# Patient Record
Sex: FEMALE | Race: BLACK OR AFRICAN AMERICAN | NOT HISPANIC OR LATINO | Employment: OTHER | URBAN - METROPOLITAN AREA
[De-identification: names, ages, dates, MRNs, and addresses within clinical notes are randomized per-mention and may not be internally consistent; named-entity substitution may affect disease eponyms.]

---

## 2017-01-04 ENCOUNTER — TELEPHONE (OUTPATIENT)
Dept: RHEUMATOLOGY | Facility: CLINIC | Age: 72
End: 2017-01-04

## 2017-01-04 NOTE — TELEPHONE ENCOUNTER
----- Message from Avril Ayon MA sent at 1/4/2017  9:21 AM CST -----  Contact: self@home      ----- Message -----     From: Mirna Matthews     Sent: 1/3/2017  11:31 AM       To: Johny MURRAY Staff    Patient states she is in extreme joint pain patient has an appointment for [facility] 2nd patient states she cannot wait that long.

## 2017-01-05 ENCOUNTER — OFFICE VISIT (OUTPATIENT)
Dept: RHEUMATOLOGY | Facility: CLINIC | Age: 72
End: 2017-01-05
Payer: MEDICARE

## 2017-01-05 VITALS
WEIGHT: 200.69 LBS | SYSTOLIC BLOOD PRESSURE: 152 MMHG | BODY MASS INDEX: 36.93 KG/M2 | HEART RATE: 74 BPM | TEMPERATURE: 98 F | DIASTOLIC BLOOD PRESSURE: 71 MMHG | HEIGHT: 62 IN

## 2017-01-05 DIAGNOSIS — M05.79 RHEUMATOID ARTHRITIS INVOLVING MULTIPLE SITES WITH POSITIVE RHEUMATOID FACTOR: ICD-10-CM

## 2017-01-05 DIAGNOSIS — R53.83 FATIGUE, UNSPECIFIED TYPE: ICD-10-CM

## 2017-01-05 DIAGNOSIS — D84.9 IMMUNOSUPPRESSION: Primary | ICD-10-CM

## 2017-01-05 PROCEDURE — 99999 PR PBB SHADOW E&M-EST. PATIENT-LVL III: CPT | Mod: PBBFAC,,, | Performed by: INTERNAL MEDICINE

## 2017-01-05 PROCEDURE — 99214 OFFICE O/P EST MOD 30 MIN: CPT | Mod: S$PBB,,, | Performed by: INTERNAL MEDICINE

## 2017-01-05 PROCEDURE — 99213 OFFICE O/P EST LOW 20 MIN: CPT | Mod: PBBFAC | Performed by: INTERNAL MEDICINE

## 2017-01-05 PROCEDURE — 96372 THER/PROPH/DIAG INJ SC/IM: CPT | Mod: PBBFAC

## 2017-01-05 RX ORDER — PREDNISONE 10 MG/1
10 TABLET ORAL DAILY
Qty: 30 TABLET | Refills: 0 | Status: SHIPPED | OUTPATIENT
Start: 2017-01-05 | End: 2017-12-30 | Stop reason: ALTCHOICE

## 2017-01-05 RX ORDER — TRIAMCINOLONE ACETONIDE 40 MG/ML
80 INJECTION, SUSPENSION INTRA-ARTICULAR; INTRAMUSCULAR
Status: COMPLETED | OUTPATIENT
Start: 2017-01-05 | End: 2017-01-05

## 2017-01-05 RX ORDER — PREDNISONE 10 MG/1
10 TABLET ORAL DAILY
Qty: 30 TABLET | Refills: 0 | Status: SHIPPED | OUTPATIENT
Start: 2017-01-05 | End: 2017-01-05 | Stop reason: SDUPTHER

## 2017-01-05 RX ADMIN — TRIAMCINOLONE ACETONIDE 80 MG: 40 INJECTION, SUSPENSION INTRA-ARTICULAR; INTRAMUSCULAR at 09:01

## 2017-01-05 ASSESSMENT — ROUTINE ASSESSMENT OF PATIENT INDEX DATA (RAPID3)
FATIGUE SCORE: 9
PSYCHOLOGICAL DISTRESS SCORE: 2.2
AM STIFFNESS SCORE: 1, YES
TOTAL RAPID3 SCORE: 6.78
MDHAQ FUNCTION SCORE: 1
PAIN SCORE: 10
PATIENT GLOBAL ASSESSMENT SCORE: 7
WHEN YOU AWAKENED IN THE MORNING OVER THE LAST WEEK, PLEASE INDICATE THE AMOUNT OF TIME IT TAKES UNTIL YOU ARE AS LIMBER AS YOU WILL BE FOR THE DAY: 24 HOURS

## 2017-01-05 NOTE — MR AVS SNAPSHOT
[facility] Rheumatology  [address]  [facility] [address]  Phone: [phone]  Fax: [phone]                  Yulia Peralta   2017 8:30 AM   Office Visit    Description:  Female : 1945   Provider:  Usha Soares MD   Department:  [facility] - Rheumatology           Reason for Visit     Rheumatoid Arthritis     Shoulder Pain           Diagnoses this Visit        Comments    Immunosuppression    -  Primary     Rheumatoid arthritis involving multiple sites with positive rheumatoid factor         Fatigue, unspecified type                To Do List           Future Appointments        Provider Department Dept Phone    2017 9:35 AM LAB, APPOINTMENT [place] [facility] [phone]    2017 8:30 AM Usha Soares MD [facility] [phone]      Goals (5 Years of Data)     None      Follow-Up and Disposition     Return in about 4 weeks (around 2017).       These Medications        Disp Refills Start End    predniSONE (DELTASONE) 10 MG tablet 30 tablet 0 2017     Take 1 tablet (10 mg total) by mouth once daily. Take for RA flare. Call office in one week with update. - Oral    Ph[facility]acy: [facility] [address] [place] [address] #: [phone]         [facility]     [facility] Care [facility] -  Assistance  Registered nurses in the [facility] provide clinical advisement, health education, appointment booking, and other advisory services.  Call for this free service at [phone].             Medications           Message regarding Medications     Verify the changes and/or additions to your medication regime listed below are the same as discussed with your clinician today.  If any of these changes or additions are incorrect, please notify your healthcare provider.        These medications were administered today        Dose Freq    triamcinolone acetonide injection 80  mg 80 mg Clinic/HOD 1 time    Sig: Inject 2 mLs (80 mg total) into the muscle one time.    Class: Normal    Route: Intramuscular           Verify that the below list of medications is an accurate representation of the medications you are currently taking.  If none reported, the list may be blank. If incorrect, please contact your healthcare provider. Carry this list with you in case of emergency.           Current Medications     butalbital-aspirin-caffeine -40 mg (FIORINAL) -40 mg Cap Take 1 capsule by mouth every 6 (six) hours as needed. 1 Capsule Oral Every 6 hours    cyanocobalamin 1,000 mcg/mL injection Inject 1 mL (1,000 mcg total) into the muscle every 30 days. 1 Solution Injection monthly.  1 mL IM in gluteal muscle monthly    ergocalciferol (VITAMIN D2) 50,000 unit Cap Take 1 capsule (50,000 Units total) by mouth every 7 days.    folic acid (FOLVITE) 1 MG tablet Take 2 tablets (2 mg total) by mouth once daily.    furosemide (LASIX) 40 MG tablet Take 1 tablet (40 mg total) by mouth once daily.    gabapentin (NEURONTIN) 100 MG capsule Take 1 capsule (100 mg total) by mouth 3 (three) times daily.    irbesartan (AVAPRO) 150 MG tablet Take 1 tablet (150 mg total) by mouth once daily. 1 Tablet Oral Every day    methotrexate 2.5 MG Tab Take 10 tablets (25 mg total) by mouth as directed. 5 tablets on Monday and Tuesday only    misoprostol (CYTOTEC) 100 MCG Tab Take 1 tablet (100 mcg total) by mouth 2 (two) times daily. 1 Tablet Oral Twice a day diarrhea occurs , decrease to once daily    nabumetone (RELAFEN) 500 MG tablet Take 1 tablet (500 mg total) by mouth 2 (two) times daily.    nystatin-triamcinolone (MYCOLOG II) cream Apply to affected area 2 times daily    pantoprazole (PROTONIX) 40 MG tablet Take 1 tablet (40 mg total) by mouth once daily. 1 Tablet, Delayed Release (E.C.) Oral Every day    predniSONE (DELTASONE) 10 MG tablet Take 1 tablet (10 mg total) by mouth once daily. Take for RA flare.  "Call office in one week with update.    ranitidine (ZANTAC) 300 MG capsule Take 1 capsule (300 mg total) by mouth nightly. 1 Capsule Oral Every day    safety needles 25 X 1 " Ndle 1 Units by Misc.(Non-Drug; Combo Route) route once a week.    syringe with needle, disp, (MONOJECT SAFETY SYRINGES) Syrg Use as directed    valacyclovir (VALTREX) 500 MG tablet 1 Tablet DAILY for suppression, increase to BID for outbreak    valsartan-hydrochlorothiazide (DIOVAN-HCT) 160-25 mg per tablet Take 1 tablet by mouth once daily.           Clinical Reference Information           Vital Signs - Last Recorded  Most recent update: 1/5/2017  8:42 AM by Avril Ayon MA    BP Pulse Temp Ht Wt BMI    (!) 152/71 (BP Location: Right arm, Patient Position: Sitting, BP Method: Automatic) 74 98.1 °F (36.7 °C) (Oral) 5' 2" (1.575 m) 91 kg (200 lb 11.2 oz) 36.71 kg/m2      Blood Pressure          Most Recent Value    BP  (!)  152/71      Allergies as of 1/5/2017     No Known Drug Allergies      Immunizations Administered on Date of Encounter - 1/5/2017     None      [facility] Sign-Up     Activating your [facility] account is as easy as 1-2-3!     1) Visit [email or web address], select Sign Up Now, enter this activation code and your date of birth, then select Next.  [email or web address]  Expires: 2/19/2017  9:29 AM      2) Create a username and password to use when you visit [facility] in the future and select a security question in case you lose your password and select Next.    3) Enter your e-mail address and click Sign Up!    Additional Information  If you have questions, please e-mail [email or web address] or call [phone] to talk to our [facility] staff. Remember, [facility] is NOT to be used for urgent needs. For medical emergencies, dial 911.         "

## 2017-01-05 NOTE — PROGRESS NOTES
"Subjective:       Patient ID: Yulia Peralta is a 71 y.o. female.    Chief Complaint: Rheumatoid Arthritis and Shoulder Pain      HPI:  Yulia Peralta is a 71 y.o. female with a history of rheumatoid arthritis for 25 years.    She had been on methotrexate for the past 5 years and her current dose is 8 tablets q week (4 Monday and 4 Tuesday).   Went back to prior dose of  10 tabs on Monday.    History of gout some years ago.  She was evaluated at [facility] for chest mass. Biopsy was negative. She had repeat MRI that showed stable mass.      Emergency visit for 4 weeks of increasing joint pain and swelling all over.  Shoulders, elbows, wrists, right knee and feet. Pain is 10/10 ache with swelling.   Morning stiffness for 20-25 minutes.    Similar to pain many years ago.    Decreased MTX due to LFT from 10 tabs to 8 tabs.  Recently started a Overton mix for energy.  Pain worse with activity.  Not improved with rest or pain medication.     Took prednisone for one week and improved but stopped and flared up.    Stopped tramadol due to hair loss.    Review of Systems   Constitutional: Positive for fatigue.   HENT: Negative.    Eyes: Negative.    Respiratory: Negative.    Cardiovascular: Negative.    Gastrointestinal: Negative.    Endocrine: Negative.    Genitourinary: Negative.    Musculoskeletal: Negative.    Skin: Negative.    Allergic/Immunologic: Negative.    Neurological: Negative.    Hematological: Negative.    Psychiatric/Behavioral: Negative.               Objective:     Visit Vitals    BP (!) 152/71 (BP Location: Right arm, Patient Position: Sitting, BP Method: Automatic)    Pulse 74    Temp 98.1 °F (36.7 °C) (Oral)    Ht 5' 2" (1.575 m)    Wt 91 kg (200 lb 11.2 oz)    BMI 36.71 kg/m2        Physical Exam   Constitutional: She is oriented to person, place, and time and well-developed, well-nourished, and in no distress.   HENT:   Head: Normocephalic and atraumatic.   Eyes: Conjunctivae and EOM are " normal.   Neck: Neck supple.   Cardiovascular: Normal rate, regular rhythm and normal heart sounds.    Pulmonary/Chest: Effort normal and breath sounds normal.   Abdominal: Soft. Bowel sounds are normal.   Neurological: She is alert and oriented to person, place, and time.   Skin: Skin is warm and dry.     Psychiatric: Mood and affect normal.   Musculoskeletal: She exhibits edema, tenderness and deformity.   28 joint count:  10 swollen (all MCPs) and 16 tender (all MCPs, wrists, shoulders, knees)    MTPs painful compression            LABS    Component      Latest Ref Rng & Units 12/12/2016 11/10/2016 11/3/2016   WBC      3.90 - 12.70 K/uL 5.63  5.54   RBC      4.00 - 5.40 M/uL 3.40 (L)  3.58 (L)   Hemoglobin      12.0 - 16.0 g/dL 10.6 (L)  11.2 (L)   Hematocrit      37.0 - 48.5 % 32.7 (L)  35.1 (L)   MCV      82 - 98 fL 96  98   MCH      27.0 - 31.0 pg 31.2 (H)  31.3 (H)   MCHC      32.0 - 36.0 % 32.4  31.9 (L)   RDW      11.5 - 14.5 % 14.3  14.6 (H)   Platelets      150 - 350 K/uL 129 (L)  131 (L)   MPV      9.2 - 12.9 fL 14.4 (H)  SEE COMMENT   Gran #      1.8 - 7.7 K/uL 2.9  3.3   Lymph #      1.0 - 4.8 K/uL 1.6  1.4   Mono #      0.3 - 1.0 K/uL 1.0  0.6   Eos #      0.0 - 0.5 K/uL 0.2  0.1   Baso #      0.00 - 0.20 K/uL 0.01  0.01   Gran%      38.0 - 73.0 % 50.8  60.1   Lymph%      18.0 - 48.0 % 28.2  26.0   Mono%      4.0 - 15.0 % 17.4 (H)  11.4   Eosinophil%      0.0 - 8.0 % 3.4  2.3   Basophil%      0.0 - 1.9 % 0.2  0.2   Platelet Estimate       Decreased (A)  Decreased (A)   Aniso         Slight   Hypo         Occasional   Vacuolated Granulocytes         Present   Differential Method       Automated  Automated   Sodium      136 - 145 mmol/L 139 140 141   Potassium      3.5 - 5.1 mmol/L 4.8 4.4 5.2 (H)   Chloride      95 - 110 mmol/L 106 105 108   CO2      23 - 29 mmol/L 28 25 28   Glucose      70 - 110 mg/dL 91 98 93   BUN, Bld      8 - 23 mg/dL 16 14 14   Creatinine      0.5 - 1.4 mg/dL 0.8 0.8 0.9    Calcium      8.7 - 10.5 mg/dL 9.6 9.5 9.4   Total Protein      6.0 - 8.4 g/dL 7.7 7.7 7.4   Albumin      3.5 - 5.2 g/dL 3.6 3.7 3.6   Total Bilirubin      0.1 - 1.0 mg/dL 0.4 0.4 0.6   Alkaline Phosphatase      55 - 135 U/L 115 111 119   AST      10 - 40 U/L 31 42 (H) 81 (H)   ALT      10 - 44 U/L 37 53 (H) 99 (H)   Anion Gap      8 - 16 mmol/L 5 (L) 10 5 (L)   eGFR if African American      >60 mL/min/1.73 m:2 >60.0 >60.0 >60.0   eGFR if non African American      >60 mL/min/1.73 m:2 >60.0 >60.0 >60.0   CRP      0.0 - 8.2 mg/L 3.4  2.2   Sed Rate      0 - 20 mm/Hr 35 (H)  25 (H)     Assessment:       1. Rheumatoid arthritis manifested by rheumatoid nodule, polyarthritis in the past, and contractures of the elbows.    Treated in the past with gold. Plaquenil did not work in the past and she never took SSZ.   Now with flare. Continue MTX (5 tabs on Monday and 5 tabs on Tuesday) and folic acid   2. Encounter for long-term (current) use of other medications    3. Fatigue. 4. Chest pain. Small mass on CT evaluated by cardiothoracic surgery who said it was too small to biopsy and will repeat scan in one month.  She decided to go for second opinion at [facility]. Biopsy was negative. MRI repeat stable  5. 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 Felt not to have hepatitis C by hepatology.  6.Right anserine bursitis.  Now painful  7. Obesity  8. HTN  Plan:       1.  Kenalog 80 mg IM  2.  Prednisone 10 mg daily if pain returns  3.  Labs     RTO 1 month/prn

## 2017-01-09 ENCOUNTER — TELEPHONE (OUTPATIENT)
Dept: RHEUMATOLOGY | Facility: CLINIC | Age: 72
End: 2017-01-09

## 2017-01-09 NOTE — TELEPHONE ENCOUNTER
----- Message from Avril Ayon MA sent at 1/9/2017 11:38 AM CST -----  Contact: self@ home       ----- Message -----     From: Anali Pantoja     Sent: 1/9/2017  11:16 AM       To: Johny MURRAY Staff    Pt is calling to speak with Dr. Ramirez about her pain  And her medication.

## 2017-01-09 NOTE — TELEPHONE ENCOUNTER
Patient reports no improvement in pain.  Patient to start prednisone 20 mg daily and send update Thursday.  Patient informed of lab results.

## 2017-03-15 DIAGNOSIS — M06.9 RHEUMATOID ARTHRITIS OF HAND, UNSPECIFIED LATERALITY, UNSPECIFIED RHEUMATOID FACTOR PRESENCE: ICD-10-CM

## 2017-03-15 RX ORDER — METHOTREXATE 2.5 MG/1
25 TABLET ORAL
Qty: 120 TABLET | Refills: 0 | Status: SHIPPED | OUTPATIENT
Start: 2017-03-15 | End: 2017-06-20 | Stop reason: SDUPTHER

## 2017-03-15 NOTE — TELEPHONE ENCOUNTER
----- Message from Avril Ayon MA sent at 3/15/2017  9:35 AM CDT -----  Contact: patient visited office      ----- Message -----     From: Dejan Monterroso     Sent: 3/15/2017   9:23 AM       To: Avril Ayon MA    Patient needs to refill on prescription. Methotrexate

## 2017-04-26 ENCOUNTER — OFFICE VISIT (OUTPATIENT)
Dept: FAMILY MEDICINE | Facility: CLINIC | Age: 72
End: 2017-04-26
Payer: MEDICARE

## 2017-04-26 VITALS
HEIGHT: 62 IN | OXYGEN SATURATION: 98 % | TEMPERATURE: 98 F | BODY MASS INDEX: 37.73 KG/M2 | WEIGHT: 205 LBS | DIASTOLIC BLOOD PRESSURE: 86 MMHG | SYSTOLIC BLOOD PRESSURE: 160 MMHG | HEART RATE: 64 BPM | RESPIRATION RATE: 17 BRPM

## 2017-04-26 DIAGNOSIS — Z13.6 SCREENING FOR CARDIOVASCULAR CONDITION: ICD-10-CM

## 2017-04-26 DIAGNOSIS — I10 ESSENTIAL HYPERTENSION: Primary | ICD-10-CM

## 2017-04-26 DIAGNOSIS — Z23 NEED FOR PNEUMOCOCCAL VACCINATION: ICD-10-CM

## 2017-04-26 DIAGNOSIS — R51.9 CHRONIC NONINTRACTABLE HEADACHE, UNSPECIFIED HEADACHE TYPE: ICD-10-CM

## 2017-04-26 DIAGNOSIS — G89.29 CHRONIC NONINTRACTABLE HEADACHE, UNSPECIFIED HEADACHE TYPE: ICD-10-CM

## 2017-04-26 DIAGNOSIS — R51.9 NONINTRACTABLE EPISODIC HEADACHE, UNSPECIFIED HEADACHE TYPE: ICD-10-CM

## 2017-04-26 DIAGNOSIS — M54.2 NECK PAIN: ICD-10-CM

## 2017-04-26 PROCEDURE — 99213 OFFICE O/P EST LOW 20 MIN: CPT | Mod: PBBFAC,PN | Performed by: INTERNAL MEDICINE

## 2017-04-26 PROCEDURE — 99999 PR PBB SHADOW E&M-EST. PATIENT-LVL III: CPT | Mod: PBBFAC,,, | Performed by: INTERNAL MEDICINE

## 2017-04-26 PROCEDURE — 99214 OFFICE O/P EST MOD 30 MIN: CPT | Mod: S$PBB,,, | Performed by: INTERNAL MEDICINE

## 2017-05-07 NOTE — PROGRESS NOTES
Subjective:       Patient ID: Yulia Peralta is a 71 y.o. female.    Chief Complaint: Hypertension and Follow-up    HPI Comments: C/o intermittent headache, lasting a few second, 9/10, radiates to neck and forehead.  No triggers.  Concerned about carotid disease.  She has had no meds today.    Review of Systems   Eyes: Negative for visual disturbance.   Neurological: Positive for dizziness and headaches.       Objective:      Physical Exam   Constitutional: She is oriented to person, place, and time. She appears well-developed and well-nourished. No distress.   HENT:   Head: Normocephalic and atraumatic.   Right Ear: External ear normal.   Left Ear: External ear normal.   Eyes: Conjunctivae are normal. No scleral icterus.   Cardiovascular: Normal rate, regular rhythm and normal heart sounds.  Exam reveals no gallop and no friction rub.    No murmur heard.  Pulmonary/Chest: Effort normal and breath sounds normal. No respiratory distress. She has no wheezes. She has no rales.   Abdominal: Soft. Bowel sounds are normal. She exhibits no distension. There is no tenderness.   Musculoskeletal: She exhibits no tenderness.   Neurological: She is alert and oriented to person, place, and time. No cranial nerve deficit.   Skin: Skin is warm and dry.   Psychiatric: She has a normal mood and affect.   Vitals reviewed.      Assessment:       1. Essential hypertension    2. Need for pneumococcal vaccination    3. Chronic nonintractable headache, unspecified headache type    4. Neck pain    5. Screening for cardiovascular condition    6. Nonintractable episodic headache, unspecified headache type        Plan:       Yulia was seen today for hypertension and follow-up.    Diagnoses and all orders for this visit:    Essential hypertension - bp elevated,  No meds today. Reassess next visit    Need for pneumococcal vaccination  -     Cancel: Pneumococcal Conjugate Vaccine (13 Valent) (IM)    Chronic nonintractable headache,  unspecified headache type - no red flags.  F/u if persistent    Neck pain    Screening for cardiovascular condition    F/u 3 months and prn

## 2017-06-13 ENCOUNTER — TELEPHONE (OUTPATIENT)
Dept: RHEUMATOLOGY | Facility: CLINIC | Age: 72
End: 2017-06-13

## 2017-06-13 DIAGNOSIS — D64.9 ANEMIA, UNSPECIFIED TYPE: ICD-10-CM

## 2017-06-13 DIAGNOSIS — M06.9 RHEUMATOID ARTHRITIS OF HAND, UNSPECIFIED LATERALITY, UNSPECIFIED RHEUMATOID FACTOR PRESENCE: ICD-10-CM

## 2017-06-13 RX ORDER — METHOTREXATE 2.5 MG/1
25 TABLET ORAL
Qty: 120 TABLET | Refills: 0 | OUTPATIENT
Start: 2017-06-13 | End: 2017-08-21

## 2017-06-13 RX ORDER — CYANOCOBALAMIN 1000 UG/ML
1000 INJECTION, SOLUTION INTRAMUSCULAR; SUBCUTANEOUS
Qty: 10 ML | Refills: 3 | Status: SHIPPED | OUTPATIENT
Start: 2017-06-13 | End: 2017-06-19 | Stop reason: SDUPTHER

## 2017-06-13 RX ORDER — FUROSEMIDE 40 MG/1
40 TABLET ORAL DAILY
Qty: 90 TABLET | Refills: 1 | Status: SHIPPED | OUTPATIENT
Start: 2017-06-13 | End: 2017-06-19 | Stop reason: SDUPTHER

## 2017-06-13 NOTE — TELEPHONE ENCOUNTER
----- Message from Queenie Houston sent at 6/13/2017 10:55 AM CDT -----  Contact: Self/[phone]  Refill:  furosemide (LASIX) 40 MG tablet  Refill:  cyanocobalamin 1,000 mcg/mL injection  Refill:  BD 3ML syringes    Patient is requesting a 90 day supply. Thank you.

## 2017-06-19 ENCOUNTER — LAB VISIT (OUTPATIENT)
Dept: LAB | Facility: HOSPITAL | Age: 72
End: 2017-06-19
Attending: INTERNAL MEDICINE
Payer: MEDICARE

## 2017-06-19 ENCOUNTER — TELEPHONE (OUTPATIENT)
Dept: RHEUMATOLOGY | Facility: CLINIC | Age: 72
End: 2017-06-19

## 2017-06-19 DIAGNOSIS — Z79.899 ENCOUNTER FOR LONG-TERM (CURRENT) USE OF MEDICATIONS: ICD-10-CM

## 2017-06-19 DIAGNOSIS — D64.9 ANEMIA, UNSPECIFIED TYPE: ICD-10-CM

## 2017-06-19 DIAGNOSIS — M06.9 RHEUMATOID ARTHRITIS: ICD-10-CM

## 2017-06-19 DIAGNOSIS — M06.9 RHEUMATOID ARTHRITIS OF HAND, UNSPECIFIED LATERALITY, UNSPECIFIED RHEUMATOID FACTOR PRESENCE: ICD-10-CM

## 2017-06-19 LAB
ALBUMIN SERPL BCP-MCNC: 3.7 G/DL
ALP SERPL-CCNC: 100 U/L
ALT SERPL W/O P-5'-P-CCNC: 26 U/L
ANION GAP SERPL CALC-SCNC: 7 MMOL/L
AST SERPL-CCNC: 23 U/L
BASOPHILS # BLD AUTO: 0.01 K/UL
BASOPHILS NFR BLD: 0.2 %
BILIRUB SERPL-MCNC: 0.3 MG/DL
BUN SERPL-MCNC: 14 MG/DL
CALCIUM SERPL-MCNC: 9.5 MG/DL
CHLORIDE SERPL-SCNC: 108 MMOL/L
CO2 SERPL-SCNC: 26 MMOL/L
CREAT SERPL-MCNC: 0.9 MG/DL
CRP SERPL-MCNC: 1.8 MG/L
DIFFERENTIAL METHOD: ABNORMAL
EOSINOPHIL # BLD AUTO: 0.3 K/UL
EOSINOPHIL NFR BLD: 4 %
ERYTHROCYTE [DISTWIDTH] IN BLOOD BY AUTOMATED COUNT: 14.2 %
ERYTHROCYTE [SEDIMENTATION RATE] IN BLOOD BY WESTERGREN METHOD: 16 MM/HR
EST. GFR  (AFRICAN AMERICAN): >60 ML/MIN/1.73 M^2
EST. GFR  (NON AFRICAN AMERICAN): >60 ML/MIN/1.73 M^2
GLUCOSE SERPL-MCNC: 95 MG/DL
HCT VFR BLD AUTO: 33.4 %
HGB BLD-MCNC: 10.7 G/DL
LYMPHOCYTES # BLD AUTO: 1.9 K/UL
LYMPHOCYTES NFR BLD: 30.1 %
MCH RBC QN AUTO: 31.5 PG
MCHC RBC AUTO-ENTMCNC: 32 %
MCV RBC AUTO: 98 FL
MONOCYTES # BLD AUTO: 1.1 K/UL
MONOCYTES NFR BLD: 17.6 %
NEUTROPHILS # BLD AUTO: 3 K/UL
NEUTROPHILS NFR BLD: 48.1 %
PLATELET # BLD AUTO: 131 K/UL
PMV BLD AUTO: 13 FL
POTASSIUM SERPL-SCNC: 5 MMOL/L
PROT SERPL-MCNC: 7.4 G/DL
RBC # BLD AUTO: 3.4 M/UL
SODIUM SERPL-SCNC: 141 MMOL/L
WBC # BLD AUTO: 6.18 K/UL

## 2017-06-19 PROCEDURE — 85651 RBC SED RATE NONAUTOMATED: CPT

## 2017-06-19 PROCEDURE — 85025 COMPLETE CBC W/AUTO DIFF WBC: CPT

## 2017-06-19 PROCEDURE — 36415 COLL VENOUS BLD VENIPUNCTURE: CPT | Mod: PO

## 2017-06-19 PROCEDURE — 80053 COMPREHEN METABOLIC PANEL: CPT

## 2017-06-19 PROCEDURE — 86140 C-REACTIVE PROTEIN: CPT

## 2017-06-19 RX ORDER — CYANOCOBALAMIN 1000 UG/ML
1000 INJECTION, SOLUTION INTRAMUSCULAR; SUBCUTANEOUS
Qty: 1 ML | Refills: 3 | Status: SHIPPED | OUTPATIENT
Start: 2017-06-19 | End: 2018-01-04 | Stop reason: SDUPTHER

## 2017-06-19 RX ORDER — FUROSEMIDE 40 MG/1
40 TABLET ORAL DAILY
Qty: 30 TABLET | Refills: 3 | Status: SHIPPED | OUTPATIENT
Start: 2017-06-19 | End: 2017-08-24 | Stop reason: SDUPTHER

## 2017-06-19 RX ORDER — NABUMETONE 500 MG/1
500 TABLET, FILM COATED ORAL 2 TIMES DAILY
Qty: 180 TABLET | Refills: 3 | Status: SHIPPED | OUTPATIENT
Start: 2017-06-19 | End: 2018-02-01 | Stop reason: SDUPTHER

## 2017-06-19 NOTE — TELEPHONE ENCOUNTER
----- Message from Gloria Bernal sent at 6/19/2017 10:34 AM CDT -----  Contact: [phone]  Pt is requesting  Transfers on Refill:  furosemide (LASIX) 40 MG tablet  Refill:  cyanocobalamin 1,000 mcg/mL injection  Refill:  BD 3ML syringes at a 90 days supply on all them please send to [facility] [address] [facility]

## 2017-06-19 NOTE — TELEPHONE ENCOUNTER
----- Message from Avril Ayon MA sent at 6/19/2017 10:48 AM CDT -----  Contact: self/home      ----- Message -----  From: Harry Subramanian  Sent: 6/19/2017  10:35 AM  To: Johny MURRAY Staff    Pt called in stating that she needs a refill on her methotraxate medication. Pt would like the rx to be called into [facility].

## 2017-06-19 NOTE — TELEPHONE ENCOUNTER
LOV 4-26-17    Spoke with patient. Prescriptions were sent to [facility]. They [facility] have been sent to [facility]       Needs 30 days supply with 3 refills sent to [facility]    Please resend

## 2017-06-20 ENCOUNTER — TELEPHONE (OUTPATIENT)
Dept: RHEUMATOLOGY | Facility: CLINIC | Age: 72
End: 2017-06-20

## 2017-06-20 DIAGNOSIS — M06.9 RHEUMATOID ARTHRITIS OF HAND, UNSPECIFIED LATERALITY, UNSPECIFIED RHEUMATOID FACTOR PRESENCE: ICD-10-CM

## 2017-06-20 DIAGNOSIS — M05.79 RHEUMATOID ARTHRITIS INVOLVING MULTIPLE SITES WITH POSITIVE RHEUMATOID FACTOR: Primary | ICD-10-CM

## 2017-06-20 RX ORDER — METHOTREXATE 2.5 MG/1
25 TABLET ORAL
Qty: 120 TABLET | Refills: 0 | Status: SHIPPED | OUTPATIENT
Start: 2017-06-20 | End: 2017-09-21 | Stop reason: SDUPTHER

## 2017-08-02 ENCOUNTER — OFFICE VISIT (OUTPATIENT)
Dept: OPTOMETRY | Facility: CLINIC | Age: 72
End: 2017-08-02
Payer: MEDICARE

## 2017-08-02 DIAGNOSIS — H25.13 NUCLEAR SCLEROSIS, BILATERAL: ICD-10-CM

## 2017-08-02 DIAGNOSIS — Z13.5 GLAUCOMA SCREENING: ICD-10-CM

## 2017-08-02 DIAGNOSIS — I10 ESSENTIAL HYPERTENSION: Primary | ICD-10-CM

## 2017-08-02 DIAGNOSIS — H52.03 HYPEROPIA WITH PRESBYOPIA, BILATERAL: ICD-10-CM

## 2017-08-02 DIAGNOSIS — H52.4 HYPEROPIA WITH PRESBYOPIA, BILATERAL: ICD-10-CM

## 2017-08-02 PROCEDURE — 92015 DETERMINE REFRACTIVE STATE: CPT | Mod: ,,, | Performed by: OPTOMETRIST

## 2017-08-02 PROCEDURE — 99999 PR PBB SHADOW E&M-EST. PATIENT-LVL II: CPT | Mod: PBBFAC,,, | Performed by: OPTOMETRIST

## 2017-08-02 PROCEDURE — 92014 COMPRE OPH EXAM EST PT 1/>: CPT | Mod: S$PBB,,, | Performed by: OPTOMETRIST

## 2017-08-02 PROCEDURE — 99212 OFFICE O/P EST SF 10 MIN: CPT | Mod: PBBFAC,PO | Performed by: OPTOMETRIST

## 2017-08-02 NOTE — PROGRESS NOTES
"Subjective:       Patient ID: Yulia Peralta is a 72 y.o. female      Chief Complaint   Patient presents with    Hypertensive Eye Exam     History of Present Illness  Dls: 7/26/16 Dr. Oconnor     Pt here for hypertensive eye exam.   Pt c/o blurry vision at near ou x few weeks. Pt wears bifocal glasses. Pt   c/o pain os off/on no tearing no itching no burning no ha's no floaters.     Eye meds:  None     Assessment/Plan:     1. Essential hypertension  No hypertensive retinopathy. Continue BP control. RTC 1 year for DFE.    2. Nuclear sclerosis, bilateral  Educated patient on the presence of cataracts and effects on vision, including clouded, blurred or dim vision, increasing difficulty with vision at night, sensitivity to light and glare, need for brighter light for reading and other activities, and seeing "halos" around lights. No surgery at this time. Recheck in one year.    3. Glaucoma screening  No changes related to glaucoma. Monitor yearly.    4. Hyperopia with presbyopia, bilateral  Educated patient on refractive error and discussed lens options. Dispensed updated spectacle Rx. Educated about adaptation period to new specs.    Eyeglass Final Rx     Eyeglass Final Rx       Sphere Cylinder Add    Right +1.25 Sphere +2.75    Left +2.25 Sphere +2.75    Type:  Bifocal    Expiration Date:  8/3/2018                  Return in about 1 year (around 8/2/2018) for Annual eye exam.       "

## 2017-08-03 ENCOUNTER — TELEPHONE (OUTPATIENT)
Dept: RHEUMATOLOGY | Facility: CLINIC | Age: 72
End: 2017-08-03

## 2017-08-03 NOTE — TELEPHONE ENCOUNTER
----- Message from Avril Ayon MA sent at 8/2/2017  4:24 PM CDT -----  Contact: self@[phone] or [phone]      ----- Message -----  From: Bautista Norwood  Sent: 8/2/2017   4:16 PM  To: Johny Kerr A Staff    Pt called in advising that she has been have bad arthritis flare-ups for the past 2 weeks. Pt stated that she is experiencing swelling in her shoulders, hands and legs and can hardly sleep. She is asking to please call her to advise    Thank you

## 2017-08-03 NOTE — TELEPHONE ENCOUNTER
Increased joint pain in shoulders and swelling in hands and feet  Says she is currently taking her mtx and nabumetone   She has some prednisone left over from previous flare up  I advised her to resume prednisone 10 mg for 5-7 days the report back.

## 2017-08-23 ENCOUNTER — TELEPHONE (OUTPATIENT)
Dept: RHEUMATOLOGY | Facility: CLINIC | Age: 72
End: 2017-08-23

## 2017-08-23 DIAGNOSIS — M06.9 RHEUMATOID ARTHRITIS OF HAND, UNSPECIFIED LATERALITY, UNSPECIFIED RHEUMATOID FACTOR PRESENCE: ICD-10-CM

## 2017-08-23 RX ORDER — FOLIC ACID 1 MG/1
2 TABLET ORAL DAILY
Qty: 180 TABLET | Refills: 3 | Status: SHIPPED | OUTPATIENT
Start: 2017-08-23 | End: 2017-10-27 | Stop reason: SDUPTHER

## 2017-08-23 NOTE — TELEPHONE ENCOUNTER
Patient reports shoulders and right leg have been painful for a month.  Some ankle swelling.  Pain travels down shoulders into arm.  Rubbing them helps.  Gabapentin has not helped.  She took prednisone 10 mg daily for 1 week which helped.  Will do labs tomorrow after she sees primary doctor.  If elevated inflammatory markers will consider adjusting medications and determine if she needs to come in early for visit.

## 2017-08-23 NOTE — TELEPHONE ENCOUNTER
----- Message from Avril Ayon MA sent at 8/21/2017  7:12 AM CDT -----  Contact: self@home, [phone]      ----- Message -----  From: Bautista Norwood  Sent: 8/18/2017   4:40 PM  To: Johny MURRAY Staff    Pt called in stating that she is experiencing a flare up in both her shoulders and her right leg and needs to be seen as soon as possible. Pt also stated that she needs a one month supply of Folic Acid sent to [facility] [address] [place] [address] - [facility].    Thank you

## 2017-08-24 ENCOUNTER — OFFICE VISIT (OUTPATIENT)
Dept: FAMILY MEDICINE | Facility: CLINIC | Age: 72
End: 2017-08-24
Payer: MEDICARE

## 2017-08-24 VITALS
BODY MASS INDEX: 36.92 KG/M2 | WEIGHT: 200.63 LBS | SYSTOLIC BLOOD PRESSURE: 140 MMHG | TEMPERATURE: 98 F | OXYGEN SATURATION: 97 % | DIASTOLIC BLOOD PRESSURE: 78 MMHG | HEIGHT: 62 IN | HEART RATE: 70 BPM | RESPIRATION RATE: 16 BRPM

## 2017-08-24 DIAGNOSIS — M05.79 RHEUMATOID ARTHRITIS INVOLVING MULTIPLE SITES WITH POSITIVE RHEUMATOID FACTOR: ICD-10-CM

## 2017-08-24 DIAGNOSIS — Z23 NEED FOR PNEUMOCOCCAL VACCINATION: ICD-10-CM

## 2017-08-24 DIAGNOSIS — B00.9 HERPES: ICD-10-CM

## 2017-08-24 DIAGNOSIS — I10 ESSENTIAL HYPERTENSION: Primary | ICD-10-CM

## 2017-08-24 PROCEDURE — 99999 PR PBB SHADOW E&M-EST. PATIENT-LVL III: CPT | Mod: PBBFAC,,, | Performed by: INTERNAL MEDICINE

## 2017-08-24 PROCEDURE — 90670 PCV13 VACCINE IM: CPT | Mod: PBBFAC,PN

## 2017-08-24 PROCEDURE — 1125F AMNT PAIN NOTED PAIN PRSNT: CPT | Mod: ,,, | Performed by: INTERNAL MEDICINE

## 2017-08-24 PROCEDURE — 3077F SYST BP >= 140 MM HG: CPT | Mod: ,,, | Performed by: INTERNAL MEDICINE

## 2017-08-24 PROCEDURE — 3078F DIAST BP <80 MM HG: CPT | Mod: ,,, | Performed by: INTERNAL MEDICINE

## 2017-08-24 PROCEDURE — 99213 OFFICE O/P EST LOW 20 MIN: CPT | Mod: PBBFAC,PN,25 | Performed by: INTERNAL MEDICINE

## 2017-08-24 PROCEDURE — 1159F MED LIST DOCD IN RCRD: CPT | Mod: ,,, | Performed by: INTERNAL MEDICINE

## 2017-08-24 PROCEDURE — 99214 OFFICE O/P EST MOD 30 MIN: CPT | Mod: S$PBB,,, | Performed by: INTERNAL MEDICINE

## 2017-08-24 RX ORDER — FUROSEMIDE 40 MG/1
40 TABLET ORAL DAILY
Qty: 90 TABLET | Refills: 1 | Status: SHIPPED | OUTPATIENT
Start: 2017-08-24 | End: 2017-10-13 | Stop reason: SDUPTHER

## 2017-08-24 RX ORDER — VALACYCLOVIR HYDROCHLORIDE 500 MG/1
TABLET, FILM COATED ORAL
Qty: 180 TABLET | Refills: 2 | Status: SHIPPED | OUTPATIENT
Start: 2017-08-24 | End: 2022-09-01 | Stop reason: SDUPTHER

## 2017-08-24 NOTE — PROGRESS NOTES
Subjective:       Patient ID: Yulia Peralta is a 72 y.o. female.    Chief Complaint: Hypertension and Follow-up    She presents for follow-up today.  She has not had her blood pressure medication this morning.  She does complain of feeling tired lately.  She's not been sleeping due to pain in her shoulders and back.  This is been present for about a month.  She has discussed with her rheumatologist who had planned to order labs.  She did complete a week of prednisone 10 mg with some improvement.  She also complains of feeling dizzy and off balance for brief periods intermittently over the past month.  She has pain in her right lower leg.      Review of Systems   Respiratory: Negative for shortness of breath.    Cardiovascular: Negative for chest pain and leg swelling.   Musculoskeletal: Positive for arthralgias and back pain.   Neurological: Positive for dizziness. Negative for syncope and headaches.       Objective:      Physical Exam   Constitutional: She is oriented to person, place, and time. She appears well-developed and well-nourished. No distress.   HENT:   Head: Normocephalic and atraumatic.   Right Ear: External ear normal.   Left Ear: External ear normal.   Eyes: Conjunctivae are normal. No scleral icterus.   Neck: No muscular tenderness present.   Cardiovascular: Normal rate, regular rhythm and normal heart sounds.  Exam reveals no gallop and no friction rub.    No murmur heard.  Pulmonary/Chest: Effort normal and breath sounds normal. No respiratory distress. She has no wheezes. She has no rales.   Musculoskeletal: She exhibits edema. She exhibits no tenderness.        Right shoulder: She exhibits normal range of motion, no tenderness, no bony tenderness and no swelling.        Left shoulder: She exhibits normal range of motion, no tenderness, no bony tenderness and no swelling.   Neurological: She is alert and oriented to person, place, and time. No cranial nerve deficit.   Skin: Skin is warm and  dry.   Psychiatric: She has a normal mood and affect.   Vitals reviewed.      Assessment:       1. Need for pneumococcal vaccination    2. Rheumatoid arthritis involving multiple sites with positive rheumatoid factor    3. Essential hypertension    4. Herpes        Plan:       Need for pneumococcal vaccination  -     Pneumococcal Conjugate Vaccine (13 Valent) (IM)    Rheumatoid arthritis involving multiple sites with positive rheumatoid factor - complains of bilateral shoulder pain and right lower leg pain.  No significant tenderness on exam.  She has discussed with rheumatology.  We'll order labs for her planned and for her when results are available.  She also complains of some dizziness today.  Encouraged adequate water intake.  We'll follow-up her labs.  -     CBC auto differential; Future  -     Comprehensive metabolic panel; Future  -     Sedimentation rate, manual; Future  -     C-reactive protein; Future    Essential hypertension - BP elevated today though improved.  She has not had her medication.  We'll have her follow-up for a blood pressure check  -     Lipid panel; Future  -     furosemide (LASIX) 40 MG tablet; Take 1 tablet (40 mg total) by mouth once daily.  Dispense: 90 tablet; Refill: 1    Herpes  -     valacyclovir (VALTREX) 500 MG tablet; 1 Tablet DAILY for suppression, increase to BID for outbreak  Dispense: 180 tablet; Refill: 2       follow-up in 6 months and when necessary

## 2017-08-24 NOTE — PROGRESS NOTES
Pnemococcal 13 Given to the pt as order by the MD. The patient tolerated well, I advised the patient to wait 15 minuets to observe for any vaccine reactions. The pt. Expressed an understanding.

## 2017-08-25 ENCOUNTER — LAB VISIT (OUTPATIENT)
Dept: LAB | Facility: HOSPITAL | Age: 72
End: 2017-08-25
Attending: INTERNAL MEDICINE
Payer: MEDICARE

## 2017-08-25 ENCOUNTER — TELEPHONE (OUTPATIENT)
Dept: RHEUMATOLOGY | Facility: CLINIC | Age: 72
End: 2017-08-25

## 2017-08-25 DIAGNOSIS — I10 ESSENTIAL HYPERTENSION: ICD-10-CM

## 2017-08-25 DIAGNOSIS — M05.79 RHEUMATOID ARTHRITIS INVOLVING MULTIPLE SITES WITH POSITIVE RHEUMATOID FACTOR: ICD-10-CM

## 2017-08-25 LAB
ALBUMIN SERPL BCP-MCNC: 3.6 G/DL
ALP SERPL-CCNC: 102 U/L
ALT SERPL W/O P-5'-P-CCNC: 33 U/L
ANION GAP SERPL CALC-SCNC: 10 MMOL/L
AST SERPL-CCNC: 29 U/L
BASOPHILS # BLD AUTO: 0.01 K/UL
BASOPHILS NFR BLD: 0.1 %
BILIRUB SERPL-MCNC: 0.5 MG/DL
BUN SERPL-MCNC: 13 MG/DL
CALCIUM SERPL-MCNC: 9.8 MG/DL
CHLORIDE SERPL-SCNC: 109 MMOL/L
CHOLEST/HDLC SERPL: 2.8 {RATIO}
CO2 SERPL-SCNC: 24 MMOL/L
CREAT SERPL-MCNC: 0.7 MG/DL
CRP SERPL-MCNC: 2.4 MG/L
DIFFERENTIAL METHOD: ABNORMAL
EOSINOPHIL # BLD AUTO: 0.1 K/UL
EOSINOPHIL NFR BLD: 1.9 %
ERYTHROCYTE [DISTWIDTH] IN BLOOD BY AUTOMATED COUNT: 14.6 %
ERYTHROCYTE [SEDIMENTATION RATE] IN BLOOD BY WESTERGREN METHOD: 27 MM/HR
EST. GFR  (AFRICAN AMERICAN): >60 ML/MIN/1.73 M^2
EST. GFR  (NON AFRICAN AMERICAN): >60 ML/MIN/1.73 M^2
GIANT PLATELETS BLD QL SMEAR: PRESENT
GLUCOSE SERPL-MCNC: 95 MG/DL
HCT VFR BLD AUTO: 33.5 %
HDL/CHOLESTEROL RATIO: 35.7 %
HDLC SERPL-MCNC: 154 MG/DL
HDLC SERPL-MCNC: 55 MG/DL
HGB BLD-MCNC: 11 G/DL
LDLC SERPL CALC-MCNC: 89 MG/DL
LYMPHOCYTES # BLD AUTO: 1.4 K/UL
LYMPHOCYTES NFR BLD: 20.4 %
MCH RBC QN AUTO: 31.3 PG
MCHC RBC AUTO-ENTMCNC: 32.8 G/DL
MCV RBC AUTO: 95 FL
MONOCYTES # BLD AUTO: 0.7 K/UL
MONOCYTES NFR BLD: 9.6 %
NEUTROPHILS # BLD AUTO: 4.7 K/UL
NEUTROPHILS NFR BLD: 68 %
NONHDLC SERPL-MCNC: 99 MG/DL
PLATELET # BLD AUTO: 155 K/UL
PLATELET BLD QL SMEAR: ABNORMAL
PMV BLD AUTO: 13.6 FL
POTASSIUM SERPL-SCNC: 4.8 MMOL/L
PROT SERPL-MCNC: 7.4 G/DL
RBC # BLD AUTO: 3.51 M/UL
SODIUM SERPL-SCNC: 143 MMOL/L
TRIGL SERPL-MCNC: 50 MG/DL
WBC # BLD AUTO: 6.86 K/UL

## 2017-08-25 PROCEDURE — 86140 C-REACTIVE PROTEIN: CPT

## 2017-08-25 PROCEDURE — 85025 COMPLETE CBC W/AUTO DIFF WBC: CPT

## 2017-08-25 PROCEDURE — 36415 COLL VENOUS BLD VENIPUNCTURE: CPT | Mod: PO

## 2017-08-25 PROCEDURE — 80053 COMPREHEN METABOLIC PANEL: CPT

## 2017-08-25 PROCEDURE — 85651 RBC SED RATE NONAUTOMATED: CPT

## 2017-08-25 PROCEDURE — 80061 LIPID PANEL: CPT

## 2017-08-29 ENCOUNTER — TELEPHONE (OUTPATIENT)
Dept: RHEUMATOLOGY | Facility: CLINIC | Age: 72
End: 2017-08-29

## 2017-08-30 ENCOUNTER — TELEPHONE (OUTPATIENT)
Dept: FAMILY MEDICINE | Facility: CLINIC | Age: 72
End: 2017-08-30

## 2017-08-30 DIAGNOSIS — Z23 NEED FOR TDAP VACCINATION: Primary | ICD-10-CM

## 2017-08-30 NOTE — TELEPHONE ENCOUNTER
Notify the patient that I have received her records from Dr. Molina. Her last Tdap was documented as 10/25/2005.  She is due.  I have sent a prescription to her [facility] pharmacy.

## 2017-09-21 DIAGNOSIS — M06.9 RHEUMATOID ARTHRITIS OF HAND, UNSPECIFIED LATERALITY, UNSPECIFIED RHEUMATOID FACTOR PRESENCE: ICD-10-CM

## 2017-09-22 RX ORDER — METHOTREXATE 2.5 MG/1
25 TABLET ORAL
Qty: 120 TABLET | Refills: 0 | Status: SHIPPED | OUTPATIENT
Start: 2017-09-22 | End: 2017-10-27 | Stop reason: SDUPTHER

## 2017-09-25 ENCOUNTER — TELEPHONE (OUTPATIENT)
Dept: FAMILY MEDICINE | Facility: CLINIC | Age: 72
End: 2017-09-25

## 2017-09-27 ENCOUNTER — TELEPHONE (OUTPATIENT)
Dept: FAMILY MEDICINE | Facility: CLINIC | Age: 72
End: 2017-09-27

## 2017-09-27 NOTE — TELEPHONE ENCOUNTER
----- Message from Rachelle Rivers sent at 9/27/2017  3:35 PM CDT -----  Contact: SELF  Patient is returning Mara 's call .    [phone] or  [phone] QN

## 2017-09-28 NOTE — TELEPHONE ENCOUNTER
There are no MRI orders in [facility].  She will need to request this from the ordering physician.

## 2017-09-29 ENCOUNTER — OFFICE VISIT (OUTPATIENT)
Dept: FAMILY MEDICINE | Facility: CLINIC | Age: 72
End: 2017-09-29
Payer: MEDICARE

## 2017-09-29 VITALS
OXYGEN SATURATION: 98 % | RESPIRATION RATE: 17 BRPM | HEIGHT: 62 IN | SYSTOLIC BLOOD PRESSURE: 148 MMHG | TEMPERATURE: 98 F | HEART RATE: 67 BPM | DIASTOLIC BLOOD PRESSURE: 82 MMHG | BODY MASS INDEX: 37.61 KG/M2 | WEIGHT: 204.38 LBS

## 2017-09-29 DIAGNOSIS — M54.16 LUMBAR RADICULOPATHY: ICD-10-CM

## 2017-09-29 DIAGNOSIS — Z23 NEED FOR PROPHYLACTIC VACCINATION AND INOCULATION AGAINST INFLUENZA: ICD-10-CM

## 2017-09-29 DIAGNOSIS — K14.6 SORENESS OF TONGUE: ICD-10-CM

## 2017-09-29 DIAGNOSIS — L91.8 SKIN TAG: ICD-10-CM

## 2017-09-29 DIAGNOSIS — E04.2 NONTOXIC MULTINODULAR GOITER: ICD-10-CM

## 2017-09-29 DIAGNOSIS — Z23 NEED FOR INFLUENZA VACCINATION: ICD-10-CM

## 2017-09-29 DIAGNOSIS — D49.89 THYMOMA: Primary | ICD-10-CM

## 2017-09-29 DIAGNOSIS — R09.89 ABNORMAL CAROTID PULSE: ICD-10-CM

## 2017-09-29 PROCEDURE — 99213 OFFICE O/P EST LOW 20 MIN: CPT | Mod: PBBFAC,PN | Performed by: INTERNAL MEDICINE

## 2017-09-29 PROCEDURE — 99214 OFFICE O/P EST MOD 30 MIN: CPT | Mod: S$PBB,,, | Performed by: INTERNAL MEDICINE

## 2017-09-29 PROCEDURE — 3079F DIAST BP 80-89 MM HG: CPT | Mod: ,,, | Performed by: INTERNAL MEDICINE

## 2017-09-29 PROCEDURE — 1159F MED LIST DOCD IN RCRD: CPT | Mod: ,,, | Performed by: INTERNAL MEDICINE

## 2017-09-29 PROCEDURE — 3077F SYST BP >= 140 MM HG: CPT | Mod: ,,, | Performed by: INTERNAL MEDICINE

## 2017-09-29 PROCEDURE — 1125F AMNT PAIN NOTED PAIN PRSNT: CPT | Mod: ,,, | Performed by: INTERNAL MEDICINE

## 2017-09-29 PROCEDURE — 99999 PR PBB SHADOW E&M-EST. PATIENT-LVL III: CPT | Mod: PBBFAC,,, | Performed by: INTERNAL MEDICINE

## 2017-09-29 PROCEDURE — G0008 ADMIN INFLUENZA VIRUS VAC: HCPCS | Mod: PBBFAC,PN

## 2017-09-29 RX ORDER — LORAZEPAM 0.5 MG/1
TABLET ORAL
Qty: 2 TABLET | Refills: 0 | Status: SHIPPED | OUTPATIENT
Start: 2017-09-29 | End: 2018-01-10

## 2017-09-29 NOTE — PROGRESS NOTES
Subjective:       Patient ID: Yulia Peralta is a 72 y.o. female.    Chief Complaint: Mass (NECK ) and Flu Vaccine    She presents today with several complaints.  She will be following up at [facility] in a couple of weeks.  She will have an MRI reports claustrophobia.  She would like medication to take beforehand.  She also complains of more prominent pulsating in her right neck.  She reports that the area has been noticeable for some time but now feels more like a mass or lump.  She has no associated pain or other symptoms.  She also complains of a small lesion on her right hip.  She thinks may be a skin tag.  She's noted very itchy insect bites on her legs when she was doing yard work recently.  This did not respond to Benadryl.  She is currently no longer doing any yard work.  She also complains of a soreness in her tongue that comes and goes intermittently.  She also coding also.  No sores ulceration or redness.  She complains of a cold sensation in her left foot intermittently after she's been standing for some time.  It feels like cold water is running down her foot.  She also notes pain in her left upper back and hip.  She has occasional burning and stinging in her breasts.      Review of Systems   Musculoskeletal: Positive for arthralgias, back pain and joint swelling.   Skin: Positive for rash.   Neurological: Negative for syncope.       Objective:      Physical Exam   Constitutional: She is oriented to person, place, and time. She appears well-developed and well-nourished. No distress.   HENT:   Head: Normocephalic and atraumatic.   Right Ear: External ear normal.   Left Ear: External ear normal.   Mouth/Throat: Oropharynx is clear and moist. No oral lesions. No oropharyngeal exudate, posterior oropharyngeal edema or posterior oropharyngeal erythema.   Eyes: Conjunctivae are normal. No scleral icterus.   Neck: Normal carotid pulses present. Carotid bruit is not present.   Cardiovascular: Normal  rate, regular rhythm and normal heart sounds.  Exam reveals no gallop and no friction rub.    No murmur heard.  Pulsation right neck, no masses   Pulmonary/Chest: Effort normal and breath sounds normal. No respiratory distress. She has no wheezes. She has no rales.   Musculoskeletal: She exhibits no edema or tenderness.   Lymphadenopathy:     She has no cervical adenopathy.   Neurological: She is alert and oriented to person, place, and time. No cranial nerve deficit.   Skin: Skin is warm and dry.   2-3 mm skin tag right hip   Psychiatric: She has a normal mood and affect.   Vitals reviewed.      Assessment:       1. Thymoma    2. Need for prophylactic vaccination and inoculation against influenza    3. Need for influenza vaccination    4. Soreness of tongue    5. Lumbar radiculopathy    6. Skin tag    7. Nontoxic multinodular goiter     8. Abnormal carotid pulse        Plan:       Yulia was seen today for mass and flu vaccine.    Diagnoses and all orders for this visit:    Thymoma - To have MRI chest at [facility] 10/17.  She will f/u after  -     lorazepam (ATIVAN) 0.5 MG tablet; 1 tablet 30 minute prior to MRI.    Need for prophylactic vaccination and inoculation against influenza  -     Influenza - High Dose (65+) (PF) (IM)    Soreness of tongue - normal exam today.  Considered medications.  R/o vitamin def  -     TSH; Future  -     Magnesium; Future  -     Folate; Future  -     Vitamin B12; Future  -     VITAMIN B1; Future    Lumbar radiculopathy - c/o intermittent cold sensation left foot only.  Reviewed prior lumbar imaging.  Suspect radiculopathy.  Considered PVD.  Monitor for now.  She will f/u with worsening symptoms    Skin tag - reassured the patient    Nontoxic multinodular goiter   -     TSH; Future    Abnormal carotid pulse - prominent on the right.  No prior imaging.  Will f/u after upcoming MRI       F/u 3 weeks

## 2017-09-29 NOTE — PROGRESS NOTES
Influenza vaccine high dose Given to the pt as ordered by the MD. The patient tolerated well, I advised the patient to wait 15 minuets to observe for any vaccine reactions. The pt. Expressed an understanding.

## 2017-10-03 ENCOUNTER — LAB VISIT (OUTPATIENT)
Dept: LAB | Facility: HOSPITAL | Age: 72
End: 2017-10-03
Attending: INTERNAL MEDICINE
Payer: MEDICARE

## 2017-10-03 DIAGNOSIS — D84.9 IMMUNOSUPPRESSION: ICD-10-CM

## 2017-10-03 DIAGNOSIS — M06.9 RHEUMATOID ARTHRITIS OF HAND, UNSPECIFIED LATERALITY, UNSPECIFIED RHEUMATOID FACTOR PRESENCE: ICD-10-CM

## 2017-10-03 DIAGNOSIS — M06.9 RHEUMATOID ARTHRITIS: ICD-10-CM

## 2017-10-03 DIAGNOSIS — Z79.899 ENCOUNTER FOR LONG-TERM (CURRENT) USE OF MEDICATIONS: ICD-10-CM

## 2017-10-03 LAB
ALBUMIN SERPL BCP-MCNC: 3.8 G/DL
ALP SERPL-CCNC: 123 U/L
ALT SERPL W/O P-5'-P-CCNC: 22 U/L
ANION GAP SERPL CALC-SCNC: 6 MMOL/L
AST SERPL-CCNC: 23 U/L
BASOPHILS # BLD AUTO: 0.01 K/UL
BASOPHILS NFR BLD: 0.2 %
BILIRUB SERPL-MCNC: 0.3 MG/DL
BUN SERPL-MCNC: 16 MG/DL
CALCIUM SERPL-MCNC: 10.4 MG/DL
CHLORIDE SERPL-SCNC: 105 MMOL/L
CO2 SERPL-SCNC: 31 MMOL/L
CREAT SERPL-MCNC: 0.8 MG/DL
CRP SERPL-MCNC: 2.8 MG/L
CRP SERPL-MCNC: 2.8 MG/L
DIFFERENTIAL METHOD: ABNORMAL
EOSINOPHIL # BLD AUTO: 0.2 K/UL
EOSINOPHIL NFR BLD: 3.6 %
ERYTHROCYTE [DISTWIDTH] IN BLOOD BY AUTOMATED COUNT: 14.4 %
ERYTHROCYTE [SEDIMENTATION RATE] IN BLOOD BY WESTERGREN METHOD: 18 MM/HR
EST. GFR  (AFRICAN AMERICAN): >60 ML/MIN/1.73 M^2
EST. GFR  (NON AFRICAN AMERICAN): >60 ML/MIN/1.73 M^2
GLUCOSE SERPL-MCNC: 102 MG/DL
HCT VFR BLD AUTO: 36.1 %
HGB BLD-MCNC: 11.6 G/DL
LYMPHOCYTES # BLD AUTO: 1.7 K/UL
LYMPHOCYTES NFR BLD: 26.1 %
MCH RBC QN AUTO: 31.3 PG
MCHC RBC AUTO-ENTMCNC: 32.1 G/DL
MCV RBC AUTO: 97 FL
MONOCYTES # BLD AUTO: 1.1 K/UL
MONOCYTES NFR BLD: 15.8 %
NEUTROPHILS # BLD AUTO: 3.6 K/UL
NEUTROPHILS NFR BLD: 54.3 %
PLATELET # BLD AUTO: 129 K/UL
PMV BLD AUTO: ABNORMAL FL
POTASSIUM SERPL-SCNC: 5.1 MMOL/L
PROT SERPL-MCNC: 8.2 G/DL
RBC # BLD AUTO: 3.71 M/UL
SODIUM SERPL-SCNC: 142 MMOL/L
WBC # BLD AUTO: 6.66 K/UL

## 2017-10-03 PROCEDURE — 85651 RBC SED RATE NONAUTOMATED: CPT

## 2017-10-03 PROCEDURE — 80053 COMPREHEN METABOLIC PANEL: CPT

## 2017-10-03 PROCEDURE — 36415 COLL VENOUS BLD VENIPUNCTURE: CPT | Mod: PO

## 2017-10-03 PROCEDURE — 86140 C-REACTIVE PROTEIN: CPT

## 2017-10-04 ENCOUNTER — TELEPHONE (OUTPATIENT)
Dept: RHEUMATOLOGY | Facility: CLINIC | Age: 72
End: 2017-10-04

## 2017-10-04 NOTE — TELEPHONE ENCOUNTER
Staff to inform patient labs show improvement in inflammation but she needs an appointment since it has been over one year since her last visit.  Will not be able to refill medication beyond 3 months without a visit

## 2017-10-12 ENCOUNTER — TELEPHONE (OUTPATIENT)
Dept: RHEUMATOLOGY | Facility: CLINIC | Age: 72
End: 2017-10-12

## 2017-10-13 ENCOUNTER — TELEPHONE (OUTPATIENT)
Dept: RHEUMATOLOGY | Facility: CLINIC | Age: 72
End: 2017-10-13

## 2017-10-13 DIAGNOSIS — I10 ESSENTIAL HYPERTENSION: ICD-10-CM

## 2017-10-13 DIAGNOSIS — I10 HYPERTENSION, UNSPECIFIED TYPE: ICD-10-CM

## 2017-10-13 RX ORDER — FUROSEMIDE 40 MG/1
40 TABLET ORAL DAILY
Qty: 90 TABLET | Refills: 1 | Status: SHIPPED | OUTPATIENT
Start: 2017-10-13 | End: 2018-04-09 | Stop reason: SDUPTHER

## 2017-10-13 RX ORDER — IRBESARTAN 150 MG/1
150 TABLET ORAL DAILY
Qty: 90 TABLET | Refills: 1 | Status: SHIPPED | OUTPATIENT
Start: 2017-10-13 | End: 2018-04-09 | Stop reason: SDUPTHER

## 2017-10-13 NOTE — TELEPHONE ENCOUNTER
----- Message from Carmen Fountain sent at 10/13/2017 11:11 AM CDT -----  Contact: self  irbesartan (AVAPRO) 150 MG tablet  furosemide (LASIX) 40 MG tablet    Pt calling to request a 90 day refill of the above to be sent to [facility] by Mail. Please call pt to [phone].

## 2017-10-27 ENCOUNTER — OFFICE VISIT (OUTPATIENT)
Dept: RHEUMATOLOGY | Facility: CLINIC | Age: 72
End: 2017-10-27
Payer: MEDICARE

## 2017-10-27 ENCOUNTER — HOSPITAL ENCOUNTER (OUTPATIENT)
Dept: RADIOLOGY | Facility: HOSPITAL | Age: 72
Discharge: HOME | End: 2017-10-27
Attending: INTERNAL MEDICINE
Payer: MEDICARE

## 2017-10-27 VITALS
SYSTOLIC BLOOD PRESSURE: 146 MMHG | DIASTOLIC BLOOD PRESSURE: 70 MMHG | WEIGHT: 200.5 LBS | HEART RATE: 62 BPM | HEIGHT: 62 IN | BODY MASS INDEX: 36.9 KG/M2 | TEMPERATURE: 98 F

## 2017-10-27 DIAGNOSIS — M05.79 RHEUMATOID ARTHRITIS INVOLVING MULTIPLE SITES WITH POSITIVE RHEUMATOID FACTOR: ICD-10-CM

## 2017-10-27 DIAGNOSIS — M06.9 RHEUMATOID ARTHRITIS OF HAND, UNSPECIFIED LATERALITY, UNSPECIFIED RHEUMATOID FACTOR PRESENCE: ICD-10-CM

## 2017-10-27 DIAGNOSIS — M19.90 OTHER TYPE OF OSTEOARTHRITIS, UNSPECIFIED SITE: ICD-10-CM

## 2017-10-27 DIAGNOSIS — M05.79 RHEUMATOID ARTHRITIS INVOLVING MULTIPLE SITES WITH POSITIVE RHEUMATOID FACTOR: Primary | ICD-10-CM

## 2017-10-27 DIAGNOSIS — D84.9 IMMUNOSUPPRESSION: ICD-10-CM

## 2017-10-27 DIAGNOSIS — R53.83 FATIGUE, UNSPECIFIED TYPE: ICD-10-CM

## 2017-10-27 PROCEDURE — 99214 OFFICE O/P EST MOD 30 MIN: CPT | Mod: S$PBB,,, | Performed by: INTERNAL MEDICINE

## 2017-10-27 PROCEDURE — 77077 JOINT SURVEY SINGLE VIEW: CPT | Mod: 26,,, | Performed by: RADIOLOGY

## 2017-10-27 PROCEDURE — 99214 OFFICE O/P EST MOD 30 MIN: CPT | Mod: PBBFAC,25 | Performed by: INTERNAL MEDICINE

## 2017-10-27 PROCEDURE — 99999 PR PBB SHADOW E&M-EST. PATIENT-LVL IV: CPT | Mod: PBBFAC,,, | Performed by: INTERNAL MEDICINE

## 2017-10-27 PROCEDURE — 77077 JOINT SURVEY SINGLE VIEW: CPT | Mod: TC

## 2017-10-27 RX ORDER — FOLIC ACID 1 MG/1
2 TABLET ORAL DAILY
Qty: 180 TABLET | Refills: 3 | Status: SHIPPED | OUTPATIENT
Start: 2017-10-27 | End: 2018-05-01 | Stop reason: SDUPTHER

## 2017-10-27 RX ORDER — METHOTREXATE 2.5 MG/1
25 TABLET ORAL
Qty: 120 TABLET | Refills: 0 | Status: SHIPPED | OUTPATIENT
Start: 2017-10-27 | End: 2018-03-28

## 2017-10-27 RX ORDER — FOLIC ACID 1 MG/1
2 TABLET ORAL DAILY
Qty: 180 TABLET | Refills: 3 | Status: SHIPPED | OUTPATIENT
Start: 2017-10-27 | End: 2017-10-27 | Stop reason: SDUPTHER

## 2017-10-27 ASSESSMENT — ROUTINE ASSESSMENT OF PATIENT INDEX DATA (RAPID3)
TOTAL RAPID3 SCORE: 3.48
PATIENT GLOBAL ASSESSMENT SCORE: 5.6
AM STIFFNESS SCORE: 0, NO
MDHAQ FUNCTION SCORE: .7
PSYCHOLOGICAL DISTRESS SCORE: 2.2
PAIN SCORE: 2.5
FATIGUE SCORE: 4.5

## 2017-10-27 NOTE — PROGRESS NOTES
"Subjective:       Patient ID: Yulia Peralta is a 72 y.o. female.    Chief Complaint: Rheumatoid Arthritis      HPI:  Yulia Peralta is a 72 y.o. female with a history of rheumatoid arthritis for 25 years.    She had been on methotrexate for the past 5 years and her current dose is 8 tablets q week (4 Monday and 4 Tuesday).   Went back to prior dose of  10 tabs on Monday.    History of gout some years ago.    Interval History:  10/17/17 had MRI at [facility] which showed stable enlargement of the thymic gland most compatible with hyperplasia.   Has feeling of ice water being poured down left leg.  Occurs once or twice a month when she is standing at sink.   Denies joint pain today.       Review of Systems   Constitutional: Positive for fatigue.   HENT: Negative.    Eyes: Negative.    Respiratory: Negative.    Cardiovascular: Negative.    Gastrointestinal: Negative.    Endocrine: Negative.    Genitourinary: Negative.    Musculoskeletal: Negative.    Skin: Negative.    Allergic/Immunologic: Negative.    Neurological: Positive for headaches.   Hematological: Negative.    Psychiatric/Behavioral: Negative.          Objective:   BP (!) 146/70 (BP Location: Left arm, Patient Position: Sitting, BP Method: Large (Automatic))   Pulse 62   Temp 97.8 °F (36.6 °C) (Oral)   Ht 5' 2" (1.575 m)   Wt 90.9 kg (200 lb 8 oz)   BMI 36.67 kg/m²      Physical Exam   Constitutional: She is oriented to person, place, and time and well-developed, well-nourished, and in no distress.   HENT:   Head: Normocephalic and atraumatic.   Eyes: Conjunctivae and EOM are normal.   Neck: Neck supple.   Cardiovascular: Normal rate, regular rhythm and normal heart sounds.    Pulmonary/Chest: Effort normal and breath sounds normal.   Abdominal: Soft. Bowel sounds are normal.   Neurological: She is alert and oriented to person, place, and time. Gait normal.   Skin: Skin is warm and dry.     Psychiatric: Mood and affect normal.   Musculoskeletal: "   28 joint count:  0 swollen and 0 tender   MTPs not painful compression   35 degree on right and 40 degree on left (contractures of elbows-unchanged)              LABS    Component      Latest Ref Rng & Units 10/3/2017   WBC      3.90 - 12.70 K/uL 6.66   RBC      4.00 - 5.40 M/uL 3.71 (L)   Hemoglobin      12.0 - 16.0 g/dL 11.6 (L)   Hematocrit      37.0 - 48.5 % 36.1 (L)   MCV      82 - 98 fL 97   MCH      27.0 - 31.0 pg 31.3 (H)   MCHC      32.0 - 36.0 g/dL 32.1   RDW      11.5 - 14.5 % 14.4   Platelets      150 - 350 K/uL 129 (L)   MPV      9.2 - 12.9 fL SEE COMMENT   Gran #      1.8 - 7.7 K/uL 3.6   Lymph #      1.0 - 4.8 K/uL 1.7   Mono #      0.3 - 1.0 K/uL 1.1 (H)   Eos #      0.0 - 0.5 K/uL 0.2   Baso #      0.00 - 0.20 K/uL 0.01   Gran%      38.0 - 73.0 % 54.3   Lymph%      18.0 - 48.0 % 26.1   Mono%      4.0 - 15.0 % 15.8 (H)   Eosinophil%      0.0 - 8.0 % 3.6   Basophil%      0.0 - 1.9 % 0.2   Differential Method       auto   Sodium      136 - 145 mmol/L 142   Potassium      3.5 - 5.1 mmol/L 5.1   Chloride      95 - 110 mmol/L 105   CO2      23 - 29 mmol/L 31 (H)   Glucose      70 - 110 mg/dL 102   BUN, Bld      8 - 23 mg/dL 16   Creatinine      0.5 - 1.4 mg/dL 0.8   Calcium      8.7 - 10.5 mg/dL 10.4   Total Protein      6.0 - 8.4 g/dL 8.2   Albumin      3.5 - 5.2 g/dL 3.8   Total Bilirubin      0.1 - 1.0 mg/dL 0.3   Alkaline Phosphatase      55 - 135 U/L 123   AST      10 - 40 U/L 23   ALT      10 - 44 U/L 22   Anion Gap      8 - 16 mmol/L 6 (L)   eGFR if African American      >60 mL/min/1.73 m:2 >60.0   eGFR if non African American      >60 mL/min/1.73 m:2 >60.0   Sed Rate      0 - 20 mm/Hr 18   CRP      0.0 - 8.2 mg/L 2.8     Assessment:       1. Rheumatoid arthritis manifested by rheumatoid nodule, polyarthritis in the past, and contractures of the elbows.    Treated in the past with gold. Plaquenil did not work in the past and she never took SSZ.   Now stable. Continue MTX (5 tabs on Monday and 5  tabs on Tuesday) and folic acid   2. Encounter for long-term (current) use of other medications    3. Fatigue.   4. Chest pain. Small mass on CT evaluated by cardiothoracic surgery who said it was too small to biopsy.  She decided to go for second opinion at [facility]. Biopsy was negative. MRI repeat stable and most recent 10/2017 stable  5. 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 Felt not to have hepatitis C by hepatology.  6. Right anserine bursitis.   7. Obesity  8. HTN  9. Chronic back pain.  Bulging disc.  May be cause of paresthesias in legs with standing.  10.  Paresthesia of in left leg.  May be due to #9  Plan:       1. Continue to take methotrexate to 10 tabs qweek (5 on Monday and 5 on Tuesday).  2. Check CRP, ESR, CBC, and CMP q 3months.    3. Continue Nabumetone.    4. Continue folic acid 2 tabs.    5. Continue home exercises given by PT  6. See primary regarding fatigue  7. Had flu   8. Consider PT for back if paresthesias persist     Return to the office in 3 months         Patient seen face to face for 25 minutes for greater than 50% spent in counseling  regarding RA treatment.

## 2017-10-30 ENCOUNTER — TELEPHONE (OUTPATIENT)
Dept: RHEUMATOLOGY | Facility: CLINIC | Age: 72
End: 2017-10-30

## 2017-11-08 ENCOUNTER — OFFICE VISIT (OUTPATIENT)
Dept: CARDIOLOGY | Facility: CLINIC | Age: 72
End: 2017-11-08
Payer: MEDICARE

## 2017-11-08 VITALS
BODY MASS INDEX: 39.36 KG/M2 | SYSTOLIC BLOOD PRESSURE: 142 MMHG | DIASTOLIC BLOOD PRESSURE: 67 MMHG | HEART RATE: 67 BPM | WEIGHT: 213.88 LBS | HEIGHT: 62 IN | OXYGEN SATURATION: 96 %

## 2017-11-08 DIAGNOSIS — M05.79 RHEUMATOID ARTHRITIS INVOLVING MULTIPLE SITES WITH POSITIVE RHEUMATOID FACTOR: ICD-10-CM

## 2017-11-08 DIAGNOSIS — R07.9 CHEST PAIN, UNSPECIFIED TYPE: ICD-10-CM

## 2017-11-08 DIAGNOSIS — D49.89 THYMOMA: ICD-10-CM

## 2017-11-08 DIAGNOSIS — I10 ESSENTIAL HYPERTENSION: Primary | ICD-10-CM

## 2017-11-08 DIAGNOSIS — R00.2 PALPITATIONS: ICD-10-CM

## 2017-11-08 DIAGNOSIS — I10 HTN (HYPERTENSION): ICD-10-CM

## 2017-11-08 PROCEDURE — 99999 PR PBB SHADOW E&M-EST. PATIENT-LVL IV: CPT | Mod: PBBFAC,,, | Performed by: INTERNAL MEDICINE

## 2017-11-08 PROCEDURE — 99214 OFFICE O/P EST MOD 30 MIN: CPT | Mod: PBBFAC | Performed by: INTERNAL MEDICINE

## 2017-11-08 PROCEDURE — 93005 ELECTROCARDIOGRAM TRACING: CPT | Mod: PBBFAC | Performed by: INTERNAL MEDICINE

## 2017-11-08 PROCEDURE — 93010 ELECTROCARDIOGRAM REPORT: CPT | Mod: S$PBB,,, | Performed by: INTERNAL MEDICINE

## 2017-11-08 PROCEDURE — 99213 OFFICE O/P EST LOW 20 MIN: CPT | Mod: S$PBB,,, | Performed by: INTERNAL MEDICINE

## 2017-11-08 NOTE — PROGRESS NOTES
Subjective:    Patient ID:  Yulia Peralta is a 72 y.o. female who presents for follow-up of Hypertension      HPI     HTN, RA, MORILLO, Mediastinal mass - enlarged thymus    MRI at [facility] 10/17/17 - stable    Stress test 8/1/16  Impression: NORMAL MYOCARDIAL PERFUSION  1. The perfusion scan is free of evidence for myocardial ischemia or injury.   2. Resting wall motion is physiologic.   3. Visually estimated LV function is normal.   4. The ventricular volumes are normal at rest and stress.   5. The extracardiac distribution of radioactivity is normal.   6. When compared to the previous study from 07/23/2015, no changes noted     Echo 8/1/16    1 - Normal left ventricular systolic function (EF 60-65%).  Normal wall motion.     2 - Eccentric hypertrophy.     3 - Left ventricular diastolic dysfunction.     4 - Trivial mitral regurgitation.     5 - Trivial tricuspid regurgitation.     6 - Pulmonary hypertension. The estimated PA systolic pressure is 42 mmHg.     MORILLO has been stable  Denies CP  EKG NSR - ok    Review of Systems   Constitution: Negative for decreased appetite.   HENT: Negative for ear discharge.    Eyes: Negative for blurred vision.   Respiratory: Negative for hemoptysis.    Endocrine: Negative for polyphagia.   Hematologic/Lymphatic: Negative for adenopathy.   Skin: Negative for color change.   Musculoskeletal: Negative for joint swelling.   Neurological: Negative for brief paralysis.   Psychiatric/Behavioral: Negative for hallucinations.        Objective:    Physical Exam   Constitutional: She is oriented to person, place, and time. She appears well-developed and well-nourished.   HENT:   Head: Normocephalic and atraumatic.   Eyes: Conjunctivae are normal. Pupils are equal, round, and reactive to light.   Neck: Normal range of motion. Neck supple.   Cardiovascular: Normal rate and intact distal pulses.    Murmur heard.   Early systolic murmur is present with a grade of 2/6   Pulmonary/Chest: Effort  normal and breath sounds normal.   Abdominal: Soft. Bowel sounds are normal.   Musculoskeletal: Normal range of motion.   Neurological: She is alert and oriented to person, place, and time.   Skin: Skin is warm and dry.         Assessment:       1. Essential hypertension    2. Palpitations    3. Thymoma    4. Rheumatoid arthritis involving multiple sites with positive rheumatoid factor    5. Chest pain, unspecified type         Plan:       Stable MORILLO - will continue with medical Rx for now  OV 6 months

## 2017-11-10 ENCOUNTER — TELEPHONE (OUTPATIENT)
Dept: FAMILY MEDICINE | Facility: CLINIC | Age: 72
End: 2017-11-10

## 2017-11-10 NOTE — TELEPHONE ENCOUNTER
----- Message from Dulce Gomez sent at 11/10/2017 10:27 AM CST -----  Contact: Self  Pt calling to speak to a nurse regarding health. Please call [phone].

## 2017-11-10 NOTE — TELEPHONE ENCOUNTER
Pt. Called request labs put in for HMG A1C to be done in [facility] for this Monday morning.

## 2017-11-10 NOTE — TELEPHONE ENCOUNTER
[facility] 84 [facility] and 101 this morning and he told her it was hi. Told patient her sugar is within normal limits and there is no need for testing at this time.

## 2017-11-17 ENCOUNTER — OFFICE VISIT (OUTPATIENT)
Dept: FAMILY MEDICINE | Facility: CLINIC | Age: 72
End: 2017-11-17
Payer: MEDICARE

## 2017-11-17 VITALS
BODY MASS INDEX: 37.24 KG/M2 | RESPIRATION RATE: 16 BRPM | SYSTOLIC BLOOD PRESSURE: 140 MMHG | HEART RATE: 65 BPM | HEIGHT: 62 IN | TEMPERATURE: 98 F | WEIGHT: 202.38 LBS | DIASTOLIC BLOOD PRESSURE: 92 MMHG | OXYGEN SATURATION: 99 %

## 2017-11-17 DIAGNOSIS — M25.50 ARTHRALGIA, UNSPECIFIED JOINT: ICD-10-CM

## 2017-11-17 DIAGNOSIS — R09.89 ABNORMAL CAROTID PULSE: Primary | ICD-10-CM

## 2017-11-17 PROCEDURE — 99213 OFFICE O/P EST LOW 20 MIN: CPT | Mod: S$PBB,,, | Performed by: INTERNAL MEDICINE

## 2017-11-17 PROCEDURE — 99999 PR PBB SHADOW E&M-EST. PATIENT-LVL III: CPT | Mod: PBBFAC,,, | Performed by: INTERNAL MEDICINE

## 2017-11-17 PROCEDURE — 99213 OFFICE O/P EST LOW 20 MIN: CPT | Mod: PBBFAC,PN | Performed by: INTERNAL MEDICINE

## 2017-11-22 ENCOUNTER — HOSPITAL ENCOUNTER (OUTPATIENT)
Dept: RADIOLOGY | Facility: HOSPITAL | Age: 72
Discharge: HOME | End: 2017-11-22
Attending: INTERNAL MEDICINE
Payer: MEDICARE

## 2017-11-22 DIAGNOSIS — R09.89 ABNORMAL CAROTID PULSE: ICD-10-CM

## 2017-11-22 PROCEDURE — 93880 EXTRACRANIAL BILAT STUDY: CPT | Mod: 26,,, | Performed by: RADIOLOGY

## 2017-11-22 PROCEDURE — 93880 EXTRACRANIAL BILAT STUDY: CPT | Mod: TC

## 2017-12-01 ENCOUNTER — OFFICE VISIT (OUTPATIENT)
Dept: FAMILY MEDICINE | Facility: CLINIC | Age: 72
End: 2017-12-01
Payer: MEDICARE

## 2017-12-01 VITALS
DIASTOLIC BLOOD PRESSURE: 74 MMHG | HEART RATE: 68 BPM | WEIGHT: 196.88 LBS | BODY MASS INDEX: 36.23 KG/M2 | TEMPERATURE: 98 F | SYSTOLIC BLOOD PRESSURE: 136 MMHG | HEIGHT: 62 IN | OXYGEN SATURATION: 99 %

## 2017-12-01 DIAGNOSIS — I99.9 VASCULAR ABNORMALITY: Primary | ICD-10-CM

## 2017-12-01 PROCEDURE — 99999 PR PBB SHADOW E&M-EST. PATIENT-LVL IV: CPT | Mod: PBBFAC,,, | Performed by: FAMILY MEDICINE

## 2017-12-01 PROCEDURE — 99214 OFFICE O/P EST MOD 30 MIN: CPT | Mod: S$PBB,,, | Performed by: FAMILY MEDICINE

## 2017-12-01 PROCEDURE — 99214 OFFICE O/P EST MOD 30 MIN: CPT | Mod: PBBFAC,PO | Performed by: FAMILY MEDICINE

## 2017-12-01 NOTE — PROGRESS NOTES
"Subjective:       Patient ID: Yulia Peralta is a 72 y.o. female.    Chief Complaint: Establish Care    Patient presents for concerns about a knot on her neck. She has had this for several years. She state  she feels like her neck is pulsating. She denies pain associated with this. She states she does have a pain slightly above this that she feels radiates up to her head. She states it is fleeting. She does suffer with chronic migraines and has this at least once a month.     She had a carotid ultrasound down, which shows:   1.  No evidence of hemodynamically significant stenosis.  2.    Vascular structure with arterial flow in the region of the right thoracic inlet.  This most likely corresponds to marked tortuosity of the right brachiocephalic trunk and proximal right subclavian artery, which is evident on CT dated 8/12/15.  However, aneurysm could have a similar appearance and CTA of the neck should be considered for further evaluation.      Review of Systems    Objective:       Vitals:    12/01/17 1145   BP: 136/74   Pulse: 68   Temp: 97.8 °F (36.6 °C)   TempSrc: Tympanic   SpO2: 99%   Weight: 89.3 kg (196 lb 13.9 oz)   Height: 5' 2" (1.575 m)       Physical Exam   Constitutional: She is oriented to person, place, and time. She appears well-developed and well-nourished. No distress.   HENT:   Head: Normocephalic and atraumatic.   Eyes: Conjunctivae are normal.   Neck: Normal range of motion. Neck supple. Carotid bruit is not present.   Cardiovascular: Normal rate, regular rhythm and normal heart sounds.  Exam reveals no gallop and no friction rub.    No murmur heard.  Pulmonary/Chest: Effort normal and breath sounds normal. No respiratory distress. She has no wheezes. She has no rales.   Musculoskeletal: She exhibits no edema.   Neurological: She is alert and oriented to person, place, and time.   Skin: She is not diaphoretic.       Assessment:       1. Vascular abnormality    2. Chronic migraine        Plan: "       Yulia was seen today for establish care.    Diagnoses and all orders for this visit:    Vascular abnormality  -     CTA Neck; Future  -     Creatinine, serum; Future  Ordered CT angiogram to assess the abnormality on the carotid ultrasound.    Chronic migraine  -     CT Head Without Contrast; Future  She has chronic headaches as well. She is concerned about aneurysm but has no focal deficits of concern.

## 2017-12-05 ENCOUNTER — HOSPITAL ENCOUNTER (OUTPATIENT)
Dept: RADIOLOGY | Facility: HOSPITAL | Age: 72
Discharge: HOME | End: 2017-12-05
Attending: FAMILY MEDICINE
Payer: MEDICARE

## 2017-12-05 ENCOUNTER — TELEPHONE (OUTPATIENT)
Dept: FAMILY MEDICINE | Facility: CLINIC | Age: 72
End: 2017-12-05

## 2017-12-05 DIAGNOSIS — I99.9 VASCULAR ABNORMALITY: ICD-10-CM

## 2017-12-05 PROCEDURE — 70498 CT ANGIOGRAPHY NECK: CPT | Mod: TC

## 2017-12-05 PROCEDURE — 70450 CT HEAD/BRAIN W/O DYE: CPT | Mod: TC

## 2017-12-05 PROCEDURE — 70450 CT HEAD/BRAIN W/O DYE: CPT | Mod: 26,,, | Performed by: RADIOLOGY

## 2017-12-05 PROCEDURE — 25500020 PHARM REV CODE 255: Performed by: FAMILY MEDICINE

## 2017-12-05 PROCEDURE — 70498 CT ANGIOGRAPHY NECK: CPT | Mod: 26,,, | Performed by: RADIOLOGY

## 2017-12-05 RX ADMIN — IOHEXOL 75 ML: 350 INJECTION, SOLUTION INTRAVENOUS at 07:12

## 2017-12-30 ENCOUNTER — OFFICE VISIT (OUTPATIENT)
Dept: FAMILY MEDICINE | Facility: CLINIC | Age: 72
End: 2017-12-30
Payer: MEDICARE

## 2017-12-30 VITALS
BODY MASS INDEX: 36.1 KG/M2 | SYSTOLIC BLOOD PRESSURE: 156 MMHG | WEIGHT: 196.19 LBS | HEIGHT: 62 IN | TEMPERATURE: 98 F | DIASTOLIC BLOOD PRESSURE: 66 MMHG | OXYGEN SATURATION: 98 %

## 2017-12-30 DIAGNOSIS — R09.1 PLEURISY: Primary | ICD-10-CM

## 2017-12-30 PROCEDURE — 99213 OFFICE O/P EST LOW 20 MIN: CPT | Mod: S$PBB,,, | Performed by: INTERNAL MEDICINE

## 2017-12-30 PROCEDURE — 99214 OFFICE O/P EST MOD 30 MIN: CPT | Mod: PBBFAC,PO

## 2017-12-30 PROCEDURE — 99999 PR PBB SHADOW E&M-EST. PATIENT-LVL IV: CPT | Mod: PBBFAC,,,

## 2017-12-30 RX ORDER — IBUPROFEN 800 MG/1
800 TABLET ORAL EVERY 6 HOURS
Qty: 28 TABLET | Refills: 0 | Status: SHIPPED | OUTPATIENT
Start: 2017-12-30 | End: 2018-01-06

## 2017-12-30 NOTE — PATIENT INSTRUCTIONS
Pleurisy  You have pain in your chest. Your healthcare provider has told you that you have pleurisy, or pleuritis. Pleurisy is swelling (inflammation) of the pleura. The pleura are two layers of thin smooth tissue that surround the lungs and line the chest.  What are the symptoms of pleurisy?     Pleurisy is inflammation of the pleura. The pleura cover the lungs and line the chest.   Pleurisy usually causes sharp chest pain. It is usually worse when you take a deep breath, cough, or sneeze.  What causes pleurisy?  Many things can cause pleurisy. A common cause is a viral infection like the flu or pneumonia. Serious lung problems that can cause it include:  · A blood clot in the lung (pulmonary embolism)  · Air between the pleura (pneumothorax)  Serious heart problems that can cause pleurisy include:  · Heart attack  · Inflammation of the covering of the heart (pericarditis)  How is pleurisy diagnosed?  Your healthcare provider examines you and asks you about your symptoms and health history. He or she will first check you for the serious causes of chest pain. You may have:  · Lab tests  · Imaging tests such as chest X-ray, CT scan, or ultrasound  · EKG  How is pleurisy treated?  Treatment depends on what is causing the pleurisy. Serious conditions are treated in the hospital. You may need medicines to decrease the inflammation and pain.     Call 911  Call 911 if any of these occur:  · Trouble breathing  · Chest pain that gets worse  Call your healthcare provider  Call your healthcare provider right away if you have:  · A fever of 100.4°F (38°C) or higher, or as directed by your healthcare provider   Date Last Reviewed: 11/1/2016  © 4217-2878 [employer]. [address] [place] [address], [place] [address]. All rights reserved. This information is not intended as a substitute for professional medical care. Always follow your healthcare professional's instructions.

## 2017-12-30 NOTE — PROGRESS NOTES
Subjective:       Patient ID: Yulia Peralta is a 72 y.o. female who presents to urgent care with complaints of newly developed painful hard lump on her right collarbone, pain just began a few days ago.  Around the same time she has developed a dull ache on her right lower chest farheen when she coughs.  No fever, no sob, no pain in shoulder or arm, no change in arm mobility, pulse ox 100%    Chief Complaint: Shoulder Pain (right )    HPI  Review of Systems   Constitutional: Negative for appetite change, chills, fatigue, fever and unexpected weight change.   Respiratory: Negative for cough, chest tightness, shortness of breath and wheezing.    Cardiovascular: Negative for chest pain and leg swelling.   Gastrointestinal: Negative for abdominal distention, abdominal pain, anal bleeding, blood in stool, constipation, diarrhea, nausea, rectal pain and vomiting.   Genitourinary: Negative for difficulty urinating, dysuria, flank pain, frequency and hematuria.   Musculoskeletal: Negative for back pain, gait problem and joint swelling.   Neurological: Negative for syncope, weakness and numbness.   Hematological: Does not bruise/bleed easily.   Psychiatric/Behavioral: Negative for agitation, confusion, decreased concentration and hallucinations. The patient is not nervous/anxious and is not hyperactive.        Objective:      Physical Exam   Constitutional: She is oriented to person, place, and time. She appears well-developed and well-nourished. No distress.   HENT:   Head: Normocephalic.   Eyes: Pupils are equal, round, and reactive to light.   Cardiovascular: Normal rate, regular rhythm and normal heart sounds.    Pulmonary/Chest: Effort normal and breath sounds normal. No respiratory distress. She has no wheezes. She has no rales.   Abdominal: Soft. She exhibits no mass. There is no rebound and no guarding.   Musculoskeletal:        Right shoulder: She exhibits bony tenderness. She exhibits normal range of motion, no  tenderness, no swelling, no effusion and no crepitus.        Arms:  Neurological: She is alert and oriented to person, place, and time.   Skin: Skin is warm and dry.   Psychiatric: She has a normal mood and affect. Her behavior is normal. Judgment and thought content normal.   Nursing note and vitals reviewed.      Assessment:       1. Pleurisy        Plan:       Yulia was seen today for shoulder pain.    Diagnoses and all orders for this visit:    Pleurisy    Other orders  -     ibuprofen (ADVIL,MOTRIN) 800 MG tablet; Take 1 tablet (800 mg total) by mouth every 6 (six) hours.        Education  Pt has been given instructions populated from [employer] database and those entered into patient instructions field and has verbalized understanding of the after visit summary and information contained wherein.    Follow Up  Fu wit pcp farheen in regards to painful nodule on collar bone, offered cxy but she preferred to speak with her pcp about it first    In Case of Emergency   May go to ER for acute shortness of breath, lightheadedness, fever, or any other emergent complaints or changes in condition.

## 2018-01-04 ENCOUNTER — OFFICE VISIT (OUTPATIENT)
Dept: FAMILY MEDICINE | Facility: CLINIC | Age: 73
End: 2018-01-04
Payer: MEDICARE

## 2018-01-04 VITALS
RESPIRATION RATE: 12 BRPM | OXYGEN SATURATION: 99 % | HEIGHT: 62 IN | BODY MASS INDEX: 36.52 KG/M2 | TEMPERATURE: 98 F | HEART RATE: 68 BPM | WEIGHT: 198.44 LBS | SYSTOLIC BLOOD PRESSURE: 124 MMHG | DIASTOLIC BLOOD PRESSURE: 80 MMHG

## 2018-01-04 DIAGNOSIS — K21.9 GASTROESOPHAGEAL REFLUX DISEASE, ESOPHAGITIS PRESENCE NOT SPECIFIED: ICD-10-CM

## 2018-01-04 DIAGNOSIS — E55.9 VITAMIN D DEFICIENCY DISEASE: ICD-10-CM

## 2018-01-04 DIAGNOSIS — D16.7: Primary | ICD-10-CM

## 2018-01-04 DIAGNOSIS — D64.9 ANEMIA, UNSPECIFIED TYPE: ICD-10-CM

## 2018-01-04 PROCEDURE — 99214 OFFICE O/P EST MOD 30 MIN: CPT | Mod: S$PBB,,, | Performed by: FAMILY MEDICINE

## 2018-01-04 PROCEDURE — 99999 PR PBB SHADOW E&M-EST. PATIENT-LVL III: CPT | Mod: PBBFAC,,, | Performed by: FAMILY MEDICINE

## 2018-01-04 PROCEDURE — 99213 OFFICE O/P EST LOW 20 MIN: CPT | Mod: PBBFAC,PO | Performed by: FAMILY MEDICINE

## 2018-01-04 RX ORDER — PANTOPRAZOLE SODIUM 40 MG/1
40 TABLET, DELAYED RELEASE ORAL DAILY
Qty: 90 TABLET | Refills: 3 | Status: SHIPPED | OUTPATIENT
Start: 2018-01-04 | End: 2019-03-08 | Stop reason: SDUPTHER

## 2018-01-04 RX ORDER — CYANOCOBALAMIN 1000 UG/ML
1000 INJECTION, SOLUTION INTRAMUSCULAR; SUBCUTANEOUS
Qty: 1 ML | Refills: 5 | Status: SHIPPED | OUTPATIENT
Start: 2018-01-04 | End: 2018-11-14 | Stop reason: ALTCHOICE

## 2018-01-04 RX ORDER — ERGOCALCIFEROL 1.25 MG/1
50000 CAPSULE ORAL
Qty: 12 CAPSULE | Refills: 0 | Status: SHIPPED | OUTPATIENT
Start: 2018-01-04 | End: 2018-04-09 | Stop reason: SDUPTHER

## 2018-01-04 NOTE — PROGRESS NOTES
Subjective:       Patient ID: Yulia Peralta is a 72 y.o. female.    Chief Complaint: Right Side Pain; Neck Pain; and Check knot on chest/collar bone area    Patient presents for right sided abdominal pain. It hurts with sneezing, yawning, and coughing. She was seen at the clinic and was given 800 mg of ibuprofen a week ago. She does move furniture as her  has a heart condition and she tries not to use this. 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 She states she hasn't taken the ibuprofen, but feels that it is improving.     Past Medical History:  No date: Acid reflux  No date: Anemia  No date: Anxiety  No date: Arthritis  No date: Blood transfusion  No date: Cataract  No date: Depression  No date: Dry eyes  No date: Heart murmur  No date: Hypertension  5/19/2015: Left lumbar radiculitis  12/18/2012: Multiple thyroid nodules  No date: Osteoporosis  No date: Rheumatoid arthritis  10/30/2013: Thoracic or lumbosacral neuritis or radiculitis*   Past Surgical History:  1090-9343: gallstones  No date: HYSTERECTOMY  [id number]: JOINT REPLACEMENT      Comment:  bilateral knee  No date: VT REMOVAL OF OVARY/TUBE(S)  No date: TONSILLECTOMY  Review of patient's family history indicates:    Cataracts                      Mother                    Hypertension                   Mother                    Hypertension                   Sister                    No Known Problems              Father                    Glaucoma                       Brother                   No Known Problems              Maternal Aunt             No Known Problems              Maternal Uncle            No Known Problems              Paternal Aunt             No Known Problems              Paternal Uncle            No Known Problems              Maternal Grandmother      No Known Problems              Maternal Grandfather      No Known Problems              Paternal Grandmother       No Known Problems              Paternal Grandfather      Lupus                          Neg Hx                    Rheum arthritis                Neg Hx                    Psoriasis                      Neg Hx                    Amblyopia                      Neg Hx                    Blindness                      Neg Hx                    Cancer                         Neg Hx                    Diabetes                       Neg Hx                    Macular degeneration           Neg Hx                    Retinal detachment             Neg Hx                    Strabismus                     Neg Hx                    Stroke                         Neg Hx                    Thyroid disease                Neg Hx                    Social History    Marital status:              Spouse name:                       Years of education:                 Number of children:               Occupational History    None on file    Social History Main Topics    Smoking status: Never Smoker                                                                Smokeless tobacco: Never Used                        Alcohol use: Yes             Drug use: No              Sexual activity: Not on file          Other Topics            Concern    None on file    Social History Narrative    Adult Screenings updated and reviewed  6/12/14    Mammogram( for females) ordered for DIS    Pap ( for females) Dr Zepeda  Due for f/u   For  2014    Colonoscopy  Done once    Flu shot yearly done  2013    Td 2005    Pneumovax  Updated  12/3/13    Zostavax done 2012    Eye exam recommended yearly done 2013    Bone density  12/9/13    Thyroid ultrasound  11/6/2012 Normal sized thyroid containing several subcentimeter nodules, similar to the 5/12/11 exam.  No concerning nodules identified.                     Neck Pain        Review of Systems   Musculoskeletal: Positive for neck pain.       Objective:       Vitals:    01/04/18 1503   BP: 124/80  "  Pulse: 68   Resp: 12   Temp: 98.1 °F (36.7 °C)   TempSrc: Oral   SpO2: 99%   Weight: 90 kg (198 lb 6.6 oz)   Height: 5' 2" (1.575 m)       Physical Exam   Constitutional: She is oriented to person, place, and time. She appears well-developed and well-nourished. No distress.   Cardiovascular: Normal rate, regular rhythm and normal heart sounds.  Exam reveals no gallop and no friction rub.    No murmur heard.  Pulmonary/Chest: Effort normal and breath sounds normal. No respiratory distress. She has no wheezes. She has no rales.   Musculoskeletal:        Arms:  Neurological: She is alert and oriented to person, place, and time.   Skin: She is not diaphoretic.       Assessment:       1. Benign neoplasm of right clavicle    2. Vitamin D deficiency disease    3. Anemia, unspecified type    4. Gastroesophageal reflux disease, esophagitis presence not specified        Plan:       Yulia was seen today for right side pain, neck pain and check knot on chest/collar bone area.    Diagnoses and all orders for this visit:    Benign neoplasm of right clavicle  -     X-Ray Clavicle Right; Future  Ordering xray fo the clavicle    Vitamin D deficiency disease  -     ergocalciferol (VITAMIN D2) 50,000 unit Cap; Take 1 capsule (50,000 Units total) by mouth every 7 days.    Anemia, unspecified type  -     ergocalciferol (VITAMIN D2) 50,000 unit Cap; Take 1 capsule (50,000 Units total) by mouth every 7 days.  -     syringe with needle (MONOJECT SAFETY SYRINGES) Syrg; Use as directed    Gastroesophageal reflux disease, esophagitis presence not specified  -     pantoprazole (PROTONIX) 40 MG tablet; Take 1 tablet (40 mg total) by mouth once daily. 1 Tablet, Delayed Release (E.C.) Oral Every day  Starting protonix for reflux  Other orders  -     cyanocobalamin 1,000 mcg/mL injection; Inject 1 mL (1,000 mcg total) into the muscle every 30 days. 1 Solution Injection monthly.  1 mL IM in gluteal muscle monthly  -     safety needles 25 gauge " "x 1" Ndle; 1 Units by Misc.(Non-Drug; Combo Route) route once a week.           "

## 2018-01-04 NOTE — Clinical Note
"".5 CM curvilinear area enhancement left superior and medial orbit subdeltoid anterior, superior, adjacent lobe nonspecific, could represent focal AVM draining into the superior abdomen pain, similar opacity without enhancement noted on the CT brain image 8 series 2.  Included intracranial oval structures otherwise normal.   1.  Asymmetrical hypertrophy right posterior to the lack of junction, inflammatory, infectious, neoplastic etiologies considered in differential diagnosis."   Clinton Locke, I just needed a clarification on this read. What are we looking at with these reads? Sorry, not sure what is going on here. "

## 2018-01-05 ENCOUNTER — HOSPITAL ENCOUNTER (OUTPATIENT)
Dept: RADIOLOGY | Facility: HOSPITAL | Age: 73
Discharge: HOME | End: 2018-01-05
Attending: FAMILY MEDICINE
Payer: MEDICARE

## 2018-01-05 DIAGNOSIS — D16.7: ICD-10-CM

## 2018-01-05 PROCEDURE — 73000 X-RAY EXAM OF COLLAR BONE: CPT | Mod: TC,FY,PO,RT

## 2018-01-05 PROCEDURE — 73000 X-RAY EXAM OF COLLAR BONE: CPT | Mod: 26,RT,, | Performed by: RADIOLOGY

## 2018-01-09 ENCOUNTER — TELEPHONE (OUTPATIENT)
Dept: FAMILY MEDICINE | Facility: CLINIC | Age: 73
End: 2018-01-09

## 2018-01-09 NOTE — TELEPHONE ENCOUNTER
----- Message from Beatriz Fletcher sent at 1/9/2018 12:56 PM CST -----  Contact: Self  Patient is having headaches, cough, face and mouth pains. Pt would like a prescription called in to her pharmacy. Please call at [phone].      [facility] [place]

## 2018-01-09 NOTE — TELEPHONE ENCOUNTER
Patient requesting medication to help relieve headache, cough, face and mouth pain.  Stated symptoms began yesterday.  Stated if she takes 2 Tylenol tablets the pain will go away, but the returns stronger.  Please advise.

## 2018-01-10 ENCOUNTER — OFFICE VISIT (OUTPATIENT)
Dept: FAMILY MEDICINE | Facility: CLINIC | Age: 73
End: 2018-01-10
Payer: MEDICARE

## 2018-01-10 VITALS
HEIGHT: 62 IN | SYSTOLIC BLOOD PRESSURE: 132 MMHG | BODY MASS INDEX: 35.49 KG/M2 | DIASTOLIC BLOOD PRESSURE: 72 MMHG | OXYGEN SATURATION: 95 % | TEMPERATURE: 101 F | HEART RATE: 72 BPM | WEIGHT: 192.88 LBS

## 2018-01-10 DIAGNOSIS — D84.9 IMMUNOSUPPRESSION: ICD-10-CM

## 2018-01-10 DIAGNOSIS — M05.79 RHEUMATOID ARTHRITIS INVOLVING MULTIPLE SITES WITH POSITIVE RHEUMATOID FACTOR: ICD-10-CM

## 2018-01-10 DIAGNOSIS — R68.89 FLU-LIKE SYMPTOMS: Primary | ICD-10-CM

## 2018-01-10 LAB
CTP QC/QA: YES
FLUAV AG NPH QL: NEGATIVE
FLUBV AG NPH QL: NEGATIVE

## 2018-01-10 PROCEDURE — 99213 OFFICE O/P EST LOW 20 MIN: CPT | Mod: PBBFAC,PO | Performed by: INTERNAL MEDICINE

## 2018-01-10 PROCEDURE — 87804 INFLUENZA ASSAY W/OPTIC: CPT | Mod: 59,PBBFAC,PO | Performed by: INTERNAL MEDICINE

## 2018-01-10 PROCEDURE — 99999 PR PBB SHADOW E&M-EST. PATIENT-LVL III: CPT | Mod: PBBFAC,,, | Performed by: INTERNAL MEDICINE

## 2018-01-10 PROCEDURE — 99213 OFFICE O/P EST LOW 20 MIN: CPT | Mod: S$PBB,,, | Performed by: INTERNAL MEDICINE

## 2018-01-10 RX ORDER — BENZONATATE 100 MG/1
100 CAPSULE ORAL 3 TIMES DAILY PRN
Qty: 30 CAPSULE | Refills: 0 | Status: SHIPPED | OUTPATIENT
Start: 2018-01-10 | End: 2018-01-20

## 2018-01-10 RX ORDER — IPRATROPIUM BROMIDE 42 UG/1
2 SPRAY, METERED NASAL 3 TIMES DAILY
Qty: 30 ML | Refills: 0 | Status: SHIPPED | OUTPATIENT
Start: 2018-01-10 | End: 2018-01-17

## 2018-01-10 NOTE — PROGRESS NOTES
This note was created by combination of typed  and Dragon dictation.  Transcription errors may be present.  If there are any questions, please contact me.    Assessment & Plan  Flu-like symptoms-flu swab negative.  Rest, fluids, Atrovent nasal spray for congestion, Tessalon for any ensuing cough.  -     POCT Influenza A/B  -     ipratropium (ATROVENT) 42 mcg (0.06 %) nasal spray; 2 sprays by Nasal route 3 (three) times daily. AS NEEDED FOR NASAL CONGESTION AND DRIP  Dispense: 30 mL; Refill: 0  -     benzonatate (TESSALON) 100 MG capsule; Take 1 capsule (100 mg total) by mouth 3 (three) times daily as needed for Cough.  Dispense: 30 capsule; Refill: 0    Immunosuppression  Rheumatoid arthritis involving multiple sites with positive rheumatoid factor - on methotrexate.  May prolong her symptoms.  Followed by rheumatology.    There are no discontinued medications.    Follow-up: No Follow-up on file.      =================================================================      Chief Complaint   Patient presents with    URI     flulike symptoms       HPI  Yulia is a 72 y.o. female, last appointment with this clinic was 12/30/2017.    No LMP recorded. Patient has had a hysterectomy.    Started Sunday, 3 days ago, with headache, sinus, face, teeth, all that hurt.  And in the last day or so developed generalized aching.  Subjective fever sensation yesterday and reading of 100.4.  Sick contacts none.  No rhinorrhea.  No congestion.  Yes chest congestion.  Cough minimal but can hear rattling when she does.  OTC coricedin.  But continued pain. For the facial pain - heat pads.  No sore throat.  But with brushing her teeth - tongue is coated and raw.     She is on immunosuppression with methotrexate for her rheumatoid arthritis.     Patient Care Team:  Ileana Martin MD as PCP - General (Family Medicine)  Usha Soares MD as Referring Physician (Rheumatology)  Aston Schulte MD as Physician  (Cardiology)  John Felix MD as Surgeon (Orthopedic Surgery)  Dev Thomas OD (Inactive) as Physician (Optometry)  Ally Zepeda MD as Obstetrician (Obstetrics and Gynecology)    Patient Active Problem List    Diagnosis Date Noted    Abnormal LFTs 11/04/2016    Serum potassium elevated 11/04/2016    Thymoma 10/28/2016     Followed at [facility].  Benign path per pt      Bilateral ankle pain 01/14/2016    Effusion of right elbow 09/29/2015    Right elbow pain 09/28/2015    Fall at home 09/28/2015    Abnormal CT scan, chest 08/07/2015    Abnormal chest x-ray 07/29/2015    Chest pain 07/28/2015    Recent foreign travel 07/28/2015    Chest pain, atypical 07/08/2015    Left lumbar radiculitis 05/19/2015    Anserine bursitis 04/21/2015    Obesity 04/21/2015    Left shoulder pain 12/18/2014    Immunosuppression 12/18/2014    SOB (shortness of breath) 06/16/2014    Palpitations 06/16/2014    Acquired scoliosis 04/16/2014    Genital herpes in women 12/03/2013    Vitamin B 12 deficiency 12/03/2013    Need for hepatitis C screening test 12/03/2013    Thoracic or lumbosacral neuritis or radiculitis, unspecified 10/30/2013    Spondylosis without myelopathy 07/10/2013    Degeneration of lumbar or lumbosacral intervertebral disc 07/10/2013    Spinal stenosis, lumbar region, with neurogenic claudication 07/10/2013    Acquired spondylolisthesis 07/10/2013    Displacement of lumbar intervertebral disc without myelopathy 07/10/2013    Lumbago 07/10/2013    HTN (hypertension) 05/31/2013    GERD (gastroesophageal reflux disease) 05/31/2013    Herpes 05/31/2013    Vitamin D deficiency disease 05/31/2013    DJD (degenerative joint disease) of lumbar spine 05/31/2013    UTI (lower urinary tract infection) 05/31/2013    Acid reflux     Lumbar vertebral fracture 05/27/2013    Back pain 05/24/2013    Hypertension 12/18/2012    Multiple thyroid nodules 12/18/2012    Osteoarthritis  12/18/2012    Anemia of other chronic disease 12/18/2012    Thrombocytopenia 12/18/2012    Hypovitaminosis D 12/18/2012    Migraine headache 12/18/2012    Encounter for long-term (current) use of other medications 08/03/2012    Rheumatoid arthritis 08/03/2012    Fatigue 08/03/2012       PAST MEDICAL HISTORY:  Past Medical History:   Diagnosis Date    Acid reflux     Anemia     Anxiety     Arthritis     Blood transfusion     Cataract     Depression     Dry eyes     Heart murmur     Hypertension     Left lumbar radiculitis 5/19/2015    Multiple thyroid nodules 12/18/2012    Osteoporosis     Rheumatoid arthritis     Thoracic or lumbosacral neuritis or radiculitis, unspecified 10/30/2013       PAST SURGICAL HISTORY:  Past Surgical History:   Procedure Laterality Date    gallstones  9221-6002    HYSTERECTOMY      JOINT REPLACEMENT  1124-5022     bilateral knee    VA REMOVAL OF OVARY/TUBE(S)      TONSILLECTOMY         SOCIAL HISTORY:  Social History     Social History    Marital status:      Spouse name: N/A    Number of children: N/A    Years of education: N/A     Occupational History    Not on file.     Social History Main Topics    Smoking status: Never Smoker    Smokeless tobacco: Never Used    Alcohol use Yes    Drug use: No    Sexual activity: Not on file     Other Topics Concern    Not on file     Social History Narrative    Adult Screenings updated and reviewed  6/12/14    Mammogram( for females) ordered for DIS    Pap ( for females) Dr Zepeda  Due for f/u   For  2014    Colonoscopy  Done once    Flu shot yearly done  2013    Td 2005    Pneumovax  Updated  12/3/13    Zostavax done 2012    Eye exam recommended yearly done 2013    Bone density  12/9/13    Thyroid ultrasound  11/6/2012 Normal sized thyroid containing several subcentimeter nodules, similar to the 5/12/11 exam.  No concerning nodules identified..                ALLERGIES AND MEDICATIONS: updated and  "reviewed.  Review of patient's allergies indicates:   Allergen Reactions    No known drug allergies      Current Outpatient Prescriptions   Medication Sig Dispense Refill    butalbital-aspirin-caffeine -40 mg (FIORINAL) -40 mg Cap Take 1 capsule by mouth every 6 (six) hours as needed. 1 Capsule Oral Every 6 hours 60 capsule 4    cyanocobalamin 1,000 mcg/mL injection Inject 1 mL (1,000 mcg total) into the muscle every 30 days. 1 Solution Injection monthly.  1 mL IM in gluteal muscle monthly 1 mL 5    ergocalciferol (VITAMIN D2) 50,000 unit Cap Take 1 capsule (50,000 Units total) by mouth every 7 days. 12 capsule 0    folic acid (FOLVITE) 1 MG tablet Take 2 tablets (2 mg total) by mouth once daily. 180 tablet 3    furosemide (LASIX) 40 MG tablet Take 1 tablet (40 mg total) by mouth once daily. 90 tablet 1    irbesartan (AVAPRO) 150 MG tablet Take 1 tablet (150 mg total) by mouth once daily. 90 tablet 1    lorazepam (ATIVAN) 0.5 MG tablet 1 tablet 30 minute prior to MRI. 2 tablet 0    misoprostol (CYTOTEC) 100 MCG Tab Take 1 tablet (100 mcg total) by mouth 2 (two) times daily. 1 Tablet Oral Twice a day diarrhea occurs , decrease to once daily 180 tablet 3    nabumetone (RELAFEN) 500 MG tablet Take 1 tablet (500 mg total) by mouth 2 (two) times daily. 180 tablet 3    nystatin-triamcinolone (MYCOLOG II) cream Apply to affected area 2 times daily 90 g 1    pantoprazole (PROTONIX) 40 MG tablet Take 1 tablet (40 mg total) by mouth once daily. 1 Tablet, Delayed Release (E.C.) Oral Every day 90 tablet 3    ranitidine (ZANTAC) 300 MG capsule Take 1 capsule (300 mg total) by mouth nightly. 1 Capsule Oral Every day 90 capsule 3    safety needles 25 gauge x 1" Ndle 1 Units by Misc.(Non-Drug; Combo Route) route once a week. 100 each 2    syringe with needle (MONOJECT SAFETY SYRINGES) Syrg Use as directed 5 Syringe 3    valacyclovir (VALTREX) 500 MG tablet 1 Tablet DAILY for suppression, increase to BID " "for outbreak 180 tablet 2    gabapentin (NEURONTIN) 100 MG capsule Take 1 capsule (100 mg total) by mouth 3 (three) times daily. 270 capsule 1    methotrexate 2.5 MG Tab Take 10 tablets (25 mg total) by mouth every 7 days. 5 tablets on Monday and Tuesday only 120 tablet 0     No current facility-administered medications for this visit.        Review of Systems   All other systems reviewed and are negative.      Physical Exam   Vitals:    01/10/18 1553   BP: 132/72   BP Location: Left arm   Patient Position: Sitting   BP Method: Medium (Manual)   Pulse: 72   Temp: (!) 100.5 °F (38.1 °C)   TempSrc: Oral   SpO2: 95%   Weight: 87.5 kg (192 lb 14.4 oz)   Height: 5' 2" (1.575 m)    Body mass index is 35.28 kg/m².  Weight: 87.5 kg (192 lb 14.4 oz)   Height: 5' 2" (157.5 cm)     Physical Exam   Constitutional: She is oriented to person, place, and time. She appears well-developed and well-nourished.   HENT:   TMs grey/clear bilaterally.  OP no erythema no exudates   Eyes: EOM are normal.   Neck: Neck supple.   Cardiovascular: Normal rate, regular rhythm and normal heart sounds.    Pulmonary/Chest: Effort normal and breath sounds normal. She has no wheezes.   Lymphadenopathy:     She has no cervical adenopathy.   Neurological: She is alert and oriented to person, place, and time.   Skin: Skin is warm and dry.   Psychiatric: She has a normal mood and affect. Her behavior is normal.     Rapid flu test negative  "

## 2018-01-18 ENCOUNTER — TELEPHONE (OUTPATIENT)
Dept: FAMILY MEDICINE | Facility: CLINIC | Age: 73
End: 2018-01-18

## 2018-01-18 DIAGNOSIS — R93.89 ABNORMAL COMPUTED TOMOGRAPHY ANGIOGRAPHY (CTA) OF NECK: ICD-10-CM

## 2018-01-18 DIAGNOSIS — G43.909 MIGRAINE WITHOUT STATUS MIGRAINOSUS, NOT INTRACTABLE, UNSPECIFIED MIGRAINE TYPE: Primary | ICD-10-CM

## 2018-01-18 NOTE — TELEPHONE ENCOUNTER
Spoke to radiologist Dr. Mauricio and Dr. Hamilton in radiology, unspecific incidental enhancement behind right eye on 2 reads on CTA neck. Dr. Martin inform.  Pt reports vision deficit, wears glasses and migraines so advised to obtain MRI for further clarification. Orders placed. Pt instructed to contact us back in 1 week if has not been contacted by referral/scheduling for this. Pt verbalized understanding

## 2018-01-22 ENCOUNTER — TELEPHONE (OUTPATIENT)
Dept: FAMILY MEDICINE | Facility: CLINIC | Age: 73
End: 2018-01-22

## 2018-01-22 NOTE — TELEPHONE ENCOUNTER
Patient would like to know why she needs to have the MRI done.  Stated she was not given a clear explanation of the results of the CT and CTA and would like information on why another test is needed.  If MRI is needed, stated she was advised that a lab to check her liver and/or kidney is needed prior to MRI.  Please order if MRI is needed.    Patient also requesting to have scripts sent to pharmacy that were to be sent during last office visit (1/4/2018).  Stated that her pharmacy advised her that scripts have not been received.  Please advise.

## 2018-01-22 NOTE — TELEPHONE ENCOUNTER
----- Message from Dulce Gomez sent at 1/22/2018 10:36 AM CST -----  Contact: Self  Pt calling to speak to  regarding MRI. Please call [phone].

## 2018-01-30 ENCOUNTER — LAB VISIT (OUTPATIENT)
Dept: LAB | Facility: HOSPITAL | Age: 73
End: 2018-01-30
Attending: INTERNAL MEDICINE
Payer: MEDICARE

## 2018-01-30 ENCOUNTER — TELEPHONE (OUTPATIENT)
Dept: RHEUMATOLOGY | Facility: CLINIC | Age: 73
End: 2018-01-30

## 2018-01-30 DIAGNOSIS — G43.909 MIGRAINE WITHOUT STATUS MIGRAINOSUS, NOT INTRACTABLE, UNSPECIFIED MIGRAINE TYPE: ICD-10-CM

## 2018-01-30 DIAGNOSIS — R93.89 ABNORMAL COMPUTED TOMOGRAPHY ANGIOGRAPHY (CTA) OF NECK: ICD-10-CM

## 2018-01-30 DIAGNOSIS — M06.9 RHEUMATOID ARTHRITIS: ICD-10-CM

## 2018-01-30 DIAGNOSIS — Z79.899 ENCOUNTER FOR LONG-TERM (CURRENT) USE OF MEDICATIONS: ICD-10-CM

## 2018-01-30 LAB
ALBUMIN SERPL BCP-MCNC: 3.6 G/DL
ALP SERPL-CCNC: 102 U/L
ALT SERPL W/O P-5'-P-CCNC: 31 U/L
ANION GAP SERPL CALC-SCNC: 9 MMOL/L
ANISOCYTOSIS BLD QL SMEAR: SLIGHT
AST SERPL-CCNC: 27 U/L
BASOPHILS # BLD AUTO: 0.01 K/UL
BASOPHILS NFR BLD: 0.2 %
BILIRUB SERPL-MCNC: 0.5 MG/DL
BUN SERPL-MCNC: 10 MG/DL
CALCIUM SERPL-MCNC: 9.8 MG/DL
CHLORIDE SERPL-SCNC: 107 MMOL/L
CO2 SERPL-SCNC: 26 MMOL/L
CREAT SERPL-MCNC: 0.9 MG/DL
CREAT SERPL-MCNC: 0.9 MG/DL
CRP SERPL-MCNC: 1.7 MG/L
DIFFERENTIAL METHOD: ABNORMAL
EOSINOPHIL # BLD AUTO: 0.2 K/UL
EOSINOPHIL NFR BLD: 3.7 %
ERYTHROCYTE [DISTWIDTH] IN BLOOD BY AUTOMATED COUNT: 13.9 %
ERYTHROCYTE [SEDIMENTATION RATE] IN BLOOD BY WESTERGREN METHOD: 22 MM/HR
EST. GFR  (AFRICAN AMERICAN): >60 ML/MIN/1.73 M^2
EST. GFR  (AFRICAN AMERICAN): >60 ML/MIN/1.73 M^2
EST. GFR  (NON AFRICAN AMERICAN): >60 ML/MIN/1.73 M^2
EST. GFR  (NON AFRICAN AMERICAN): >60 ML/MIN/1.73 M^2
GLUCOSE SERPL-MCNC: 132 MG/DL
HCT VFR BLD AUTO: 32 %
HGB BLD-MCNC: 10.2 G/DL
HYPOCHROMIA BLD QL SMEAR: ABNORMAL
IMM GRANULOCYTES # BLD AUTO: 0.02 K/UL
IMM GRANULOCYTES NFR BLD AUTO: 0.3 %
LYMPHOCYTES # BLD AUTO: 1.3 K/UL
LYMPHOCYTES NFR BLD: 22.7 %
MCH RBC QN AUTO: 32.1 PG
MCHC RBC AUTO-ENTMCNC: 31.9 G/DL
MCV RBC AUTO: 101 FL
MONOCYTES # BLD AUTO: 0.9 K/UL
MONOCYTES NFR BLD: 15.1 %
NEUTROPHILS # BLD AUTO: 3.4 K/UL
NEUTROPHILS NFR BLD: 58 %
NRBC BLD-RTO: 0 /100 WBC
OVALOCYTES BLD QL SMEAR: ABNORMAL
PLATELET # BLD AUTO: 131 K/UL
PMV BLD AUTO: 14.1 FL
POIKILOCYTOSIS BLD QL SMEAR: SLIGHT
POLYCHROMASIA BLD QL SMEAR: ABNORMAL
POTASSIUM SERPL-SCNC: 4.7 MMOL/L
PROT SERPL-MCNC: 7.5 G/DL
RBC # BLD AUTO: 3.18 M/UL
SODIUM SERPL-SCNC: 142 MMOL/L
WBC # BLD AUTO: 5.9 K/UL

## 2018-01-30 PROCEDURE — 85651 RBC SED RATE NONAUTOMATED: CPT

## 2018-01-30 PROCEDURE — 86140 C-REACTIVE PROTEIN: CPT

## 2018-01-30 PROCEDURE — 85025 COMPLETE CBC W/AUTO DIFF WBC: CPT

## 2018-01-30 PROCEDURE — 36415 COLL VENOUS BLD VENIPUNCTURE: CPT | Mod: PO

## 2018-01-30 PROCEDURE — 80053 COMPREHEN METABOLIC PANEL: CPT

## 2018-01-30 NOTE — TELEPHONE ENCOUNTER
----- Message from Ebony Nicole MA sent at 1/30/2018 10:16 AM CST -----  Contact: self      ----- Message -----  From: Rachelle Moyer  Sent: 1/30/2018   8:59 AM  To: [facility] Staff    Pt called wanting to know if Dr. Ramirez wants to schedule her an appt for today to do her blood work or should she want until 02/12 because she has to do her blood work before she comes in to see Dr. Ramirez. Pt would Like an immediate call back from Gladys and could be reached at [phone]

## 2018-01-30 NOTE — TELEPHONE ENCOUNTER
Returned patient call but no answer.  Left message for the patient to contact us and tell the  the best time, date and [facility] location to schedule her labs

## 2018-01-31 NOTE — TELEPHONE ENCOUNTER
I have tried to reach you about her CTA. I am reaching out again to get a clarification on the CTA that you read. Please help!!!

## 2018-02-01 ENCOUNTER — OFFICE VISIT (OUTPATIENT)
Dept: RHEUMATOLOGY | Facility: CLINIC | Age: 73
End: 2018-02-01
Payer: MEDICARE

## 2018-02-01 VITALS
HEIGHT: 62 IN | DIASTOLIC BLOOD PRESSURE: 77 MMHG | SYSTOLIC BLOOD PRESSURE: 133 MMHG | HEART RATE: 67 BPM | WEIGHT: 197 LBS | BODY MASS INDEX: 36.25 KG/M2

## 2018-02-01 DIAGNOSIS — E66.09 CLASS 2 OBESITY DUE TO EXCESS CALORIES WITHOUT SERIOUS COMORBIDITY WITH BODY MASS INDEX (BMI) OF 36.0 TO 36.9 IN ADULT: ICD-10-CM

## 2018-02-01 DIAGNOSIS — M06.9 RHEUMATOID ARTHRITIS OF HAND, UNSPECIFIED LATERALITY, UNSPECIFIED RHEUMATOID FACTOR PRESENCE: ICD-10-CM

## 2018-02-01 DIAGNOSIS — M05.79 RHEUMATOID ARTHRITIS INVOLVING MULTIPLE SITES WITH POSITIVE RHEUMATOID FACTOR: Primary | ICD-10-CM

## 2018-02-01 DIAGNOSIS — D84.9 IMMUNOSUPPRESSION: ICD-10-CM

## 2018-02-01 PROCEDURE — 1126F AMNT PAIN NOTED NONE PRSNT: CPT | Mod: ,,, | Performed by: INTERNAL MEDICINE

## 2018-02-01 PROCEDURE — 99999 PR PBB SHADOW E&M-EST. PATIENT-LVL III: CPT | Mod: PBBFAC,,, | Performed by: INTERNAL MEDICINE

## 2018-02-01 PROCEDURE — 99214 OFFICE O/P EST MOD 30 MIN: CPT | Mod: S$PBB,,, | Performed by: INTERNAL MEDICINE

## 2018-02-01 PROCEDURE — 99213 OFFICE O/P EST LOW 20 MIN: CPT | Mod: PBBFAC | Performed by: INTERNAL MEDICINE

## 2018-02-01 PROCEDURE — 1159F MED LIST DOCD IN RCRD: CPT | Mod: ,,, | Performed by: INTERNAL MEDICINE

## 2018-02-01 RX ORDER — NABUMETONE 500 MG/1
500 TABLET, FILM COATED ORAL 2 TIMES DAILY
Qty: 180 TABLET | Refills: 3 | Status: SHIPPED | OUTPATIENT
Start: 2018-02-01 | End: 2018-08-14 | Stop reason: SDUPTHER

## 2018-02-01 NOTE — PROGRESS NOTES
"Subjective:       Patient ID: Yulia Peralta is a 72 y.o. female.    Chief Complaint: Rheumatoid Arthritis      HPI:  Yulia Peralta is a 72 y.o. female with a history of rheumatoid arthritis for 25 years.  She had been on methotrexate for the past 5 years and her current dose is 8 tablets q week (4 Monday and 4 Tuesday).   Went back to prior dose of  10 tabs on Monday.    History of gout some years ago.     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   Flu-like symptoms.  Was evaluated and told she does not have flu.  Still hanging on.  Trouble with clearing throat.  In morning yellow like mucus but clear during the day.      Review of Systems   Constitutional: Positive for fatigue.   HENT: Negative.    Eyes: Negative.    Respiratory: Positive for cough and shortness of breath.    Cardiovascular: Negative.    Gastrointestinal:        Heartburn     Endocrine: Negative.    Genitourinary: Negative.    Musculoskeletal: Positive for arthralgias.   Skin: Negative.    Allergic/Immunologic: Negative.    Neurological: Positive for headaches.   Hematological: Negative.    Psychiatric/Behavioral: Negative.          Objective:   /77 (BP Location: Left arm, Patient Position: Sitting, BP Method: Small (Automatic))   Pulse 67   Ht 5' 2" (1.575 m)   Wt 89.4 kg (197 lb)   BMI 36.03 kg/m²  T=97.5 F     Physical Exam   Constitutional: She is oriented to person, place, and time and well-developed, well-nourished, and in no distress.   HENT:   Head: Normocephalic and atraumatic.   Eyes: Conjunctivae and EOM are normal.   Neck: Neck supple.   Cardiovascular: Normal rate, regular rhythm and normal heart sounds.    Pulmonary/Chest: Effort normal and breath sounds normal.   Abdominal: Soft. Bowel sounds are normal.   Neurological: She is alert and oriented to person, place, and time. Gait normal.   Skin: Skin is warm and dry. "     Psychiatric: Mood and affect normal.   Musculoskeletal:   28 joint count:  0 swollen and 0 tender   MTPs not painful compression   35 degree on right and 40 degree on left (contractures of elbows-unchanged)   Bony prominence mid right clavicle and prominent sternoclavicular joint        LABS    Component      Latest Ref Rng & Units 1/30/2018 1/30/2018           1:22 PM  1:22 PM   WBC      3.90 - 12.70 K/uL  5.90   RBC      4.00 - 5.40 M/uL  3.18 (L)   Hemoglobin      12.0 - 16.0 g/dL  10.2 (L)   Hematocrit      37.0 - 48.5 %  32.0 (L)   MCV      82 - 98 fL  101 (H)   MCH      27.0 - 31.0 pg  32.1 (H)   MCHC      32.0 - 36.0 g/dL  31.9 (L)   RDW      11.5 - 14.5 %  13.9   Platelets      150 - 350 K/uL  131 (L)   MPV      9.2 - 12.9 fL  14.1 (H)   Immature Granulocytes      0.0 - 0.5 %  0.3   Gran # (ANC)      1.8 - 7.7 K/uL  3.4   Immature Grans (Abs)      0.00 - 0.04 K/uL  0.02   Lymph #      1.0 - 4.8 K/uL  1.3   Mono #      0.3 - 1.0 K/uL  0.9   Eos #      0.0 - 0.5 K/uL  0.2   Baso #      0.00 - 0.20 K/uL  0.01   nRBC      0 /100 WBC  0   Gran%      38.0 - 73.0 %  58.0   Lymph%      18.0 - 48.0 %  22.7   Mono%      4.0 - 15.0 %  15.1 (H)   Eosinophil%      0.0 - 8.0 %  3.7   Basophil%      0.0 - 1.9 %  0.2   Aniso        Slight   Poik        Slight   Poly        Occasional   Hypo        Occasional   Ovalocytes        Occasional   Differential Method        Automated   Sodium      136 - 145 mmol/L  142   Potassium      3.5 - 5.1 mmol/L  4.7   Chloride      95 - 110 mmol/L  107   CO2      23 - 29 mmol/L  26   Glucose      70 - 110 mg/dL  132 (H)   BUN, Bld      8 - 23 mg/dL  10   Creatinine      0.5 - 1.4 mg/dL 0.9 0.9   Calcium      8.7 - 10.5 mg/dL  9.8   Total Protein      6.0 - 8.4 g/dL  7.5   Albumin      3.5 - 5.2 g/dL  3.6   Total Bilirubin      0.1 - 1.0 mg/dL  0.5   Alkaline Phosphatase      55 - 135 U/L  102   AST      10 - 40 U/L  27   ALT      10 - 44 U/L  31   Anion Gap      8 - 16 mmol/L  9   eGFR  if African American      >60 mL/min/1.73 m:2 >60.0 >60.0   eGFR if non African American      >60 mL/min/1.73 m:2 >60.0 >60.0   CRP      0.0 - 8.2 mg/L  1.7   Sed Rate      0 - 20 mm/Hr  22 (H)        Assessment:       1. Rheumatoid arthritis manifested by rheumatoid nodule, polyarthritis in the past, and contractures of the elbows.    Treated in the past with gold. Plaquenil did not work in the past and she never took SSZ.   Now stable. Continue MTX (5 tabs on Monday and 5 tabs on Tuesday) and folic acid   2. Encounter for long-term (current) use of other medications    3. Fatigue.   4. Chest pain. Small mass on CT evaluated by cardiothoracic surgery who said it was too small to biopsy.  She decided to go for second opinion at [facility]. Biopsy was negative. MRI repeat stable and most recent 10/2017 stable  5. 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 Felt not to have hepatitis C by hepatology.  6. Right anserine bursitis.   7. Obesity  8. HTN  9. Chronic back pain.  Bulging disc.  May be cause of paresthesias in legs with standing.  10.  Paresthesia of in left leg.  May be due to #9  11.  Right clavicle mass.  Evaluated by x-ray  Plan:       1. Hold methotrexate to 10 tabs qweek (5 on Monday and 5 on Tuesday) until flu-like symptoms resolve.  2. Check CRP, ESR, CBC, and CMP q 3months.    3. Continue Nabumetone.    4. Continue folic acid 2 tabs.    5. Continue home exercises given by PT  6. See primary regarding fatigue  7. Consider PT for back if paresthesias return  8.  Follow with primary doctor regarding abnormal CT head     Return to the office in 3 months

## 2018-02-05 ENCOUNTER — TELEPHONE (OUTPATIENT)
Dept: SLEEP MEDICINE | Facility: CLINIC | Age: 73
End: 2018-02-05

## 2018-02-05 NOTE — TELEPHONE ENCOUNTER
Spoke to pt to follow up on MRI scheduling, pt reports has not heard back from radiology department so pt given scheduling number [phone] to schedule. Instructed to contact us back if she encounters scheduling problems. Pt verbalize understanding

## 2018-02-06 ENCOUNTER — TELEPHONE (OUTPATIENT)
Dept: FAMILY MEDICINE | Facility: CLINIC | Age: 73
End: 2018-02-06

## 2018-02-06 NOTE — TELEPHONE ENCOUNTER
Patient is requesting to have her lab ordered for a creatnine check before her MRI of the brain with and without contrast/ please place order

## 2018-02-06 NOTE — TELEPHONE ENCOUNTER
----- Message from Gloria Bernal sent at 2/6/2018  3:30 PM CST -----  Contact: [phone]  Pt scheduled for a mri on 2/8 that's Thursday of this week pt said she supposed to have blood work done so pt wants to know if she had the blood work done already for the Mri she is scheduled Please call pt at your earliest convenience.  Thanks !

## 2018-02-08 ENCOUNTER — HOSPITAL ENCOUNTER (OUTPATIENT)
Dept: RADIOLOGY | Facility: HOSPITAL | Age: 73
Discharge: HOME | End: 2018-02-08
Attending: NURSE PRACTITIONER
Payer: MEDICARE

## 2018-02-08 ENCOUNTER — TELEPHONE (OUTPATIENT)
Dept: FAMILY MEDICINE | Facility: CLINIC | Age: 73
End: 2018-02-08

## 2018-02-08 DIAGNOSIS — G43.909 MIGRAINE WITHOUT STATUS MIGRAINOSUS, NOT INTRACTABLE, UNSPECIFIED MIGRAINE TYPE: ICD-10-CM

## 2018-02-08 DIAGNOSIS — R93.89 ABNORMAL COMPUTED TOMOGRAPHY ANGIOGRAPHY (CTA) OF NECK: ICD-10-CM

## 2018-02-08 PROBLEM — I67.89 CEREBRAL MICROVASCULAR DISEASE: Status: ACTIVE | Noted: 2018-02-08

## 2018-02-08 PROCEDURE — 70553 MRI BRAIN STEM W/O & W/DYE: CPT | Mod: 26,,, | Performed by: RADIOLOGY

## 2018-02-08 PROCEDURE — 25500020 PHARM REV CODE 255: Performed by: NURSE PRACTITIONER

## 2018-02-08 PROCEDURE — 70553 MRI BRAIN STEM W/O & W/DYE: CPT | Mod: TC

## 2018-02-08 PROCEDURE — A9585 GADOBUTROL INJECTION: HCPCS | Performed by: NURSE PRACTITIONER

## 2018-02-08 RX ORDER — GADOBUTROL 604.72 MG/ML
9 INJECTION INTRAVENOUS
Status: COMPLETED | OUTPATIENT
Start: 2018-02-08 | End: 2018-02-08

## 2018-02-08 RX ADMIN — GADOBUTROL 9 ML: 604.72 INJECTION INTRAVENOUS at 08:02

## 2018-02-09 NOTE — TELEPHONE ENCOUNTER
Inform pt of MRI results, advise regular f/u with pcp to manage blood pressure, blood sugar and cholesterol. Advise weight loss and regular exercise. Results mailed.

## 2018-02-22 ENCOUNTER — OFFICE VISIT (OUTPATIENT)
Dept: FAMILY MEDICINE | Facility: CLINIC | Age: 73
End: 2018-02-22
Payer: MEDICARE

## 2018-02-22 VITALS
TEMPERATURE: 98 F | HEIGHT: 62 IN | HEART RATE: 69 BPM | SYSTOLIC BLOOD PRESSURE: 140 MMHG | BODY MASS INDEX: 36.07 KG/M2 | DIASTOLIC BLOOD PRESSURE: 80 MMHG | OXYGEN SATURATION: 99 % | WEIGHT: 196 LBS

## 2018-02-22 DIAGNOSIS — Q27.30 AVM (ARTERIOVENOUS MALFORMATION): Primary | ICD-10-CM

## 2018-02-22 DIAGNOSIS — D52.0 DIETARY FOLATE DEFICIENCY ANEMIA: ICD-10-CM

## 2018-02-22 DIAGNOSIS — D69.6 THROMBOCYTOPENIA: ICD-10-CM

## 2018-02-22 DIAGNOSIS — D64.9 ANEMIA, UNSPECIFIED TYPE: ICD-10-CM

## 2018-02-22 DIAGNOSIS — D75.89 MACROCYTOSIS: ICD-10-CM

## 2018-02-22 DIAGNOSIS — R59.9 ADENOPATHY: ICD-10-CM

## 2018-02-22 PROCEDURE — 1159F MED LIST DOCD IN RCRD: CPT | Mod: ,,, | Performed by: FAMILY MEDICINE

## 2018-02-22 PROCEDURE — 99214 OFFICE O/P EST MOD 30 MIN: CPT | Mod: S$PBB,,, | Performed by: FAMILY MEDICINE

## 2018-02-22 PROCEDURE — 99213 OFFICE O/P EST LOW 20 MIN: CPT | Mod: PBBFAC,PO | Performed by: FAMILY MEDICINE

## 2018-02-22 PROCEDURE — 99999 PR PBB SHADOW E&M-EST. PATIENT-LVL III: CPT | Mod: PBBFAC,,, | Performed by: FAMILY MEDICINE

## 2018-02-22 PROCEDURE — 1126F AMNT PAIN NOTED NONE PRSNT: CPT | Mod: ,,, | Performed by: FAMILY MEDICINE

## 2018-02-22 NOTE — PROGRESS NOTES
"Subjective:       Patient ID: Yulia Peralta is a 72 y.o. female.    Chief Complaint: Cough and Head Cold/ Pressure    Patient is here as she wants to review her previous studies. She was seen in the past with concerns about amass in her neck. She also has chronic migraine headaches so CT of the neck and CT of the brain. The CT of the brain showed no abnormalities.    Ct neck shows:  Impression      1.  Asymmetrical hypertrophy right posterior to the lack of junction, inflammatory, infectious, neoplastic etiologies considered in differential diagnosis.    2.  Prominent degenerative disc spondylosis cervical spine.    3.  Incidental enhancing opacity right anterior superior orbit discussed above.  Consider CT scan orbits with IV contrast, ophthalmology consultation as indicated.    4.  Incidental bone exostosis or small meningioma left anterior frontal inner table.    She  states she feels like the back of her tongue is sore. She had a head cold, but that resolved, but still has congestion, post nasal drip. She is wondering if the soreness is related to the finding on her CT of her neck. She has several family members that are very sick now with cancer and various illness and she admits she is nervous about getting this diagnosis. She has no fever, chills, weight loss, however.     Neck Pain          Review of Systems       Objective:       Vitals:    02/22/18 0845   BP: (!) 140/80   Pulse: 69   Temp: 97.8 °F (36.6 °C)   TempSrc: Oral   SpO2: 99%   Weight: 88.9 kg (195 lb 15.8 oz)   Height: 5' 2" (1.575 m)       Physical Exam   Constitutional: She is oriented to person, place, and time. She appears well-developed and well-nourished. No distress.   HENT:   Head: Normocephalic and atraumatic.   Neck: Normal range of motion. Neck supple.   Cardiovascular: Normal rate, regular rhythm and normal heart sounds.  Exam reveals no gallop and no friction rub.    No murmur heard.  Pulmonary/Chest: Effort normal and breath " sounds normal. No respiratory distress. She has no wheezes. She has no rales.   Musculoskeletal:        Arms:  Neurological: She is alert and oriented to person, place, and time.   Skin: She is not diaphoretic.   Psychiatric: She has a normal mood and affect.   Vitals reviewed.      Assessment:       1. AVM (arteriovenous malformation)    2. Adenopathy    3. Anemia, unspecified type    4. Macrocytosis    5. Thrombocytopenia     6. Dietary folate deficiency anemia         Plan:       Yulia was seen today for cough and head cold/ pressure.    Diagnoses and all orders for this visit:    AVM (arteriovenous malformation)  -     CT Orbits Sella Post Fossa With Contrast; Future  Will get a closer look at the CT of the orbits to see if this is an AVM in the orbit  Adenopathy  -     Ambulatory referral to ENT  Will get a second opinion on the CT of tne neck tos ee if this is significant    Anemia, unspecified type  -     CBC auto differential; Future  -     Vitamin B12; Future  -     Folate; Future  -     Hemoglobin Electrophoresis,Hgb A2 Eugenio.; Future    Macrocytosis  -     Vitamin B12; Future  -     Folate; Future    Thrombocytopenia   -     Vitamin B12; Future    Dietary folate deficiency anemia   -     Folate; Future

## 2018-02-23 ENCOUNTER — TELEPHONE (OUTPATIENT)
Dept: RHEUMATOLOGY | Facility: CLINIC | Age: 73
End: 2018-02-23

## 2018-02-23 NOTE — TELEPHONE ENCOUNTER
----- Message from Avril Ayon MA sent at 2/23/2018  7:13 AM CST -----  Contact: self      ----- Message -----  From: Harry Subramanian  Sent: 2/22/2018   4:48 PM  To: Johny MURRAY Staff    Pt called to report that after discontinuing the methotrexate she has been experiencing a dry cough, throat irritation and cold of of her vocal cords. Pt would like to know if she needs to began taking the medication again.

## 2018-02-28 ENCOUNTER — TELEPHONE (OUTPATIENT)
Dept: FAMILY MEDICINE | Facility: CLINIC | Age: 73
End: 2018-02-28

## 2018-02-28 NOTE — TELEPHONE ENCOUNTER
----- Message from Beatriz Fletcher sent at 2/28/2018  2:54 PM CST -----  Contact: Self   Patient says she has an ENT appointment on Friday and would like to know if Dr. Martin thinks she may need a chest xray to check for Pneumonia because of the dry cough she has. Please call at [phone].

## 2018-03-02 ENCOUNTER — OFFICE VISIT (OUTPATIENT)
Dept: OTOLARYNGOLOGY | Facility: CLINIC | Age: 73
End: 2018-03-02
Payer: MEDICARE

## 2018-03-02 VITALS
SYSTOLIC BLOOD PRESSURE: 152 MMHG | DIASTOLIC BLOOD PRESSURE: 74 MMHG | WEIGHT: 195.75 LBS | TEMPERATURE: 98 F | HEART RATE: 67 BPM | BODY MASS INDEX: 35.81 KG/M2

## 2018-03-02 DIAGNOSIS — R93.89 ABNORMAL CT SCAN, NECK: Primary | ICD-10-CM

## 2018-03-02 PROCEDURE — 99999 PR PBB SHADOW E&M-EST. PATIENT-LVL III: CPT | Mod: PBBFAC,,, | Performed by: OTOLARYNGOLOGY

## 2018-03-02 PROCEDURE — 99203 OFFICE O/P NEW LOW 30 MIN: CPT | Mod: 25,S$PBB,, | Performed by: OTOLARYNGOLOGY

## 2018-03-02 PROCEDURE — 31575 DIAGNOSTIC LARYNGOSCOPY: CPT | Mod: S$PBB,,, | Performed by: OTOLARYNGOLOGY

## 2018-03-02 PROCEDURE — 99213 OFFICE O/P EST LOW 20 MIN: CPT | Mod: PBBFAC,25 | Performed by: OTOLARYNGOLOGY

## 2018-03-02 PROCEDURE — 31575 DIAGNOSTIC LARYNGOSCOPY: CPT | Mod: PBBFAC | Performed by: OTOLARYNGOLOGY

## 2018-03-02 NOTE — PROGRESS NOTES
Chief Complaint   Patient presents with    Consult     adenopathy         72 y.o. female presents with history of shooting neck and temporal pain associated with headaches. During her workup a CTA neck was done which demonstrated some asymmetry of the vallecula. Here for further evaluation. No history of throat pain or dysphagia. No tobacco history.       Review of Systems     Constitutional: Negative for fatigue and unexpected weight change.   HENT: per HPI.  Eyes: Negative for visual disturbance.   Respiratory: Negative for shortness of breath, hemoptysis  Cardiovascular: Negative for chest pain and palpitations.   Genitourinary: Negative for dysuria and difficulty urinating.   Musculoskeletal: Negative for decreased ROM, back pain.   Skin: Negative for rash, sunburn, itching.   Neurological: Negative for dizziness and seizures.   Hematological: Negative for adenopathy. Does not bruise/bleed easily.   Psychiatric/Behavioral: Negative for agitation. The patient is not nervous/anxious.   Endocrine: Negative for rapid weight loss/weight gain, heat/cold intolerance.     Past Medical History:   Diagnosis Date    Acid reflux     Anemia     Anxiety     Arthritis     Blood transfusion     Cataract     Depression     Dry eyes     Heart murmur     Hypertension     Left lumbar radiculitis 5/19/2015    Multiple thyroid nodules 12/18/2012    Osteoporosis     Rheumatoid arthritis     Thoracic or lumbosacral neuritis or radiculitis, unspecified 10/30/2013       Past Surgical History:   Procedure Laterality Date    gallstones  1814-1932    HYSTERECTOMY      JOINT REPLACEMENT  2181-8649     bilateral knee    [place] REMOVAL OF OVARY/TUBE(S)      TONSILLECTOMY         family history includes Cataracts in her mother; Glaucoma in her brother; Hypertension in her mother and sister; No Known Problems in her father, maternal aunt, maternal grandfather, maternal grandmother, maternal uncle, paternal aunt, paternal  grandfather, paternal grandmother, and paternal uncle.    Pt  reports that she has never smoked. She has never used smokeless tobacco. She reports that she drinks alcohol. She reports that she does not use drugs.    Review of patient's allergies indicates:   Allergen Reactions    No known drug allergies         Physical Exam    Vitals:    03/02/18 0936   BP: (!) 152/74   Pulse: 67   Temp: 97.9 °F (36.6 °C)     Body mass index is 35.81 kg/m².    Physical Exam   Constitutional: She is oriented to person, place, and time. She appears well-developed and well-nourished. No distress.   HENT:   Head: Normocephalic and atraumatic.   Right Ear: Hearing, tympanic membrane, external ear and ear canal normal. Tympanic membrane mobility is normal. No middle ear effusion. No decreased hearing is noted.   Left Ear: Hearing, tympanic membrane, external ear and ear canal normal. Tympanic membrane mobility is normal.  No middle ear effusion. No decreased hearing is noted.   Nose: Nose normal.   Mouth/Throat: Oropharynx is clear and moist.   Eyes: Conjunctivae, EOM and lids are normal. Pupils are equal, round, and reactive to light. Right eye exhibits no discharge. Left eye exhibits no discharge.   Neck: Trachea normal, normal range of motion and phonation normal. Neck supple. No tracheal tenderness present. No tracheal deviation, no edema and no erythema present. No thyroid mass and no thyromegaly present.   Cardiovascular: Normal heart sounds.    Pulmonary/Chest: Breath sounds normal. No stridor.   Abdominal: Soft.   Lymphadenopathy:     She has no cervical adenopathy.   Neurological: She is alert and oriented to person, place, and time.   Skin: Skin is warm and dry. No rash noted. She is not diaphoretic. No erythema. No pallor.   Psychiatric: She has a normal mood and affect.   Nursing note and vitals reviewed.    Procedure: Flexible laryngoscopy  In order to fully examine the upper aerodigestive tract, including the larynx, in a  patient with a hyperactive gag reflex, flexible endoscopy is required.  After explaining the procedure and obtaining verbal consent, a timeout was performed with the patient's participation according to the universal protocol. Both nasal cavities were anesthetized with 4% Xylocaine spray mixed with Castillo-Synephrine. The flexible laryngoscope was inserted into the nasal cavity and advanced to visualize the nasal cavity, nasopharynx, the posterior oropharynx, hypopharynx, and the endolarynx with the above findings noted. The scope was removed and the procedure terminated. The patient tolerated this procedure well without apparent complication.     Nasopharynx - the torus is clear. There are no lesions of the posterior wall.   Oropharynx - no lesions of the tongue base. There is no obvious fullness or asymmetry.  Hypopharynx - there are no lesions of the pyriform sinuses or postcricoid region    Larynx - there are no lesions of the supraglottic or glottic larynx. Vocal fold mobility is normal with complete closure.     Studies reviewed:  CTA neck 12/5/17:  Posterior tongue right vallecula junction, 1.2 x 1.8 x 2 CM asymmetric opacity lymphoid tissue, less prominent changes contralateral side, mildly, moderate caudally, posterior displaces the epiglottis from vertical position.  Parapharyngeal soft tissues otherwise intact.  Larynx and subglottic region normal.  Lung apices normal.    Assessment     1. Abnormal CT scan, neck          Plan  In summary, Ms. Peralta is a 72 year old female with an abnormal CT finding. No abnormality on visualization today with flexible laryngoscopy. Reassurance given. All questions were answered. Return to clinic as needed.

## 2018-03-02 NOTE — LETTER
March 2, 2018      Ileana Martin MD  [address] [address]           [facility]  [address]  [facility] [address]  Phone: [phone]  Fax: [phone]          Patient: Yulia Peralta   MR Number: [id number]   YOB: 1945   Date of Visit: 3/2/2018       Dear Dr. Ileana Martin:    Thank you for referring Yulia Peralta to me for evaluation. Attached you will find relevant portions of my assessment and plan of care.    If you have questions, please do not hesitate to call me. I look forward to following Yulia Peralta along with you.    Sincerely,    Holly Felix MD    Enclosure  CC:  No Recipients    If you would like to receive this communication electronically, please contact [email or web address] or [phone] to request more information on [facility] [facility] Link access.    For providers and/or their staff who would like to refer a patient to [facility], please contact us through our one-stop-shop provider referral line, [facility], at [phone].    If you feel you have received this communication in error or would no longer like to receive these types of communications, please e-mail [email or web address]

## 2018-03-06 ENCOUNTER — TELEPHONE (OUTPATIENT)
Dept: FAMILY MEDICINE | Facility: CLINIC | Age: 73
End: 2018-03-06

## 2018-03-06 ENCOUNTER — HOSPITAL ENCOUNTER (OUTPATIENT)
Dept: RADIOLOGY | Facility: HOSPITAL | Age: 73
Discharge: HOME | End: 2018-03-06
Attending: FAMILY MEDICINE
Payer: MEDICARE

## 2018-03-06 DIAGNOSIS — Q27.30 AVM (ARTERIOVENOUS MALFORMATION): ICD-10-CM

## 2018-03-06 DIAGNOSIS — I67.1 ANTERIOR COMMUNICATING ARTERY ANEURYSM: Primary | ICD-10-CM

## 2018-03-06 DIAGNOSIS — R93.89 ABNORMAL CT SCAN, CHEST: Primary | ICD-10-CM

## 2018-03-06 PROCEDURE — 70481 CT ORBIT/EAR/FOSSA W/DYE: CPT | Mod: 26,,, | Performed by: RADIOLOGY

## 2018-03-06 PROCEDURE — 25500020 PHARM REV CODE 255: Performed by: FAMILY MEDICINE

## 2018-03-06 PROCEDURE — 70481 CT ORBIT/EAR/FOSSA W/DYE: CPT | Mod: TC

## 2018-03-06 RX ADMIN — IOHEXOL 100 ML: 350 INJECTION, SOLUTION INTRAVENOUS at 09:03

## 2018-03-06 NOTE — TELEPHONE ENCOUNTER
MARTITAM to call us back    Let her know her test shows a possible aneurysm which can be better visualized with additional imaging. Since there a couple possible test that can be done, Dr. Martin wants to send her to the eye specialist so they can decide which is best.

## 2018-03-06 NOTE — TELEPHONE ENCOUNTER
----- Message from Gisella Voss sent at 3/6/2018  8:18 AM CST -----  Contact: [facility] called, need creatinine lab. Patient is waiting.

## 2018-03-13 ENCOUNTER — INITIAL CONSULT (OUTPATIENT)
Dept: OPHTHALMOLOGY | Facility: CLINIC | Age: 73
End: 2018-03-13
Payer: MEDICARE

## 2018-03-13 DIAGNOSIS — H04.123 DRY EYES, BILATERAL: ICD-10-CM

## 2018-03-13 DIAGNOSIS — I67.1 ANTERIOR COMMUNICATING ARTERY ANEURYSM: Primary | ICD-10-CM

## 2018-03-13 PROCEDURE — 92004 COMPRE OPH EXAM NEW PT 1/>: CPT | Mod: S$PBB,,, | Performed by: OPHTHALMOLOGY

## 2018-03-13 PROCEDURE — 99999 PR PBB SHADOW E&M-EST. PATIENT-LVL II: CPT | Mod: PBBFAC,,, | Performed by: OPHTHALMOLOGY

## 2018-03-13 PROCEDURE — 99212 OFFICE O/P EST SF 10 MIN: CPT | Mod: PBBFAC | Performed by: OPHTHALMOLOGY

## 2018-03-13 NOTE — LETTER
March 13, 2018      SEKOU Stack  [address] [place] [address]           [facility]hthal[facility]  [address]  [place] [address]  Phone: [phone]  Fax: [phone]          Patient: Yulia Peralta   MR Number: [id number]   YOB: 1945   Date of Visit: 3/13/2018       Dear Cal Dan:    Thank you for referring Yulia Peralta to me for evaluation. Attached you will find relevant portions of my assessment and plan of care.    If you have questions, please do not hesitate to call me. I look forward to following Yulia Peralta along with you.    Sincerely,    Gwendolyn Braden MD    Enclosure  CC:  No Recipients    If you would like to receive this communication electronically, please contact [email or web address] or [phone] to request more information on [employer] Link access.    For providers and/or their staff who would like to refer a patient to [facility], please contact us through our one-stop-shop provider referral line, [facility] , at [phone].    If you feel you have received this communication in error or would no longer like to receive these types of communications, please e-mail [email or web address]

## 2018-03-19 ENCOUNTER — TELEPHONE (OUTPATIENT)
Dept: RHEUMATOLOGY | Facility: CLINIC | Age: 73
End: 2018-03-19

## 2018-03-19 NOTE — TELEPHONE ENCOUNTER
Patient left message that she is having significant pain.  Staff to have patient get labs as soon as possible to evaluate for possible flare.

## 2018-03-19 NOTE — TELEPHONE ENCOUNTER
----- Message from Avril Ayon MA sent at 3/19/2018 11:44 AM CDT -----  Contact: self      ----- Message -----  From: Claudia Ledezma  Sent: 3/19/2018  11:15 AM  To: Johny MURRAY Staff    Patient states she is in pain in all of her joints. Patient states it's hard to get up and function. Once up pain get better however when night fall pain comes. Hard to sleep at night. Patient can be reached at  or

## 2018-03-20 ENCOUNTER — LAB VISIT (OUTPATIENT)
Dept: LAB | Facility: HOSPITAL | Age: 73
End: 2018-03-20
Attending: INTERNAL MEDICINE
Payer: MEDICARE

## 2018-03-20 DIAGNOSIS — Z79.899 ENCOUNTER FOR LONG-TERM (CURRENT) USE OF MEDICATIONS: ICD-10-CM

## 2018-03-20 DIAGNOSIS — M06.9 RHEUMATOID ARTHRITIS: ICD-10-CM

## 2018-03-20 LAB
ALBUMIN SERPL BCP-MCNC: 3.9 G/DL
ALP SERPL-CCNC: 97 U/L
ALT SERPL W/O P-5'-P-CCNC: 20 U/L
ANION GAP SERPL CALC-SCNC: 8 MMOL/L
AST SERPL-CCNC: 22 U/L
BASOPHILS # BLD AUTO: 0.02 K/UL
BASOPHILS NFR BLD: 0.2 %
BILIRUB SERPL-MCNC: 0.5 MG/DL
BUN SERPL-MCNC: 15 MG/DL
CALCIUM SERPL-MCNC: 10.1 MG/DL
CHLORIDE SERPL-SCNC: 104 MMOL/L
CO2 SERPL-SCNC: 26 MMOL/L
CREAT SERPL-MCNC: 0.8 MG/DL
CRP SERPL-MCNC: 5 MG/L
DIFFERENTIAL METHOD: ABNORMAL
EOSINOPHIL # BLD AUTO: 0.3 K/UL
EOSINOPHIL NFR BLD: 3.2 %
ERYTHROCYTE [DISTWIDTH] IN BLOOD BY AUTOMATED COUNT: 12.8 %
ERYTHROCYTE [SEDIMENTATION RATE] IN BLOOD BY WESTERGREN METHOD: 0 MM/HR
EST. GFR  (AFRICAN AMERICAN): >60 ML/MIN/1.73 M^2
EST. GFR  (NON AFRICAN AMERICAN): >60 ML/MIN/1.73 M^2
GLUCOSE SERPL-MCNC: 102 MG/DL
HCT VFR BLD AUTO: 33.8 %
HGB BLD-MCNC: 11.2 G/DL
IMM GRANULOCYTES # BLD AUTO: 0.01 K/UL
IMM GRANULOCYTES NFR BLD AUTO: 0.1 %
LYMPHOCYTES # BLD AUTO: 2.2 K/UL
LYMPHOCYTES NFR BLD: 26.9 %
MCH RBC QN AUTO: 31.2 PG
MCHC RBC AUTO-ENTMCNC: 33.1 G/DL
MCV RBC AUTO: 94 FL
MONOCYTES # BLD AUTO: 1.2 K/UL
MONOCYTES NFR BLD: 14.4 %
NEUTROPHILS # BLD AUTO: 4.6 K/UL
NEUTROPHILS NFR BLD: 55.2 %
NRBC BLD-RTO: 0 /100 WBC
PLATELET # BLD AUTO: 136 K/UL
PMV BLD AUTO: 14.2 FL
POTASSIUM SERPL-SCNC: 4.6 MMOL/L
PROT SERPL-MCNC: 8.1 G/DL
RBC # BLD AUTO: 3.59 M/UL
SODIUM SERPL-SCNC: 138 MMOL/L
WBC # BLD AUTO: 8.25 K/UL

## 2018-03-20 PROCEDURE — 85651 RBC SED RATE NONAUTOMATED: CPT

## 2018-03-20 PROCEDURE — 86140 C-REACTIVE PROTEIN: CPT

## 2018-03-20 PROCEDURE — 80053 COMPREHEN METABOLIC PANEL: CPT

## 2018-03-20 PROCEDURE — 85025 COMPLETE CBC W/AUTO DIFF WBC: CPT

## 2018-03-20 PROCEDURE — 36415 COLL VENOUS BLD VENIPUNCTURE: CPT | Mod: PO

## 2018-03-21 ENCOUNTER — TELEPHONE (OUTPATIENT)
Dept: RHEUMATOLOGY | Facility: CLINIC | Age: 73
End: 2018-03-21

## 2018-03-21 NOTE — TELEPHONE ENCOUNTER
Staff to inform patient that her labs do not show evidence of rheumatoid arthritis flare.  If she is continuing to have issues she should be evaluated by her primary care doctor in send us an update after that evaluation is done

## 2018-03-22 ENCOUNTER — TELEPHONE (OUTPATIENT)
Dept: RHEUMATOLOGY | Facility: CLINIC | Age: 73
End: 2018-03-22

## 2018-03-26 ENCOUNTER — TELEPHONE (OUTPATIENT)
Dept: FAMILY MEDICINE | Facility: CLINIC | Age: 73
End: 2018-03-26

## 2018-03-26 NOTE — TELEPHONE ENCOUNTER
----- Message from Beatriz Fletcher sent at 3/26/2018 10:27 AM CDT -----  Contact: Self   Patient would like to know what she should do since she has been taken of her medication she has been in extreme pain in her finger, hands, legs and joints. She says her Rheumatologist did labs but did not find any arthritis.   Please call patient to advise at [phone].

## 2018-03-28 ENCOUNTER — TELEPHONE (OUTPATIENT)
Dept: RHEUMATOLOGY | Facility: CLINIC | Age: 73
End: 2018-03-28

## 2018-03-28 ENCOUNTER — OFFICE VISIT (OUTPATIENT)
Dept: FAMILY MEDICINE | Facility: CLINIC | Age: 73
End: 2018-03-28
Payer: MEDICARE

## 2018-03-28 VITALS
BODY MASS INDEX: 35.89 KG/M2 | DIASTOLIC BLOOD PRESSURE: 70 MMHG | SYSTOLIC BLOOD PRESSURE: 108 MMHG | HEART RATE: 72 BPM | OXYGEN SATURATION: 97 % | TEMPERATURE: 98 F | WEIGHT: 196.19 LBS

## 2018-03-28 DIAGNOSIS — J30.1 CHRONIC ALLERGIC RHINITIS DUE TO POLLEN, UNSPECIFIED SEASONALITY: ICD-10-CM

## 2018-03-28 DIAGNOSIS — M06.9 RHEUMATOID ARTHRITIS, INVOLVING UNSPECIFIED SITE, UNSPECIFIED RHEUMATOID FACTOR PRESENCE: Primary | ICD-10-CM

## 2018-03-28 PROCEDURE — 99213 OFFICE O/P EST LOW 20 MIN: CPT | Mod: PBBFAC,PO | Performed by: FAMILY MEDICINE

## 2018-03-28 PROCEDURE — 99999 PR PBB SHADOW E&M-EST. PATIENT-LVL III: CPT | Mod: PBBFAC,,, | Performed by: FAMILY MEDICINE

## 2018-03-28 PROCEDURE — 99214 OFFICE O/P EST MOD 30 MIN: CPT | Mod: S$PBB,,, | Performed by: FAMILY MEDICINE

## 2018-03-28 RX ORDER — AZELASTINE 1 MG/ML
1 SPRAY, METERED NASAL 2 TIMES DAILY
Qty: 30 ML | Refills: 1 | Status: SHIPPED | OUTPATIENT
Start: 2018-03-28 | End: 2019-02-07 | Stop reason: SDUPTHER

## 2018-03-28 NOTE — TELEPHONE ENCOUNTER
Primary doctor reports pain worse of MTX.  Patient instructed to restart MTX and send update week of April 9th

## 2018-03-28 NOTE — Clinical Note
This patient has rheumatoid arthritis and her understanding was to stop her methotrexate completely while she had a viral illness. She is no longer on methotrexate and is now having joint pains in her hands, finger, shoulders, and legs. I know all of these joints aren't characteristic of rheumatoid, but is she supposed to be completely off her methotrexate indefinitely?? She had labs done that shows no signs of inflammation with a normal ESR and CRP.

## 2018-03-28 NOTE — TELEPHONE ENCOUNTER
----- Message from Ileana Martin MD sent at 3/28/2018  9:51 AM CDT -----   This patient has rheumatoid arthritis and her understanding was to stop her methotrexate completely while she had a viral illness. She is no longer on methotrexate and is now having joint pains in her hands, finger, shoulders, and legs. I know all of these joints aren't characteristic of rheumatoid, but is she supposed to be completely off her methotrexate indefinitely?? She had labs done that shows no signs of inflammation with a normal ESR and CRP.

## 2018-03-28 NOTE — PROGRESS NOTES
"Subjective:       Patient ID: Yulia Peralta is a 72 y.o. female.    Chief Complaint: Discuss Medications and Discuss aches and pains    Patient presents with concerns about her joint pains. She has joint pains in her hands, wrists, shoulder. She was on methotrexate, but stopped this when she developed "flu like symptoms". She is here today with joint pains. She had labs done, which showed no signs of inflammation. Upon review of her labs she did have a positive rheumatoid factor 7 years ago. She is taking the nabumetone, but without resolution of her symptoms. She states " I was fine on my methotrexate. It worked for me." she denies swelling or redness of her joints.       Review of Systems    Objective:       Vitals:    03/28/18 0908   BP: 108/70   Pulse: 72   Temp: 98.4 °F (36.9 °C)   TempSrc: Oral   SpO2: 97%   Weight: 89 kg (196 lb 3.4 oz)       Physical Exam   Constitutional: She is oriented to person, place, and time. She appears well-developed and well-nourished. No distress.   HENT:   Head: Normocephalic and atraumatic.   Neck: Neck supple.   Cardiovascular: Normal rate, regular rhythm and normal heart sounds.  Exam reveals no friction rub.    No murmur heard.  Pulmonary/Chest: Breath sounds normal. No respiratory distress. She has no wheezes. She has no rales.   Musculoskeletal: She exhibits no edema.   Neurological: She is alert and oriented to person, place, and time.   Skin: She is not diaphoretic.         Tender point exam for fibromyalgia:  9/18 points  Assessment:       1. Rheumatoid arthritis, involving unspecified site, unspecified rheumatoid factor presence    2. Chronic allergic rhinitis due to pollen, unspecified seasonality        Plan:       Yulia was seen today for discuss medications and discuss aches and pains.    Diagnoses and all orders for this visit:    Rheumatoid arthritis, involving unspecified site, unspecified rheumatoid factor presence  Will discuss restarting her methotrexate " as conventional NSAIDs are not working and she reports improved quality of life ont he methotrexate.   Chronic allergic rhinitis due to pollen, unspecified seasonality  -     azelastine (ASTELIN) 137 mcg (0.1 %) nasal spray; 1 spray (137 mcg total) by Nasal route 2 (two) times daily.            No

## 2018-04-09 DIAGNOSIS — I10 ESSENTIAL HYPERTENSION: ICD-10-CM

## 2018-04-09 DIAGNOSIS — D64.9 ANEMIA, UNSPECIFIED TYPE: ICD-10-CM

## 2018-04-09 DIAGNOSIS — E55.9 VITAMIN D DEFICIENCY DISEASE: ICD-10-CM

## 2018-04-09 DIAGNOSIS — I10 HYPERTENSION, UNSPECIFIED TYPE: ICD-10-CM

## 2018-04-09 NOTE — TELEPHONE ENCOUNTER
LOV  3/28/2018  Last refill  Lasix 10/13/2017                   avapro 10/13/2017                   Vitamin D2 1/4/2018    BP    108/70  CMP  3/20/2018

## 2018-04-09 NOTE — TELEPHONE ENCOUNTER
----- Message from Gloira Bernal sent at 4/9/2018  4:32 PM CDT -----  Contact: [phone]  Refills :furosemide (LASIX) 40 MG tablet, irbesartan (AVAPRO) 150 MG tablet,ergocalciferol (VITAMIN D2) 50,000 unit Cap at a 90 day supply please send to [facility] - [address]

## 2018-04-10 ENCOUNTER — TELEPHONE (OUTPATIENT)
Dept: RHEUMATOLOGY | Facility: CLINIC | Age: 73
End: 2018-04-10

## 2018-04-10 DIAGNOSIS — M06.9 RHEUMATOID ARTHRITIS, INVOLVING UNSPECIFIED SITE, UNSPECIFIED RHEUMATOID FACTOR PRESENCE: Primary | ICD-10-CM

## 2018-04-10 RX ORDER — FUROSEMIDE 40 MG/1
40 TABLET ORAL DAILY
Qty: 90 TABLET | Refills: 1 | Status: SHIPPED | OUTPATIENT
Start: 2018-04-10 | End: 2019-03-08 | Stop reason: SDUPTHER

## 2018-04-10 RX ORDER — PREDNISONE 10 MG/1
10 TABLET ORAL DAILY
Qty: 30 TABLET | Refills: 0 | Status: SHIPPED | OUTPATIENT
Start: 2018-04-10 | End: 2018-04-20

## 2018-04-10 RX ORDER — ERGOCALCIFEROL 1.25 MG/1
50000 CAPSULE ORAL
Qty: 12 CAPSULE | Refills: 0 | Status: SHIPPED | OUTPATIENT
Start: 2018-04-10 | End: 2018-08-16 | Stop reason: SDUPTHER

## 2018-04-10 RX ORDER — IRBESARTAN 150 MG/1
150 TABLET ORAL DAILY
Qty: 90 TABLET | Refills: 1 | Status: SHIPPED | OUTPATIENT
Start: 2018-04-10 | End: 2018-09-28 | Stop reason: SDUPTHER

## 2018-04-10 NOTE — TELEPHONE ENCOUNTER
----- Message from Avril Ayon MA sent at 4/9/2018 10:14 AM CDT -----  Contact: self      ----- Message -----  From: Claudia Ledezma  Sent: 4/9/2018   9:56 AM  To: Johny MURRAY Staff    Patient states she is in pain. No change in condition. Swelling and stiffness in hands and joints. Fingers on right hand are closing have to get into hot shower in order to move around. Patient can be reached at  or

## 2018-04-10 NOTE — TELEPHONE ENCOUNTER
Patient reports persistent pain in hands and shoulders. Back on methotrexate but still in pain.  Prednisone in past helped.  Will prescribe prednisone 10 mg.  Take for RA flare. Call office in one week with update.

## 2018-04-23 ENCOUNTER — TELEPHONE (OUTPATIENT)
Dept: RHEUMATOLOGY | Facility: CLINIC | Age: 73
End: 2018-04-23

## 2018-05-01 ENCOUNTER — LAB VISIT (OUTPATIENT)
Dept: LAB | Facility: HOSPITAL | Age: 73
End: 2018-05-01
Attending: INTERNAL MEDICINE
Payer: MEDICARE

## 2018-05-01 ENCOUNTER — OFFICE VISIT (OUTPATIENT)
Dept: RHEUMATOLOGY | Facility: CLINIC | Age: 73
End: 2018-05-01
Payer: MEDICARE

## 2018-05-01 VITALS
SYSTOLIC BLOOD PRESSURE: 151 MMHG | DIASTOLIC BLOOD PRESSURE: 78 MMHG | HEART RATE: 67 BPM | BODY MASS INDEX: 36.34 KG/M2 | TEMPERATURE: 98 F | WEIGHT: 197.5 LBS | HEIGHT: 62 IN

## 2018-05-01 DIAGNOSIS — M06.9 RHEUMATOID ARTHRITIS OF HAND, UNSPECIFIED LATERALITY, UNSPECIFIED RHEUMATOID FACTOR PRESENCE: ICD-10-CM

## 2018-05-01 DIAGNOSIS — M06.9 RHEUMATOID ARTHRITIS: ICD-10-CM

## 2018-05-01 DIAGNOSIS — Z79.899 ENCOUNTER FOR LONG-TERM (CURRENT) USE OF MEDICATIONS: ICD-10-CM

## 2018-05-01 LAB
ALBUMIN SERPL BCP-MCNC: 3.8 G/DL
ALP SERPL-CCNC: 104 U/L
ALT SERPL W/O P-5'-P-CCNC: 27 U/L
ANION GAP SERPL CALC-SCNC: 9 MMOL/L
AST SERPL-CCNC: 21 U/L
BASOPHILS # BLD AUTO: 0.01 K/UL
BASOPHILS NFR BLD: 0.1 %
BILIRUB SERPL-MCNC: 0.7 MG/DL
BUN SERPL-MCNC: 11 MG/DL
CALCIUM SERPL-MCNC: 9.9 MG/DL
CHLORIDE SERPL-SCNC: 105 MMOL/L
CO2 SERPL-SCNC: 27 MMOL/L
CREAT SERPL-MCNC: 0.8 MG/DL
CRP SERPL-MCNC: 2.6 MG/L
DIFFERENTIAL METHOD: ABNORMAL
EOSINOPHIL # BLD AUTO: 0.2 K/UL
EOSINOPHIL NFR BLD: 2.3 %
ERYTHROCYTE [DISTWIDTH] IN BLOOD BY AUTOMATED COUNT: 15.2 %
ERYTHROCYTE [SEDIMENTATION RATE] IN BLOOD BY WESTERGREN METHOD: 41 MM/HR
EST. GFR  (AFRICAN AMERICAN): >60 ML/MIN/1.73 M^2
EST. GFR  (NON AFRICAN AMERICAN): >60 ML/MIN/1.73 M^2
GLUCOSE SERPL-MCNC: 96 MG/DL
HCT VFR BLD AUTO: 33.8 %
HGB BLD-MCNC: 10.7 G/DL
IMM GRANULOCYTES # BLD AUTO: 0.02 K/UL
IMM GRANULOCYTES NFR BLD AUTO: 0.2 %
LYMPHOCYTES # BLD AUTO: 1.2 K/UL
LYMPHOCYTES NFR BLD: 15.1 %
MCH RBC QN AUTO: 30.5 PG
MCHC RBC AUTO-ENTMCNC: 31.7 G/DL
MCV RBC AUTO: 96 FL
MONOCYTES # BLD AUTO: 1.1 K/UL
MONOCYTES NFR BLD: 13.7 %
NEUTROPHILS # BLD AUTO: 5.6 K/UL
NEUTROPHILS NFR BLD: 68.6 %
NRBC BLD-RTO: 0 /100 WBC
PLATELET # BLD AUTO: 157 K/UL
PMV BLD AUTO: 11.9 FL
POTASSIUM SERPL-SCNC: 4.3 MMOL/L
PROT SERPL-MCNC: 8.1 G/DL
RBC # BLD AUTO: 3.51 M/UL
SODIUM SERPL-SCNC: 141 MMOL/L
WBC # BLD AUTO: 8.22 K/UL

## 2018-05-01 PROCEDURE — 99999 PR PBB SHADOW E&M-EST. PATIENT-LVL III: CPT | Mod: PBBFAC,,, | Performed by: INTERNAL MEDICINE

## 2018-05-01 PROCEDURE — 86140 C-REACTIVE PROTEIN: CPT

## 2018-05-01 PROCEDURE — 85025 COMPLETE CBC W/AUTO DIFF WBC: CPT

## 2018-05-01 PROCEDURE — 99213 OFFICE O/P EST LOW 20 MIN: CPT | Mod: PBBFAC | Performed by: INTERNAL MEDICINE

## 2018-05-01 PROCEDURE — 85651 RBC SED RATE NONAUTOMATED: CPT

## 2018-05-01 PROCEDURE — 36415 COLL VENOUS BLD VENIPUNCTURE: CPT

## 2018-05-01 PROCEDURE — 80053 COMPREHEN METABOLIC PANEL: CPT

## 2018-05-01 PROCEDURE — 99214 OFFICE O/P EST MOD 30 MIN: CPT | Mod: S$PBB,,, | Performed by: INTERNAL MEDICINE

## 2018-05-01 RX ORDER — METHOTREXATE 2.5 MG/1
TABLET ORAL
Qty: 120 TABLET | Refills: 0 | Status: SHIPPED | OUTPATIENT
Start: 2018-05-01 | End: 2018-07-30 | Stop reason: SDUPTHER

## 2018-05-01 RX ORDER — FOLIC ACID 1 MG/1
2 TABLET ORAL DAILY
Qty: 180 TABLET | Refills: 3 | Status: SHIPPED | OUTPATIENT
Start: 2018-05-01 | End: 2018-05-01 | Stop reason: SDUPTHER

## 2018-05-01 RX ORDER — PREDNISONE 2.5 MG/1
7.5 TABLET ORAL DAILY
Qty: 90 TABLET | Refills: 0 | Status: SHIPPED | OUTPATIENT
Start: 2018-05-01 | End: 2018-05-11

## 2018-05-01 RX ORDER — FOLIC ACID 1 MG/1
2 TABLET ORAL DAILY
Qty: 180 TABLET | Refills: 3 | Status: SHIPPED | OUTPATIENT
Start: 2018-05-01 | End: 2018-11-14 | Stop reason: SDUPTHER

## 2018-05-01 ASSESSMENT — ROUTINE ASSESSMENT OF PATIENT INDEX DATA (RAPID3)
TOTAL RAPID3 SCORE: 2.17
MDHAQ FUNCTION SCORE: .6
PATIENT GLOBAL ASSESSMENT SCORE: .5
PSYCHOLOGICAL DISTRESS SCORE: 2.2
FATIGUE SCORE: 2.5
PAIN SCORE: 4
AM STIFFNESS SCORE: 0, NO

## 2018-05-01 NOTE — PROGRESS NOTES
"Subjective:       Patient ID: Yulia Peralta is a 72 y.o. female.    Chief Complaint: Rheumatoid Arthritis            HPI:  Yulia Peralta is a 72 y.o. female with a history of rheumatoid arthritis for 25 years.  She had been on methotrexate for the past 5 years and her current dose is 8 tablets q week (4 Monday and 4 Tuesday).   Went back to prior dose of  10 tabs on Monday.    History of gout some years ago.      Interval History:    Shoulders and leg pain improved with prednisone but hands and wrists are still painful.  She is on 10 mg prednisone daily.    Pain 4/10 ache.  Worse with activity.  No morning stiffness      Review of Systems   Constitutional: Positive for fatigue.   HENT: Negative.    Eyes: Negative.    Respiratory: Negative.    Cardiovascular: Negative.    Gastrointestinal: Negative.    Endocrine: Negative.    Genitourinary: Negative.    Musculoskeletal: Positive for arthralgias.   Skin: Negative.    Allergic/Immunologic: Negative.    Neurological: Negative.    Hematological: Negative.    Psychiatric/Behavioral: Negative.          Objective:   BP (!) 151/78 (BP Location: Right arm, Patient Position: Sitting, BP Method: Large (Automatic))   Pulse 67   Temp 98.4 °F (36.9 °C) (Oral)   Ht 5' 2" (1.575 m)   Wt 89.6 kg (197 lb 8 oz)   BMI 36.12 kg/m²      Physical Exam   Constitutional: She is oriented to person, place, and time and well-developed, well-nourished, and in no distress.   HENT:   Head: Normocephalic.   Eyes: Conjunctivae and EOM are normal.   Neck: Neck supple.   Cardiovascular: Normal rate, regular rhythm and normal heart sounds.    Pulmonary/Chest: Effort normal and breath sounds normal.   Abdominal: Soft. Bowel sounds are normal.   Neurological: She is alert and oriented to person, place, and time. Gait normal.   Skin: Skin is warm and dry.     Psychiatric: Mood and affect normal.   Musculoskeletal:   28 joint count: 0 swollen and 0 tender  Multiple flexor tendon nodules " painful on both hands            LABS    Component      Latest Ref Rng & Units 3/20/2018   WBC      3.90 - 12.70 K/uL 8.25   RBC      4.00 - 5.40 M/uL 3.59 (L)   Hemoglobin      12.0 - 16.0 g/dL 11.2 (L)   Hematocrit      37.0 - 48.5 % 33.8 (L)   MCV      82 - 98 fL 94   MCH      27.0 - 31.0 pg 31.2 (H)   MCHC      32.0 - 36.0 g/dL 33.1   RDW      11.5 - 14.5 % 12.8   Platelets      150 - 350 K/uL 136 (L)   MPV      9.2 - 12.9 fL 14.2 (H)   Immature Granulocytes      0.0 - 0.5 % 0.1   Gran # (ANC)      1.8 - 7.7 K/uL 4.6   Immature Grans (Abs)      0.00 - 0.04 K/uL 0.01   Lymph #      1.0 - 4.8 K/uL 2.2   Mono #      0.3 - 1.0 K/uL 1.2 (H)   Eos #      0.0 - 0.5 K/uL 0.3   Baso #      0.00 - 0.20 K/uL 0.02   nRBC      0 /100 WBC 0   Gran%      38.0 - 73.0 % 55.2   Lymph%      18.0 - 48.0 % 26.9   Mono%      4.0 - 15.0 % 14.4   Eosinophil%      0.0 - 8.0 % 3.2   Basophil%      0.0 - 1.9 % 0.2   Differential Method       Automated   Sodium      136 - 145 mmol/L 138   Potassium      3.5 - 5.1 mmol/L 4.6   Chloride      95 - 110 mmol/L 104   CO2      23 - 29 mmol/L 26   Glucose      70 - 110 mg/dL 102   BUN, Bld      8 - 23 mg/dL 15   Creatinine      0.5 - 1.4 mg/dL 0.8   Calcium      8.7 - 10.5 mg/dL 10.1   Total Protein      6.0 - 8.4 g/dL 8.1   Albumin      3.5 - 5.2 g/dL 3.9   Total Bilirubin      0.1 - 1.0 mg/dL 0.5   Alkaline Phosphatase      55 - 135 U/L 97   AST      10 - 40 U/L 22   ALT      10 - 44 U/L 20   Anion Gap      8 - 16 mmol/L 8   eGFR if African American      >60 mL/min/1.73 m:2 >60.0   eGFR if non African American      >60 mL/min/1.73 m:2 >60.0   CRP      0.0 - 8.2 mg/L 5.0   Sed Rate      0 - 20 mm/Hr 0     Assessment:       1. Rheumatoid arthritis manifested by rheumatoid nodule, polyarthritis in the past, and contractures of the elbows.    Treated in the past with gold. Plaquenil did not work in the past and she never took SSZ.   Now increasing activity. Continue MTX (5 tabs on Monday and 5 tabs  on Tuesday) and folic acid   2. Encounter for long-term (current) use of other medications    3. Fatigue.   4. Chest pain. Small mass on CT evaluated by cardiothoracic surgery who said it was too small to biopsy.  She decided to go for second opinion at [facility]. Biopsy was negative. MRI repeat stable and most recent 10/2017 stable  5. 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 Felt not to have hepatitis C by hepatology.  6. Right anserine bursitis.   7. Obesity  8. HTN  9. Chronic back pain.  Bulging disc.  May be cause of paresthesias in legs with standing.  10.  Paresthesia of in left leg.  May be due to #9  11.  Right clavicle mass.  Evaluated by x-ray   12.  Trigger fingers.  Left 4th and right 3rd and right 5th   Plan:       1. Continue methotrexate to 10 tabs qweek (5 on Monday and 5 on Tuesday). Physical therapy for hands.   If no improvement with PT then add SSZ (risks discussed; patient declined at this time).  Decrease to prednisone 7.5 mg   2. Check CRP, ESR, CBC, and CMP    3. Continue Nabumetone.    4. Continue folic acid 2 tabs.    5. Refer to PT  6. Body tape fingers     Return to the office in 3 months

## 2018-05-01 NOTE — PATIENT INSTRUCTIONS
Sulfasalazine delayed release tablets  What is this medicine?  SULFASALAZINE (sul fa SAL a zeen) is for ulcerative colitis and certain types of rheumatoid arthritis.  How should I use this medicine?  Take this medicine by mouth with a full glass of water. Follow the directions on the prescription label. If the medicine upsets your stomach, take it with food or milk. Do not crush or chew the tablets. Swallow the tablets whole. Take your medicine at regular intervals. Do not take your medicine more often than directed. Do not stop taking except on your doctor's advice.  Talk to your pediatrician regarding the use of this medicine in children. Special care may be needed. While this drug may be prescribed for children as young as 6 years for selected conditions, precautions do apply.  Patients over 65 years old may have a stronger reaction and need a smaller dose.  What side effects may I notice from receiving this medicine?  Side effects that you should report to your doctor or health care professional as soon as possible:  · allergic reactions like skin rash, itching or hives, swelling of the face, lips, or tongue  · fever, chills, or any other sign of infection  · painful, difficult, or reduced urination  · redness, blistering, peeling or loosening of the skin, including inside the mouth  · severe stomach pain  · unusual bleeding or bruising  · unusually weak or tired  · yellowing of the skin or eyes  Side effects that usually do not require medical attention (report to your doctor or health care professional if they continue or are bothersome):  · headache  · loss of appetite  · nausea, vomiting  · orange color to the urine  · reduced sperm count  What may interact with this medicine?  · digoxin  · folic acid  What if I miss a dose?  If you miss a dose, take it as soon as you can. If it is almost time for your next dose, take only that dose. Do not take double or extra doses.  Where should I keep my  medicine?  Keep out of the reach of children.  Store at room temperature between 15 and 30 degrees C (59 and 86 degrees F). Keep container tightly closed. Throw away any unused medicine after the expiration date.  What should I tell my health care provider before I take this medicine?  They need to know if you have any of these conditions:  · asthma  · blood disorders or anemia  · glucose-6-phosphate dehydrogenase (G6PD) deficiency  · intestinal obstruction  · kidney disease  · liver disease  · porphyria  · urinary tract obstruction  · an unusual reaction to sulfasalazine, sulfa drugs, salicylates, other medicines, foods, dyes, or preservatives  · pregnant or trying to get pregnant  · breast-feeding  What should I watch for while using this medicine?  Visit your doctor or health care professional for regular checks on your progress. You will need frequent blood and urine checks.  This medicine can make you more sensitive to the sun. Keep out of the sun. If you cannot avoid being in the sun, wear protective clothing and use sunscreen. Do not use sun lamps or tanning beds/booths.  Drink plenty of water while taking this medicine.  Tell your doctor if you see the tablet in your stools. Your body may not be absorbing the medicine.  NOTE:This sheet is a summary. It may not cover all possible information. If you have questions about this medicine, talk to your doctor, pharmacist, or health care provider. Copyright© 2017 [employer]

## 2018-05-02 ENCOUNTER — TELEPHONE (OUTPATIENT)
Dept: RHEUMATOLOGY | Facility: CLINIC | Age: 73
End: 2018-05-02

## 2018-05-04 ENCOUNTER — TELEPHONE (OUTPATIENT)
Dept: RHEUMATOLOGY | Facility: CLINIC | Age: 73
End: 2018-05-04

## 2018-06-08 ENCOUNTER — OFFICE VISIT (OUTPATIENT)
Dept: CARDIOLOGY | Facility: CLINIC | Age: 73
End: 2018-06-08
Payer: MEDICARE

## 2018-06-08 VITALS
BODY MASS INDEX: 37 KG/M2 | DIASTOLIC BLOOD PRESSURE: 69 MMHG | SYSTOLIC BLOOD PRESSURE: 143 MMHG | HEIGHT: 62 IN | HEART RATE: 66 BPM | WEIGHT: 201.06 LBS

## 2018-06-08 DIAGNOSIS — I10 HTN (HYPERTENSION): ICD-10-CM

## 2018-06-08 DIAGNOSIS — I10 ESSENTIAL HYPERTENSION: Primary | ICD-10-CM

## 2018-06-08 DIAGNOSIS — R07.89 CHEST PAIN, ATYPICAL: ICD-10-CM

## 2018-06-08 DIAGNOSIS — R06.02 SOB (SHORTNESS OF BREATH): ICD-10-CM

## 2018-06-08 DIAGNOSIS — R00.2 PALPITATIONS: ICD-10-CM

## 2018-06-08 PROCEDURE — 93010 ELECTROCARDIOGRAM REPORT: CPT | Mod: ,,, | Performed by: INTERNAL MEDICINE

## 2018-06-08 PROCEDURE — 99999 PR PBB SHADOW E&M-EST. PATIENT-LVL III: CPT | Mod: PBBFAC,,, | Performed by: INTERNAL MEDICINE

## 2018-06-08 PROCEDURE — 93005 ELECTROCARDIOGRAM TRACING: CPT | Mod: PBBFAC,PO | Performed by: INTERNAL MEDICINE

## 2018-06-08 PROCEDURE — 99213 OFFICE O/P EST LOW 20 MIN: CPT | Mod: S$PBB,,, | Performed by: INTERNAL MEDICINE

## 2018-06-08 PROCEDURE — 99213 OFFICE O/P EST LOW 20 MIN: CPT | Mod: PBBFAC,PO,25 | Performed by: INTERNAL MEDICINE

## 2018-06-08 NOTE — PROGRESS NOTES
Subjective:    Patient ID:  Yulia Peralta is a 73 y.o. female who presents for follow-up of Hypertension      HPI     HTN, RA, MORILLO, Mediastinal mass - enlarged thymus     [facility] at [facility] 10/17/17 - stable     Stress test 8/1/16  Impression: NORMAL MYOCARDIAL PERFUSION  1. The perfusion scan is free of evidence for myocardial ischemia or injury.   2. Resting wall motion is physiologic.   3. Visually estimated LV function is normal.   4. The ventricular volumes are normal at rest and stress.   5. The extracardiac distribution of radioactivity is normal.   6. When compared to the previous study from 07/23/2015, no changes noted     Echo 8/1/16    1 - Normal left ventricular systolic function (EF 60-65%).  Normal wall motion.     2 - Eccentric hypertrophy.     3 - Left ventricular diastolic dysfunction.     4 - Trivial mitral regurgitation.     5 - Trivial tricuspid regurgitation.     6 - Pulmonary hypertension. The estimated PA systolic pressure is 42 mmHg.     Denies CP  Stable MORILLO   EKG NSR - ok      Review of Systems   Constitution: Negative for decreased appetite.   HENT: Negative for ear discharge.    Eyes: Negative for blurred vision.   Respiratory: Negative for hemoptysis.    Endocrine: Negative for polyphagia.   Hematologic/Lymphatic: Negative for adenopathy.   Skin: Negative for color change.   Musculoskeletal: Negative for joint swelling.   Neurological: Negative for brief paralysis.   Psychiatric/Behavioral: Negative for hallucinations.        Objective:    Physical Exam   Constitutional: She is oriented to person, place, and time. She appears well-developed and well-nourished.   HENT:   Head: Normocephalic and atraumatic.   Eyes: Conjunctivae are normal. Pupils are equal, round, and reactive to light.   Neck: Normal range of motion. Neck supple.   Cardiovascular: Normal rate, normal heart sounds and intact distal pulses.    Pulmonary/Chest: Effort normal and breath sounds normal.   Abdominal: Soft.  Bowel sounds are normal.   Musculoskeletal: Normal range of motion.   Neurological: She is alert and oriented to person, place, and time.   Skin: Skin is warm and dry.         Assessment:       1. Essential hypertension    2. Palpitations    3. SOB (shortness of breath)    4. Chest pain, atypical         Plan:        cardiac stable  OV 6 months

## 2018-06-27 ENCOUNTER — OFFICE VISIT (OUTPATIENT)
Dept: FAMILY MEDICINE | Facility: CLINIC | Age: 73
End: 2018-06-27
Payer: MEDICARE

## 2018-06-27 VITALS
HEIGHT: 62 IN | WEIGHT: 205.25 LBS | DIASTOLIC BLOOD PRESSURE: 70 MMHG | HEART RATE: 73 BPM | SYSTOLIC BLOOD PRESSURE: 130 MMHG | OXYGEN SATURATION: 97 % | TEMPERATURE: 98 F | BODY MASS INDEX: 37.77 KG/M2

## 2018-06-27 DIAGNOSIS — S93.402A SPRAIN OF LEFT ANKLE, UNSPECIFIED LIGAMENT, INITIAL ENCOUNTER: Primary | ICD-10-CM

## 2018-06-27 DIAGNOSIS — S80.12XA CONTUSION OF LEFT LOWER EXTREMITY, INITIAL ENCOUNTER: ICD-10-CM

## 2018-06-27 DIAGNOSIS — I10 ESSENTIAL HYPERTENSION: ICD-10-CM

## 2018-06-27 DIAGNOSIS — E66.01 SEVERE OBESITY (BMI 35.0-39.9) WITH COMORBIDITY: ICD-10-CM

## 2018-06-27 PROCEDURE — 99213 OFFICE O/P EST LOW 20 MIN: CPT | Mod: S$PBB,,, | Performed by: INTERNAL MEDICINE

## 2018-06-27 PROCEDURE — 99213 OFFICE O/P EST LOW 20 MIN: CPT | Mod: PBBFAC,PO | Performed by: INTERNAL MEDICINE

## 2018-06-27 PROCEDURE — 99999 PR PBB SHADOW E&M-EST. PATIENT-LVL III: CPT | Mod: PBBFAC,,, | Performed by: INTERNAL MEDICINE
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
## 2018-07-31 RX ORDER — METHOTREXATE 2.5 MG/1
TABLET ORAL
Qty: 120 TABLET | Refills: 0 | Status: SHIPPED | OUTPATIENT
Start: 2018-07-31 | End: 2018-08-07 | Stop reason: SDUPTHER

## 2018-08-01 ENCOUNTER — OFFICE VISIT (OUTPATIENT)
Dept: OPTOMETRY | Facility: CLINIC | Age: 73
End: 2018-08-01
Payer: MEDICARE

## 2018-08-01 DIAGNOSIS — H52.03 HYPEROPIA WITH PRESBYOPIA, BILATERAL: ICD-10-CM

## 2018-08-01 DIAGNOSIS — I10 ESSENTIAL HYPERTENSION: Primary | ICD-10-CM

## 2018-08-01 DIAGNOSIS — H52.4 HYPEROPIA WITH PRESBYOPIA, BILATERAL: ICD-10-CM

## 2018-08-01 DIAGNOSIS — H25.13 NUCLEAR SCLEROSIS, BILATERAL: ICD-10-CM

## 2018-08-01 PROCEDURE — 99999 PR PBB SHADOW E&M-EST. PATIENT-LVL II: CPT | Mod: PBBFAC,,, | Performed by: OPTOMETRIST

## 2018-08-01 PROCEDURE — 99212 OFFICE O/P EST SF 10 MIN: CPT | Mod: PBBFAC,PO | Performed by: OPTOMETRIST

## 2018-08-01 PROCEDURE — 92014 COMPRE OPH EXAM EST PT 1/>: CPT | Mod: S$PBB,,, | Performed by: OPTOMETRIST

## 2018-08-01 NOTE — PROGRESS NOTES
Subjective:       Patient ID: Yulia Peralta is a 73 y.o. female      Chief Complaint   Patient presents with    Concerns About Ocular Health    Hypertensive Eye Exam     History of Present Illness  Dls: 8/2/17 Dr. Luu    72 y/o female presents today for hypertensive eye exam.   Pt c/o blurry vision at distance and near ou.   Pt wears bifocal glasses.     No tearing  No itching  No burning  + pain ou off/on   + ha's off/on   No floaters  No flashes     Eye meds:  None       Assessment/Plan:     1. Essential hypertension  No hypertensive retinopathy. Continue BP control. RTC 1 year for DFE.    2. Nuclear sclerosis, bilateral  Educated pt on presence of cataracts and effects on vision. No surgery at this time. Recheck in one year.    3. Hyperopia with presbyopia, bilateral  Pt declined MRx, continue with current spectacles.     Follow-up in about 1 year (around 8/1/2019).

## 2018-08-07 ENCOUNTER — LAB VISIT (OUTPATIENT)
Dept: LAB | Facility: HOSPITAL | Age: 73
End: 2018-08-07
Attending: INTERNAL MEDICINE
Payer: MEDICARE

## 2018-08-07 ENCOUNTER — TELEPHONE (OUTPATIENT)
Dept: RHEUMATOLOGY | Facility: CLINIC | Age: 73
End: 2018-08-07

## 2018-08-07 DIAGNOSIS — M06.9 RHEUMATOID ARTHRITIS: ICD-10-CM

## 2018-08-07 DIAGNOSIS — Z79.899 ENCOUNTER FOR LONG-TERM (CURRENT) USE OF MEDICATIONS: ICD-10-CM

## 2018-08-07 LAB
ALBUMIN SERPL BCP-MCNC: 3.9 G/DL
ALP SERPL-CCNC: 93 U/L
ALT SERPL W/O P-5'-P-CCNC: 32 U/L
ANION GAP SERPL CALC-SCNC: 8 MMOL/L
AST SERPL-CCNC: 25 U/L
BASOPHILS # BLD AUTO: 0.02 K/UL
BASOPHILS NFR BLD: 0.3 %
BILIRUB SERPL-MCNC: 0.4 MG/DL
BUN SERPL-MCNC: 15 MG/DL
CALCIUM SERPL-MCNC: 9.6 MG/DL
CHLORIDE SERPL-SCNC: 104 MMOL/L
CO2 SERPL-SCNC: 26 MMOL/L
CREAT SERPL-MCNC: 0.8 MG/DL
CRP SERPL-MCNC: 1 MG/L
DIFFERENTIAL METHOD: ABNORMAL
EOSINOPHIL # BLD AUTO: 0.2 K/UL
EOSINOPHIL NFR BLD: 3.7 %
ERYTHROCYTE [DISTWIDTH] IN BLOOD BY AUTOMATED COUNT: 14.4 %
ERYTHROCYTE [SEDIMENTATION RATE] IN BLOOD BY WESTERGREN METHOD: 15 MM/HR
EST. GFR  (AFRICAN AMERICAN): >60 ML/MIN/1.73 M^2
EST. GFR  (NON AFRICAN AMERICAN): >60 ML/MIN/1.73 M^2
GLUCOSE SERPL-MCNC: 117 MG/DL
HCT VFR BLD AUTO: 33.8 %
HGB BLD-MCNC: 10.9 G/DL
IMM GRANULOCYTES # BLD AUTO: 0.01 K/UL
IMM GRANULOCYTES NFR BLD AUTO: 0.2 %
LYMPHOCYTES # BLD AUTO: 1.4 K/UL
LYMPHOCYTES NFR BLD: 23.8 %
MCH RBC QN AUTO: 32.4 PG
MCHC RBC AUTO-ENTMCNC: 32.2 G/DL
MCV RBC AUTO: 101 FL
MONOCYTES # BLD AUTO: 1.2 K/UL
MONOCYTES NFR BLD: 20.4 %
NEUTROPHILS # BLD AUTO: 3 K/UL
NEUTROPHILS NFR BLD: 51.6 %
NRBC BLD-RTO: 0 /100 WBC
PLATELET # BLD AUTO: 129 K/UL
PMV BLD AUTO: 13.9 FL
POTASSIUM SERPL-SCNC: 4.5 MMOL/L
PROT SERPL-MCNC: 7.7 G/DL
RBC # BLD AUTO: 3.36 M/UL
SODIUM SERPL-SCNC: 138 MMOL/L
WBC # BLD AUTO: 5.89 K/UL

## 2018-08-07 PROCEDURE — 85025 COMPLETE CBC W/AUTO DIFF WBC: CPT

## 2018-08-07 PROCEDURE — 85651 RBC SED RATE NONAUTOMATED: CPT

## 2018-08-07 PROCEDURE — 36415 COLL VENOUS BLD VENIPUNCTURE: CPT | Mod: PO

## 2018-08-07 PROCEDURE — 86140 C-REACTIVE PROTEIN: CPT

## 2018-08-07 PROCEDURE — 80053 COMPREHEN METABOLIC PANEL: CPT

## 2018-08-07 RX ORDER — METHOTREXATE 2.5 MG/1
TABLET ORAL
Qty: 40 TABLET | Refills: 0 | Status: SHIPPED | OUTPATIENT
Start: 2018-08-07 | End: 2018-11-14 | Stop reason: SDUPTHER

## 2018-08-07 NOTE — TELEPHONE ENCOUNTER
Pt requesting methotrexate refill.She is due for labs this week. Please schedule so I can refill. Thanks [facility]

## 2018-08-13 ENCOUNTER — TELEPHONE (OUTPATIENT)
Dept: FAMILY MEDICINE | Facility: CLINIC | Age: 73
End: 2018-08-13

## 2018-08-13 NOTE — TELEPHONE ENCOUNTER
Patient informed that x-ray was 1/5/208.  Patient requested copy be faxed to home fax.  Results faxed.

## 2018-08-13 NOTE — TELEPHONE ENCOUNTER
----- Message from Beatriz Fletcher sent at 8/13/2018  2:04 PM CDT -----  Contact: Self   Patient says she has a x-ray of her left collarbone in May and she would like to get a copy of the report from it. Please call patient at [phone] Fax:[phone].

## 2018-08-14 ENCOUNTER — HOSPITAL ENCOUNTER (OUTPATIENT)
Dept: RADIOLOGY | Facility: HOSPITAL | Age: 73
Discharge: HOME | End: 2018-08-14
Attending: INTERNAL MEDICINE
Payer: MEDICARE

## 2018-08-14 ENCOUNTER — OFFICE VISIT (OUTPATIENT)
Dept: RHEUMATOLOGY | Facility: CLINIC | Age: 73
End: 2018-08-14
Payer: MEDICARE

## 2018-08-14 VITALS
SYSTOLIC BLOOD PRESSURE: 159 MMHG | HEART RATE: 70 BPM | HEIGHT: 62 IN | DIASTOLIC BLOOD PRESSURE: 76 MMHG | WEIGHT: 205.38 LBS | BODY MASS INDEX: 37.79 KG/M2

## 2018-08-14 DIAGNOSIS — R53.83 FATIGUE, UNSPECIFIED TYPE: ICD-10-CM

## 2018-08-14 DIAGNOSIS — M06.9 RHEUMATOID ARTHRITIS OF HAND, UNSPECIFIED LATERALITY, UNSPECIFIED RHEUMATOID FACTOR PRESENCE: Primary | ICD-10-CM

## 2018-08-14 DIAGNOSIS — M79.662 PAIN AND SWELLING OF LEFT LOWER LEG: ICD-10-CM

## 2018-08-14 DIAGNOSIS — M79.89 PAIN AND SWELLING OF LEFT LOWER LEG: ICD-10-CM

## 2018-08-14 DIAGNOSIS — D84.9 IMMUNOSUPPRESSION: ICD-10-CM

## 2018-08-14 DIAGNOSIS — E66.01 SEVERE OBESITY (BMI 35.0-39.9) WITH COMORBIDITY: ICD-10-CM

## 2018-08-14 DIAGNOSIS — K21.9 GASTROESOPHAGEAL REFLUX DISEASE, ESOPHAGITIS PRESENCE NOT SPECIFIED: ICD-10-CM

## 2018-08-14 PROCEDURE — 99213 OFFICE O/P EST LOW 20 MIN: CPT | Mod: PBBFAC,25 | Performed by: INTERNAL MEDICINE

## 2018-08-14 PROCEDURE — 73590 X-RAY EXAM OF LOWER LEG: CPT | Mod: 26,LT,, | Performed by: RADIOLOGY

## 2018-08-14 PROCEDURE — 99999 PR PBB SHADOW E&M-EST. PATIENT-LVL III: CPT | Mod: PBBFAC,,, | Performed by: INTERNAL MEDICINE

## 2018-08-14 PROCEDURE — 99214 OFFICE O/P EST MOD 30 MIN: CPT | Mod: S$PBB,,, | Performed by: INTERNAL MEDICINE

## 2018-08-14 PROCEDURE — 73590 X-RAY EXAM OF LOWER LEG: CPT | Mod: TC,LT

## 2018-08-14 RX ORDER — MISOPROSTOL 100 UG/1
100 TABLET ORAL 2 TIMES DAILY
Qty: 180 TABLET | Refills: 3 | Status: SHIPPED | OUTPATIENT
Start: 2018-08-14 | End: 2019-05-17 | Stop reason: ALTCHOICE

## 2018-08-14 RX ORDER — NABUMETONE 500 MG/1
500 TABLET, FILM COATED ORAL 2 TIMES DAILY
Qty: 180 TABLET | Refills: 3 | Status: SHIPPED | OUTPATIENT
Start: 2018-08-14 | End: 2019-02-27 | Stop reason: SDUPTHER

## 2018-08-14 ASSESSMENT — ROUTINE ASSESSMENT OF PATIENT INDEX DATA (RAPID3)
PAIN SCORE: .5
PATIENT GLOBAL ASSESSMENT SCORE: .5
AM STIFFNESS SCORE: 0, NO
TOTAL RAPID3 SCORE: .89
FATIGUE SCORE: .5
MDHAQ FUNCTION SCORE: .5
PSYCHOLOGICAL DISTRESS SCORE: 2.2

## 2018-08-14 NOTE — PROGRESS NOTES
"Subjective:       Patient ID: Yulia Peralta is a 73 y.o. female.    Chief Complaint: Rheumatoid arthritis    HPI:  Yulia Peralta is a 73 y.o. female with a history of rheumatoid arthritis for 25 years.  She had been on methotrexate for the past 5 years and her current dose is methotrexate 10 tabs qweek (5 on Monday and 5 on Tuesday).  Off prednisone    History of gout some years ago.        Interval History:     Pain and bruising in left ankle since injury on mobility scooter when she caught the area in the door.  Was evaluated by primary care and told it was a bad bruise.    Pain is 5/10 ache in left ankle.  Rest improves.  Worse with walking.  No morning stiffness.    Review of Systems   Constitutional: Positive for fatigue.   HENT: Negative.    Eyes: Negative.    Cardiovascular: Negative.    Gastrointestinal: Negative.    Endocrine: Negative.    Genitourinary: Negative.    Musculoskeletal: Positive for arthralgias and joint swelling.   Skin: Negative.    Allergic/Immunologic: Negative.    Neurological: Negative.    Hematological: Negative.    Psychiatric/Behavioral: Negative.          Objective:   BP (!) 159/76   Pulse 70   Ht 5' 2" (1.575 m)   Wt 93.2 kg (205 lb 6.4 oz)   BMI 37.57 kg/m²      Physical Exam   Constitutional: She is oriented to person, place, and time and well-developed, well-nourished, and in no distress.   HENT:   Head: Normocephalic and atraumatic.   Eyes: Conjunctivae and EOM are normal.   Neck: Neck supple.   Cardiovascular: Normal rate and regular rhythm.    Pulmonary/Chest: Effort normal and breath sounds normal.   Abdominal: Soft. Bowel sounds are normal.   Neurological: She is alert and oriented to person, place, and time. Gait normal.   Skin: Skin is warm and dry.     Psychiatric: Mood and affect normal.   Musculoskeletal:   28 joint count: 0 swollen and 0 tender  Swelling and tenderness left lower leg  No foot pain or swelling.               LABS    Component      Latest Ref " Rng & Units 8/7/2018   WBC      3.90 - 12.70 K/uL 5.89   RBC      4.00 - 5.40 M/uL 3.36 (L)   Hemoglobin      12.0 - 16.0 g/dL 10.9 (L)   Hematocrit      37.0 - 48.5 % 33.8 (L)   MCV      82 - 98 fL 101 (H)   MCH      27.0 - 31.0 pg 32.4 (H)   MCHC      32.0 - 36.0 g/dL 32.2   RDW      11.5 - 14.5 % 14.4   Platelets      150 - 350 K/uL 129 (L)   MPV      9.2 - 12.9 fL 13.9 (H)   Immature Granulocytes      0.0 - 0.5 % 0.2   Gran # (ANC)      1.8 - 7.7 K/uL 3.0   Immature Grans (Abs)      0.00 - 0.04 K/uL 0.01   Lymph #      1.0 - 4.8 K/uL 1.4   Mono #      0.3 - 1.0 K/uL 1.2 (H)   Eos #      0.0 - 0.5 K/uL 0.2   Baso #      0.00 - 0.20 K/uL 0.02   nRBC      0 /100 WBC 0   Gran%      38.0 - 73.0 % 51.6   Lymph%      18.0 - 48.0 % 23.8   Mono%      4.0 - 15.0 % 20.4 (H)   Eosinophil%      0.0 - 8.0 % 3.7   Basophil%      0.0 - 1.9 % 0.3   Differential Method       Automated   Sodium      136 - 145 mmol/L 138   Potassium      3.5 - 5.1 mmol/L 4.5   Chloride      95 - 110 mmol/L 104   CO2      23 - 29 mmol/L 26   Glucose      70 - 110 mg/dL 117 (H)   BUN, Bld      8 - 23 mg/dL 15   Creatinine      0.5 - 1.4 mg/dL 0.8   Calcium      8.7 - 10.5 mg/dL 9.6   Total Protein      6.0 - 8.4 g/dL 7.7   Albumin      3.5 - 5.2 g/dL 3.9   Total Bilirubin      0.1 - 1.0 mg/dL 0.4   Alkaline Phosphatase      55 - 135 U/L 93   AST      10 - 40 U/L 25   ALT      10 - 44 U/L 32   Anion Gap      8 - 16 mmol/L 8   eGFR if African American      >60 mL/min/1.73 m:2 >60.0   eGFR if non African American      >60 mL/min/1.73 m:2 >60.0   CRP      0.0 - 8.2 mg/L 1.0   Sed Rate      0 - 20 mm/Hr 15     Assessment:       1. Rheumatoid arthritis manifested by rheumatoid nodule, polyarthritis in the past, and contractures of the elbows.    Treated in the past with gold. Plaquenil did not work in the past and she never took SSZ.   Now doing well. Continue MTX (5 tabs on Monday and 5 tabs on Tuesday) and folic acid   2. Encounter for long-term  (current) use of other medications    3. Fatigue.   4. Chest pain. Small mass on CT evaluated by cardiothoracic surgery who said it was too small to biopsy.  She decided to go for second opinion at [facility]. Biopsy was negative. MRI repeat stable and most recent 10/2017 stable  5. 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 Felt not to have hepatitis C by hepatology.  6. Right anserine bursitis.   7. Obesity  8. HTN  9. Chronic back pain.  Bulging disc.  May be cause of paresthesias in legs with standing.  10.  Paresthesia of in left leg.  May be due to #9  11.  Right clavicle mass.  Evaluated by x-ray   12.  Trigger fingers.  Left 4th and right 3rd and right 5th.  Therapy helped and is doing home plan.    13.  Left lower leg pain and swelling.  History of trauma  Plan:       1. Continue methotrexate 10 tabs qweek (5 on Monday and 5 on Tuesday).  2. Check CRP, ESR, CBC, and CMP    3. Continue Nabumetone.    4. Continue folic acid 2 tabs.    5. X-ray left tibia/fibula  6. Body tape fingers     Return to the office in 3 months

## 2018-08-15 ENCOUNTER — TELEPHONE (OUTPATIENT)
Dept: RHEUMATOLOGY | Facility: CLINIC | Age: 73
End: 2018-08-15

## 2018-08-15 NOTE — TELEPHONE ENCOUNTER
Staff to inform patient that x-ray of the lower leg area shows evidence of swelling that most likely is due to her prior trauma.  No evidence of fracture.  Prior injury may take some time to heal and for swelling to resolve.  She also has evidence of wear and tear or osteoarthritis and ankle area that comes with time.  She should ice the lower leg to decrease the swelling at least 3 times a day for 20 min on and 20 min off until it resolves

## 2018-08-16 DIAGNOSIS — D64.9 ANEMIA, UNSPECIFIED TYPE: ICD-10-CM

## 2018-08-16 DIAGNOSIS — E55.9 VITAMIN D DEFICIENCY DISEASE: ICD-10-CM

## 2018-08-16 RX ORDER — ERGOCALCIFEROL 1.25 MG/1
50000 CAPSULE ORAL
Qty: 12 CAPSULE | Refills: 0 | Status: SHIPPED | OUTPATIENT
Start: 2018-08-16 | End: 2018-11-14 | Stop reason: ALTCHOICE

## 2018-08-16 NOTE — TELEPHONE ENCOUNTER
----- Message from Kalani Samayoa sent at 8/16/2018  2:44 PM CDT -----  Contact: self  Refill: ergocalciferol (VITAMIN D2) 50,000 unit Cap. [facility] [phone].  Pt is requesting 90 day supply. Pt contact [phone].      Thanks-

## 2018-09-28 DIAGNOSIS — D64.9 ANEMIA, UNSPECIFIED TYPE: ICD-10-CM

## 2018-09-28 DIAGNOSIS — I10 HYPERTENSION, UNSPECIFIED TYPE: ICD-10-CM

## 2018-09-28 DIAGNOSIS — E55.9 VITAMIN D DEFICIENCY DISEASE: ICD-10-CM

## 2018-09-28 DIAGNOSIS — E53.8 B12 DEFICIENCY: Primary | ICD-10-CM

## 2018-09-28 RX ORDER — IRBESARTAN 150 MG/1
150 TABLET ORAL DAILY
Qty: 90 TABLET | Refills: 1 | Status: SHIPPED | OUTPATIENT
Start: 2018-09-28 | End: 2019-05-17

## 2018-09-28 RX ORDER — ERGOCALCIFEROL 1.25 MG/1
50000 CAPSULE ORAL
Qty: 12 CAPSULE | Refills: 0 | OUTPATIENT
Start: 2018-09-28

## 2018-09-28 RX ORDER — CYANOCOBALAMIN 1000 UG/ML
1000 INJECTION, SOLUTION INTRAMUSCULAR; SUBCUTANEOUS
Qty: 1 ML | Refills: 5 | OUTPATIENT
Start: 2018-09-28

## 2018-09-28 NOTE — TELEPHONE ENCOUNTER
----- Message from Eda Mcghee sent at 9/28/2018 12:59 PM CDT -----  Contact: SELF   Pt is requesting a refill on her syringes . Pls call ..    [facility] [id number] [facility]  [address] [facility] [place] [address]  Phone: [phone] Fax: [phone]    Thanks.....DAVID

## 2018-09-28 NOTE — LETTER
September 28, 2018    Yuliagwendolyn Peralta  [address] [facility] [place] [address]             [facility]  [address]  [address]  [place] [address]  Phone: [phone]  Fax: [phone] Dear Mrs. Peralta:    We were tried to reach you by phone but was unsuccessful. We were unable to refill your vitamin D and vitamin B 12 because we need to get labs done to check your vitamin D and B 12 levels. Please call [phone] to schedule a lab appointment.    If you have any questions or concerns, please don't hesitate to call.    Sincerely,        Ileana Martin MD

## 2018-09-28 NOTE — TELEPHONE ENCOUNTER
We need labs for vit d and b12. I do not see labs since 2014/13  Is she getting labs anywhere else?

## 2018-09-28 NOTE — TELEPHONE ENCOUNTER
----- Message from Flora Garcia sent at 9/28/2018 12:52 PM CDT -----  Contact: Self  Pt is needing refills on medication     cyanocobalamin 1,000 mcg/mL injection  ergocalciferol (VITAMIN D2) 50,000 unit Cap  irbesartan (AVAPRO) 150 MG tablet    [facility] [place] [address] [facility] [phone] (Phone)  [phone] (Fax)

## 2018-10-02 ENCOUNTER — TELEPHONE (OUTPATIENT)
Dept: FAMILY MEDICINE | Facility: CLINIC | Age: 73
End: 2018-10-02

## 2018-10-02 DIAGNOSIS — E53.8 VITAMIN B12 DEFICIENCY: ICD-10-CM

## 2018-10-02 DIAGNOSIS — E55.9 VITAMIN D DEFICIENCY: Primary | ICD-10-CM

## 2018-10-02 NOTE — TELEPHONE ENCOUNTER
----- Message from Kristofer Salcedo sent at 10/1/2018  4:11 PM CDT -----  Contact: Self/[phone]  The patient returned the staff call.    Thank you

## 2018-10-02 NOTE — TELEPHONE ENCOUNTER
Patient requesting order for hand x-ray.  Stated her hand was slammed by a door, hurts and has a knot on it.      Patient stated she has all labs done with [facility].  Stated she does not have labs drawn anywhere else.  If labs are needed, please order.

## 2018-10-02 NOTE — TELEPHONE ENCOUNTER
Patient informed of lab orders placed.  Patient also informed that an office visit is needed for hand assessment.  Patient verbalized understanding, scheduled appointment for 10/10/2018.

## 2018-10-04 ENCOUNTER — LAB VISIT (OUTPATIENT)
Dept: LAB | Facility: HOSPITAL | Age: 73
End: 2018-10-04
Attending: FAMILY MEDICINE
Payer: MEDICARE

## 2018-10-04 DIAGNOSIS — E55.9 VITAMIN D DEFICIENCY DISEASE: ICD-10-CM

## 2018-10-04 DIAGNOSIS — E53.8 B12 DEFICIENCY: ICD-10-CM

## 2018-10-04 DIAGNOSIS — D64.9 ANEMIA, UNSPECIFIED TYPE: ICD-10-CM

## 2018-10-04 DIAGNOSIS — E55.9 VITAMIN D DEFICIENCY: ICD-10-CM

## 2018-10-04 DIAGNOSIS — D75.89 MACROCYTOSIS: ICD-10-CM

## 2018-10-04 DIAGNOSIS — M06.9 RHEUMATOID ARTHRITIS OF HAND, UNSPECIFIED LATERALITY, UNSPECIFIED RHEUMATOID FACTOR PRESENCE: ICD-10-CM

## 2018-10-04 DIAGNOSIS — D52.0 DIETARY FOLATE DEFICIENCY ANEMIA: ICD-10-CM

## 2018-10-04 DIAGNOSIS — D69.6 THROMBOCYTOPENIA: ICD-10-CM

## 2018-10-04 DIAGNOSIS — E53.8 VITAMIN B12 DEFICIENCY: ICD-10-CM

## 2018-10-04 DIAGNOSIS — R93.89 ABNORMAL CT SCAN, CHEST: ICD-10-CM

## 2018-10-04 LAB
25(OH)D3+25(OH)D2 SERPL-MCNC: 35 NG/ML
25(OH)D3+25(OH)D2 SERPL-MCNC: 35 NG/ML
BASOPHILS # BLD AUTO: 0.01 K/UL
BASOPHILS NFR BLD: 0.2 %
CREAT SERPL-MCNC: 0.8 MG/DL
CRP SERPL-MCNC: 1.1 MG/L
DIFFERENTIAL METHOD: ABNORMAL
EOSINOPHIL # BLD AUTO: 0.1 K/UL
EOSINOPHIL NFR BLD: 2.7 %
ERYTHROCYTE [DISTWIDTH] IN BLOOD BY AUTOMATED COUNT: 14.2 %
EST. GFR  (AFRICAN AMERICAN): >60 ML/MIN/1.73 M^2
EST. GFR  (NON AFRICAN AMERICAN): >60 ML/MIN/1.73 M^2
FOLATE SERPL-MCNC: >40 NG/ML
HCT VFR BLD AUTO: 36.6 %
HGB BLD-MCNC: 11.6 G/DL
IMM GRANULOCYTES # BLD AUTO: 0.01 K/UL
IMM GRANULOCYTES NFR BLD AUTO: 0.2 %
LYMPHOCYTES # BLD AUTO: 1.2 K/UL
LYMPHOCYTES NFR BLD: 23.7 %
MCH RBC QN AUTO: 32.2 PG
MCHC RBC AUTO-ENTMCNC: 31.7 G/DL
MCV RBC AUTO: 102 FL
MONOCYTES # BLD AUTO: 0.8 K/UL
MONOCYTES NFR BLD: 16.2 %
NEUTROPHILS # BLD AUTO: 3 K/UL
NEUTROPHILS NFR BLD: 57 %
NRBC BLD-RTO: 0 /100 WBC
PLATELET # BLD AUTO: 146 K/UL
PMV BLD AUTO: 14.5 FL
RBC # BLD AUTO: 3.6 M/UL
VIT B12 SERPL-MCNC: 874 PG/ML
WBC # BLD AUTO: 5.18 K/UL

## 2018-10-04 PROCEDURE — 86140 C-REACTIVE PROTEIN: CPT

## 2018-10-04 PROCEDURE — 83021 HEMOGLOBIN CHROMOTOGRAPHY: CPT

## 2018-10-04 PROCEDURE — 82607 VITAMIN B-12: CPT

## 2018-10-04 PROCEDURE — 85025 COMPLETE CBC W/AUTO DIFF WBC: CPT

## 2018-10-04 PROCEDURE — 36415 COLL VENOUS BLD VENIPUNCTURE: CPT | Mod: PO

## 2018-10-04 PROCEDURE — 82306 VITAMIN D 25 HYDROXY: CPT

## 2018-10-04 PROCEDURE — 82565 ASSAY OF CREATININE: CPT

## 2018-10-04 PROCEDURE — 82746 ASSAY OF FOLIC ACID SERUM: CPT

## 2018-10-05 ENCOUNTER — TELEPHONE (OUTPATIENT)
Dept: FAMILY MEDICINE | Facility: CLINIC | Age: 73
End: 2018-10-05

## 2018-10-05 LAB
HGB A2 MFR BLD HPLC: 2.7 %
HGB FRACT BLD ELPH-IMP: NORMAL
HGB FRACT BLD ELPH-IMP: NORMAL

## 2018-10-05 NOTE — TELEPHONE ENCOUNTER
Called patient and informed of results. Patient will d/c the b-12 injections monthly. She was taking 45631Q of vit d 1/week and decrease to the 1000U a day  Wanted to know if there is a specific mg of b complex that she should start taking and if it is available by itself?  Informed that I will send message back to provider to see. Voiced understanding.

## 2018-10-10 ENCOUNTER — OFFICE VISIT (OUTPATIENT)
Dept: FAMILY MEDICINE | Facility: CLINIC | Age: 73
End: 2018-10-10
Payer: MEDICARE

## 2018-10-10 VITALS
TEMPERATURE: 99 F | WEIGHT: 201.25 LBS | HEIGHT: 62 IN | SYSTOLIC BLOOD PRESSURE: 136 MMHG | OXYGEN SATURATION: 97 % | RESPIRATION RATE: 18 BRPM | HEART RATE: 70 BPM | DIASTOLIC BLOOD PRESSURE: 74 MMHG | BODY MASS INDEX: 37.04 KG/M2

## 2018-10-10 DIAGNOSIS — M06.9 RHEUMATOID ARTHRITIS FLARE: Primary | ICD-10-CM

## 2018-10-10 DIAGNOSIS — F40.240 CLAUSTROPHOBIA: ICD-10-CM

## 2018-10-10 PROCEDURE — 99214 OFFICE O/P EST MOD 30 MIN: CPT | Mod: S$PBB,,, | Performed by: FAMILY MEDICINE

## 2018-10-10 PROCEDURE — 99213 OFFICE O/P EST LOW 20 MIN: CPT | Mod: PBBFAC,PO | Performed by: FAMILY MEDICINE

## 2018-10-10 PROCEDURE — 99999 PR PBB SHADOW E&M-EST. PATIENT-LVL III: CPT | Mod: PBBFAC,,, | Performed by: FAMILY MEDICINE

## 2018-10-10 RX ORDER — METHYLPREDNISOLONE 4 MG/1
TABLET ORAL
Qty: 1 PACKAGE | Refills: 0 | Status: SHIPPED | OUTPATIENT
Start: 2018-10-10 | End: 2019-02-07 | Stop reason: ALTCHOICE

## 2018-10-10 RX ORDER — LORAZEPAM 1 MG/1
1 TABLET ORAL EVERY 6 HOURS PRN
Qty: 3 TABLET | Refills: 0 | Status: SHIPPED | OUTPATIENT
Start: 2018-10-10 | End: 2019-10-21

## 2018-10-10 NOTE — PROGRESS NOTES
73 year old female presents with a history of rheumatoid arthritis. She was off her medication and restarted this back in March 2018.s he has swelling and inflammation of her right hand along the first MTP joint. She states she closed it in the garage door. She states her hand was caught in the door leading out of her garage. She noticed the pain after this. She is currently on methotrexate 5 mg twice weekly.

## 2018-10-10 NOTE — PROGRESS NOTES
"Subjective:       Patient ID: Yulia Peralta is a 73 y.o. female.    Chief Complaint: Hand Injury    73 year old female presents with a history of rheumatoid arthritis. She was off her medication and restarted this back in March 2018.s he has swelling and inflammation of her right hand along the first MTP joint. She states she closed it in the garage door. She states her hand was caught in the door leading out of her garage. She noticed the pain after this. She is currently on methotrexate 5 mg twice weekly.     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 She is going to [facility] for evaluation.       Review of Systems    Objective:       Vitals:    10/10/18 1504   BP: 136/74   Pulse: 70   Resp: 18   Temp: 98.5 °F (36.9 °C)   TempSrc: Oral   SpO2: 97%   Weight: 91.3 kg (201 lb 4.5 oz)   Height: 5' 2" (1.575 m)       Physical Exam   Constitutional: She appears well-developed and well-nourished. No distress.   Musculoskeletal:        Right hand: She exhibits tenderness and swelling. She exhibits normal range of motion, no bony tenderness, no deformity and no laceration.        Hands:  Skin: She is not diaphoretic.       Assessment:       1. Rheumatoid arthritis flare    2. Claustrophobia        Plan:       Yulia was seen today for hand injury.    Diagnoses and all orders for this visit:    Rheumatoid arthritis flare  -     methylPREDNISolone (MEDROL DOSEPACK) 4 mg tablet; use as directed    Claustrophobia  -     LORazepam (ATIVAN) 1 MG tablet; Take 1 tablet (1 mg total) by mouth every 6 (six) hours as needed for Anxiety.           "

## 2018-11-13 ENCOUNTER — LAB VISIT (OUTPATIENT)
Dept: LAB | Facility: HOSPITAL | Age: 73
End: 2018-11-13
Attending: INTERNAL MEDICINE
Payer: MEDICARE

## 2018-11-13 DIAGNOSIS — M06.9 RHEUMATOID ARTHRITIS: ICD-10-CM

## 2018-11-13 DIAGNOSIS — Z79.899 ENCOUNTER FOR LONG-TERM (CURRENT) USE OF MEDICATIONS: ICD-10-CM

## 2018-11-13 LAB
ALBUMIN SERPL BCP-MCNC: 3.9 G/DL
ALP SERPL-CCNC: 91 U/L
ALT SERPL W/O P-5'-P-CCNC: 25 U/L
ANION GAP SERPL CALC-SCNC: 7 MMOL/L
AST SERPL-CCNC: 23 U/L
BASOPHILS # BLD AUTO: 0.02 K/UL
BASOPHILS NFR BLD: 0.3 %
BILIRUB SERPL-MCNC: 0.6 MG/DL
BUN SERPL-MCNC: 9 MG/DL
CALCIUM SERPL-MCNC: 10 MG/DL
CHLORIDE SERPL-SCNC: 106 MMOL/L
CO2 SERPL-SCNC: 28 MMOL/L
CREAT SERPL-MCNC: 0.7 MG/DL
DIFFERENTIAL METHOD: ABNORMAL
EOSINOPHIL # BLD AUTO: 0.3 K/UL
EOSINOPHIL NFR BLD: 3.8 %
ERYTHROCYTE [DISTWIDTH] IN BLOOD BY AUTOMATED COUNT: 14.7 %
ERYTHROCYTE [SEDIMENTATION RATE] IN BLOOD BY WESTERGREN METHOD: 15 MM/HR
EST. GFR  (AFRICAN AMERICAN): >60 ML/MIN/1.73 M^2
EST. GFR  (NON AFRICAN AMERICAN): >60 ML/MIN/1.73 M^2
GLUCOSE SERPL-MCNC: 96 MG/DL
HCT VFR BLD AUTO: 34.4 %
HGB BLD-MCNC: 10.9 G/DL
IMM GRANULOCYTES # BLD AUTO: 0.02 K/UL
IMM GRANULOCYTES NFR BLD AUTO: 0.3 %
LYMPHOCYTES # BLD AUTO: 1.3 K/UL
LYMPHOCYTES NFR BLD: 20.2 %
MCH RBC QN AUTO: 31.8 PG
MCHC RBC AUTO-ENTMCNC: 31.7 G/DL
MCV RBC AUTO: 100 FL
MONOCYTES # BLD AUTO: 1.2 K/UL
MONOCYTES NFR BLD: 18.1 %
NEUTROPHILS # BLD AUTO: 3.8 K/UL
NEUTROPHILS NFR BLD: 57.3 %
NRBC BLD-RTO: 0 /100 WBC
PLATELET # BLD AUTO: 136 K/UL
PMV BLD AUTO: 13.7 FL
POTASSIUM SERPL-SCNC: 4.8 MMOL/L
PROT SERPL-MCNC: 7.6 G/DL
RBC # BLD AUTO: 3.43 M/UL
SODIUM SERPL-SCNC: 141 MMOL/L
WBC # BLD AUTO: 6.63 K/UL

## 2018-11-13 PROCEDURE — 80053 COMPREHEN METABOLIC PANEL: CPT

## 2018-11-13 PROCEDURE — 85652 RBC SED RATE AUTOMATED: CPT

## 2018-11-13 PROCEDURE — 85025 COMPLETE CBC W/AUTO DIFF WBC: CPT

## 2018-11-13 PROCEDURE — 86140 C-REACTIVE PROTEIN: CPT

## 2018-11-13 PROCEDURE — 36415 COLL VENOUS BLD VENIPUNCTURE: CPT | Mod: PO

## 2018-11-14 ENCOUNTER — OFFICE VISIT (OUTPATIENT)
Dept: RHEUMATOLOGY | Facility: CLINIC | Age: 73
End: 2018-11-14
Payer: MEDICARE

## 2018-11-14 VITALS
HEIGHT: 62 IN | HEART RATE: 63 BPM | SYSTOLIC BLOOD PRESSURE: 173 MMHG | BODY MASS INDEX: 36.81 KG/M2 | DIASTOLIC BLOOD PRESSURE: 78 MMHG

## 2018-11-14 DIAGNOSIS — M51.37 DEGENERATION OF LUMBAR OR LUMBOSACRAL INTERVERTEBRAL DISC: ICD-10-CM

## 2018-11-14 DIAGNOSIS — M06.9 RHEUMATOID ARTHRITIS, INVOLVING UNSPECIFIED SITE, UNSPECIFIED RHEUMATOID FACTOR PRESENCE: Primary | ICD-10-CM

## 2018-11-14 DIAGNOSIS — M54.16 LEFT LUMBAR RADICULITIS: ICD-10-CM

## 2018-11-14 DIAGNOSIS — M48.062 SPINAL STENOSIS, LUMBAR REGION, WITH NEUROGENIC CLAUDICATION: ICD-10-CM

## 2018-11-14 DIAGNOSIS — M41.9 ACQUIRED SCOLIOSIS: ICD-10-CM

## 2018-11-14 DIAGNOSIS — M43.10 ACQUIRED SPONDYLOLISTHESIS: ICD-10-CM

## 2018-11-14 DIAGNOSIS — D84.9 IMMUNOSUPPRESSION: ICD-10-CM

## 2018-11-14 DIAGNOSIS — R53.83 FATIGUE, UNSPECIFIED TYPE: ICD-10-CM

## 2018-11-14 DIAGNOSIS — M47.819 SPONDYLOSIS WITHOUT MYELOPATHY: ICD-10-CM

## 2018-11-14 DIAGNOSIS — M06.9 RHEUMATOID ARTHRITIS OF HAND, UNSPECIFIED LATERALITY, UNSPECIFIED RHEUMATOID FACTOR PRESENCE: ICD-10-CM

## 2018-11-14 LAB — CRP SERPL-MCNC: 1.4 MG/L

## 2018-11-14 PROCEDURE — 99999 PR PBB SHADOW E&M-EST. PATIENT-LVL IV: CPT | Mod: PBBFAC,,, | Performed by: INTERNAL MEDICINE

## 2018-11-14 PROCEDURE — 99214 OFFICE O/P EST MOD 30 MIN: CPT | Mod: S$PBB,,, | Performed by: INTERNAL MEDICINE

## 2018-11-14 PROCEDURE — 99214 OFFICE O/P EST MOD 30 MIN: CPT | Mod: PBBFAC | Performed by: INTERNAL MEDICINE

## 2018-11-14 RX ORDER — METHOTREXATE 2.5 MG/1
TABLET ORAL
Qty: 120 TABLET | Refills: 0 | Status: SHIPPED | OUTPATIENT
Start: 2018-11-14 | End: 2019-02-27 | Stop reason: SDUPTHER

## 2018-11-14 RX ORDER — FOLIC ACID 1 MG/1
2 TABLET ORAL DAILY
Qty: 180 TABLET | Refills: 3 | Status: SHIPPED | OUTPATIENT
Start: 2018-11-14 | End: 2019-06-03 | Stop reason: SDUPTHER

## 2018-11-14 RX ORDER — GABAPENTIN 100 MG/1
100 CAPSULE ORAL 3 TIMES DAILY
Qty: 270 CAPSULE | Refills: 3 | Status: SHIPPED | OUTPATIENT
Start: 2018-11-14 | End: 2019-10-21 | Stop reason: SDUPTHER

## 2018-11-14 ASSESSMENT — ROUTINE ASSESSMENT OF PATIENT INDEX DATA (RAPID3)
AM STIFFNESS SCORE: 0, NO
PAIN SCORE: 4.5
PSYCHOLOGICAL DISTRESS SCORE: 2.2
PATIENT GLOBAL ASSESSMENT SCORE: 3
FATIGUE SCORE: 3.5
TOTAL RAPID3 SCORE: 3.28
MDHAQ FUNCTION SCORE: .7

## 2018-11-14 NOTE — Clinical Note
Patient reports she has extreme fatigue, daytime HA and snoring (per her ).  I asked her to discuss with you the possibility of sleep apnea.

## 2018-11-14 NOTE — PROGRESS NOTES
"Subjective:       Patient ID: Yulia Peralta is a 73 y.o. female.    Chief Complaint: Rheumatoid arthritis    HPI:  Yulia Peralta is a 73 y.o. female with a history of rheumatoid arthritis for 25 years.  She had been on methotrexate for the past 5 years and her current dose is methotrexate 10 tabs qweek (5 on Monday and 5 on Tuesday).  Off prednisone    History of gout some years ago.        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  She also had mass distal right clavicle evaluated and was told it was most likely a benign growth from trauma.   Pain from above medial aspect of mid forearm to above elbow.  Can go in both directions.   Occurs spontaneously and lasts for a minute or so.  It is a stinging burning 3/10 sensation.     Pain is 4.5/10 ache tops feet and ankles.  Improve with rest.  Worse with walking at onset but will calm down after 10 minutes.    No morning stiffness.    Review of Systems   Constitutional: Positive for fatigue.   HENT: Negative.    Eyes: Negative.    Cardiovascular: Negative.    Gastrointestinal: Negative.    Endocrine: Negative.    Genitourinary: Negative.    Musculoskeletal: Positive for arthralgias and joint swelling.   Skin: Negative.    Allergic/Immunologic: Negative.    Neurological: Negative.    Hematological: Bruises/bleeds easily.   Psychiatric/Behavioral: Negative.          Objective:   Ht 5' 2" (1.575 m)   BMI 36.81 kg/m²      Physical Exam   Constitutional: She is oriented to person, place, and time and well-developed, well-nourished, and in no distress.   HENT:   Head: Normocephalic and atraumatic.   Eyes: Conjunctivae and EOM are normal.   Neck: Neck supple.   Cardiovascular: Normal rate and regular rhythm.    Pulmonary/Chest: Effort normal and breath sounds normal.   Abdominal: Soft. Bowel sounds are normal.   Neurological: She is alert and oriented to person, place, and time. Gait normal.   Skin: Skin is warm and dry. "     Psychiatric: Mood and affect normal.   Musculoskeletal:   28 joint count: 0 swollen and 0 tender  Swelling and tenderness left lower leg  No foot pain or swelling.               LABS    Component      Latest Ref Rng & Units 11/13/2018 10/4/2018 10/4/2018           11:15 AM 11:15 AM   WBC      3.90 - 12.70 K/uL 6.63     RBC      4.00 - 5.40 M/uL 3.43 (L)     Hemoglobin      12.0 - 16.0 g/dL 10.9 (L)     Hematocrit      37.0 - 48.5 % 34.4 (L)     MCV      82 - 98 fL 100 (H)     MCH      27.0 - 31.0 pg 31.8 (H)     MCHC      32.0 - 36.0 g/dL 31.7 (L)     RDW      11.5 - 14.5 % 14.7 (H)     Platelets      150 - 350 K/uL 136 (L)     MPV      9.2 - 12.9 fL 13.7 (H)     Immature Granulocytes      0.0 - 0.5 % 0.3     Gran # (ANC)      1.8 - 7.7 K/uL 3.8     Immature Grans (Abs)      0.00 - 0.04 K/uL 0.02     Lymph #      1.0 - 4.8 K/uL 1.3     Mono #      0.3 - 1.0 K/uL 1.2 (H)     Eos #      0.0 - 0.5 K/uL 0.3     Baso #      0.00 - 0.20 K/uL 0.02     nRBC      0 /100 WBC 0     Gran%      38.0 - 73.0 % 57.3     Lymph%      18.0 - 48.0 % 20.2     Mono%      4.0 - 15.0 % 18.1 (H)     Eosinophil%      0.0 - 8.0 % 3.8     Basophil%      0.0 - 1.9 % 0.3     Differential Method       Automated     Sodium      136 - 145 mmol/L 141     Potassium      3.5 - 5.1 mmol/L 4.8     Chloride      95 - 110 mmol/L 106     CO2      23 - 29 mmol/L 28     Glucose      70 - 110 mg/dL 96     BUN, Bld      8 - 23 mg/dL 9     Creatinine      0.5 - 1.4 mg/dL 0.7     Calcium      8.7 - 10.5 mg/dL 10.0     Total Protein      6.0 - 8.4 g/dL 7.6     Albumin      3.5 - 5.2 g/dL 3.9     Total Bilirubin      0.1 - 1.0 mg/dL 0.6     Alkaline Phosphatase      55 - 135 U/L 91     AST      10 - 40 U/L 23     ALT      10 - 44 U/L 25     Anion Gap      8 - 16 mmol/L 7 (L)     eGFR if African American      >60 mL/min/1.73 m:2 >60.0     eGFR if non African American      >60 mL/min/1.73 m:2 >60.0     Vitamin B-12      210 - 950 pg/mL  874 [id number]   Vit D,  25-Hydroxy      30 - 96 ng/mL   35   Sed Rate      0 - 36 mm/Hr 15       Component      Latest Ref Rng & Units 10/4/2018          11:15 AM   Creatinine      0.5 - 1.4 mg/dL 0.8   eGFR if African American      >60 mL/min/1.73 m:2 >60.0   eGFR if non African American      >60 mL/min/1.73 m:2 >60.0   Hgb A2 Quant      2.2 - 3.2 % 2.7   Hemoglobin Bands       Hb A and Hb A2   Hemoglobin Electrophoresis Interp       Normal   Vitamin B-12      210 - 950 pg/mL 874   Folate      4.0 - 24.0 ng/mL >40.0 (H)   CRP      0.0 - 8.2 mg/L 1.1   Vit D, 25-Hydroxy      30 - 96 ng/mL 35   Sed Rate      0 - 36 mm/Hr        Assessment:       1. Rheumatoid arthritis manifested by rheumatoid nodule, polyarthritis in the past, and contractures of the elbows.    Treated in the past with gold. Plaquenil did not work in the past and she never took SSZ.   Now doing well. Continue MTX (5 tabs on Monday and 5 tabs on Tuesday) and folic acid   2. Encounter for long-term (current) use of other medications    3. Fatigue.   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  She decided to go for second opinion at [facility]. Biopsy was negative. MRI repeat stable and most recent 10/2017 stable  5. 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 Felt not to have hepatitis C by hepatology.  6. Right anserine bursitis.   7. Obesity  8. HTN  9. Chronic back pain.  Bulging disc.  May be cause of paresthesias in legs with standing.  10.  Paresthesia of in left leg.  May be due to #9  11.  Right clavicle mass.  Evaluated by x-ray   12.  Trigger fingers.  Left 4th and right 3rd and right 5th.  Therapy helped in past.    13.  Left lower leg pain and swelling.  History of trauma  Plan:       1. Continue methotrexate 10 tabs qweek (5 on Monday and 5 on Tuesday).  2. Check CRP, ESR, CBC, and CMP    3. Continue Nabumetone.    4. Continue folic acid 2 tabs.    5. Patient to discuss  with primary doctor sleep apnea since she has HA, snoring and fatigue  6. Consider OT for arms if no improvement with home exercises from previous PT  7. Topical arthritis OTC ointment to feet and ankles.     Return to the office in 3 months

## 2018-12-07 ENCOUNTER — PES CALL (OUTPATIENT)
Dept: ADMINISTRATIVE | Facility: CLINIC | Age: 73
End: 2018-12-07

## 2018-12-27 ENCOUNTER — OFFICE VISIT (OUTPATIENT)
Dept: FAMILY MEDICINE | Facility: CLINIC | Age: 73
End: 2018-12-27
Payer: MEDICARE

## 2018-12-27 VITALS
WEIGHT: 202.38 LBS | DIASTOLIC BLOOD PRESSURE: 80 MMHG | TEMPERATURE: 98 F | BODY MASS INDEX: 37.24 KG/M2 | HEART RATE: 66 BPM | OXYGEN SATURATION: 97 % | RESPIRATION RATE: 16 BRPM | HEIGHT: 62 IN | SYSTOLIC BLOOD PRESSURE: 154 MMHG

## 2018-12-27 DIAGNOSIS — D84.9 IMMUNOSUPPRESSION: ICD-10-CM

## 2018-12-27 DIAGNOSIS — Z23 NEED FOR INFLUENZA VACCINATION: ICD-10-CM

## 2018-12-27 DIAGNOSIS — I77.1 TORTUOUS AORTA: ICD-10-CM

## 2018-12-27 DIAGNOSIS — D69.6 THROMBOCYTOPENIA: ICD-10-CM

## 2018-12-27 DIAGNOSIS — E66.01 SEVERE OBESITY (BMI 35.0-39.9) WITH COMORBIDITY: ICD-10-CM

## 2018-12-27 DIAGNOSIS — Z00.00 ENCOUNTER FOR PREVENTIVE HEALTH EXAMINATION: Primary | ICD-10-CM

## 2018-12-27 DIAGNOSIS — M06.9 RHEUMATOID ARTHRITIS, INVOLVING UNSPECIFIED SITE, UNSPECIFIED RHEUMATOID FACTOR PRESENCE: ICD-10-CM

## 2018-12-27 PROCEDURE — 99215 OFFICE O/P EST HI 40 MIN: CPT | Mod: PBBFAC,PO | Performed by: PHYSICIAN ASSISTANT

## 2018-12-27 PROCEDURE — 99999 PR PBB SHADOW E&M-EST. PATIENT-LVL V: CPT | Mod: PBBFAC,,, | Performed by: PHYSICIAN ASSISTANT

## 2018-12-27 PROCEDURE — G0439 PPPS, SUBSEQ VISIT: HCPCS | Mod: S$GLB,,, | Performed by: PHYSICIAN ASSISTANT

## 2018-12-27 PROCEDURE — 90662 IIV NO PRSV INCREASED AG IM: CPT | Mod: PBBFAC,PO

## 2018-12-27 RX ORDER — GABAPENTIN 100 MG/1
100 CAPSULE ORAL
COMMUNITY
End: 2019-02-27 | Stop reason: SDUPTHER

## 2018-12-27 RX ORDER — METHOTREXATE 2.5 MG/1
12.5 TABLET ORAL
COMMUNITY
Start: 2018-08-07 | End: 2019-05-17 | Stop reason: SDUPTHER

## 2018-12-27 RX ORDER — PANTOPRAZOLE SODIUM 40 MG/1
40 TABLET, DELAYED RELEASE ORAL
COMMUNITY
End: 2019-02-07 | Stop reason: SDUPTHER

## 2018-12-27 RX ORDER — TRAMADOL HYDROCHLORIDE 50 MG/1
50 TABLET ORAL
COMMUNITY
End: 2019-05-17

## 2018-12-27 RX ORDER — BUTALBITAL, ASPIRIN, AND CAFFEINE 325; 50; 40 MG/1; MG/1; MG/1
1 CAPSULE ORAL
COMMUNITY
End: 2019-02-27 | Stop reason: SDUPTHER

## 2018-12-27 RX ORDER — MISOPROSTOL 100 UG/1
1 TABLET ORAL
COMMUNITY
End: 2019-02-27 | Stop reason: SDUPTHER

## 2018-12-27 RX ORDER — VALACYCLOVIR HYDROCHLORIDE 500 MG/1
500 TABLET, FILM COATED ORAL
COMMUNITY
End: 2019-02-07 | Stop reason: SDUPTHER

## 2018-12-27 RX ORDER — FOLIC ACID 1 MG/1
1 TABLET ORAL
COMMUNITY
End: 2019-02-27 | Stop reason: SDUPTHER

## 2018-12-27 NOTE — PATIENT INSTRUCTIONS
Counseling and Referral of Other Preventative  (Italic type indicates deductible and co-insurance are waived)    Patient Name: Yulia Peralta  Today's Date: 12/27/2018    Health Maintenance       Date Due Completion Date    TETANUS VACCINE 05/15/1963 ---    Influenza Vaccine 08/01/2018 9/29/2017    Pap Smear 08/28/2019 8/28/2018    Mammogram 09/04/2019 9/4/2018    Override on 11/17/2011: Done    DEXA SCAN 06/23/2020 6/23/2017    Override on 12/18/2011: Done    Lipid Panel 08/25/2022 8/25/2017    Colonoscopy 04/03/2025 4/3/2015        Orders Placed This Encounter   Procedures    Influenza - High Dose (65+) (PF) (IM)     The following information is provided to all patients.  This information is to help you find resources for any of the problems found today that may be affecting your health:                Living healthy guide: www.[facility].[place].[facility]      Understanding Diabetes: www.diabetes.org      Eating healthy: www.cdc.gov/healthyweight      CDC home safety checklist: www.cdc.gov/steadi/patient.html      Agency on Aging: www.goea.[place].[facility]      Alcoholics anonymous (AA): www.aa.org      Physical Activity: www.travis.nih.gov/eo7rghe      Tobacco use: www.quitwithusla.org

## 2018-12-27 NOTE — PROGRESS NOTES
Flu vaccine administered IM to right deltoid. VIS form given. Tolerated well. No adverse reaction noted.

## 2018-12-27 NOTE — PROGRESS NOTES
"Yulia Peralta presented for a  Medicare AWV and comprehensive Health Risk Assessment today. The following components were reviewed and updated:    · Medical history  · Family History  · Social history  · Allergies and Current Medications  · Health Risk Assessment  · Health Maintenance  · Care Team     ** See Completed Assessments for Annual Wellness Visit within the encounter summary.**       The following assessments were completed:  · Living Situation  · CAGE  · Depression Screening  · Timed Get Up and Go  · Whisper Test  · Cognitive Function Screening  · Nutrition Screening  · ADL Screening  · PAQ Screening    Vitals:    12/27/18 0904   BP: (!) 154/80   Pulse: 66   Resp: 16   Temp: 98.4 °F (36.9 °C)   TempSrc: Oral   SpO2: 97%   Weight: 91.8 kg (202 lb 6.1 oz)   Height: 5' 2" (1.575 m)     Body mass index is 37.02 kg/m².  Physical Exam      Diagnoses and health risks identified today and associated recommendations/orders:    1. Encounter for preventive health examination  Provided Yulia with a 5-10 year written screening schedule and personal prevention plan. Recommendations were developed using the USPSTF age appropriate recommendations. Education, counseling, and referrals were provided as needed. After Visit Summary printed and given to patient which includes a list of additional screenings\tests needed.    2. Tortuous aorta  Low fat diet, discuss statin with PCP    3. Thrombocytopenia  Stable monitor     4. Rheumatoid arthritis, involving unspecified site, unspecified rheumatoid factor presence  Continue meds, followed by rheumatology    5. Severe obesity (BMI 35.0-39.9) with comorbidity  Low fat low carb diet and exercise     6. Immunosuppression  Stable, monitor     7. Need for influenza vaccination  - Influenza - High Dose (65+) (PF) (IM)      No Follow-up on file.    Maribel Cazares PA-C  "

## 2019-02-07 ENCOUNTER — OFFICE VISIT (OUTPATIENT)
Dept: FAMILY MEDICINE | Facility: CLINIC | Age: 74
End: 2019-02-07
Payer: MEDICARE

## 2019-02-07 VITALS
DIASTOLIC BLOOD PRESSURE: 90 MMHG | HEART RATE: 67 BPM | SYSTOLIC BLOOD PRESSURE: 140 MMHG | WEIGHT: 202.81 LBS | TEMPERATURE: 98 F | HEIGHT: 62 IN | OXYGEN SATURATION: 96 % | BODY MASS INDEX: 37.32 KG/M2

## 2019-02-07 DIAGNOSIS — J30.9 CHRONIC ALLERGIC RHINITIS: ICD-10-CM

## 2019-02-07 PROCEDURE — 99213 OFFICE O/P EST LOW 20 MIN: CPT | Mod: PBBFAC,PO | Performed by: FAMILY MEDICINE

## 2019-02-07 PROCEDURE — 99213 PR OFFICE/OUTPT VISIT, EST, LEVL III, 20-29 MIN: ICD-10-PCS | Mod: S$PBB,,, | Performed by: FAMILY MEDICINE

## 2019-02-07 PROCEDURE — 96372 THER/PROPH/DIAG INJ SC/IM: CPT | Mod: PBBFAC,PO

## 2019-02-07 PROCEDURE — 99999 PR PBB SHADOW E&M-EST. PATIENT-LVL III: CPT | Mod: PBBFAC,,, | Performed by: FAMILY MEDICINE

## 2019-02-07 PROCEDURE — 99999 PR PBB SHADOW E&M-EST. PATIENT-LVL III: ICD-10-PCS | Mod: PBBFAC,,, | Performed by: FAMILY MEDICINE

## 2019-02-07 PROCEDURE — 99213 OFFICE O/P EST LOW 20 MIN: CPT | Mod: S$PBB,,, | Performed by: FAMILY MEDICINE

## 2019-02-07 RX ORDER — FLUTICASONE PROPIONATE 50 MCG
1 SPRAY, SUSPENSION (ML) NASAL DAILY
Qty: 16 G | Refills: 5 | Status: SHIPPED | OUTPATIENT
Start: 2019-02-07

## 2019-02-07 RX ORDER — AZELASTINE 1 MG/ML
1 SPRAY, METERED NASAL 2 TIMES DAILY
Qty: 30 ML | Refills: 1 | Status: SHIPPED | OUTPATIENT
Start: 2019-02-07 | End: 2023-03-15

## 2019-02-07 RX ORDER — METHYLPREDNISOLONE ACETATE 40 MG/ML
40 INJECTION, SUSPENSION INTRA-ARTICULAR; INTRALESIONAL; INTRAMUSCULAR; SOFT TISSUE
Status: COMPLETED | OUTPATIENT
Start: 2019-02-07 | End: 2019-02-07

## 2019-02-07 RX ADMIN — METHYLPREDNISOLONE ACETATE 40 MG: 40 INJECTION, SUSPENSION INTRA-ARTICULAR; INTRALESIONAL; INTRAMUSCULAR; SOFT TISSUE at 10:02

## 2019-02-07 NOTE — PROGRESS NOTES
"Subjective:       Patient ID: Yulia Peralta is a 73 y.o. female.    Chief Complaint: Nasal Congestion    Sinus Problem   This is a chronic problem. The current episode started more than 1 month ago. The problem has been waxing and waning since onset. There has been no fever. Associated symptoms include sinus pressure, sneezing and a sore throat. Pertinent negatives include no coughing or headaches. Past treatments include oral decongestants (sudafed, ipratropium nasal dpray, cetirizine).     Review of Systems   HENT: Positive for postnasal drip, sinus pressure, sneezing and sore throat. Negative for rhinorrhea.    Respiratory: Negative for cough.    Neurological: Negative for headaches.       Objective:       Vitals:    02/07/19 0948   BP: (!) 140/90   Pulse: 67   Temp: 97.6 °F (36.4 °C)   TempSrc: Oral   SpO2: 96%   Weight: 92 kg (202 lb 13.2 oz)   Height: 5' 2" (1.575 m)       Physical Exam   Constitutional: She is oriented to person, place, and time. She appears well-developed and well-nourished. No distress.   HENT:   Head: Normocephalic and atraumatic.   Right Ear: External ear normal.   Left Ear: External ear normal.   Mouth/Throat: Oropharynx is clear and moist. No oropharyngeal exudate.   Neck: Normal range of motion. Neck supple.   Cardiovascular: Normal rate, regular rhythm and normal heart sounds. Exam reveals no gallop and no friction rub.   No murmur heard.  Pulmonary/Chest: Effort normal and breath sounds normal. No stridor. No respiratory distress. She has no wheezes. She has no rales. She exhibits no tenderness.   Lymphadenopathy:     She has cervical adenopathy.   Neurological: She is alert and oriented to person, place, and time.   Skin: She is not diaphoretic.       Assessment:       1. Chronic allergic rhinitis        Plan:       Yulia was seen today for nasal congestion.    Diagnoses and all orders for this visit:    Chronic allergic rhinitis  -     azelastine (ASTELIN) 137 mcg (0.1 %) " nasal spray; 1 spray (137 mcg total) by Nasal route 2 (two) times daily.  -     fluticasone (FLONASE) 50 mcg/actuation nasal spray; 1 spray (50 mcg total) by Each Nare route once daily.  -     methylPREDNISolone acetate injection 40 mg

## 2019-02-26 ENCOUNTER — LAB VISIT (OUTPATIENT)
Dept: LAB | Facility: HOSPITAL | Age: 74
End: 2019-02-26
Attending: INTERNAL MEDICINE
Payer: MEDICARE

## 2019-02-26 DIAGNOSIS — M06.9 RHEUMATOID ARTHRITIS: ICD-10-CM

## 2019-02-26 DIAGNOSIS — Z79.899 ENCOUNTER FOR LONG-TERM (CURRENT) USE OF MEDICATIONS: ICD-10-CM

## 2019-02-26 LAB
BASOPHILS # BLD AUTO: 0.02 K/UL
BASOPHILS NFR BLD: 0.3 %
DIFFERENTIAL METHOD: ABNORMAL
EOSINOPHIL # BLD AUTO: 0.3 K/UL
EOSINOPHIL NFR BLD: 4.9 %
ERYTHROCYTE [DISTWIDTH] IN BLOOD BY AUTOMATED COUNT: 13.7 %
HCT VFR BLD AUTO: 35.9 %
HGB BLD-MCNC: 11.4 G/DL
IMM GRANULOCYTES # BLD AUTO: 0.03 K/UL
IMM GRANULOCYTES NFR BLD AUTO: 0.5 %
LYMPHOCYTES # BLD AUTO: 1.5 K/UL
LYMPHOCYTES NFR BLD: 23.9 %
MCH RBC QN AUTO: 31.4 PG
MCHC RBC AUTO-ENTMCNC: 31.8 G/DL
MCV RBC AUTO: 99 FL
MONOCYTES # BLD AUTO: 1 K/UL
MONOCYTES NFR BLD: 15.8 %
NEUTROPHILS # BLD AUTO: 3.4 K/UL
NEUTROPHILS NFR BLD: 54.6 %
NRBC BLD-RTO: 0 /100 WBC
PLATELET # BLD AUTO: 142 K/UL
PMV BLD AUTO: ABNORMAL FL
RBC # BLD AUTO: 3.63 M/UL
WBC # BLD AUTO: 6.27 K/UL

## 2019-02-26 PROCEDURE — 36415 COLL VENOUS BLD VENIPUNCTURE: CPT | Mod: PO

## 2019-02-26 PROCEDURE — 85025 COMPLETE CBC W/AUTO DIFF WBC: CPT

## 2019-02-27 ENCOUNTER — HOSPITAL ENCOUNTER (OUTPATIENT)
Dept: RADIOLOGY | Facility: HOSPITAL | Age: 74
Discharge: HOME | End: 2019-02-27
Attending: INTERNAL MEDICINE
Payer: MEDICARE

## 2019-02-27 ENCOUNTER — OFFICE VISIT (OUTPATIENT)
Dept: RHEUMATOLOGY | Facility: CLINIC | Age: 74
End: 2019-02-27
Payer: MEDICARE

## 2019-02-27 VITALS
WEIGHT: 205.25 LBS | HEIGHT: 62 IN | SYSTOLIC BLOOD PRESSURE: 175 MMHG | HEART RATE: 55 BPM | BODY MASS INDEX: 37.77 KG/M2 | DIASTOLIC BLOOD PRESSURE: 76 MMHG

## 2019-02-27 DIAGNOSIS — M05.79 RHEUMATOID ARTHRITIS INVOLVING MULTIPLE SITES WITH POSITIVE RHEUMATOID FACTOR: Primary | ICD-10-CM

## 2019-02-27 DIAGNOSIS — E66.01 SEVERE OBESITY (BMI 35.0-39.9) WITH COMORBIDITY: ICD-10-CM

## 2019-02-27 DIAGNOSIS — D84.9 IMMUNOSUPPRESSION: ICD-10-CM

## 2019-02-27 DIAGNOSIS — M05.79 RHEUMATOID ARTHRITIS INVOLVING MULTIPLE SITES WITH POSITIVE RHEUMATOID FACTOR: ICD-10-CM

## 2019-02-27 DIAGNOSIS — M06.9 RHEUMATOID ARTHRITIS OF HAND, UNSPECIFIED LATERALITY, UNSPECIFIED RHEUMATOID FACTOR PRESENCE: ICD-10-CM

## 2019-02-27 PROCEDURE — 99213 OFFICE O/P EST LOW 20 MIN: CPT | Mod: PBBFAC | Performed by: INTERNAL MEDICINE

## 2019-02-27 PROCEDURE — 99214 PR OFFICE/OUTPT VISIT, EST, LEVL IV, 30-39 MIN: ICD-10-PCS | Mod: S$PBB,,, | Performed by: INTERNAL MEDICINE

## 2019-02-27 PROCEDURE — 77077 JOINT SURVEY SINGLE VIEW: CPT | Mod: TC

## 2019-02-27 PROCEDURE — 77077 XR ARTHRITIS SURVEY: ICD-10-PCS | Mod: 26,,, | Performed by: RADIOLOGY

## 2019-02-27 PROCEDURE — 77077 JOINT SURVEY SINGLE VIEW: CPT | Mod: 26,,, | Performed by: RADIOLOGY

## 2019-02-27 PROCEDURE — 99999 PR PBB SHADOW E&M-EST. PATIENT-LVL III: CPT | Mod: PBBFAC,,, | Performed by: INTERNAL MEDICINE

## 2019-02-27 PROCEDURE — 99999 PR PBB SHADOW E&M-EST. PATIENT-LVL III: ICD-10-PCS | Mod: PBBFAC,,, | Performed by: INTERNAL MEDICINE

## 2019-02-27 PROCEDURE — 99214 OFFICE O/P EST MOD 30 MIN: CPT | Mod: S$PBB,,, | Performed by: INTERNAL MEDICINE

## 2019-02-27 RX ORDER — METHOTREXATE 2.5 MG/1
TABLET ORAL
Qty: 120 TABLET | Refills: 0 | Status: SHIPPED | OUTPATIENT
Start: 2019-02-27 | End: 2019-06-03 | Stop reason: SDUPTHER

## 2019-02-27 RX ORDER — NABUMETONE 500 MG/1
500 TABLET, FILM COATED ORAL 2 TIMES DAILY
Qty: 180 TABLET | Refills: 3 | Status: SHIPPED | OUTPATIENT
Start: 2019-02-27 | End: 2019-09-09 | Stop reason: SDUPTHER

## 2019-02-27 ASSESSMENT — ROUTINE ASSESSMENT OF PATIENT INDEX DATA (RAPID3)
TOTAL RAPID3 SCORE: 5
MDHAQ FUNCTION SCORE: .6
AM STIFFNESS SCORE: 0, NO
PATIENT GLOBAL ASSESSMENT SCORE: 8.5
FATIGUE SCORE: 5
PAIN SCORE: 4.5
PSYCHOLOGICAL DISTRESS SCORE: 2.2

## 2019-02-27 NOTE — PROGRESS NOTES
"Subjective:       Patient ID: Yulia Peralta is a 73 y.o. female.    Chief Complaint: Rheumatoid arthritis    HPI:  Yulia Peralta is a 73 y.o. female with a history of rheumatoid arthritis for 25 years.  She had been on methotrexate for the past 5 years and her current dose is methotrexate 10 tabs qweek (5 on Monday and 5 on Tuesday).  Off prednisone    History of gout some years ago.        Interval History:     Had colonoscopy which showed healing ulcer.  Was treated for H. Pylori.  She notes taken Rifaximin for stomach issues.  Primary gave nasal sprays but GI told her to hold until finish antibiotic which he will restart antibiotics after rash on chest that developed completely resolve.   Pain is 4.5/10 ache instep of both feet.  Feels frozen or stiff.  Improve with resting feet.  Worse with walking.    No morning stiffness.    Review of Systems   Constitutional: Positive for fatigue.   HENT: Negative.    Eyes: Negative.    Cardiovascular: Negative.    Gastrointestinal: Negative.    Endocrine: Negative.    Genitourinary: Negative.    Musculoskeletal: Positive for arthralgias and joint swelling.   Skin: Positive for rash.   Allergic/Immunologic: Negative.    Neurological: Positive for headaches.   Hematological: Bruises/bleeds easily.   Psychiatric/Behavioral: Negative.          Objective:   BP (!) 175/76   Pulse (!) 55   Ht 5' 2" (1.575 m)   Wt 93.1 kg (205 lb 4 oz)   BMI 37.54 kg/m²      Repeat 130/85     Physical Exam   Constitutional: She is oriented to person, place, and time and well-developed, well-nourished, and in no distress.   HENT:   Head: Normocephalic and atraumatic.   Eyes: Conjunctivae and EOM are normal.   Neck: Neck supple.   Cardiovascular: Normal rate and regular rhythm.    Pulmonary/Chest: Effort normal and breath sounds normal.   Abdominal: Soft. Bowel sounds are normal.   Neurological: She is alert and oriented to person, place, and time. Gait normal.   Skin: Skin is warm and " dry.     Psychiatric: Mood and affect normal.   Musculoskeletal:   28 joint count: 0 swollen and 0 tender  No foot pain or swelling.               LABS    Component      Latest Ref Rng & Units 2/26/2019 11/13/2018   WBC      3.90 - 12.70 K/uL 6.27 6.63   RBC      4.00 - 5.40 M/uL 3.63 (L) 3.43 (L)   Hemoglobin      12.0 - 16.0 g/dL 11.4 (L) 10.9 (L)   Hematocrit      37.0 - 48.5 % 35.9 (L) 34.4 (L)   MCV      82 - 98 fL 99 (H) 100 (H)   MCH      27.0 - 31.0 pg 31.4 (H) 31.8 (H)   MCHC      32.0 - 36.0 g/dL 31.8 (L) 31.7 (L)   RDW      11.5 - 14.5 % 13.7 14.7 (H)   Platelets      150 - 350 K/uL 142 (L) 136 (L)   MPV      9.2 - 12.9 fL SEE COMMENT 13.7 (H)   Immature Granulocytes      0.0 - 0.5 % 0.5 0.3   Gran # (ANC)      1.8 - 7.7 K/uL 3.4 3.8   Immature Grans (Abs)      0.00 - 0.04 K/uL 0.03 0.02   Lymph #      1.0 - 4.8 K/uL 1.5 1.3   Mono #      0.3 - 1.0 K/uL 1.0 1.2 (H)   Eos #      0.0 - 0.5 K/uL 0.3 0.3   Baso #      0.00 - 0.20 K/uL 0.02 0.02   nRBC      0 /100 WBC 0 0   Gran%      38.0 - 73.0 % 54.6 57.3   Lymph%      18.0 - 48.0 % 23.9 20.2   Mono%      4.0 - 15.0 % 15.8 (H) 18.1 (H)   Eosinophil%      0.0 - 8.0 % 4.9 3.8   Basophil%      0.0 - 1.9 % 0.3 0.3   Differential Method       Automated Automated   Sodium      136 - 145 mmol/L  141   Potassium      3.5 - 5.1 mmol/L  4.8   Chloride      95 - 110 mmol/L  106   CO2      23 - 29 mmol/L  28   Glucose      70 - 110 mg/dL  96   BUN, Bld      8 - 23 mg/dL  9   Creatinine      0.5 - 1.4 mg/dL  0.7   Calcium      8.7 - 10.5 mg/dL  10.0   Total Protein      6.0 - 8.4 g/dL  7.6   Albumin      3.5 - 5.2 g/dL  3.9   Total Bilirubin      0.1 - 1.0 mg/dL  0.6   Alkaline Phosphatase      55 - 135 U/L  91   AST      10 - 40 U/L  23   ALT      10 - 44 U/L  25   Anion Gap      8 - 16 mmol/L  7 (L)   eGFR if African American      >60 mL/min/1.73 m:2  >60.0   eGFR if non African American      >60 mL/min/1.73 m:2  >60.0   Sed Rate      0 - 36 mm/Hr  15   CRP      0.0 -  8.2 mg/L  1.4         Assessment:       1. Rheumatoid arthritis manifested by rheumatoid nodule, polyarthritis in the past, and contractures of the elbows.    Treated in the past with gold. Plaquenil did not work in the past and she never took SSZ.   Now doing well. Continue MTX (5 tabs on Monday and 5 tabs on Tuesday) and folic acid   2. Encounter for long-term (current) use of other medications    3. Fatigue.   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  She decided to go for second opinion at [facility]. Biopsy was negative. MRI repeat stable and most recent 10/2017 stable  5. 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 Felt not to have hepatitis C by hepatology.  6. Right anserine bursitis.   7. Obesity  8. HTN  9. Chronic back pain.  Bulging disc.  May be cause of paresthesias in legs with standing.  10.  Paresthesia of in left leg.  May be due to #9  11.  Right clavicle mass.  Evaluated by x-ray   12.  Trigger fingers.  Left 4th and right 3rd and right 5th.  Therapy helped in past.    13.  Left lower leg pain and swelling.  History of trauma  Plan:       1. Continue methotrexate 10 tabs qweek (5 on Monday and 5 on Tuesday).  2. Check CRP, ESR, CBC, and CMP    3. Continue Nabumetone.  Patient says GI ok with her continuing despite healing ulcer on EGD.   4. Continue folic acid 2 tabs.    5. Patient to discuss with primary doctor sleep apnea since she has HA, snoring and fatigue  6. Consider OT for arms if pain returns and no improvement with home exercises from previous PT  7. Topical arthritis OTC ointment to feet and ankles.     Return to the office in 3 months

## 2019-02-28 ENCOUNTER — TELEPHONE (OUTPATIENT)
Dept: RHEUMATOLOGY | Facility: CLINIC | Age: 74
End: 2019-02-28

## 2019-03-08 DIAGNOSIS — I10 ESSENTIAL HYPERTENSION: ICD-10-CM

## 2019-03-08 DIAGNOSIS — K21.9 GASTROESOPHAGEAL REFLUX DISEASE, ESOPHAGITIS PRESENCE NOT SPECIFIED: ICD-10-CM

## 2019-03-08 NOTE — TELEPHONE ENCOUNTER
----- Message from Mary Jerome sent at 3/8/2019  2:43 PM CST -----  Contact: Pt   Can the clinic reply in [facility]:No       Please refill the medication(s) listed below. Please call the patient when the prescription(s) is ready for  at the phone number [phone]    Medication #1:furosemide (LASIX) 40 MG tablet    Medication #2:pantoprazole (PROTONIX) 40 MG tablet      Preferred Pharmacy: [facility] [id number]

## 2019-03-11 RX ORDER — PANTOPRAZOLE SODIUM 40 MG/1
40 TABLET, DELAYED RELEASE ORAL DAILY
Qty: 90 TABLET | Refills: 3 | Status: SHIPPED | OUTPATIENT
Start: 2019-03-11 | End: 2019-10-21 | Stop reason: SDUPTHER

## 2019-03-11 RX ORDER — FUROSEMIDE 40 MG/1
40 TABLET ORAL DAILY
Qty: 90 TABLET | Refills: 1 | Status: SHIPPED | OUTPATIENT
Start: 2019-03-11 | End: 2019-10-21 | Stop reason: SDUPTHER

## 2019-04-26 ENCOUNTER — OFFICE VISIT (OUTPATIENT)
Dept: FAMILY MEDICINE | Facility: CLINIC | Age: 74
End: 2019-04-26
Payer: MEDICARE

## 2019-04-26 VITALS
BODY MASS INDEX: 36.87 KG/M2 | HEIGHT: 62 IN | WEIGHT: 200.38 LBS | DIASTOLIC BLOOD PRESSURE: 76 MMHG | OXYGEN SATURATION: 97 % | RESPIRATION RATE: 16 BRPM | HEART RATE: 60 BPM | SYSTOLIC BLOOD PRESSURE: 147 MMHG | TEMPERATURE: 98 F

## 2019-04-26 DIAGNOSIS — S40.862A INSECT BITE OF LEFT UPPER EXTREMITY, INITIAL ENCOUNTER: Primary | ICD-10-CM
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
## 2019-04-26 DIAGNOSIS — S40.862A INSECT BITE OF LEFT UPPER EXTREMITY, INITIAL ENCOUNTER: ICD-10-CM

## 2019-04-26 PROCEDURE — 99999 PR PBB SHADOW E&M-EST. PATIENT-LVL III: ICD-10-PCS | Mod: PBBFAC,,, | Performed by: NURSE PRACTITIONER

## 2019-04-26 PROCEDURE — 99213 OFFICE O/P EST LOW 20 MIN: CPT | Mod: PBBFAC,PN | Performed by: NURSE PRACTITIONER

## 2019-04-26 PROCEDURE — 99999 PR PBB SHADOW E&M-EST. PATIENT-LVL III: CPT | Mod: PBBFAC,,, | Performed by: NURSE PRACTITIONER

## 2019-04-26 PROCEDURE — 99214 OFFICE O/P EST MOD 30 MIN: CPT | Mod: S$PBB,,, | Performed by: NURSE PRACTITIONER

## 2019-04-26 PROCEDURE — 99214 PR OFFICE/OUTPT VISIT, EST, LEVL IV, 30-39 MIN: ICD-10-PCS | Mod: S$PBB,,, | Performed by: NURSE PRACTITIONER

## 2019-04-26 RX ORDER — HYDROCORTISONE 25 MG/G
CREAM TOPICAL 3 TIMES DAILY
Qty: 3.5 G | Refills: 0 | Status: SHIPPED | OUTPATIENT
Start: 2019-04-26 | End: 2019-04-26

## 2019-04-26 RX ORDER — CIPROFLOXACIN 500 MG/1
TABLET ORAL
Refills: 0 | COMMUNITY
Start: 2019-03-27 | End: 2019-05-17

## 2019-04-26 RX ORDER — HYDROXYZINE HYDROCHLORIDE 25 MG/1
25 TABLET, FILM COATED ORAL 3 TIMES DAILY PRN
Qty: 45 TABLET | Refills: 0 | Status: SHIPPED | OUTPATIENT
Start: 2019-04-26 | End: 2019-05-29 | Stop reason: DRUGHIGH

## 2019-04-26 RX ORDER — CEPHALEXIN 500 MG/1
500 CAPSULE ORAL 3 TIMES DAILY
Qty: 21 CAPSULE | Refills: 0 | Status: SHIPPED | OUTPATIENT
Start: 2019-04-26 | End: 2019-05-17 | Stop reason: ALTCHOICE

## 2019-04-26 RX ORDER — HYDROCORTISONE 25 MG/G
CREAM TOPICAL 3 TIMES DAILY
Qty: 3.5 G | Refills: 0 | Status: SHIPPED | OUTPATIENT
Start: 2019-04-26 | End: 2019-04-26 | Stop reason: SDUPTHER

## 2019-04-26 NOTE — PROGRESS NOTES
Routine Office Visit    Patient Name: Yulia Peralta    : 1945  MRN: [id number]    Chief Complaint:  Insect bite    Subjective:  Yulia is a 73 y.o. female who presents today for:    1.  Insect bite - patient states that she was working in her [employer] 2 days ago when she received an insect bite on her left arm.  She states that she is not sure exactly what insect may have bitten her, but she does state that she noticed small spider webs on the [employer] when she was working with them.  She endorses itching to the area as well as some pain, but no tenderness to palpation.  Denies any fevers or chills, denies shortness of breath wheezing, trouble painful swallowing.  She states that the area where she was bit initially started with redness but now has a central dot.  She has tried Benadryl cream as well as an antibiotic cream both of which only helped for a short term.  She has also tried icing the bite as well which provided only minimal relief.  She reports that the rash has not spread anywhere else to other parts of her body besides her left arm.    Past Medical History  Past Medical History:   Diagnosis Date    Acid reflux     Anemia     Anxiety     Arthritis     Blood transfusion     Cataract     Depression     Dry eyes     Heart murmur     Hypertension     Left lumbar radiculitis 2015    Multiple thyroid nodules 2012    Osteoporosis     Rheumatoid arthritis     Thoracic or lumbosacral neuritis or radiculitis, unspecified 10/30/2013       Past Surgical History  Past Surgical History:   Procedure Laterality Date    SARAH-TRANSFORAMINAL Left 2015    Performed by [facility] at [facility]    gallstones  [id number]    HYSTERECTOMY      JOINT REPLACEMENT  [id number]     bilateral knee    WA REMOVAL OF OVARY/TUBE(S)      TONSILLECTOMY         Family History  Family History   Problem Relation Age of Onset    Cataracts Mother     Hypertension Mother      Hypertension Sister     No Known Problems Father     Glaucoma Brother     No Known Problems Maternal Aunt     No Known Problems Maternal Uncle     No Known Problems Paternal Aunt     No Known Problems Paternal Uncle     No Known Problems Maternal Grandmother     No Known Problems Maternal Grandfather     No Known Problems Paternal Grandmother     No Known Problems Paternal Grandfather     Lupus Neg Hx     Rheum arthritis Neg Hx     Psoriasis Neg Hx     Amblyopia Neg Hx     Blindness Neg Hx     Cancer Neg Hx     Diabetes Neg Hx     Macular degeneration Neg Hx     Retinal detachment Neg Hx     Strabismus Neg Hx     Stroke Neg Hx     Thyroid disease Neg Hx        Social History  Social History     Socioeconomic History    Marital status:      Spouse name: Not on file    Number of children: Not on file    Years of education: Not on file    Highest education level: Not on file   Occupational History    Not on file   Social Needs    Financial resource strain: Not on file    Food insecurity:     Worry: Not on file     Inability: Not on file    Transportation needs:     Medical: Not on file     Non-medical: Not on file   Tobacco Use    Smoking status: Never Smoker    Smokeless tobacco: Never Used   Substance and Sexual Activity    Alcohol use: Yes    Drug use: No    Sexual activity: Not on file   Lifestyle    Physical activity:     Days per week: Not on file     Minutes per session: Not on file    Stress: Not on file   Relationships    Social connections:     Talks on phone: Not on file     Gets together: Not on file     Attends Evangelical service: Not on file     Active member of club or organization: Not on file     Attends meetings of clubs or organizations: Not on file     Relationship status: Not on file   Other Topics Concern    Not on file   Social History Narrative    Adult Screenings updated and reviewed  6/12/14    Mammogram( for females) ordered for DIS    Pap ( for  "females) Dr Zepeda  Due for f/u   For  2014    Colonoscopy  Done once    Flu shot yearly done  2013    Td 2005    Pneumovax  Updated  12/3/13    Zostavax done 2012    Eye exam recommended yearly done 2013    Bone density  12/9/13    Thyroid ultrasound  11/6/2012 Normal sized thyroid containing several subcentimeter nodules, similar to the 5/12/11 exam.  No concerning nodules identified..            Current Medications  Current Outpatient Medications on File Prior to Visit   Medication Sig Dispense Refill    folic acid (FOLVITE) 1 MG tablet Take 2 tablets (2 mg total) by mouth once daily. 180 tablet 3    furosemide (LASIX) 40 MG tablet Take 1 tablet (40 mg total) by mouth once daily. 90 tablet 1    gabapentin (NEURONTIN) 100 MG capsule Take 1 capsule (100 mg total) by mouth 3 (three) times daily. 270 capsule 3    irbesartan (AVAPRO) 150 MG tablet Take 1 tablet (150 mg total) by mouth once daily. 90 tablet 1    methotrexate 2.5 MG Tab Take 12.5 mg by mouth.      methotrexate 2.5 MG Tab 5 tabs po on Monday pm and 5 tabs po on Tuesday am 120 tablet 0    miSOPROStol (CYTOTEC) 100 MCG Tab Take 1 tablet (100 mcg total) by mouth 2 (two) times daily. 1 Tablet Oral Twice a day diarrhea occurs , decrease to once daily 180 tablet 3    nabumetone (RELAFEN) 500 MG tablet Take 1 tablet (500 mg total) by mouth 2 (two) times daily. 180 tablet 3    pantoprazole (PROTONIX) 40 MG tablet Take 1 tablet (40 mg total) by mouth once daily. 1 Tablet, Delayed Release (E.C.) Oral Every day 90 tablet 3    safety needles 25 gauge x 1" Ndle 1 Units by Misc.(Non-Drug; Combo Route) route once a week. 100 each 2    syringe with needle (MONOJECT SAFETY SYRINGES) Syrg Use as directed 5 Syringe 3    valacyclovir (VALTREX) 500 MG tablet 1 Tablet DAILY for suppression, increase to BID for outbreak 180 tablet 2    azelastine (ASTELIN) 137 mcg (0.1 %) nasal spray 1 spray (137 mcg total) by Nasal route 2 (two) times daily. 30 mL 1    " "butalbital-aspirin-caffeine -40 mg (FIORINAL) -40 mg Cap Take 1 capsule by mouth every 6 (six) hours as needed. 1 Capsule Oral Every 6 hours 60 capsule 4    ciprofloxacin HCl (CIPRO) 500 MG tablet   0    fluticasone (FLONASE) 50 mcg/actuation nasal spray 1 spray (50 mcg total) by Each Nare route once daily. 16 g 5    LORazepam (ATIVAN) 1 MG tablet Take 1 tablet (1 mg total) by mouth every 6 (six) hours as needed for Anxiety. 3 tablet 0    nystatin-triamcinolone (MYCOLOG II) cream Apply to affected area 2 times daily 90 g 1    ranitidine (ZANTAC) 300 MG capsule Take 1 capsule (300 mg total) by mouth nightly. 1 Capsule Oral Every day 90 capsule 3    traMADol (ULTRAM) 50 mg tablet Take 50 mg by mouth.       No current facility-administered medications on file prior to visit.        Allergies   Review of patient's allergies indicates:   Allergen Reactions    No known drug allergies        Review of Systems (Pertinent positives)  Review of Systems   Constitutional: Negative for chills and fever.   HENT: Negative.    Eyes: Negative.    Respiratory: Negative for cough, hemoptysis, sputum production, shortness of breath and wheezing.    Cardiovascular: Negative for chest pain and palpitations.   Gastrointestinal: Negative.  Negative for abdominal pain, blood in stool, constipation, diarrhea, heartburn, melena, nausea and vomiting.   Genitourinary: Negative.    Musculoskeletal: Negative.    Skin: Positive for itching and rash.   Neurological: Negative.    Endo/Heme/Allergies: Negative.    Psychiatric/Behavioral: Negative.        BP (!) 147/76 (BP Location: Right arm, Patient Position: Sitting, BP Method: Small (Automatic))   Pulse 60   Temp 98.1 °F (36.7 °C) (Oral)   Resp 16   Ht 5' 2" (1.575 m)   Wt 90.9 kg (200 lb 6.4 oz)   SpO2 97%   BMI 36.65 kg/m²     Physical Exam   Constitutional: She is oriented to person, place, and time. She appears well-developed and well-nourished. No distress.   Eyes: " Pupils are equal, round, and reactive to light. Conjunctivae and EOM are normal.   Neck: Normal range of motion. Neck supple.   Cardiovascular: Normal rate, regular rhythm, normal heart sounds and intact distal pulses. Exam reveals no gallop and no friction rub.   No murmur heard.  Pulmonary/Chest: Effort normal and breath sounds normal. No stridor. No respiratory distress. She has no wheezes. She has no rales. She exhibits no tenderness.   Neurological: She is alert and oriented to person, place, and time.   Skin: Skin is warm and dry. Capillary refill takes less than 2 seconds. She is not diaphoretic.             Assessment/Plan:  Yulia Peralta is a 73 y.o. female who presents today for :    Yulia was seen today for insect bite.    Diagnoses and all orders for this visit:    Insect bite of left upper extremity, initial encounter  -     hydrocortisone 2.5 % cream; Apply topically 3 (three) times daily. for 7 days  -     hydrOXYzine HCl (ATARAX) 25 MG tablet; Take 1 tablet (25 mg total) by mouth 3 (three) times daily as needed for Itching.  -     cephALEXin (KEFLEX) 500 MG capsule; Take 1 capsule (500 mg total) by mouth 3 (three) times daily.    Treat with steroid cream and Atarax.  Patient was instructed that Atarax may make her sleepy and she should not drive or operate heavy machinery until she knows how this medication affects her.  Patient was instructed that if she becomes too drowsy from the Atarax she may cut the pill in half and take half a pill instead of a whole pill 3 times daily.  She has a 3-4 cm area of erythema around the bite.  Will treat with Keflex.  Patient was instructed regarding side effects of medications.  She was instructed to go to the emergency room for any shortness of breath, wheezing, trouble swallowing, or painful swallowing, as well as spreading rash.  If symptoms are not relieved in 1 week she is to follow-up to the clinic.  Patient verbalized understanding of these  instructions.        This office note has been dictated.  This dictation has been generated using M-Modal Fluency Direct dictation; some phonetic errors may occur.   My collaborating physician is Dr. Akbar Stauffer.

## 2019-05-17 ENCOUNTER — OFFICE VISIT (OUTPATIENT)
Dept: FAMILY MEDICINE | Facility: CLINIC | Age: 74
End: 2019-05-17
Payer: MEDICARE

## 2019-05-17 VITALS
OXYGEN SATURATION: 99 % | BODY MASS INDEX: 37.13 KG/M2 | SYSTOLIC BLOOD PRESSURE: 142 MMHG | DIASTOLIC BLOOD PRESSURE: 90 MMHG | HEART RATE: 63 BPM | RESPIRATION RATE: 16 BRPM | WEIGHT: 201.75 LBS | TEMPERATURE: 98 F | HEIGHT: 62 IN

## 2019-05-17 DIAGNOSIS — I10 ESSENTIAL HYPERTENSION: ICD-10-CM

## 2019-05-17 DIAGNOSIS — L73.9 FOLLICULITIS: Primary | ICD-10-CM

## 2019-05-17 PROCEDURE — 99214 OFFICE O/P EST MOD 30 MIN: CPT | Mod: S$PBB,,, | Performed by: FAMILY MEDICINE

## 2019-05-17 PROCEDURE — 99214 PR OFFICE/OUTPT VISIT, EST, LEVL IV, 30-39 MIN: ICD-10-PCS | Mod: S$PBB,,, | Performed by: FAMILY MEDICINE

## 2019-05-17 PROCEDURE — 99999 PR PBB SHADOW E&M-EST. PATIENT-LVL IV: ICD-10-PCS | Mod: PBBFAC,,, | Performed by: FAMILY MEDICINE

## 2019-05-17 PROCEDURE — 99999 PR PBB SHADOW E&M-EST. PATIENT-LVL IV: CPT | Mod: PBBFAC,,, | Performed by: FAMILY MEDICINE

## 2019-05-17 PROCEDURE — 99214 OFFICE O/P EST MOD 30 MIN: CPT | Mod: PBBFAC,PO | Performed by: FAMILY MEDICINE

## 2019-05-17 RX ORDER — HYDROCORTISONE 25 MG/G
CREAM TOPICAL
Refills: 0 | COMMUNITY
Start: 2019-04-26

## 2019-05-17 RX ORDER — DOXYCYCLINE HYCLATE 100 MG
100 TABLET ORAL 2 TIMES DAILY
Qty: 14 TABLET | Refills: 0 | Status: SHIPPED | OUTPATIENT
Start: 2019-05-17 | End: 2019-05-24

## 2019-05-17 RX ORDER — IRBESARTAN 300 MG/1
300 TABLET ORAL NIGHTLY
Qty: 90 TABLET | Refills: 3 | Status: SHIPPED | OUTPATIENT
Start: 2019-05-17 | End: 2019-10-21 | Stop reason: SDUPTHER

## 2019-05-17 NOTE — PROGRESS NOTES
"Subjective:       Patient ID: Yulia Peralta is a 74 y.o. female.    Chief Complaint: Rash (on back)    74 year old cherye presents for recurrent pustular infections. She sates it is a single red bump with a white head. It is tender to toch. shes tates she was seen and was given keflex, which helped, ubt it recurs. She has no fever or chills.     Review of Systems    Objective:       Vitals:    05/17/19 1101 05/17/19 1130   BP: (!) 164/90 (!) 142/90   Pulse: 63    Resp: 16    Temp: 97.8 °F (36.6 °C)    TempSrc: Oral    SpO2: 99%    Weight: 91.5 kg (201 lb 11.5 oz)    Height: 5' 2" (1.575 m)        Physical Exam   Constitutional: She is oriented to person, place, and time. She appears well-developed and well-nourished. No distress.   HENT:   Head: Normocephalic and atraumatic.   Eyes: Conjunctivae are normal.   Neck: Normal range of motion. Neck supple. Carotid bruit is not present.   Cardiovascular: Normal rate, regular rhythm and normal heart sounds. Exam reveals no gallop and no friction rub.   No murmur heard.  Pulmonary/Chest: Effort normal and breath sounds normal. No stridor. No respiratory distress. She has no wheezes. She has no rales.   Musculoskeletal: She exhibits no edema.   Neurological: She is alert and oriented to person, place, and time.   Skin: She is not diaphoretic.       Assessment:       1. Folliculitis    2. Essential hypertension        Plan:       Yulia was seen today for rash.    Diagnoses and all orders for this visit:    Folliculitis  -     doxycycline (VIBRA-TABS) 100 MG tablet; Take 1 tablet (100 mg total) by mouth 2 (two) times daily. for 7 days    Essential hypertension  -     NURSING COMMUNICATION: Create [facility] Account  -     Hypertension Digital Medicine (HDMP) Enrollment Order  -     Hypertension Digital Medicine (HDMP): Assign Onboarding Questionnaires  -     irbesartan (AVAPRO) 300 MG tablet; Take 1 tablet (300 mg total) by mouth every evening.  Increased avapro from 150 " to 300 mg daily.

## 2019-05-29 ENCOUNTER — OFFICE VISIT (OUTPATIENT)
Dept: FAMILY MEDICINE | Facility: CLINIC | Age: 74
End: 2019-05-29
Payer: MEDICARE

## 2019-05-29 ENCOUNTER — TELEPHONE (OUTPATIENT)
Dept: RHEUMATOLOGY | Facility: CLINIC | Age: 74
End: 2019-05-29

## 2019-05-29 VITALS
DIASTOLIC BLOOD PRESSURE: 88 MMHG | TEMPERATURE: 98 F | WEIGHT: 203.25 LBS | OXYGEN SATURATION: 98 % | HEIGHT: 62 IN | BODY MASS INDEX: 37.4 KG/M2 | RESPIRATION RATE: 16 BRPM | SYSTOLIC BLOOD PRESSURE: 138 MMHG | HEART RATE: 62 BPM

## 2019-05-29 DIAGNOSIS — M05.79 RHEUMATOID ARTHRITIS INVOLVING MULTIPLE SITES WITH POSITIVE RHEUMATOID FACTOR: Primary | ICD-10-CM

## 2019-05-29 DIAGNOSIS — D84.9 IMMUNOSUPPRESSION: ICD-10-CM

## 2019-05-29 DIAGNOSIS — R21 RASH OF ENTIRE BODY: Primary | ICD-10-CM

## 2019-05-29 PROCEDURE — 99215 OFFICE O/P EST HI 40 MIN: CPT | Mod: PBBFAC,PO | Performed by: PHYSICIAN ASSISTANT

## 2019-05-29 PROCEDURE — 99999 PR PBB SHADOW E&M-EST. PATIENT-LVL V: CPT | Mod: PBBFAC,,, | Performed by: PHYSICIAN ASSISTANT

## 2019-05-29 PROCEDURE — 99213 OFFICE O/P EST LOW 20 MIN: CPT | Mod: S$PBB,,, | Performed by: PHYSICIAN ASSISTANT

## 2019-05-29 PROCEDURE — 99213 PR OFFICE/OUTPT VISIT, EST, LEVL III, 20-29 MIN: ICD-10-PCS | Mod: S$PBB,,, | Performed by: PHYSICIAN ASSISTANT

## 2019-05-29 PROCEDURE — 99999 PR PBB SHADOW E&M-EST. PATIENT-LVL V: ICD-10-PCS | Mod: PBBFAC,,, | Performed by: PHYSICIAN ASSISTANT

## 2019-05-29 RX ORDER — HYDROXYZINE HYDROCHLORIDE 50 MG/1
50 TABLET, FILM COATED ORAL 3 TIMES DAILY PRN
Qty: 60 TABLET | Refills: 0 | Status: SHIPPED | OUTPATIENT
Start: 2019-05-29 | End: 2019-10-21 | Stop reason: SDUPTHER

## 2019-05-29 NOTE — TELEPHONE ENCOUNTER
----- Message from Brianne Chan MA sent at 5/29/2019 12:32 PM CDT -----  Contact: Self  Please add lab order's #L  ----- Message -----  From: Bhavana De La Rosa  Sent: 5/29/2019   9:38 AM  To: Johny MURRAY Staff    Pt is calling in regards to her blood work before seeing Dr Ramirez.  Pt is needing blood work scheduled at [facility] the [facility]  Pt stated these are the things she is needing done for blood work   CBC CMP Sed Rate and a CRP    Pt can be reached @[phone] or [phone]

## 2019-05-29 NOTE — PROGRESS NOTES
Subjective:       Patient ID: Yulia Peralta is a 74 y.o. female with multiple medical diagnoses as listed in the medical history and problem list that presents for Rash  .    Chief Complaint: Rash      Rash   This is a new problem. The current episode started more than 1 month ago. The problem has been gradually worsening since onset. Location: oringally check and neck and now arms  The rash is characterized by burning, redness, pain, swelling and itchiness. Associated with: may have started right after one dose of antiobiotic name she cannot recall but has bottle at home and only took one dose  Pertinent negatives include no congestion, diarrhea, eye pain, fever, joint pain, shortness of breath or sore throat. Treatments tried: keflex, doxycyline, atarax  The treatment provided no relief.     Review of Systems   Constitutional: Negative for fever.   HENT: Negative for congestion and sore throat.    Eyes: Negative for pain.   Respiratory: Negative for shortness of breath.    Gastrointestinal: Negative for diarrhea.   Musculoskeletal: Negative for joint pain.   Skin: Positive for rash.         PAST MEDICAL HISTORY:  Past Medical History:   Diagnosis Date    Acid reflux     Anemia     Anxiety     Arthritis     Blood transfusion     Cataract     Depression     Dry eyes     Heart murmur     Hypertension     Left lumbar radiculitis 5/19/2015    Multiple thyroid nodules 12/18/2012    Osteoporosis     Rheumatoid arthritis     Thoracic or lumbosacral neuritis or radiculitis, unspecified 10/30/2013       SOCIAL HISTORY:  Social History     Socioeconomic History    Marital status:      Spouse name: Not on file    Number of children: Not on file    Years of education: Not on file    Highest education level: Not on file   Occupational History    Not on file   Social Needs    Financial resource strain: Not on file    Food insecurity:     Worry: Not on file     Inability: Not on file     Transportation needs:     Medical: Not on file     Non-medical: Not on file   Tobacco Use    Smoking status: Never Smoker    Smokeless tobacco: Never Used   Substance and Sexual Activity    Alcohol use: Yes    Drug use: No    Sexual activity: Not on file   Lifestyle    Physical activity:     Days per week: Not on file     Minutes per session: Not on file    Stress: Not on file   Relationships    Social connections:     Talks on phone: Not on file     Gets together: Not on file     Attends [employer] service: Not on file     Active member of club or organization: Not on file     Attends meetings of clubs or organizations: Not on file     Relationship status: Not on file   Other Topics Concern    Not on file   Social History Narrative    Adult Screenings updated and reviewed  6/12/14    Mammogram( for females) ordered for DIS    Pap ( for females) Dr Zepeda  Due for f/u   For  2014    Colonoscopy  Done once    Flu shot yearly done  2013    Td 2005    Pneumovax  Updated  12/3/13    Zostavax done 2012    Eye exam recommended yearly done 2013    Bone density  12/9/13    Thyroid ultrasound  11/6/2012 Normal sized thyroid containing several subcentimeter nodules, similar to the 5/12/11 exam.  No concerning nodules identified..            ALLERGIES AND MEDICATIONS: updated and reviewed.  Review of patient's allergies indicates:   Allergen Reactions    No known drug allergies      Current Outpatient Medications   Medication Sig Dispense Refill    azelastine (ASTELIN) 137 mcg (0.1 %) nasal spray 1 spray (137 mcg total) by Nasal route 2 (two) times daily. 30 mL 1    fluticasone (FLONASE) 50 mcg/actuation nasal spray 1 spray (50 mcg total) by Each Nare route once daily. 16 g 5    folic acid (FOLVITE) 1 MG tablet Take 2 tablets (2 mg total) by mouth once daily. 180 tablet 3    furosemide (LASIX) 40 MG tablet Take 1 tablet (40 mg total) by mouth once daily. 90 tablet 1    gabapentin (NEURONTIN) 100 MG capsule Take  "1 capsule (100 mg total) by mouth 3 (three) times daily. 270 capsule 3    hydrocortisone 2.5 % cream   0    irbesartan (AVAPRO) 300 MG tablet Take 1 tablet (300 mg total) by mouth every evening. 90 tablet 3    LORazepam (ATIVAN) 1 MG tablet Take 1 tablet (1 mg total) by mouth every 6 (six) hours as needed for Anxiety. 3 tablet 0    methotrexate 2.5 MG Tab 5 tabs po on Monday pm and 5 tabs po on Tuesday am (Patient taking differently: 5 tabs po twice a day on monday) 120 tablet 0    nabumetone (RELAFEN) 500 MG tablet Take 1 tablet (500 mg total) by mouth 2 (two) times daily. 180 tablet 3    pantoprazole (PROTONIX) 40 MG tablet Take 1 tablet (40 mg total) by mouth once daily. 1 Tablet, Delayed Release (E.C.) Oral Every day 90 tablet 3    ranitidine (ZANTAC) 300 MG capsule Take 1 capsule (300 mg total) by mouth nightly. 1 Capsule Oral Every day 90 capsule 3    valacyclovir (VALTREX) 500 MG tablet 1 Tablet DAILY for suppression, increase to BID for outbreak 180 tablet 2    hydrOXYzine (ATARAX) 50 MG tablet Take 1 tablet (50 mg total) by mouth 3 (three) times daily as needed for Itching. 60 tablet 0    nystatin-triamcinolone (MYCOLOG II) cream Apply to affected area 2 times daily 90 g 1     No current facility-administered medications for this visit.          Objective:   /88   Pulse 62   Temp 98.1 °F (36.7 °C) (Oral)   Resp 16   Ht 5' 2" (1.575 m)   Wt 92.2 kg (203 lb 4.2 oz)   SpO2 98%   BMI 37.18 kg/m²      Physical Exam   Constitutional: No distress.   Cardiovascular: Normal rate and regular rhythm.   Pulmonary/Chest: Effort normal and breath sounds normal.   Skin: Skin is warm and intact.   Macular raise hyperpigmented erythematous spots throughout arm and chest  Some with vesicles    Psychiatric: She has a normal mood and affect. Her behavior is normal.           Assessment:       1. Rash of entire body        Plan:       Rash of entire body  OTC anti-histamine  -     hydrOXYzine (ATARAX) 50 " MG tablet; Take 1 tablet (50 mg total) by mouth 3 (three) times daily as needed for Itching.  Dispense: 60 tablet; Refill: 0  -     Ambulatory referral to Dermatology            No follow-ups on file.

## 2019-05-31 ENCOUNTER — LAB VISIT (OUTPATIENT)
Dept: LAB | Facility: HOSPITAL | Age: 74
End: 2019-05-31
Attending: INTERNAL MEDICINE
Payer: MEDICARE

## 2019-05-31 DIAGNOSIS — M05.79 RHEUMATOID ARTHRITIS INVOLVING MULTIPLE SITES WITH POSITIVE RHEUMATOID FACTOR: ICD-10-CM

## 2019-05-31 DIAGNOSIS — D84.9 IMMUNOSUPPRESSION: ICD-10-CM

## 2019-05-31 LAB
ALBUMIN SERPL BCP-MCNC: 4 G/DL (ref 3.5–5.2)
ALP SERPL-CCNC: 111 U/L (ref 55–135)
ALT SERPL W/O P-5'-P-CCNC: 25 U/L (ref 10–44)
ANION GAP SERPL CALC-SCNC: 7 MMOL/L (ref 8–16)
AST SERPL-CCNC: 20 U/L (ref 10–40)
BASOPHILS # BLD AUTO: 0.02 K/UL (ref 0–0.2)
BASOPHILS NFR BLD: 0.3 % (ref 0–1.9)
BILIRUB SERPL-MCNC: 0.5 MG/DL (ref 0.1–1)
BUN SERPL-MCNC: 12 MG/DL (ref 8–23)
CALCIUM SERPL-MCNC: 10.1 MG/DL (ref 8.7–10.5)
CHLORIDE SERPL-SCNC: 108 MMOL/L (ref 95–110)
CO2 SERPL-SCNC: 26 MMOL/L (ref 23–29)
CREAT SERPL-MCNC: 0.7 MG/DL (ref 0.5–1.4)
CRP SERPL-MCNC: 1.5 MG/L (ref 0–8.2)
DIFFERENTIAL METHOD: ABNORMAL
EOSINOPHIL # BLD AUTO: 0.2 K/UL (ref 0–0.5)
EOSINOPHIL NFR BLD: 4.2 % (ref 0–8)
ERYTHROCYTE [DISTWIDTH] IN BLOOD BY AUTOMATED COUNT: 14.2 % (ref 11.5–14.5)
ERYTHROCYTE [SEDIMENTATION RATE] IN BLOOD BY WESTERGREN METHOD: 19 MM/HR (ref 0–36)
EST. GFR  (AFRICAN AMERICAN): >60 ML/MIN/1.73 M^2
EST. GFR  (NON AFRICAN AMERICAN): >60 ML/MIN/1.73 M^2
GLUCOSE SERPL-MCNC: 98 MG/DL (ref 70–110)
HCT VFR BLD AUTO: 32.6 % (ref 37–48.5)
HGB BLD-MCNC: 10.6 G/DL (ref 12–16)
IMM GRANULOCYTES # BLD AUTO: 0.03 K/UL (ref 0–0.04)
IMM GRANULOCYTES NFR BLD AUTO: 0.5 % (ref 0–0.5)
LYMPHOCYTES # BLD AUTO: 1.8 K/UL (ref 1–4.8)
LYMPHOCYTES NFR BLD: 30.8 % (ref 18–48)
MCH RBC QN AUTO: 31.4 PG (ref 27–31)
MCHC RBC AUTO-ENTMCNC: 32.5 G/DL (ref 32–36)
MCV RBC AUTO: 96 FL (ref 82–98)
MONOCYTES # BLD AUTO: 0.7 K/UL (ref 0.3–1)
MONOCYTES NFR BLD: 12.8 % (ref 4–15)
NEUTROPHILS # BLD AUTO: 3 K/UL (ref 1.8–7.7)
NEUTROPHILS NFR BLD: 51.4 % (ref 38–73)
NRBC BLD-RTO: 0 /100 WBC
PLATELET # BLD AUTO: 134 K/UL (ref 150–350)
PMV BLD AUTO: ABNORMAL FL (ref 9.2–12.9)
POTASSIUM SERPL-SCNC: 4.2 MMOL/L (ref 3.5–5.1)
PROT SERPL-MCNC: 7.5 G/DL (ref 6–8.4)
RBC # BLD AUTO: 3.38 M/UL (ref 4–5.4)
SODIUM SERPL-SCNC: 141 MMOL/L (ref 136–145)
WBC # BLD AUTO: 5.78 K/UL (ref 3.9–12.7)

## 2019-05-31 PROCEDURE — 36415 COLL VENOUS BLD VENIPUNCTURE: CPT | Mod: PO

## 2019-05-31 PROCEDURE — 86140 C-REACTIVE PROTEIN: CPT

## 2019-05-31 PROCEDURE — 85025 COMPLETE CBC W/AUTO DIFF WBC: CPT

## 2019-05-31 PROCEDURE — 80053 COMPREHEN METABOLIC PANEL: CPT

## 2019-05-31 PROCEDURE — 85652 RBC SED RATE AUTOMATED: CPT

## 2019-06-03 ENCOUNTER — OFFICE VISIT (OUTPATIENT)
Dept: RHEUMATOLOGY | Facility: CLINIC | Age: 74
End: 2019-06-03
Payer: MEDICARE

## 2019-06-03 ENCOUNTER — TELEPHONE (OUTPATIENT)
Dept: FAMILY MEDICINE | Facility: CLINIC | Age: 74
End: 2019-06-03

## 2019-06-03 VITALS
WEIGHT: 208.56 LBS | BODY MASS INDEX: 38.38 KG/M2 | HEART RATE: 61 BPM | SYSTOLIC BLOOD PRESSURE: 188 MMHG | HEIGHT: 62 IN | DIASTOLIC BLOOD PRESSURE: 83 MMHG

## 2019-06-03 DIAGNOSIS — D84.9 IMMUNOSUPPRESSION: ICD-10-CM

## 2019-06-03 DIAGNOSIS — M05.79 RHEUMATOID ARTHRITIS INVOLVING MULTIPLE SITES WITH POSITIVE RHEUMATOID FACTOR: Primary | ICD-10-CM

## 2019-06-03 DIAGNOSIS — M06.9 RHEUMATOID ARTHRITIS OF HAND, UNSPECIFIED LATERALITY, UNSPECIFIED RHEUMATOID FACTOR PRESENCE: ICD-10-CM

## 2019-06-03 DIAGNOSIS — E66.01 SEVERE OBESITY (BMI 35.0-39.9) WITH COMORBIDITY: ICD-10-CM

## 2019-06-03 DIAGNOSIS — D69.6 THROMBOCYTOPENIA: ICD-10-CM

## 2019-06-03 DIAGNOSIS — I10 ESSENTIAL HYPERTENSION: ICD-10-CM

## 2019-06-03 DIAGNOSIS — R21 RASH: Primary | ICD-10-CM

## 2019-06-03 PROCEDURE — 99214 PR OFFICE/OUTPT VISIT, EST, LEVL IV, 30-39 MIN: ICD-10-PCS | Mod: S$PBB,,, | Performed by: INTERNAL MEDICINE

## 2019-06-03 PROCEDURE — 99214 OFFICE O/P EST MOD 30 MIN: CPT | Mod: S$PBB,,, | Performed by: INTERNAL MEDICINE

## 2019-06-03 PROCEDURE — 99213 OFFICE O/P EST LOW 20 MIN: CPT | Mod: PBBFAC | Performed by: INTERNAL MEDICINE

## 2019-06-03 PROCEDURE — 99999 PR PBB SHADOW E&M-EST. PATIENT-LVL III: CPT | Mod: PBBFAC,,, | Performed by: INTERNAL MEDICINE

## 2019-06-03 PROCEDURE — 99999 PR PBB SHADOW E&M-EST. PATIENT-LVL III: ICD-10-PCS | Mod: PBBFAC,,, | Performed by: INTERNAL MEDICINE

## 2019-06-03 RX ORDER — METHOTREXATE 2.5 MG/1
TABLET ORAL
Qty: 120 TABLET | Refills: 0 | Status: SHIPPED | OUTPATIENT
Start: 2019-06-03 | End: 2019-08-21 | Stop reason: SDUPTHER

## 2019-06-03 RX ORDER — FOLIC ACID 1 MG/1
2 TABLET ORAL DAILY
Qty: 180 TABLET | Refills: 3 | Status: SHIPPED | OUTPATIENT
Start: 2019-06-03 | End: 2019-09-09 | Stop reason: SDUPTHER

## 2019-06-03 ASSESSMENT — ROUTINE ASSESSMENT OF PATIENT INDEX DATA (RAPID3)
AM STIFFNESS SCORE: 0, NO
PATIENT GLOBAL ASSESSMENT SCORE: 1.5
PAIN SCORE: 5
TOTAL RAPID3 SCORE: 2.83
MDHAQ FUNCTION SCORE: .6
PSYCHOLOGICAL DISTRESS SCORE: 1.1
FATIGUE SCORE: 2.5

## 2019-06-03 NOTE — TELEPHONE ENCOUNTER
----- Message from Sisi Truong sent at 6/3/2019  4:43 PM CDT -----  ..Type: Patient Call Back    Who called: pt     What is the request in detail: pt states she was referred to Dr. Blanc with Dermatology. She states she contacted them but cannot see her until July. She is asking for referral to another dermatologist.     Can the clinic reply by [facility]? No     Would the patient rather a call back or a response via [facility]? Call back     Best call back number: [phone]

## 2019-06-03 NOTE — PROGRESS NOTES
"Subjective:       Patient ID: Yulia Peralta is a 74 y.o. female.    Chief Complaint: Rheumatoid arthritis    HPI:  Yulia Peralta is a 74 y.o. female with a history of rheumatoid arthritis for 25 years.  She had been on methotrexate for the past 5 years and her current dose is methotrexate 10 tabs qweek (5 on Monday and 5 on Tuesday).  Off prednisone    History of gout some years ago.        Interval History:    Had colonoscopy which showed healing ulcer.  Was treated for H. Pylori.  She completed Rifaximin for stomach issues.  Was given ciprofloxacin for stomach issues recently.     Recurrent rash.  Will see dermatology soon.      Feels joints are doing well but has pain is 5/10 ache insteps bilaterally.    Denies morning stiffness.  Improve with resting feet.  Worse with walking.  Tingling in feet but does not take gabapentin daily.    No morning stiffness.    Review of Systems   Constitutional: Positive for fatigue.   HENT: Negative.    Eyes: Negative.    Cardiovascular: Negative.    Gastrointestinal: Negative.    Endocrine: Negative.    Genitourinary: Negative.    Musculoskeletal: Positive for arthralgias and joint swelling.   Skin: Positive for rash.   Allergic/Immunologic: Negative.    Neurological: Positive for headaches.   Hematological: Bruises/bleeds easily.   Psychiatric/Behavioral: Negative.          Objective:   BP (!) 188/83   Pulse 61   Ht 5' 2" (1.575 m)   Wt 94.6 kg (208 lb 8.9 oz)   BMI 38.15 kg/m²      Repeat 130/85     Physical Exam   Constitutional: She is oriented to person, place, and time and well-developed, well-nourished, and in no distress.   HENT:   Head: Normocephalic and atraumatic.   Eyes: Conjunctivae and EOM are normal.   Neck: Neck supple.   Cardiovascular: Normal rate and regular rhythm.    Pulmonary/Chest: Effort normal and breath sounds normal.   Abdominal: Soft. Bowel sounds are normal.   Neurological: She is alert and oriented to person, place, and time. Gait normal. "   Skin: Skin is warm and dry.     Psychiatric: Mood and affect normal.   Musculoskeletal:   28 joint count: 0 swollen and 0 tender  No foot pain or swelling.               LABS    Component      Latest Ref Rng & Units 5/31/2019   WBC      3.90 - 12.70 K/uL 5.78   RBC      4.00 - 5.40 M/uL 3.38 (L)   Hemoglobin      12.0 - 16.0 g/dL 10.6 (L)   Hematocrit      37.0 - 48.5 % 32.6 (L)   MCV      82 - 98 fL 96   MCH      27.0 - 31.0 pg 31.4 (H)   MCHC      32.0 - 36.0 g/dL 32.5   RDW      11.5 - 14.5 % 14.2   Platelets      150 - 350 K/uL 134 (L)   MPV      9.2 - 12.9 fL SEE COMMENT   Immature Granulocytes      0.0 - 0.5 % 0.5   Gran # (ANC)      1.8 - 7.7 K/uL 3.0   Immature Grans (Abs)      0.00 - 0.04 K/uL 0.03   Lymph #      1.0 - 4.8 K/uL 1.8   Mono #      0.3 - 1.0 K/uL 0.7   Eos #      0.0 - 0.5 K/uL 0.2   Baso #      0.00 - 0.20 K/uL 0.02   nRBC      0 /100 WBC 0   Gran%      38.0 - 73.0 % 51.4   Lymph%      18.0 - 48.0 % 30.8   Mono%      4.0 - 15.0 % 12.8   Eosinophil%      0.0 - 8.0 % 4.2   Basophil%      0.0 - 1.9 % 0.3   Differential Method       Automated   Sodium      136 - 145 mmol/L 141   Potassium      3.5 - 5.1 mmol/L 4.2   Chloride      95 - 110 mmol/L 108   CO2      23 - 29 mmol/L 26   Glucose      70 - 110 mg/dL 98   BUN, Bld      8 - 23 mg/dL 12   Creatinine      0.5 - 1.4 mg/dL 0.7   Calcium      8.7 - 10.5 mg/dL 10.1   PROTEIN TOTAL      6.0 - 8.4 g/dL 7.5   Albumin      3.5 - 5.2 g/dL 4.0   BILIRUBIN TOTAL      0.1 - 1.0 mg/dL 0.5   Alkaline Phosphatase      55 - 135 U/L 111   AST      10 - 40 U/L 20   ALT      10 - 44 U/L 25   Anion Gap      8 - 16 mmol/L 7 (L)   eGFR if African American      >60 mL/min/1.73 m:2 >60.0   eGFR if non African American      >60 mL/min/1.73 m:2 >60.0   CRP      0.0 - 8.2 mg/L 1.5   Sed Rate      0 - 36 mm/Hr 19         Assessment:       1. Rheumatoid arthritis manifested by rheumatoid nodule, polyarthritis in the past, and contractures of the elbows.    Treated in  the past with gold. Plaquenil did not work in the past and she never took SSZ.   Now doing well. Continue MTX ( changed from 5 tabs on Monday and 5 tabs on Tuesday to Monday 5 in morning and 5 in evening) and folic acid   2. Encounter for long-term (current) use of other medications    3. Fatigue.   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  She decided to go for second opinion at [facility]. Biopsy was negative. MRI repeat stable and most recent 10/2017 stable  5. 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 Felt not to have hepatitis C by hepatology.  6. Right anserine bursitis.   7. Obesity  8. HTN.   9. Chronic back pain.  Bulging disc.  May be cause of paresthesias in legs with standing.  10.  Paresthesia of in left leg.  May be due to #9  11.  Right clavicle mass.  Evaluated by x-ray   12.  Trigger fingers.  Left 4th and right 3rd and right 5th.  Therapy helped in past.    13.  Left lower leg pain and swelling.  History of trauma  Plan:       1. Continue methotrexate 10 tabs qweek (5 on Monday and 5 on Tuesday).  2. Check CRP, ESR, CBC, and CMP    3. Continue Nabumetone.  Patient says GI ok with her continuing despite healing ulcer on EGD.   4. Continue folic acid 2 tabs.    5. Patient to discuss with primary doctor sleep apnea since she has HA, snoring and fatigue  6. Consider OT for arms if pain returns and no improvement with home exercises from previous PT  7. Topical arthritis OTC ointment to feet and ankles.  8.  BP elevated.  Repeat 170/90  9.  Stressed compliance with gabapentin     Return to the office in 3 months

## 2019-06-11 ENCOUNTER — TELEPHONE (OUTPATIENT)
Dept: FAMILY MEDICINE | Facility: CLINIC | Age: 74
End: 2019-06-11

## 2019-06-11 ENCOUNTER — TELEPHONE (OUTPATIENT)
Dept: ADMINISTRATIVE | Facility: HOSPITAL | Age: 74
End: 2019-06-11

## 2019-06-11 ENCOUNTER — OFFICE VISIT (OUTPATIENT)
Dept: FAMILY MEDICINE | Facility: CLINIC | Age: 74
End: 2019-06-11
Payer: MEDICARE

## 2019-06-11 VITALS
DIASTOLIC BLOOD PRESSURE: 90 MMHG | RESPIRATION RATE: 20 BRPM | BODY MASS INDEX: 36.79 KG/M2 | WEIGHT: 199.94 LBS | TEMPERATURE: 98 F | HEIGHT: 62 IN | SYSTOLIC BLOOD PRESSURE: 160 MMHG | HEART RATE: 71 BPM | OXYGEN SATURATION: 96 %

## 2019-06-11 DIAGNOSIS — I77.1 TORTUOUS AORTA: ICD-10-CM

## 2019-06-11 DIAGNOSIS — I10 ESSENTIAL HYPERTENSION: Primary | ICD-10-CM

## 2019-06-11 PROCEDURE — 99215 OFFICE O/P EST HI 40 MIN: CPT | Mod: PBBFAC,PO | Performed by: PHYSICIAN ASSISTANT

## 2019-06-11 PROCEDURE — 99214 PR OFFICE/OUTPT VISIT, EST, LEVL IV, 30-39 MIN: ICD-10-PCS | Mod: S$PBB,,, | Performed by: PHYSICIAN ASSISTANT

## 2019-06-11 PROCEDURE — 99999 PR PBB SHADOW E&M-EST. PATIENT-LVL V: CPT | Mod: PBBFAC,,, | Performed by: PHYSICIAN ASSISTANT

## 2019-06-11 PROCEDURE — 99999 PR PBB SHADOW E&M-EST. PATIENT-LVL V: ICD-10-PCS | Mod: PBBFAC,,, | Performed by: PHYSICIAN ASSISTANT

## 2019-06-11 PROCEDURE — 99214 OFFICE O/P EST MOD 30 MIN: CPT | Mod: S$PBB,,, | Performed by: PHYSICIAN ASSISTANT

## 2019-06-11 RX ORDER — AMLODIPINE BESYLATE 5 MG/1
5 TABLET ORAL DAILY
Qty: 30 TABLET | Refills: 11 | Status: SHIPPED | OUTPATIENT
Start: 2019-06-11 | End: 2019-10-21 | Stop reason: SDUPTHER

## 2019-06-11 NOTE — PATIENT INSTRUCTIONS
Check blood pressure twice a day. One hour after taking medication and after dinner or before bed. Sit for 5 minutes. Keep arm at heart level.     Goal less 140 and less than 90

## 2019-06-11 NOTE — PROGRESS NOTES
Subjective:       Patient ID: Yulia Peralta is a 74 y.o. female with multiple medical diagnoses as listed in the medical history and problem list that presents for Hypertension  .    Chief Complaint: Hypertension      Hypertension   This is a chronic problem. The current episode started more than 1 year ago. The problem is uncontrolled. Associated symptoms include headaches. Pertinent negatives include no blurred vision, palpitations, peripheral edema or shortness of breath. There are no associated agents to hypertension. Past treatments include angiotensin blockers. There are no compliance problems.         Review of Systems   Eyes: Negative for blurred vision.   Respiratory: Negative for shortness of breath.    Cardiovascular: Negative for palpitations.   Neurological: Positive for headaches.         PAST MEDICAL HISTORY:  Past Medical History:   Diagnosis Date    Acid reflux     Anemia     Anxiety     Arthritis     Blood transfusion     Cataract     Depression     Dry eyes     Heart murmur     Hypertension     Left lumbar radiculitis 5/19/2015    Multiple thyroid nodules 12/18/2012    Osteoporosis     Rheumatoid arthritis     Thoracic or lumbosacral neuritis or radiculitis, unspecified 10/30/2013       SOCIAL HISTORY:  Social History     Socioeconomic History    Marital status:      Spouse name: Not on file    Number of children: Not on file    Years of education: Not on file    Highest education level: Not on file   Occupational History    Not on file   Social Needs    Financial resource strain: Not on file    Food insecurity:     Worry: Not on file     Inability: Not on file    Transportation needs:     Medical: Not on file     Non-medical: Not on file   Tobacco Use    Smoking status: Never Smoker    Smokeless tobacco: Never Used   Substance and Sexual Activity    Alcohol use: Yes     Comment: socially    Drug use: No    Sexual activity: Not on file   Lifestyle     Physical activity:     Days per week: Not on file     Minutes per session: Not on file    Stress: Only a little   Relationships    Social connections:     Talks on phone: Not on file     Gets together: Not on file     Attends Pentecostal service: Not on file     Active member of club or organization: Not on file     Attends meetings of clubs or organizations: Not on file     Relationship status: Not on file   Other Topics Concern    Not on file   Social History Narrative    Adult Screenings updated and reviewed  6/12/14    Mammogram( for females) ordered for DIS    Pap ( for females) Dr Zepeda  Due for f/u   For  2014    Colonoscopy  Done once    Flu shot yearly done  2013    Td 2005    Pneumovax  Updated  12/3/13    Zostavax done 2012    Eye exam recommended yearly done 2013    Bone density  12/9/13    Thyroid ultrasound  11/6/2012 Normal sized thyroid containing several subcentimeter nodules, similar to the 5/12/11 exam.  No concerning nodules identified..            ALLERGIES AND MEDICATIONS: updated and reviewed.  Review of patient's allergies indicates:   Allergen Reactions    No known drug allergies      Current Outpatient Medications   Medication Sig Dispense Refill    azelastine (ASTELIN) 137 mcg (0.1 %) nasal spray 1 spray (137 mcg total) by Nasal route 2 (two) times daily. 30 mL 1    fluticasone (FLONASE) 50 mcg/actuation nasal spray 1 spray (50 mcg total) by Each Nare route once daily. 16 g 5    folic acid (FOLVITE) 1 MG tablet Take 2 tablets (2 mg total) by mouth once daily. 180 tablet 3    furosemide (LASIX) 40 MG tablet Take 1 tablet (40 mg total) by mouth once daily. 90 tablet 1    gabapentin (NEURONTIN) 100 MG capsule Take 1 capsule (100 mg total) by mouth 3 (three) times daily. 270 capsule 3    hydrocortisone 2.5 % cream   0    hydrOXYzine (ATARAX) 50 MG tablet Take 1 tablet (50 mg total) by mouth 3 (three) times daily as needed for Itching. 60 tablet 0    irbesartan (AVAPRO) 300 MG  "tablet Take 1 tablet (300 mg total) by mouth every evening. 90 tablet 3    methotrexate 2.5 MG Tab 5 tabs po twice a day on monday 120 tablet 0    nabumetone (RELAFEN) 500 MG tablet Take 1 tablet (500 mg total) by mouth 2 (two) times daily. 180 tablet 3    pantoprazole (PROTONIX) 40 MG tablet Take 1 tablet (40 mg total) by mouth once daily. 1 Tablet, Delayed Release (E.C.) Oral Every day 90 tablet 3    ranitidine (ZANTAC) 300 MG capsule Take 1 capsule (300 mg total) by mouth nightly. 1 Capsule Oral Every day 90 capsule 3    valacyclovir (VALTREX) 500 MG tablet 1 Tablet DAILY for suppression, increase to BID for outbreak 180 tablet 2    amLODIPine (NORVASC) 5 MG tablet Take 1 tablet (5 mg total) by mouth once daily. 30 tablet 11    LORazepam (ATIVAN) 1 MG tablet Take 1 tablet (1 mg total) by mouth every 6 (six) hours as needed for Anxiety. 3 tablet 0    nystatin-triamcinolone (MYCOLOG II) cream Apply to affected area 2 times daily 90 g 1     No current facility-administered medications for this visit.          Objective:   BP (!) 160/90 (BP Location: Left arm, Patient Position: Sitting, BP Method: Large (Manual))   Pulse 71   Temp 98.4 °F (36.9 °C) (Oral)   Resp 20   Ht 5' 2" (1.575 m)   Wt 90.7 kg (199 lb 15.3 oz)   SpO2 96%   BMI 36.57 kg/m²      Physical Exam   Constitutional: She is oriented to person, place, and time. No distress.   HENT:   Head: Normocephalic and atraumatic.   Eyes: Conjunctivae and EOM are normal.   Cardiovascular: Normal rate and regular rhythm.   Pulmonary/Chest: Effort normal and breath sounds normal.   Neurological: She is alert and oriented to person, place, and time.   Skin: Skin is warm. No erythema.           Assessment:       1. Essential hypertension    2. Tortuous aorta        Plan:       Essential hypertension  -     amLODIPine (NORVASC) 5 MG tablet; Take 1 tablet (5 mg total) by mouth once daily.  Dispense: 30 tablet; Refill: 11  Check blood pressure twice a day. " One hour after taking medication and after dinner or before bed. Sit for 5 minutes. Keep arm at heart level.   Goal less thab 140/90  Will bring in her machine to check with ours.   If no side effects in two weeks, let me know will send to mail order.   Aware side effect of edema. Will let us know.     Tortuous aorta  Continue to control RF BP and lipids             No follow-ups on file.

## 2019-06-11 NOTE — TELEPHONE ENCOUNTER
Return call to Pt, and she states that she has been having high blood pressure readings for about a week and a half. She states that her systolic blood pressure has been ranging from 170's-180's, and her Diastolic ranging from 100's-120's and having headaches off and on. Pt scheduled appointment with Provider on today. Pt instructed that she needs to go to her nearest Urgent care/ED for evaluation and treatment if she is having any pain or discomfort between now and her appointment. Informed that I would notify Provider of her status.Pt acknowledged understanding.

## 2019-07-03 ENCOUNTER — CLINICAL SUPPORT (OUTPATIENT)
Dept: FAMILY MEDICINE | Facility: CLINIC | Age: 74
End: 2019-07-03
Payer: MEDICARE

## 2019-07-03 DIAGNOSIS — I10 HYPERTENSION, UNSPECIFIED TYPE: Primary | ICD-10-CM

## 2019-07-03 PROCEDURE — 99499 UNLISTED E&M SERVICE: CPT | Mod: S$PBB,,, | Performed by: FAMILY MEDICINE

## 2019-07-03 PROCEDURE — 99499 NO LOS: ICD-10-PCS | Mod: S$PBB,,, | Performed by: FAMILY MEDICINE

## 2019-07-03 NOTE — PROGRESS NOTES
Yulia Peralta 74 y.o. female is here today for Blood Pressure check.   History of HTN yes.    Review of patient's allergies indicates:   Allergen Reactions    No known drug allergies      Creatinine   Date Value Ref Range Status   05/31/2019 0.7 0.5 - 1.4 mg/dL Final     Sodium   Date Value Ref Range Status   05/31/2019 141 136 - 145 mmol/L Final     Potassium   Date Value Ref Range Status   05/31/2019 4.2 3.5 - 5.1 mmol/L Final   ]  Patient verifies taking blood pressure medications on a regular basis at the same time of the day.     Current Outpatient Medications:     amLODIPine (NORVASC) 5 MG tablet, Take 1 tablet (5 mg total) by mouth once daily., Disp: 30 tablet, Rfl: 11    azelastine (ASTELIN) 137 mcg (0.1 %) nasal spray, 1 spray (137 mcg total) by Nasal route 2 (two) times daily., Disp: 30 mL, Rfl: 1    fluticasone (FLONASE) 50 mcg/actuation nasal spray, 1 spray (50 mcg total) by Each Nare route once daily., Disp: 16 g, Rfl: 5    folic acid (FOLVITE) 1 MG tablet, Take 2 tablets (2 mg total) by mouth once daily., Disp: 180 tablet, Rfl: 3    furosemide (LASIX) 40 MG tablet, Take 1 tablet (40 mg total) by mouth once daily., Disp: 90 tablet, Rfl: 1    gabapentin (NEURONTIN) 100 MG capsule, Take 1 capsule (100 mg total) by mouth 3 (three) times daily., Disp: 270 capsule, Rfl: 3    hydrocortisone 2.5 % cream, , Disp: , Rfl: 0    hydrOXYzine (ATARAX) 50 MG tablet, Take 1 tablet (50 mg total) by mouth 3 (three) times daily as needed for Itching., Disp: 60 tablet, Rfl: 0    irbesartan (AVAPRO) 300 MG tablet, Take 1 tablet (300 mg total) by mouth every evening., Disp: 90 tablet, Rfl: 3    LORazepam (ATIVAN) 1 MG tablet, Take 1 tablet (1 mg total) by mouth every 6 (six) hours as needed for Anxiety., Disp: 3 tablet, Rfl: 0    methotrexate 2.5 MG Tab, 5 tabs po twice a day on monday, Disp: 120 tablet, Rfl: 0    nabumetone (RELAFEN) 500 MG tablet, Take 1 tablet (500 mg total) by mouth 2 (two) times daily.,  Disp: 180 tablet, Rfl: 3    nystatin-triamcinolone (MYCOLOG II) cream, Apply to affected area 2 times daily, Disp: 90 g, Rfl: 1    pantoprazole (PROTONIX) 40 MG tablet, Take 1 tablet (40 mg total) by mouth once daily. 1 Tablet, Delayed Release (E.C.) Oral Every day, Disp: 90 tablet, Rfl: 3    ranitidine (ZANTAC) 300 MG capsule, Take 1 capsule (300 mg total) by mouth nightly. 1 Capsule Oral Every day, Disp: 90 capsule, Rfl: 3    valacyclovir (VALTREX) 500 MG tablet, 1 Tablet DAILY for suppression, increase to BID for outbreak, Disp: 180 tablet, Rfl: 2  Does patient have record of home blood pressure readings yes. Readings given to Provider.  Last dose of blood pressure medication was taken at 9:ooam on 7-2-19.  Patient is asymptomatic.   BP- 124/82      ,    Dr. Martin notified.

## 2019-08-20 ENCOUNTER — PATIENT OUTREACH (OUTPATIENT)
Dept: ADMINISTRATIVE | Facility: OTHER | Age: 74
End: 2019-08-20

## 2019-08-20 PROBLEM — H25.13 NUCLEAR SCLEROSIS, BILATERAL: Status: ACTIVE | Noted: 2019-08-20

## 2019-08-20 PROBLEM — H52.7 REFRACTIVE ERROR: Status: ACTIVE | Noted: 2019-08-20

## 2019-08-21 DIAGNOSIS — M06.9 RHEUMATOID ARTHRITIS OF HAND, UNSPECIFIED LATERALITY, UNSPECIFIED RHEUMATOID FACTOR PRESENCE: ICD-10-CM

## 2019-08-21 RX ORDER — METHOTREXATE 2.5 MG/1
TABLET ORAL
Qty: 120 TABLET | Refills: 0 | Status: SHIPPED | OUTPATIENT
Start: 2019-08-21 | End: 2019-11-11 | Stop reason: SDUPTHER

## 2019-08-22 ENCOUNTER — LAB VISIT (OUTPATIENT)
Dept: LAB | Facility: HOSPITAL | Age: 74
End: 2019-08-22
Attending: INTERNAL MEDICINE
Payer: MEDICARE

## 2019-08-22 ENCOUNTER — OFFICE VISIT (OUTPATIENT)
Dept: OPTOMETRY | Facility: CLINIC | Age: 74
End: 2019-08-22
Payer: MEDICARE

## 2019-08-22 DIAGNOSIS — M05.79 RHEUMATOID ARTHRITIS INVOLVING MULTIPLE SITES WITH POSITIVE RHEUMATOID FACTOR: ICD-10-CM

## 2019-08-22 DIAGNOSIS — H52.7 REFRACTIVE ERROR: ICD-10-CM

## 2019-08-22 DIAGNOSIS — I10 ESSENTIAL HYPERTENSION: ICD-10-CM

## 2019-08-22 DIAGNOSIS — H25.13 NUCLEAR SCLEROSIS, BILATERAL: Primary | ICD-10-CM

## 2019-08-22 DIAGNOSIS — D84.9 IMMUNOSUPPRESSION: ICD-10-CM

## 2019-08-22 LAB
ALBUMIN SERPL BCP-MCNC: 4 G/DL (ref 3.5–5.2)
ALP SERPL-CCNC: 102 U/L (ref 55–135)
ALT SERPL W/O P-5'-P-CCNC: 39 U/L (ref 10–44)
ANION GAP SERPL CALC-SCNC: 8 MMOL/L (ref 8–16)
AST SERPL-CCNC: 28 U/L (ref 10–40)
BASOPHILS # BLD AUTO: 0.03 K/UL (ref 0–0.2)
BASOPHILS NFR BLD: 0.6 % (ref 0–1.9)
BILIRUB SERPL-MCNC: 0.5 MG/DL (ref 0.1–1)
BUN SERPL-MCNC: 17 MG/DL (ref 8–23)
CALCIUM SERPL-MCNC: 10 MG/DL (ref 8.7–10.5)
CHLORIDE SERPL-SCNC: 102 MMOL/L (ref 95–110)
CO2 SERPL-SCNC: 27 MMOL/L (ref 23–29)
CREAT SERPL-MCNC: 0.9 MG/DL (ref 0.5–1.4)
CRP SERPL-MCNC: 1.4 MG/L (ref 0–8.2)
DIFFERENTIAL METHOD: ABNORMAL
EOSINOPHIL # BLD AUTO: 0.2 K/UL (ref 0–0.5)
EOSINOPHIL NFR BLD: 3.7 % (ref 0–8)
ERYTHROCYTE [DISTWIDTH] IN BLOOD BY AUTOMATED COUNT: 14.2 % (ref 11.5–14.5)
ERYTHROCYTE [SEDIMENTATION RATE] IN BLOOD BY WESTERGREN METHOD: 23 MM/HR (ref 0–36)
EST. GFR  (AFRICAN AMERICAN): >60 ML/MIN/1.73 M^2
EST. GFR  (NON AFRICAN AMERICAN): >60 ML/MIN/1.73 M^2
GLUCOSE SERPL-MCNC: 91 MG/DL (ref 70–110)
HCT VFR BLD AUTO: 33.7 % (ref 37–48.5)
HGB BLD-MCNC: 10.8 G/DL (ref 12–16)
IMM GRANULOCYTES # BLD AUTO: 0.03 K/UL (ref 0–0.04)
IMM GRANULOCYTES NFR BLD AUTO: 0.6 % (ref 0–0.5)
LYMPHOCYTES # BLD AUTO: 1.4 K/UL (ref 1–4.8)
LYMPHOCYTES NFR BLD: 25.7 % (ref 18–48)
MCH RBC QN AUTO: 31.5 PG (ref 27–31)
MCHC RBC AUTO-ENTMCNC: 32 G/DL (ref 32–36)
MCV RBC AUTO: 98 FL (ref 82–98)
MONOCYTES # BLD AUTO: 0.6 K/UL (ref 0.3–1)
MONOCYTES NFR BLD: 11.9 % (ref 4–15)
NEUTROPHILS # BLD AUTO: 3.1 K/UL (ref 1.8–7.7)
NEUTROPHILS NFR BLD: 57.5 % (ref 38–73)
NRBC BLD-RTO: 0 /100 WBC
PLATELET # BLD AUTO: 157 K/UL (ref 150–350)
PMV BLD AUTO: 13.5 FL (ref 9.2–12.9)
POTASSIUM SERPL-SCNC: 4.4 MMOL/L (ref 3.5–5.1)
PROT SERPL-MCNC: 7.6 G/DL (ref 6–8.4)
RBC # BLD AUTO: 3.43 M/UL (ref 4–5.4)
SODIUM SERPL-SCNC: 137 MMOL/L (ref 136–145)
WBC # BLD AUTO: 5.4 K/UL (ref 3.9–12.7)

## 2019-08-22 PROCEDURE — 99212 OFFICE O/P EST SF 10 MIN: CPT | Mod: PBBFAC,PO | Performed by: OPTOMETRIST

## 2019-08-22 PROCEDURE — 92015 PR REFRACTION: ICD-10-PCS | Mod: ,,, | Performed by: OPTOMETRIST

## 2019-08-22 PROCEDURE — 36415 COLL VENOUS BLD VENIPUNCTURE: CPT | Mod: PO

## 2019-08-22 PROCEDURE — 92014 COMPRE OPH EXAM EST PT 1/>: CPT | Mod: S$PBB,,, | Performed by: OPTOMETRIST

## 2019-08-22 PROCEDURE — 92014 PR EYE EXAM, EST PATIENT,COMPREHESV: ICD-10-PCS | Mod: S$PBB,,, | Performed by: OPTOMETRIST

## 2019-08-22 PROCEDURE — 86140 C-REACTIVE PROTEIN: CPT

## 2019-08-22 PROCEDURE — 85652 RBC SED RATE AUTOMATED: CPT

## 2019-08-22 PROCEDURE — 99999 PR PBB SHADOW E&M-EST. PATIENT-LVL II: CPT | Mod: PBBFAC,,, | Performed by: OPTOMETRIST

## 2019-08-22 PROCEDURE — 85025 COMPLETE CBC W/AUTO DIFF WBC: CPT

## 2019-08-22 PROCEDURE — 92015 DETERMINE REFRACTIVE STATE: CPT | Mod: ,,, | Performed by: OPTOMETRIST

## 2019-08-22 PROCEDURE — 99999 PR PBB SHADOW E&M-EST. PATIENT-LVL II: ICD-10-PCS | Mod: PBBFAC,,, | Performed by: OPTOMETRIST

## 2019-08-22 PROCEDURE — 80053 COMPREHEN METABOLIC PANEL: CPT

## 2019-08-22 NOTE — PROGRESS NOTES
Subjective:       Patient ID: Yulia Peralta is a 74 y.o. female      Chief Complaint   Patient presents with    Concerns About Ocular Health    Hypertensive Eye Exam     History of Present Illness  Dls: 8/1/18 Dr. Luu     73 y/o female presents today for hypertensive eye exam.   Pt c/o blurry vision at distance and near ou.   Pt wears bifocal glasses.     No tearing  No itching  No burning  + pain  + ha's  No floaters  No flashes    Eye meds  None       Assessment/Plan:     1. Nuclear sclerosis, bilateral  Educated pt on presence of cataracts and effects on vision. No surgery at this time. Recheck in one year.    2. Essential hypertension  No hypertensive retinopathy. Continue BP control. RTC 1 year for DFE.    3. Refractive error  Educated patient on refractive error and discussed lens options. Dispensed updated spectacle Rx. Educated about adaptation period to new specs.    Eyeglass Final Rx     Eyeglass Final Rx       Sphere Cylinder Axis Add    Right +1.50 Sphere  +2.75    Left +2.50 +0.25 085 +2.75    Expiration Date:  8/22/2020                  Follow up in about 1 year (around 8/22/2020).

## 2019-09-09 ENCOUNTER — OFFICE VISIT (OUTPATIENT)
Dept: RHEUMATOLOGY | Facility: CLINIC | Age: 74
End: 2019-09-09
Payer: MEDICARE

## 2019-09-09 VITALS
HEIGHT: 62 IN | BODY MASS INDEX: 38.33 KG/M2 | DIASTOLIC BLOOD PRESSURE: 68 MMHG | WEIGHT: 208.31 LBS | SYSTOLIC BLOOD PRESSURE: 147 MMHG | HEART RATE: 59 BPM

## 2019-09-09 DIAGNOSIS — D84.9 IMMUNOSUPPRESSION: ICD-10-CM

## 2019-09-09 DIAGNOSIS — E66.09 CLASS 2 OBESITY DUE TO EXCESS CALORIES WITHOUT SERIOUS COMORBIDITY WITH BODY MASS INDEX (BMI) OF 36.0 TO 36.9 IN ADULT: ICD-10-CM

## 2019-09-09 DIAGNOSIS — M06.9 RHEUMATOID ARTHRITIS OF HAND, UNSPECIFIED LATERALITY, UNSPECIFIED RHEUMATOID FACTOR PRESENCE: ICD-10-CM

## 2019-09-09 DIAGNOSIS — M05.79 RHEUMATOID ARTHRITIS INVOLVING MULTIPLE SITES WITH POSITIVE RHEUMATOID FACTOR: Primary | ICD-10-CM

## 2019-09-09 PROCEDURE — 99214 OFFICE O/P EST MOD 30 MIN: CPT | Mod: S$PBB,,, | Performed by: INTERNAL MEDICINE

## 2019-09-09 PROCEDURE — 99213 OFFICE O/P EST LOW 20 MIN: CPT | Mod: PBBFAC | Performed by: INTERNAL MEDICINE

## 2019-09-09 PROCEDURE — 99999 PR PBB SHADOW E&M-EST. PATIENT-LVL III: CPT | Mod: PBBFAC,,, | Performed by: INTERNAL MEDICINE

## 2019-09-09 PROCEDURE — 99214 PR OFFICE/OUTPT VISIT, EST, LEVL IV, 30-39 MIN: ICD-10-PCS | Mod: S$PBB,,, | Performed by: INTERNAL MEDICINE

## 2019-09-09 PROCEDURE — 99999 PR PBB SHADOW E&M-EST. PATIENT-LVL III: ICD-10-PCS | Mod: PBBFAC,,, | Performed by: INTERNAL MEDICINE

## 2019-09-09 RX ORDER — NABUMETONE 500 MG/1
500 TABLET, FILM COATED ORAL 2 TIMES DAILY
Qty: 180 TABLET | Refills: 3 | Status: SHIPPED | OUTPATIENT
Start: 2019-09-09 | End: 2019-10-21 | Stop reason: SDUPTHER

## 2019-09-09 RX ORDER — FOLIC ACID 1 MG/1
2 TABLET ORAL DAILY
Qty: 180 TABLET | Refills: 3 | Status: SHIPPED | OUTPATIENT
Start: 2019-09-09 | End: 2019-10-21 | Stop reason: SDUPTHER

## 2019-09-09 ASSESSMENT — ROUTINE ASSESSMENT OF PATIENT INDEX DATA (RAPID3)
PATIENT GLOBAL ASSESSMENT SCORE: 0
FATIGUE SCORE: 4.5
TOTAL RAPID3 SCORE: 1.61
MDHAQ FUNCTION SCORE: .7
PAIN SCORE: 2.5
PSYCHOLOGICAL DISTRESS SCORE: 1.1
AM STIFFNESS SCORE: 0, NO

## 2019-09-09 NOTE — PROGRESS NOTES
"Subjective:       Patient ID: Yulia Peralta is a 74 y.o. female.    Chief Complaint: Rheumatoid arthritis    HPI:  Yulia Peralta is a 74 y.o. female with a history of rheumatoid arthritis for 25 years.  She had been on methotrexate for the past 5 years and her current dose is methotrexate 10 tabs qweek (5 on Monday and 5 on Tuesday).  Off prednisone    History of gout some years ago.        Interval History:  Recurrent rash.  Saw 2 different dermatologists.  Dr. Mikel helton.  Also saw Dr. Packer.    Biopsy she was told was normal.  The rash comes and goes.   Will go to [facility] to check thymoma.     Feels joints are doing well 2.5/10 ache left foot.    Denies morning stiffness.  Improve with resting feet.  Worse with walking.  Tingling in feet but does not take gabapentin daily.    No morning stiffness.    Review of Systems   Constitutional: Positive for fatigue.   HENT: Negative.    Eyes: Negative.    Cardiovascular: Negative.    Gastrointestinal: Negative.    Endocrine: Negative.    Genitourinary: Negative.    Musculoskeletal: Positive for arthralgias and joint swelling.   Skin: Positive for rash.   Allergic/Immunologic: Negative.    Neurological: Positive for headaches.   Hematological: Bruises/bleeds easily.   Psychiatric/Behavioral: Negative.          Objective:   BP (!) 147/68 (BP Location: Left arm, Patient Position: Sitting, BP Method: Large (Automatic))   Pulse (!) 59   Ht 5' 2" (1.575 m)   Wt 94.5 kg (208 lb 5.4 oz)   BMI 38.10 kg/m²         Physical Exam   Constitutional: She is oriented to person, place, and time and well-developed, well-nourished, and in no distress.   HENT:   Head: Normocephalic and atraumatic.   Eyes: Conjunctivae and EOM are normal.   Neck: Neck supple.   Cardiovascular: Normal rate and regular rhythm.    Pulmonary/Chest: Effort normal and breath sounds normal.   Abdominal: Soft. Bowel sounds are normal.   Neurological: She is alert and oriented to person, place, " and time. Gait normal.   Skin: Skin is warm and dry.     Psychiatric: Mood and affect normal.   Musculoskeletal:   28 joint count: 0 swollen and 0 tender  Left ankle with not pitting edema              LABS    Component      Latest Ref Rng & Units 8/22/2019   WBC      3.90 - 12.70 K/uL 5.40   RBC      4.00 - 5.40 M/uL 3.43 (L)   Hemoglobin      12.0 - 16.0 g/dL 10.8 (L)   Hematocrit      37.0 - 48.5 % 33.7 (L)   MCV      82 - 98 fL 98   MCH      27.0 - 31.0 pg 31.5 (H)   MCHC      32.0 - 36.0 g/dL 32.0   RDW      11.5 - 14.5 % 14.2   Platelets      150 - 350 K/uL 157   MPV      9.2 - 12.9 fL 13.5 (H)   Immature Granulocytes      0.0 - 0.5 % 0.6 (H)   Gran # (ANC)      1.8 - 7.7 K/uL 3.1   Immature Grans (Abs)      0.00 - 0.04 K/uL 0.03   Lymph #      1.0 - 4.8 K/uL 1.4   Mono #      0.3 - 1.0 K/uL 0.6   Eos #      0.0 - 0.5 K/uL 0.2   Baso #      0.00 - 0.20 K/uL 0.03   nRBC      0 /100 WBC 0   Gran%      38.0 - 73.0 % 57.5   Lymph%      18.0 - 48.0 % 25.7   Mono%      4.0 - 15.0 % 11.9   Eosinophil%      0.0 - 8.0 % 3.7   Basophil%      0.0 - 1.9 % 0.6   Differential Method       Automated   Sodium      136 - 145 mmol/L 137   Potassium      3.5 - 5.1 mmol/L 4.4   Chloride      95 - 110 mmol/L 102   CO2      23 - 29 mmol/L 27   Glucose      70 - 110 mg/dL 91   BUN, Bld      8 - 23 mg/dL 17   Creatinine      0.5 - 1.4 mg/dL 0.9   Calcium      8.7 - 10.5 mg/dL 10.0   PROTEIN TOTAL      6.0 - 8.4 g/dL 7.6   Albumin      3.5 - 5.2 g/dL 4.0   BILIRUBIN TOTAL      0.1 - 1.0 mg/dL 0.5   Alkaline Phosphatase      55 - 135 U/L 102   AST      10 - 40 U/L 28   ALT      10 - 44 U/L 39   Anion Gap      8 - 16 mmol/L 8   eGFR if African American      >60 mL/min/1.73 m:2 >60.0   eGFR if non African American      >60 mL/min/1.73 m:2 >60.0   CRP      0.0 - 8.2 mg/L 1.4   Sed Rate      0 - 36 mm/Hr 23         Assessment:       1. Rheumatoid arthritis manifested by rheumatoid nodule, polyarthritis in the past, and contractures of the  elbows.    Treated in the past with gold. Plaquenil did not work in the past and she never took SSZ.   Now doing well. Continue MTX (Monday 5 in morning and 5 in evening) and folic acid   2. Encounter for long-term (current) use of other medications    3. Fatigue.   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  She decided to go for second opinion at [facility]. Biopsy was negative. MRI repeat stable and most recent 10/2017 stable  5. 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 Felt not to have hepatitis C by hepatology.  6. Right anserine bursitis.   7. Obesity  8. HTN.   9. Chronic back pain.  Bulging disc.  May be cause of paresthesias in legs with standing.  10.  Paresthesia of in left leg.  May be due to #9  11.  Right clavicle mass.  Evaluated by x-ray   12.  Trigger fingers.  Left 4th and right 3rd and right 5th.  Therapy helped in past.    13.  Left lower leg pain and swelling.  History of trauma  Plan:       1. Continue methotrexate 10 tabs qweek.  2. Check CRP, ESR, CBC, and CMP.  Add lipids to labs  3. Continue Nabumetone.  Patient says GI ok with her continuing despite healing ulcer on EGD.   4. Continue folic acid 2 tabs.    5. Patient to discuss with primary doctor sleep apnea since she has HA, snoring and fatigue  6. Consider OT for arms if pain returns and no improvement with home exercises from previous PT  7. Topical arthritis OTC ointment to feet and ankles.  8.  BP elevated.  mildly  9.  Stressed compliance with gabapentin     Return to the office in 3 months

## 2019-10-08 ENCOUNTER — OFFICE VISIT (OUTPATIENT)
Dept: FAMILY MEDICINE | Facility: CLINIC | Age: 74
End: 2019-10-08
Payer: MEDICARE

## 2019-10-08 ENCOUNTER — LAB VISIT (OUTPATIENT)
Dept: LAB | Facility: HOSPITAL | Age: 74
End: 2019-10-08
Payer: MEDICARE

## 2019-10-08 VITALS
HEIGHT: 62 IN | HEART RATE: 64 BPM | OXYGEN SATURATION: 98 % | RESPIRATION RATE: 17 BRPM | DIASTOLIC BLOOD PRESSURE: 88 MMHG | WEIGHT: 201.94 LBS | TEMPERATURE: 98 F | BODY MASS INDEX: 37.16 KG/M2 | SYSTOLIC BLOOD PRESSURE: 154 MMHG

## 2019-10-08 DIAGNOSIS — I77.1 TORTUOUS AORTA: ICD-10-CM

## 2019-10-08 DIAGNOSIS — D49.89 THYMOMA: ICD-10-CM

## 2019-10-08 DIAGNOSIS — Z00.00 ENCOUNTER FOR PREVENTIVE HEALTH EXAMINATION: Primary | ICD-10-CM

## 2019-10-08 DIAGNOSIS — L60.8 CHANGE IN NAIL APPEARANCE: ICD-10-CM

## 2019-10-08 DIAGNOSIS — D84.9 IMMUNOSUPPRESSION: ICD-10-CM

## 2019-10-08 DIAGNOSIS — Z23 NEED FOR INFLUENZA VACCINATION: ICD-10-CM

## 2019-10-08 DIAGNOSIS — M05.79 RHEUMATOID ARTHRITIS INVOLVING MULTIPLE SITES WITH POSITIVE RHEUMATOID FACTOR: ICD-10-CM

## 2019-10-08 DIAGNOSIS — E66.01 SEVERE OBESITY (BMI 35.0-39.9) WITH COMORBIDITY: ICD-10-CM

## 2019-10-08 LAB
T4 FREE SERPL-MCNC: 0.99 NG/DL (ref 0.71–1.51)
TSH SERPL DL<=0.005 MIU/L-ACNC: 0.84 UIU/ML (ref 0.4–4)

## 2019-10-08 PROCEDURE — 99215 OFFICE O/P EST HI 40 MIN: CPT | Mod: PBBFAC,PO,25 | Performed by: PHYSICIAN ASSISTANT

## 2019-10-08 PROCEDURE — 84443 ASSAY THYROID STIM HORMONE: CPT

## 2019-10-08 PROCEDURE — 99999 PR PBB SHADOW E&M-EST. PATIENT-LVL V: CPT | Mod: PBBFAC,,, | Performed by: PHYSICIAN ASSISTANT

## 2019-10-08 PROCEDURE — G0439 PR MEDICARE ANNUAL WELLNESS SUBSEQUENT VISIT: ICD-10-PCS | Mod: S$GLB,,, | Performed by: PHYSICIAN ASSISTANT

## 2019-10-08 PROCEDURE — 99999 PR PBB SHADOW E&M-EST. PATIENT-LVL V: ICD-10-PCS | Mod: PBBFAC,,, | Performed by: PHYSICIAN ASSISTANT

## 2019-10-08 PROCEDURE — G0439 PPPS, SUBSEQ VISIT: HCPCS | Mod: S$GLB,,, | Performed by: PHYSICIAN ASSISTANT

## 2019-10-08 PROCEDURE — 84439 ASSAY OF FREE THYROXINE: CPT

## 2019-10-08 PROCEDURE — 90662 IIV NO PRSV INCREASED AG IM: CPT | Mod: PBBFAC,PO

## 2019-10-08 PROCEDURE — 36415 COLL VENOUS BLD VENIPUNCTURE: CPT | Mod: PO

## 2019-10-08 RX ORDER — CICLOPIROX 80 MG/ML
SOLUTION TOPICAL
Refills: 2 | COMMUNITY
Start: 2019-09-14 | End: 2021-05-19

## 2019-10-08 NOTE — PATIENT INSTRUCTIONS
Counseling and Referral of Other Preventative  (Italic type indicates deductible and co-insurance are waived)    Patient Name: Yulia Peralta  Today's Date: 10/8/2019    Health Maintenance       Date Due Completion Date    TETANUS VACCINE 05/15/1963 ---    Shingles Vaccine (2 of 3) 10/01/2012 8/6/2012    Influenza Vaccine (1) 09/01/2019 12/27/2018    Mammogram 09/04/2019 9/4/2018    Override on 11/17/2011: Done    Pap Smear 08/28/2019 8/28/2018    DEXA SCAN 06/23/2020 6/23/2017    Lipid Panel 08/25/2022 8/25/2017    Colonoscopy 04/03/2025 4/3/2015        Orders Placed This Encounter   Procedures    Influenza - High Dose (65+) (PF) (IM)     The following information is provided to all patients.  This information is to help you find resources for any of the problems found today that may be affecting your health:                Living healthy guide: www.[facility].[place].[facility]      Understanding Diabetes: www.diabetes.org      Eating healthy: www.cdc.gov/healthyweight      CDC home safety checklist: www.cdc.gov/steadi/patient.html      Agency on Aging: www.goea.[place].[facility]      Alcoholics anonymous (AA): www.aa.org      Physical Activity: www.travis.nih.gov/ig3yotk      Tobacco use: www.quitwithusla.org

## 2019-10-08 NOTE — TELEPHONE ENCOUNTER
Pt will be going on Oct 15 to [facility] for MRI for thymoma monitoring. She is requesting ativan for MRI as she is claustrophobic.

## 2019-10-08 NOTE — PROGRESS NOTES
Patient dionna that have an appoint 10/15/19 and will do Mammogram at [facility] .

## 2019-10-08 NOTE — PROGRESS NOTES
"Yulia Peralta presented for a  Medicare AWV and comprehensive Health Risk Assessment today. The following components were reviewed and updated:    · Medical history  · Family History  · Social history  · Allergies and Current Medications  · Health Risk Assessment  · Health Maintenance  · Care Team     ** See Completed Assessments for Annual Wellness Visit within the encounter summary.**       The following assessments were completed:  · Living Situation  · CAGE  · Depression Screening  · Timed Get Up and Go  · Whisper Test  · Cognitive Function Screening  · Nutrition Screening  · ADL Screening  · PAQ Screening    Vitals:    10/08/19 0905   BP: (!) 154/88   BP Location: Right arm   Patient Position: Sitting   BP Method: Small (Manual)   Pulse: 64   Resp: 17   Temp: 98.3 °F (36.8 °C)   TempSrc: Oral   SpO2: 98%   Weight: 91.6 kg (201 lb 15.1 oz)   Height: 5' 2" (1.575 m)     Body mass index is 36.94 kg/m².  Physical Exam      Diagnoses and health risks identified today and associated recommendations/orders:    1. Encounter for preventive health examination  Provided Yulia with a 5-10 year written screening schedule and personal prevention plan. Recommendations were developed using the USPSTF age appropriate recommendations. Education, counseling, and referrals were provided as needed. After Visit Summary printed and given to patient which includes a list of additional screenings\tests needed.      2. Need for influenza vaccination  - Influenza - High Dose (65+) (PF) (IM)    3. Change in nail appearance  - TSH; Future  - T4, free; Future    4. Rheumatoid arthritis involving multiple sites with positive rheumatoid factor  Followed by rheumatology     5. Thymoma  Followed yearly at [facility]    6. Tortuous aorta  Low fat diet monitor     7. Severe obesity (BMI 35.0-39.9) with comorbidity  DASH diet increase exercise    8. Immunosuppression  Stable, monitor    Pt complains of burning pain in left foot. Pain when " legs are elevated. And feeling fatigue in her legs when walking. She will see PCP in 2 weeks to discuss ONESIMO. Also having pain in neck up to head       No follow-ups on file.    Maribel Degroot PA-C

## 2019-10-09 ENCOUNTER — TELEPHONE (OUTPATIENT)
Dept: FAMILY MEDICINE | Facility: CLINIC | Age: 74
End: 2019-10-09

## 2019-10-09 NOTE — TELEPHONE ENCOUNTER
----- Message from Maribel Degroot PA-C sent at 10/9/2019  7:29 AM CDT -----  Please inform pt her thyroid tests are normal

## 2019-10-12 ENCOUNTER — NURSE TRIAGE (OUTPATIENT)
Dept: ADMINISTRATIVE | Facility: CLINIC | Age: 74
End: 2019-10-12

## 2019-10-12 NOTE — TELEPHONE ENCOUNTER
Reason for call: requesting new order for ativan.  Please call in to [facility].  Patient traveling to [facility] for testing and need her Ativan for anxiety.    Reason for Disposition   Caller requesting a refill, no triage required, and triager able to refill per unit policy    Protocols used: MEDICATION QUESTION CALL-A-    Patient request to have called in [facility] as she will begin her travel about 8 am on Monday.

## 2019-10-14 NOTE — TELEPHONE ENCOUNTER
----- Message from Beatriz Fletcher sent at 10/14/2019  8:33 AM CDT -----  Contact: Self   Type: RX Refill Request    Who Called:  Self     Have you contacted your pharmacy: no     Refill or New Rx: Refill     RX Name and Strength:LORazepam (ATIVAN) 1 MG tablet    Preferred Pharmacy with phone number:[facility] - [place], [place] - [address] [phone] (Phone)  [phone] (Fax)        Local or Mail Order:Local     Ordering Provider: Dr. Martin   Would the patient rather a call back or a response via [facility]? Call     Best Call Back Number:[phone] or [phone]    Additional Information:  Patient says she has to leave for [facility] they will be doing a MRI and patient need medication to help with her anxiety.

## 2019-10-14 NOTE — TELEPHONE ENCOUNTER
----- Message from Antoni Venegas sent at 10/14/2019  9:45 AM CDT -----  Contact: JONH BUCIO [[id number]]      Can the clinic reply in [facility]: no      Please refill the medication(s) listed below. Please call the patient when the prescription(s) is ready for  at this phone number   [phone]      Medication #1 LORazepam (ATIVAN) 1 MG tablet (patient having a MRI tomorrow morning, requesting to be sent ASAP)    Preferred Pharmacy: [facility] - [place], [place] - [address]

## 2019-10-15 RX ORDER — LORAZEPAM 1 MG/1
1 TABLET ORAL EVERY 6 HOURS PRN
Qty: 3 TABLET | Refills: 0 | OUTPATIENT
Start: 2019-10-15 | End: 2019-11-14

## 2019-10-15 RX ORDER — LORAZEPAM 1 MG/1
1 TABLET ORAL EVERY 6 HOURS PRN
Qty: 2 TABLET | Refills: 0 | Status: SHIPPED | OUTPATIENT
Start: 2019-10-15 | End: 2020-02-19

## 2019-10-21 ENCOUNTER — OFFICE VISIT (OUTPATIENT)
Dept: FAMILY MEDICINE | Facility: CLINIC | Age: 74
End: 2019-10-21
Payer: MEDICARE

## 2019-10-21 ENCOUNTER — LAB VISIT (OUTPATIENT)
Dept: LAB | Facility: HOSPITAL | Age: 74
End: 2019-10-21
Attending: FAMILY MEDICINE
Payer: MEDICARE

## 2019-10-21 VITALS
HEIGHT: 62 IN | BODY MASS INDEX: 37.73 KG/M2 | TEMPERATURE: 98 F | DIASTOLIC BLOOD PRESSURE: 80 MMHG | OXYGEN SATURATION: 98 % | SYSTOLIC BLOOD PRESSURE: 138 MMHG | HEART RATE: 65 BPM | WEIGHT: 205 LBS

## 2019-10-21 DIAGNOSIS — M43.10 ACQUIRED SPONDYLOLISTHESIS: ICD-10-CM

## 2019-10-21 DIAGNOSIS — M25.572 ACUTE LEFT ANKLE PAIN: ICD-10-CM

## 2019-10-21 DIAGNOSIS — M06.9 RHEUMATOID ARTHRITIS OF HAND, UNSPECIFIED LATERALITY, UNSPECIFIED RHEUMATOID FACTOR PRESENCE: ICD-10-CM

## 2019-10-21 DIAGNOSIS — I10 ESSENTIAL HYPERTENSION: ICD-10-CM

## 2019-10-21 DIAGNOSIS — M54.16 LEFT LUMBAR RADICULITIS: ICD-10-CM

## 2019-10-21 DIAGNOSIS — M48.062 SPINAL STENOSIS, LUMBAR REGION, WITH NEUROGENIC CLAUDICATION: ICD-10-CM

## 2019-10-21 DIAGNOSIS — M51.37 DEGENERATION OF LUMBAR OR LUMBOSACRAL INTERVERTEBRAL DISC: ICD-10-CM

## 2019-10-21 DIAGNOSIS — M47.819 SPONDYLOSIS WITHOUT MYELOPATHY: ICD-10-CM

## 2019-10-21 DIAGNOSIS — M41.9 ACQUIRED SCOLIOSIS: ICD-10-CM

## 2019-10-21 DIAGNOSIS — R21 RASH OF ENTIRE BODY: ICD-10-CM

## 2019-10-21 DIAGNOSIS — K21.9 GASTROESOPHAGEAL REFLUX DISEASE, ESOPHAGITIS PRESENCE NOT SPECIFIED: ICD-10-CM

## 2019-10-21 DIAGNOSIS — I67.1 ANEURYSM OF ANTERIOR COMMUNICATING ARTERY: Primary | ICD-10-CM

## 2019-10-21 LAB
ALBUMIN SERPL BCP-MCNC: 4.3 G/DL (ref 3.5–5.2)
ALP SERPL-CCNC: 96 U/L (ref 55–135)
ALT SERPL W/O P-5'-P-CCNC: 32 U/L (ref 10–44)
ANION GAP SERPL CALC-SCNC: 8 MMOL/L (ref 8–16)
AST SERPL-CCNC: 19 U/L (ref 10–40)
BASOPHILS # BLD AUTO: 0.03 K/UL (ref 0–0.2)
BASOPHILS NFR BLD: 0.5 % (ref 0–1.9)
BILIRUB SERPL-MCNC: 0.6 MG/DL (ref 0.1–1)
BUN SERPL-MCNC: 12 MG/DL (ref 8–23)
CALCIUM SERPL-MCNC: 10.1 MG/DL (ref 8.7–10.5)
CHLORIDE SERPL-SCNC: 105 MMOL/L (ref 95–110)
CHOLEST SERPL-MCNC: 156 MG/DL (ref 120–199)
CHOLEST/HDLC SERPL: 2.6 {RATIO} (ref 2–5)
CO2 SERPL-SCNC: 30 MMOL/L (ref 23–29)
CREAT SERPL-MCNC: 0.7 MG/DL (ref 0.5–1.4)
DIFFERENTIAL METHOD: ABNORMAL
EOSINOPHIL # BLD AUTO: 0.3 K/UL (ref 0–0.5)
EOSINOPHIL NFR BLD: 4.1 % (ref 0–8)
ERYTHROCYTE [DISTWIDTH] IN BLOOD BY AUTOMATED COUNT: 13.9 % (ref 11.5–14.5)
ERYTHROCYTE [SEDIMENTATION RATE] IN BLOOD BY WESTERGREN METHOD: 13 MM/HR (ref 0–20)
EST. GFR  (AFRICAN AMERICAN): >60 ML/MIN/1.73 M^2
EST. GFR  (NON AFRICAN AMERICAN): >60 ML/MIN/1.73 M^2
GLUCOSE SERPL-MCNC: 97 MG/DL (ref 70–110)
HCT VFR BLD AUTO: 36.1 % (ref 37–48.5)
HDLC SERPL-MCNC: 61 MG/DL (ref 40–75)
HDLC SERPL: 39.1 % (ref 20–50)
HGB BLD-MCNC: 11.5 G/DL (ref 12–16)
IMM GRANULOCYTES # BLD AUTO: 0.02 K/UL (ref 0–0.04)
IMM GRANULOCYTES NFR BLD AUTO: 0.3 % (ref 0–0.5)
LDLC SERPL CALC-MCNC: 86.4 MG/DL (ref 63–159)
LYMPHOCYTES # BLD AUTO: 1.1 K/UL (ref 1–4.8)
LYMPHOCYTES NFR BLD: 16.4 % (ref 18–48)
MCH RBC QN AUTO: 31.6 PG (ref 27–31)
MCHC RBC AUTO-ENTMCNC: 31.9 G/DL (ref 32–36)
MCV RBC AUTO: 99 FL (ref 82–98)
MONOCYTES # BLD AUTO: 1 K/UL (ref 0.3–1)
MONOCYTES NFR BLD: 15.8 % (ref 4–15)
NEUTROPHILS # BLD AUTO: 4 K/UL (ref 1.8–7.7)
NEUTROPHILS NFR BLD: 62.9 % (ref 38–73)
NONHDLC SERPL-MCNC: 95 MG/DL
NRBC BLD-RTO: 0 /100 WBC
PLATELET # BLD AUTO: 160 K/UL (ref 150–350)
PLATELET BLD QL SMEAR: ABNORMAL
PMV BLD AUTO: 13.7 FL (ref 9.2–12.9)
POTASSIUM SERPL-SCNC: 4.4 MMOL/L (ref 3.5–5.1)
PROT SERPL-MCNC: 8 G/DL (ref 6–8.4)
RBC # BLD AUTO: 3.64 M/UL (ref 4–5.4)
SODIUM SERPL-SCNC: 143 MMOL/L (ref 136–145)
TRIGL SERPL-MCNC: 43 MG/DL (ref 30–150)
URATE SERPL-MCNC: 6.7 MG/DL (ref 2.4–5.7)
WBC # BLD AUTO: 6.41 K/UL (ref 3.9–12.7)

## 2019-10-21 PROCEDURE — 80053 COMPREHEN METABOLIC PANEL: CPT

## 2019-10-21 PROCEDURE — 85652 RBC SED RATE AUTOMATED: CPT

## 2019-10-21 PROCEDURE — 99999 PR PBB SHADOW E&M-EST. PATIENT-LVL III: ICD-10-PCS | Mod: PBBFAC,,, | Performed by: FAMILY MEDICINE

## 2019-10-21 PROCEDURE — 84550 ASSAY OF BLOOD/URIC ACID: CPT

## 2019-10-21 PROCEDURE — 99213 OFFICE O/P EST LOW 20 MIN: CPT | Mod: PBBFAC,PO | Performed by: FAMILY MEDICINE

## 2019-10-21 PROCEDURE — 99214 PR OFFICE/OUTPT VISIT, EST, LEVL IV, 30-39 MIN: ICD-10-PCS | Mod: S$PBB,,, | Performed by: FAMILY MEDICINE

## 2019-10-21 PROCEDURE — 80061 LIPID PANEL: CPT

## 2019-10-21 PROCEDURE — 99214 OFFICE O/P EST MOD 30 MIN: CPT | Mod: S$PBB,,, | Performed by: FAMILY MEDICINE

## 2019-10-21 PROCEDURE — 99999 PR PBB SHADOW E&M-EST. PATIENT-LVL III: CPT | Mod: PBBFAC,,, | Performed by: FAMILY MEDICINE

## 2019-10-21 PROCEDURE — 85025 COMPLETE CBC W/AUTO DIFF WBC: CPT

## 2019-10-21 RX ORDER — FUROSEMIDE 40 MG/1
40 TABLET ORAL DAILY
Qty: 90 TABLET | Refills: 1 | Status: SHIPPED | OUTPATIENT
Start: 2019-10-21 | End: 2020-05-06

## 2019-10-21 RX ORDER — LORAZEPAM 1 MG/1
1 TABLET ORAL EVERY 6 HOURS PRN
Qty: 2 TABLET | Refills: 0 | Status: CANCELLED | OUTPATIENT
Start: 2019-10-21 | End: 2019-11-20

## 2019-10-21 RX ORDER — GABAPENTIN 100 MG/1
100 CAPSULE ORAL 3 TIMES DAILY
Qty: 270 CAPSULE | Refills: 1 | Status: SHIPPED | OUTPATIENT
Start: 2019-10-21 | End: 2021-03-29 | Stop reason: SDUPTHER

## 2019-10-21 RX ORDER — HYDROXYZINE HYDROCHLORIDE 50 MG/1
50 TABLET, FILM COATED ORAL 3 TIMES DAILY PRN
Qty: 60 TABLET | Refills: 0 | Status: SHIPPED | OUTPATIENT
Start: 2019-10-21 | End: 2020-08-28 | Stop reason: SDUPTHER

## 2019-10-21 RX ORDER — IRBESARTAN 300 MG/1
300 TABLET ORAL NIGHTLY
Qty: 90 TABLET | Refills: 3 | Status: SHIPPED | OUTPATIENT
Start: 2019-10-21 | End: 2020-12-01 | Stop reason: SDUPTHER

## 2019-10-21 RX ORDER — NABUMETONE 500 MG/1
500 TABLET, FILM COATED ORAL 2 TIMES DAILY
Qty: 180 TABLET | Refills: 3 | Status: SHIPPED | OUTPATIENT
Start: 2019-10-21 | End: 2020-04-03 | Stop reason: SDUPTHER

## 2019-10-21 RX ORDER — FOLIC ACID 1 MG/1
2 TABLET ORAL DAILY
Qty: 180 TABLET | Refills: 3 | Status: SHIPPED | OUTPATIENT
Start: 2019-10-21 | End: 2020-04-07 | Stop reason: SDUPTHER

## 2019-10-21 RX ORDER — PANTOPRAZOLE SODIUM 40 MG/1
40 TABLET, DELAYED RELEASE ORAL DAILY
Qty: 90 TABLET | Refills: 3 | Status: SHIPPED | OUTPATIENT
Start: 2019-10-21 | End: 2020-06-08 | Stop reason: SDUPTHER

## 2019-10-21 RX ORDER — AMLODIPINE BESYLATE 5 MG/1
5 TABLET ORAL DAILY
Qty: 90 TABLET | Refills: 1 | Status: SHIPPED | OUTPATIENT
Start: 2019-10-21 | End: 2020-05-06

## 2019-10-21 NOTE — PROGRESS NOTES
Subjective:       Patient ID: Yulia Peralta is a 74 y.o. female.    Chief Complaint: Discuss Neck Pain radiates towards head and Left Foot Pain (Discoloration)    74-year-old female with vitamin B12 his deficiency, thymoma, spinal stenosis of the lumbar region, rheumatoid arthritis, migraine headaches, hypertension, acid reflux presents today with concerns about left-sided neck pain, which he states radiates up to the left side of her head.  She states is fleeting and has a throbbing pain. It lasts minutes and resolved on its own.  She has no associated focal deficits either.  She states they are happening more frequently and is worried about an aneurysm.  She did have a CT CT angiogram of her neck, which showed a 2 mm cerebral aneurysm at the anterior communicating artery, which is likely not the cause of her pain today.     She also is concerned about left ankle pain and swelling. She states is intermittent, but mostly at the end of the day.  She denies any history of trauma to the ankle.  She denies redness or drainage.      Past Medical History:  No date: Acid reflux  No date: Anemia  No date: Anxiety  No date: Arthritis  No date: Blood transfusion  No date: Cataract  No date: Depression  No date: Dry eyes  No date: Heart murmur  No date: Hypertension  5/19/2015: Left lumbar radiculitis  12/18/2012: Multiple thyroid nodules  No date: Osteoporosis  No date: Rheumatoid arthritis  10/30/2013: Thoracic or lumbosacral neuritis or radiculitis,   unspecified   Past Surgical History:  8732-9394: gallstones  No date: HYSTERECTOMY  5834-2631: JOINT REPLACEMENT      Comment:   bilateral knee  No date: UT REMOVAL OF OVARY/TUBE(S)  No date: TONSILLECTOMY  Review of patient's family history indicates:  Problem: Cataracts      Relation: Mother          Age of Onset: (Not Specified)  Problem: Hypertension      Relation: Mother          Age of Onset: (Not Specified)  Problem: Hypertension      Relation: Sister          Age  of Onset: (Not Specified)  Problem: No Known Problems      Relation: Father          Age of Onset: (Not Specified)  Problem: Glaucoma      Relation: Brother          Age of Onset: (Not Specified)  Problem: No Known Problems      Relation: Maternal Aunt          Age of Onset: (Not Specified)  Problem: No Known Problems      Relation: Maternal Uncle          Age of Onset: (Not Specified)  Problem: No Known Problems      Relation: Paternal Aunt          Age of Onset: (Not Specified)  Problem: No Known Problems      Relation: Paternal Uncle          Age of Onset: (Not Specified)  Problem: No Known Problems      Relation: Maternal Grandmother          Age of Onset: (Not Specified)  Problem: No Known Problems      Relation: Maternal Grandfather          Age of Onset: (Not Specified)  Problem: No Known Problems      Relation: Paternal Grandmother          Age of Onset: (Not Specified)  Problem: No Known Problems      Relation: Paternal Grandfather          Age of Onset: (Not Specified)  Problem: Lupus      Relation: Neg Hx          Age of Onset: (Not Specified)  Problem: Rheum arthritis      Relation: Neg Hx          Age of Onset: (Not Specified)  Problem: Psoriasis      Relation: Neg Hx          Age of Onset: (Not Specified)  Problem: Amblyopia      Relation: Neg Hx          Age of Onset: (Not Specified)  Problem: Blindness      Relation: Neg Hx          Age of Onset: (Not Specified)  Problem: Cancer      Relation: Neg Hx          Age of Onset: (Not Specified)  Problem: Diabetes      Relation: Neg Hx          Age of Onset: (Not Specified)  Problem: Macular degeneration      Relation: Neg Hx          Age of Onset: (Not Specified)  Problem: Retinal detachment      Relation: Neg Hx          Age of Onset: (Not Specified)  Problem: Strabismus      Relation: Neg Hx          Age of Onset: (Not Specified)  Problem: Stroke      Relation: Neg Hx          Age of Onset: (Not Specified)  Problem: Thyroid disease      Relation: Neg  Hx          Age of Onset: (Not Specified)    Social History    Socioeconomic History      Marital status:       Spouse name: Not on file      Number of children: Not on file      Years of education: Not on file      Highest education level: Not on file    Occupational History      Not on file    Social Needs      Financial resource strain: Not on file      Food insecurity:        Worry: Not on file        Inability: Not on file      Transportation needs:        Medical: Not on file        Non-medical: Not on file    Tobacco Use      Smoking status: Never Smoker      Smokeless tobacco: Never Used    Substance and Sexual Activity      Alcohol use: Yes        Comment: socially      Drug use: No      Sexual activity: Not on file    Lifestyle      Physical activity:        Days per week: Not on file        Minutes per session: Not on file      Stress: Only a little    Relationships      Social connections:        Talks on phone: Not on file        Gets together: Not on file        Attends Sabianist service: Not on file        Active member of club or organization: Not on file        Attends meetings of clubs or organizations: Not on file        Relationship status: Not on file    Other Topics      Concerns:        Not on file    Social History Narrative      Adult Screenings updated and reviewed  6/12/14      Mammogram( for females) ordered for DIS      Pap ( for females) Dr Zepeda  Due for f/u   For  2014      Colonoscopy  Done once      Flu shot yearly done  2013      Td 2005      Pneumovax  Updated  12/3/13      Zostavax done 2012      Eye exam recommended yearly done 2013      Bone density  12/9/13      Thyroid ultrasound  11/6/2012 Normal sized thyroid containing several subcentimeter nodules, similar to the 5/12/11 exam.  No concerning nodules identified..               Neck Pain    This is a chronic (twice a month for a year) problem. The problem occurs rarely. The problem has been unchanged. The pain is  associated with nothing. Pain location: mainly on the left side of her neck that radiates up to the side of her head. Quality: hard, throbbing. Associated symptoms include headaches. Pertinent negatives include no photophobia or syncope. She has tried nothing for the symptoms. The treatment provided no relief.   CT NECK :      Ct neck shows:  Impression       1.  Asymmetrical hypertrophy right posterior to the lack of junction, inflammatory, infectious, neoplastic etiologies considered in differential diagnosis.     2.  Prominent degenerative disc spondylosis cervical spine.     3.  Incidental enhancing opacity right anterior superior orbit discussed above.  Consider CT scan orbits with IV contrast, ophthalmology consultation as indicated.     4.  Incidental bone exostosis or small meningioma left anterior frontal inner table.     CT ORBIT: 3/26/18  Impression         Asymmetric enhancement of the right superior ophthalmic vein without discrete vascular nidus or associated mass lesion. Findings suspicious for possible carotid cavernous fistula or other cause of arterial venous shunting. Correlation with DSA suggested. No associated proptosis or inflammatory change.     Possible 2 mm anterior communicating artery aneurysm. This could also be better assessed and characterized with a DSA.    Epic notification system activated.           Past Medical History:   Diagnosis Date    Acid reflux     Anemia     Anxiety     Arthritis     Blood transfusion     Cataract     Depression     Dry eyes     Heart murmur     Hypertension     Left lumbar radiculitis 5/19/2015    Multiple thyroid nodules 12/18/2012    Osteoporosis     Rheumatoid arthritis     Thoracic or lumbosacral neuritis or radiculitis, unspecified 10/30/2013      Past Surgical History:   Procedure Laterality Date    gallstones  5465-6846    HYSTERECTOMY      JOINT REPLACEMENT  8462-4216     bilateral knee    MA REMOVAL OF OVARY/TUBE(S)       TONSILLECTOMY       Family History   Problem Relation Age of Onset    Cataracts Mother     Hypertension Mother     Hypertension Sister     No Known Problems Father     Glaucoma Brother     No Known Problems Maternal Aunt     No Known Problems Maternal Uncle     No Known Problems Paternal Aunt     No Known Problems Paternal Uncle     No Known Problems Maternal Grandmother     No Known Problems Maternal Grandfather     No Known Problems Paternal Grandmother     No Known Problems Paternal Grandfather     Lupus Neg Hx     Rheum arthritis Neg Hx     Psoriasis Neg Hx     Amblyopia Neg Hx     Blindness Neg Hx     Cancer Neg Hx     Diabetes Neg Hx     Macular degeneration Neg Hx     Retinal detachment Neg Hx     Strabismus Neg Hx     Stroke Neg Hx     Thyroid disease Neg Hx      Social History     Socioeconomic History    Marital status:      Spouse name: Not on file    Number of children: Not on file    Years of education: Not on file    Highest education level: Not on file   Occupational History    Not on file   Social Needs    Financial resource strain: Not on file    Food insecurity:     Worry: Not on file     Inability: Not on file    Transportation needs:     Medical: Not on file     Non-medical: Not on file   Tobacco Use    Smoking status: Never Smoker    Smokeless tobacco: Never Used   Substance and Sexual Activity    Alcohol use: Yes     Comment: socially    Drug use: No    Sexual activity: Not on file   Lifestyle    Physical activity:     Days per week: Not on file     Minutes per session: Not on file    Stress: Only a little   Relationships    Social connections:     Talks on phone: Not on file     Gets together: Not on file     Attends [employer] service: Not on file     Active member of club or organization: Not on file     Attends meetings of clubs or organizations: Not on file     Relationship status: Not on file   Other Topics Concern    Not on file   Social  "History Narrative    Adult Screenings updated and reviewed  6/12/14    Mammogram( for females) ordered for DIS    Pap ( for females) Dr Zepeda  Due for f/u   For  2014    Colonoscopy  Done once    Flu shot yearly done  2013    Td 2005    Pneumovax  Updated  12/3/13    Zostavax done 2012    Eye exam recommended yearly done 2013    Bone density  12/9/13    Thyroid ultrasound  11/6/2012 Normal sized thyroid containing several subcentimeter nodules, similar to the 5/12/11 exam.  No concerning nodules identified..            Review of Systems   Eyes: Negative for photophobia.   Cardiovascular: Negative for syncope.   Musculoskeletal: Positive for neck pain.   Neurological: Positive for headaches.       Objective:       Vitals:    10/21/19 0812   BP: 138/80   Pulse: 65   Temp: 98.3 °F (36.8 °C)   TempSrc: Oral   SpO2: 98%   Weight: 93 kg (205 lb 0.4 oz)   Height: 5' 2" (1.575 m)       Physical Exam   Constitutional: She is oriented to person, place, and time. She appears well-developed and well-nourished. No distress.   HENT:   Head: Normocephalic and atraumatic.   Neck: Normal range of motion. Neck supple.   Cardiovascular: Normal rate, regular rhythm and normal heart sounds. Exam reveals no gallop and no friction rub.   No murmur heard.  Pulses:       Dorsalis pedis pulses are 1+ on the right side, and 1+ on the left side.   Pulmonary/Chest: Effort normal and breath sounds normal. No stridor. No respiratory distress. She has no wheezes. She has no rales.   Neurological: She is alert and oriented to person, place, and time.   Skin: She is not diaphoretic.   Psychiatric: She has a normal mood and affect.       Assessment:       1. Aneurysm of anterior communicating artery    2. Essential hypertension    3. Rheumatoid arthritis of hand, unspecified laterality, unspecified rheumatoid factor presence    4. Spondylosis without myelopathy    5. Degeneration of lumbar or lumbosacral intervertebral disc    6. Spinal stenosis, " lumbar region, with neurogenic claudication    7. Left lumbar radiculitis    8. Acquired spondylolisthesis    9. Acquired scoliosis    10. Rash of entire body    11. Gastroesophageal reflux disease, esophagitis presence not specified    12. Acute left ankle pain        Plan:       Yulia was seen today for discuss neck pain radiates towards head and left foot pain (discoloration).    Diagnoses and all orders for this visit:    Aneurysm of anterior communicating artery  -     Ambulatory referral to Vascular Surgery  ADVISED THAT THE 2 MM ANEURYSM IS LIKELY NOT THE CAUSE OF ANY SYMPTOMS AS IT IS TOO SMALL, THE PATIENT WOULD PREFER HER 2ND OPINION.    Essential hypertension  -     amLODIPine (NORVASC) 5 MG tablet; Take 1 tablet (5 mg total) by mouth once daily.  -     furosemide (LASIX) 40 MG tablet; Take 1 tablet (40 mg total) by mouth once daily.  -     irbesartan (AVAPRO) 300 MG tablet; Take 1 tablet (300 mg total) by mouth every evening.  -     Comprehensive metabolic panel; Future  -     Lipid panel; Future  -     CBC auto differential; Future  Stable. Refilled meds and due for labs    Rheumatoid arthritis of hand, unspecified laterality, unspecified rheumatoid factor presence  -     folic acid (FOLVITE) 1 MG tablet; Take 2 tablets (2 mg total) by mouth once daily.  -     nabumetone (RELAFEN) 500 MG tablet; Take 1 tablet (500 mg total) by mouth 2 (two) times daily.    Spondylosis without myelopathy  -     gabapentin (NEURONTIN) 100 MG capsule; Take 1 capsule (100 mg total) by mouth 3 (three) times daily.    Degeneration of lumbar or lumbosacral intervertebral disc  -     gabapentin (NEURONTIN) 100 MG capsule; Take 1 capsule (100 mg total) by mouth 3 (three) times daily.    Spinal stenosis, lumbar region, with neurogenic claudication  -     gabapentin (NEURONTIN) 100 MG capsule; Take 1 capsule (100 mg total) by mouth 3 (three) times daily.    Left lumbar radiculitis  -     gabapentin (NEURONTIN) 100 MG capsule;  Take 1 capsule (100 mg total) by mouth 3 (three) times daily.    Acquired spondylolisthesis  -     gabapentin (NEURONTIN) 100 MG capsule; Take 1 capsule (100 mg total) by mouth 3 (three) times daily.    Acquired scoliosis  -     gabapentin (NEURONTIN) 100 MG capsule; Take 1 capsule (100 mg total) by mouth 3 (three) times daily.    Rash of entire body  -     hydrOXYzine (ATARAX) 50 MG tablet; Take 1 tablet (50 mg total) by mouth 3 (three) times daily as needed for Itching.    Gastroesophageal reflux disease, esophagitis presence not specified  -     pantoprazole (PROTONIX) 40 MG tablet; Take 1 tablet (40 mg total) by mouth once daily. 1 Tablet, Delayed Release (E.C.) Oral Every day    Acute left ankle pain  -     Uric acid; Future  -     Sedimentation rate; Future  WILL CHECK FOR GOUT AND SIGNS OF INFLAMMATION.  Other orders  -     Cancel: LORazepam (ATIVAN) 1 MG tablet; Take 1 tablet (1 mg total) by mouth every 6 (six) hours as needed for Anxiety.

## 2019-11-11 DIAGNOSIS — M06.9 RHEUMATOID ARTHRITIS OF HAND, UNSPECIFIED LATERALITY, UNSPECIFIED RHEUMATOID FACTOR PRESENCE: ICD-10-CM

## 2019-11-11 RX ORDER — METHOTREXATE 2.5 MG/1
TABLET ORAL
Qty: 120 TABLET | Refills: 0 | Status: SHIPPED | OUTPATIENT
Start: 2019-11-11 | End: 2020-01-07 | Stop reason: SDUPTHER

## 2019-11-11 RX ORDER — METHOTREXATE 2.5 MG/1
TABLET ORAL
Qty: 120 TABLET | Refills: 0 | Status: SHIPPED | OUTPATIENT
Start: 2019-11-11 | End: 2019-11-11 | Stop reason: SDUPTHER

## 2019-11-11 NOTE — TELEPHONE ENCOUNTER
----- Message from Gerda Marquez sent at 11/11/2019  3:58 PM CST -----  Contact:   Pt  [phone]  Rx Refill/Request     Is this a Refill or New Rx:    Refill    Rx Name and Strength:    methotrexate 2.5 MG Tab  Preferred Pharmacy with phone number:    [facility]    [phone]  Communication Preference:   Phone   Additional Information:

## 2019-11-26 ENCOUNTER — TELEPHONE (OUTPATIENT)
Dept: VASCULAR SURGERY | Facility: CLINIC | Age: 74
End: 2019-11-26

## 2019-11-26 NOTE — TELEPHONE ENCOUNTER
Call to patient to let her know that Dr. Sandhu does not see patients with her diagnosis. Explained that he stated that would be neurosurgery. Did message her primary care to let them know a new referral should be placed. Explained would cancel her appointment with our office. She stated understanding.

## 2019-11-27 ENCOUNTER — TELEPHONE (OUTPATIENT)
Dept: FAMILY MEDICINE | Facility: CLINIC | Age: 74
End: 2019-11-27

## 2019-11-27 DIAGNOSIS — I67.1 ANEURYSM OF ANTERIOR COMMUNICATING ARTERY: Primary | ICD-10-CM

## 2019-11-27 NOTE — TELEPHONE ENCOUNTER
----- Message from Mirta Lutz LPN sent at 11/26/2019  8:35 AM CST -----  Regarding: FW: wrong referral      ----- Message -----  From: Kellee Stevens RN  Sent: 11/26/2019   8:16 AM CST  To: Mario EARYL Staff  Subject: wrong referral                                   This patient was referred to vascular surgery for Aneurysm of anterior communicating artery. Dr. Sandhu does not see these patients and stated she should be referred to neurosurgery. Thanks.  Kellee

## 2019-11-29 ENCOUNTER — TELEPHONE (OUTPATIENT)
Dept: FAMILY MEDICINE | Facility: CLINIC | Age: 74
End: 2019-11-29

## 2019-12-26 ENCOUNTER — LAB VISIT (OUTPATIENT)
Dept: LAB | Facility: HOSPITAL | Age: 74
End: 2019-12-26
Attending: INTERNAL MEDICINE
Payer: MEDICARE

## 2019-12-26 DIAGNOSIS — D84.9 IMMUNOSUPPRESSION: ICD-10-CM

## 2019-12-26 DIAGNOSIS — E66.09 CLASS 2 OBESITY DUE TO EXCESS CALORIES WITHOUT SERIOUS COMORBIDITY WITH BODY MASS INDEX (BMI) OF 36.0 TO 36.9 IN ADULT: ICD-10-CM

## 2019-12-26 DIAGNOSIS — M05.79 RHEUMATOID ARTHRITIS INVOLVING MULTIPLE SITES WITH POSITIVE RHEUMATOID FACTOR: ICD-10-CM

## 2019-12-26 LAB
ALBUMIN SERPL BCP-MCNC: 4 G/DL (ref 3.5–5.2)
ALP SERPL-CCNC: 97 U/L (ref 55–135)
ALT SERPL W/O P-5'-P-CCNC: 24 U/L (ref 10–44)
ANION GAP SERPL CALC-SCNC: 5 MMOL/L (ref 8–16)
AST SERPL-CCNC: 24 U/L (ref 10–40)
BASOPHILS # BLD AUTO: 0.02 K/UL (ref 0–0.2)
BASOPHILS NFR BLD: 0.3 % (ref 0–1.9)
BILIRUB SERPL-MCNC: 0.4 MG/DL (ref 0.1–1)
BUN SERPL-MCNC: 11 MG/DL (ref 8–23)
CALCIUM SERPL-MCNC: 9.9 MG/DL (ref 8.7–10.5)
CHLORIDE SERPL-SCNC: 108 MMOL/L (ref 95–110)
CHOLEST SERPL-MCNC: 138 MG/DL (ref 120–199)
CHOLEST/HDLC SERPL: 2.8 {RATIO} (ref 2–5)
CO2 SERPL-SCNC: 28 MMOL/L (ref 23–29)
CREAT SERPL-MCNC: 0.8 MG/DL (ref 0.5–1.4)
CRP SERPL-MCNC: 1.3 MG/L (ref 0–8.2)
DIFFERENTIAL METHOD: ABNORMAL
EOSINOPHIL # BLD AUTO: 0.2 K/UL (ref 0–0.5)
EOSINOPHIL NFR BLD: 3.8 % (ref 0–8)
ERYTHROCYTE [DISTWIDTH] IN BLOOD BY AUTOMATED COUNT: 14.6 % (ref 11.5–14.5)
ERYTHROCYTE [SEDIMENTATION RATE] IN BLOOD BY WESTERGREN METHOD: 17 MM/HR (ref 0–36)
EST. GFR  (AFRICAN AMERICAN): >60 ML/MIN/1.73 M^2
EST. GFR  (NON AFRICAN AMERICAN): >60 ML/MIN/1.73 M^2
GLUCOSE SERPL-MCNC: 92 MG/DL (ref 70–110)
HCT VFR BLD AUTO: 35.8 % (ref 37–48.5)
HDLC SERPL-MCNC: 50 MG/DL (ref 40–75)
HDLC SERPL: 36.2 % (ref 20–50)
HGB BLD-MCNC: 10.9 G/DL (ref 12–16)
IMM GRANULOCYTES # BLD AUTO: 0.02 K/UL (ref 0–0.04)
IMM GRANULOCYTES NFR BLD AUTO: 0.3 % (ref 0–0.5)
LDLC SERPL CALC-MCNC: 72.8 MG/DL (ref 63–159)
LYMPHOCYTES # BLD AUTO: 1.4 K/UL (ref 1–4.8)
LYMPHOCYTES NFR BLD: 24.5 % (ref 18–48)
MCH RBC QN AUTO: 31.2 PG (ref 27–31)
MCHC RBC AUTO-ENTMCNC: 30.4 G/DL (ref 32–36)
MCV RBC AUTO: 103 FL (ref 82–98)
MONOCYTES # BLD AUTO: 1.2 K/UL (ref 0.3–1)
MONOCYTES NFR BLD: 20.2 % (ref 4–15)
NEUTROPHILS # BLD AUTO: 3 K/UL (ref 1.8–7.7)
NEUTROPHILS NFR BLD: 50.9 % (ref 38–73)
NONHDLC SERPL-MCNC: 88 MG/DL
NRBC BLD-RTO: 0 /100 WBC
PLATELET # BLD AUTO: 155 K/UL (ref 150–350)
PLATELET BLD QL SMEAR: ABNORMAL
PMV BLD AUTO: 14.1 FL (ref 9.2–12.9)
POTASSIUM SERPL-SCNC: 4.5 MMOL/L (ref 3.5–5.1)
PROT SERPL-MCNC: 7.5 G/DL (ref 6–8.4)
RBC # BLD AUTO: 3.49 M/UL (ref 4–5.4)
SODIUM SERPL-SCNC: 141 MMOL/L (ref 136–145)
TRIGL SERPL-MCNC: 76 MG/DL (ref 30–150)
WBC # BLD AUTO: 5.84 K/UL (ref 3.9–12.7)

## 2019-12-26 PROCEDURE — 80053 COMPREHEN METABOLIC PANEL: CPT

## 2019-12-26 PROCEDURE — 85025 COMPLETE CBC W/AUTO DIFF WBC: CPT

## 2019-12-26 PROCEDURE — 36415 COLL VENOUS BLD VENIPUNCTURE: CPT | Mod: PO

## 2019-12-26 PROCEDURE — 80061 LIPID PANEL: CPT

## 2019-12-26 PROCEDURE — 86140 C-REACTIVE PROTEIN: CPT

## 2019-12-26 PROCEDURE — 85652 RBC SED RATE AUTOMATED: CPT

## 2020-01-02 ENCOUNTER — PATIENT OUTREACH (OUTPATIENT)
Dept: ADMINISTRATIVE | Facility: OTHER | Age: 75
End: 2020-01-02

## 2020-01-06 ENCOUNTER — OFFICE VISIT (OUTPATIENT)
Dept: NEUROSURGERY | Facility: CLINIC | Age: 75
End: 2020-01-06
Payer: MEDICARE

## 2020-01-06 ENCOUNTER — TELEPHONE (OUTPATIENT)
Dept: NEUROSURGERY | Facility: CLINIC | Age: 75
End: 2020-01-06

## 2020-01-06 VITALS
SYSTOLIC BLOOD PRESSURE: 158 MMHG | DIASTOLIC BLOOD PRESSURE: 74 MMHG | WEIGHT: 204.13 LBS | HEIGHT: 62 IN | HEART RATE: 71 BPM | BODY MASS INDEX: 37.56 KG/M2

## 2020-01-06 DIAGNOSIS — I67.1 CAROTID-CAVERNOUS FISTULA: ICD-10-CM

## 2020-01-06 DIAGNOSIS — I67.1 CEREBRAL ANEURYSM WITHOUT RUPTURE: Primary | ICD-10-CM

## 2020-01-06 PROCEDURE — 1126F PR PAIN SEVERITY QUANTIFIED, NO PAIN PRESENT: ICD-10-PCS | Mod: ,,, | Performed by: NEUROLOGICAL SURGERY

## 2020-01-06 PROCEDURE — 1126F AMNT PAIN NOTED NONE PRSNT: CPT | Mod: ,,, | Performed by: NEUROLOGICAL SURGERY

## 2020-01-06 PROCEDURE — 1159F PR MEDICATION LIST DOCUMENTED IN MEDICAL RECORD: ICD-10-PCS | Mod: ,,, | Performed by: NEUROLOGICAL SURGERY

## 2020-01-06 PROCEDURE — 1159F MED LIST DOCD IN RCRD: CPT | Mod: ,,, | Performed by: NEUROLOGICAL SURGERY

## 2020-01-06 PROCEDURE — 99204 OFFICE O/P NEW MOD 45 MIN: CPT | Mod: S$PBB,,, | Performed by: NEUROLOGICAL SURGERY

## 2020-01-06 PROCEDURE — 99999 PR PBB SHADOW E&M-EST. PATIENT-LVL III: CPT | Mod: PBBFAC,,, | Performed by: NEUROLOGICAL SURGERY

## 2020-01-06 PROCEDURE — 99999 PR PBB SHADOW E&M-EST. PATIENT-LVL III: ICD-10-PCS | Mod: PBBFAC,,, | Performed by: NEUROLOGICAL SURGERY

## 2020-01-06 PROCEDURE — 99213 OFFICE O/P EST LOW 20 MIN: CPT | Mod: PBBFAC | Performed by: NEUROLOGICAL SURGERY

## 2020-01-06 PROCEDURE — 99204 PR OFFICE/OUTPT VISIT, NEW, LEVL IV, 45-59 MIN: ICD-10-PCS | Mod: S$PBB,,, | Performed by: NEUROLOGICAL SURGERY

## 2020-01-06 NOTE — LETTER
January 9, 2020      Ileana Martin MD  [address]  [place] [address]           [facility]  [address]   [place] [address]  Phone: [phone]  Fax: [phone]          Patient: Yulia Peralta   MR Number: [id number]   YOB: 1945   Date of Visit: 1/6/2020       Dear Dr. Ileana Martin:    Thank you for referring Yulia Peralta to me for evaluation. Attached you will find relevant portions of my assessment and plan of care.    If you have questions, please do not hesitate to call me. I look forward to following Yulia Peralta along with you.    Sincerely,    Zhao Jenkins, DO    Enclosure  CC:  No Recipients    If you would like to receive this communication electronically, please contact [email or web address] or [phone] to request more information on [employer] Link access.    For providers and/or their staff who would like to refer a patient to [facility], please contact us through our one-stop-shop provider referral line, [facility], at [phone].    If you feel you have received this communication in error or would no longer like to receive these types of communications, please e-mail [email or web address]

## 2020-01-07 ENCOUNTER — OFFICE VISIT (OUTPATIENT)
Dept: RHEUMATOLOGY | Facility: CLINIC | Age: 75
End: 2020-01-07
Payer: MEDICARE

## 2020-01-07 ENCOUNTER — TELEPHONE (OUTPATIENT)
Dept: INTERVENTIONAL RADIOLOGY/VASCULAR | Facility: CLINIC | Age: 75
End: 2020-01-07

## 2020-01-07 VITALS
BODY MASS INDEX: 38.42 KG/M2 | HEIGHT: 62 IN | SYSTOLIC BLOOD PRESSURE: 152 MMHG | HEART RATE: 63 BPM | WEIGHT: 208.75 LBS | DIASTOLIC BLOOD PRESSURE: 70 MMHG

## 2020-01-07 DIAGNOSIS — K21.9 GASTROESOPHAGEAL REFLUX DISEASE, ESOPHAGITIS PRESENCE NOT SPECIFIED: ICD-10-CM

## 2020-01-07 DIAGNOSIS — M06.9 RHEUMATOID ARTHRITIS OF HAND, UNSPECIFIED LATERALITY, UNSPECIFIED RHEUMATOID FACTOR PRESENCE: Primary | ICD-10-CM

## 2020-01-07 DIAGNOSIS — Z79.1 NSAID LONG-TERM USE: ICD-10-CM

## 2020-01-07 DIAGNOSIS — D84.9 IMMUNOSUPPRESSION: ICD-10-CM

## 2020-01-07 PROCEDURE — 1159F MED LIST DOCD IN RCRD: CPT | Mod: ,,, | Performed by: INTERNAL MEDICINE

## 2020-01-07 PROCEDURE — 99213 OFFICE O/P EST LOW 20 MIN: CPT | Mod: PBBFAC | Performed by: INTERNAL MEDICINE

## 2020-01-07 PROCEDURE — 99999 PR PBB SHADOW E&M-EST. PATIENT-LVL III: CPT | Mod: PBBFAC,,, | Performed by: INTERNAL MEDICINE

## 2020-01-07 PROCEDURE — 99999 PR PBB SHADOW E&M-EST. PATIENT-LVL III: ICD-10-PCS | Mod: PBBFAC,,, | Performed by: INTERNAL MEDICINE

## 2020-01-07 PROCEDURE — 1159F PR MEDICATION LIST DOCUMENTED IN MEDICAL RECORD: ICD-10-PCS | Mod: ,,, | Performed by: INTERNAL MEDICINE

## 2020-01-07 PROCEDURE — 99214 PR OFFICE/OUTPT VISIT, EST, LEVL IV, 30-39 MIN: ICD-10-PCS | Mod: S$PBB,,, | Performed by: INTERNAL MEDICINE

## 2020-01-07 PROCEDURE — 99214 OFFICE O/P EST MOD 30 MIN: CPT | Mod: S$PBB,,, | Performed by: INTERNAL MEDICINE

## 2020-01-07 PROCEDURE — 1125F PR PAIN SEVERITY QUANTIFIED, PAIN PRESENT: ICD-10-PCS | Mod: ,,, | Performed by: INTERNAL MEDICINE

## 2020-01-07 PROCEDURE — 1125F AMNT PAIN NOTED PAIN PRSNT: CPT | Mod: ,,, | Performed by: INTERNAL MEDICINE

## 2020-01-07 RX ORDER — METHOTREXATE 2.5 MG/1
TABLET ORAL
Qty: 160 TABLET | Refills: 0 | Status: SHIPPED | OUTPATIENT
Start: 2020-01-07 | End: 2020-04-03 | Stop reason: SDUPTHER

## 2020-01-07 RX ORDER — MISOPROSTOL 100 UG/1
100 TABLET ORAL 2 TIMES DAILY
Qty: 180 TABLET | Refills: 1 | Status: SHIPPED | OUTPATIENT
Start: 2020-01-07 | End: 2020-04-07 | Stop reason: SDUPTHER

## 2020-01-07 ASSESSMENT — ROUTINE ASSESSMENT OF PATIENT INDEX DATA (RAPID3)
PATIENT GLOBAL ASSESSMENT SCORE: .5
PSYCHOLOGICAL DISTRESS SCORE: 1.1
PAIN SCORE: 3
MDHAQ FUNCTION SCORE: .5
FATIGUE SCORE: 3
TOTAL RAPID3 SCORE: 1.72
AM STIFFNESS SCORE: 0, NO

## 2020-01-07 NOTE — PROGRESS NOTES
"Subjective:       Patient ID: Yulia Peralta is a 74 y.o. female.    Chief Complaint: Rheumatoid arthritis    HPI:  Yulia Peralta is a 74 y.o. female with a history of rheumatoid arthritis for 25 years.  She had been on methotrexate for the past 5 years and her current dose is methotrexate 10 tabs qweek (5 on Monday and 5 on Tuesday).  Off prednisone    History of gout some years ago.        Interval History:  Concerned about daughter who was hospitalized for eye lid swelling and diagnosed with lupus.    Recurrent rash.  Saw 2 different dermatologists.  Dr. Mikel helton.  Also saw Dr. Packer.    Biopsy she was told was normal.  The rash comes and goes.   Went October 2019 to [facility] to check thymoma.  Everything was fine and she will follow in 2 years.     Feels joints are doing well has 3/10 ache left outer lower leg.    Denies morning stiffness.  Improve with resting feet and rubbing with alcohol.  Worse with walking.  Tingling in feet but does not take gabapentin daily.  Seeing neurosurgery regarding aneurysm anterior communicating artery on CT.  Awaiting additional imaging.     Review of Systems   Constitutional: Positive for fatigue.   HENT: Negative.    Eyes: Negative.    Cardiovascular: Negative.    Gastrointestinal: Negative.    Endocrine: Negative.    Genitourinary: Negative.    Musculoskeletal: Positive for arthralgias and joint swelling.   Skin: Positive for rash.   Allergic/Immunologic: Negative.    Neurological: Positive for headaches.   Hematological: Bruises/bleeds easily.   Psychiatric/Behavioral: Negative.          Objective:   BP (!) 152/70 (BP Location: Left arm, Patient Position: Sitting, BP Method: Large (Automatic))   Pulse 63   Ht 5' 2" (1.575 m)   Wt 94.7 kg (208 lb 12.4 oz)   BMI 38.19 kg/m²         Physical Exam   Constitutional: She is oriented to person, place, and time and well-developed, well-nourished, and in no distress.   HENT:   Head: Normocephalic and atraumatic. "   Eyes: Conjunctivae and EOM are normal.   Neck: Neck supple.   Cardiovascular: Normal rate and regular rhythm.    Pulmonary/Chest: Effort normal and breath sounds normal.   Abdominal: Soft. Bowel sounds are normal.   Neurological: She is alert and oriented to person, place, and time. Gait normal.   Skin: Skin is warm and dry.     Psychiatric: Mood and affect normal.   Musculoskeletal:   28 joint count: 0 swollen and 0 tender  Contracture bilateral elbows  Left ankle with not pitting edema              LABS    Component      Latest Ref Rng & Units 12/26/2019   WBC      3.90 - 12.70 K/uL 5.84   RBC      4.00 - 5.40 M/uL 3.49 (L)   Hemoglobin      12.0 - 16.0 g/dL 10.9 (L)   Hematocrit      37.0 - 48.5 % 35.8 (L)   MCV      82 - 98 fL 103 (H)   MCH      27.0 - 31.0 pg 31.2 (H)   MCHC      32.0 - 36.0 g/dL 30.4 (L)   RDW      11.5 - 14.5 % 14.6 (H)   Platelets      150 - 350 K/uL 155   MPV      9.2 - 12.9 fL 14.1 (H)   Immature Granulocytes      0.0 - 0.5 % 0.3   Gran # (ANC)      1.8 - 7.7 K/uL 3.0   Immature Grans (Abs)      0.00 - 0.04 K/uL 0.02   Lymph #      1.0 - 4.8 K/uL 1.4   Mono #      0.3 - 1.0 K/uL 1.2 (H)   Eos #      0.0 - 0.5 K/uL 0.2   Baso #      0.00 - 0.20 K/uL 0.02   nRBC      0 /100 WBC 0   Gran%      38.0 - 73.0 % 50.9   Lymph%      18.0 - 48.0 % 24.5   Mono%      4.0 - 15.0 % 20.2 (H)   Eosinophil%      0.0 - 8.0 % 3.8   Basophil%      0.0 - 1.9 % 0.3   Platelet Estimate       Appears normal   Differential Method       Automated   Sodium      136 - 145 mmol/L 141   Potassium      3.5 - 5.1 mmol/L 4.5   Chloride      95 - 110 mmol/L 108   CO2      23 - 29 mmol/L 28   Glucose      70 - 110 mg/dL 92   BUN, Bld      8 - 23 mg/dL 11   Creatinine      0.5 - 1.4 mg/dL 0.8   Calcium      8.7 - 10.5 mg/dL 9.9   PROTEIN TOTAL      6.0 - 8.4 g/dL 7.5   Albumin      3.5 - 5.2 g/dL 4.0   BILIRUBIN TOTAL      0.1 - 1.0 mg/dL 0.4   Alkaline Phosphatase      55 - 135 U/L 97   AST      10 - 40 U/L 24   ALT       10 - 44 U/L 24   Anion Gap      8 - 16 mmol/L 5 (L)   eGFR if African American      >60 mL/min/1.73 m:2 >60.0   eGFR if non African American      >60 mL/min/1.73 m:2 >60.0   Cholesterol      120 - 199 mg/dL 138   Triglycerides      30 - 150 mg/dL 76   HDL      40 - 75 mg/dL 50   LDL Cholesterol External      63.0 - 159.0 mg/dL 72.8   Hdl/Cholesterol Ratio      20.0 - 50.0 % 36.2   Total Cholesterol/HDL Ratio      2.0 - 5.0 2.8   Non-HDL Cholesterol      mg/dL 88   CRP      0.0 - 8.2 mg/L 1.3   Sed Rate      0 - 36 mm/Hr 17         Assessment:       1. Rheumatoid arthritis manifested by rheumatoid nodule, polyarthritis in the past, and contractures of the elbows.    Treated in the past with gold. Plaquenil did not work in the past and she never took SSZ.   Now doing well. Continue MTX (Monday 5 in morning and 5 in evening) and folic acid   2. Encounter for long-term (current) use of other medications    3. Fatigue.   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  She decided to go for second opinion at [facility]. Biopsy was negative. MRI repeat stable and most recent 10/2017 stable  5. 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 Felt not to have hepatitis C by hepatology.  6. Right anserine bursitis.   7. Obesity  8. HTN.   9. Chronic back pain.  Bulging disc.  May be cause of paresthesias in legs with standing.  10.  Paresthesia of in left leg.  May be due to #9  11.  Right clavicle mass.  Evaluated by x-ray   12.  Trigger fingers.  Left 4th and right 3rd and right 5th.  Therapy helped in past.    13.  Left lower leg pain and swelling.  History of trauma  14.  Anterior communicating artery aneurysm  Plan:       1. Continue methotrexate 10 tabs qweek.  2. Check CRP, ESR, CBC, and CMP.   3. Continue Nabumetone.  Patient says GI ok with her continuing despite healing ulcer on EGD.   Patient on misoprostol  4. Continue folic acid 2  tabs.    5. Patient to discuss with primary doctor sleep apnea since she has HA, snoring and fatigue  6. Consider OT for arms if pain returns and no improvement with home exercises from previous PT  7. Topical arthritis OTC ointment to feet and ankles.  8.  BP elevated.  mildly  9.  Stressed compliance with gabapentin     Return to the office in 3 months

## 2020-01-07 NOTE — TELEPHONE ENCOUNTER
I have attempted to contact this patient by phone to schedule her NeuroIR clinic consult for possible ACOM aneurysm and R orbital AVM vs fistula. No answer/Left message to return call to [phone]. Please forward call.

## 2020-01-09 NOTE — PROGRESS NOTES
"CHIEF COMPLAINT:  Aneurysm    HPI:  Yulia Peralta is a 74 y.o.  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  Patient reports that she has pain on either sides of her neck that radiates up to bilateral frontal areas.  The headaches will last 2-3 minutes.  She describes them as "hard".  They are intermittent and alternate sides.  They get severe enough that she has to hold her neck.  They have been ongoing for 2-3 years.  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     she denies any triggers for the headaches.  She does not have a history of migraines.      She has cataracts but denies any vision changes, denies loss of consciousness, seizures, sensory motor changes.      She does not smoke, does not have connective tissue disease, family members with aneurysms or ever had any intracerebral hemorrhage.  She does  Have hypertension.    Review of patient's allergies indicates:   Allergen Reactions    No known drug allergies        Past Medical History:   Diagnosis Date    Acid reflux     Anemia     Anxiety     Arthritis     Blood transfusion     Cataract     Depression     Dry eyes     Heart murmur     Hypertension     Left lumbar radiculitis 5/19/2015    Multiple thyroid nodules 12/18/2012    Osteoporosis     Rheumatoid arthritis     Thoracic or lumbosacral neuritis or radiculitis, unspecified 10/30/2013     Past Surgical History:   Procedure Laterality Date    gallstones  0809-4600    HYSTERECTOMY      JOINT REPLACEMENT  0625-2511     bilateral knee    AK REMOVAL OF OVARY/TUBE(S)      TONSILLECTOMY       Family History   Problem Relation Age of Onset    Cataracts Mother     Hypertension Mother     Hypertension Sister     No Known Problems Father     Glaucoma Brother     No Known Problems Maternal " Aunt     No Known Problems Maternal Uncle     No Known Problems Paternal Aunt     No Known Problems Paternal Uncle     No Known Problems Maternal Grandmother     No Known Problems Maternal Grandfather     No Known Problems Paternal Grandmother     No Known Problems Paternal Grandfather     Lupus Neg Hx     Rheum arthritis Neg Hx     Psoriasis Neg Hx     Amblyopia Neg Hx     Blindness Neg Hx     Cancer Neg Hx     Diabetes Neg Hx     Macular degeneration Neg Hx     Retinal detachment Neg Hx     Strabismus Neg Hx     Stroke Neg Hx     Thyroid disease Neg Hx      Social History     Tobacco Use    Smoking status: Never Smoker    Smokeless tobacco: Never Used   Substance Use Topics    Alcohol use: Yes     Comment: socially    Drug use: No        Review of Systems   Constitutional: Negative.    HENT: Negative for congestion, ear discharge, ear pain, hearing loss, nosebleeds, sinus pain and tinnitus.    Eyes: Negative.    Respiratory: Negative.    Cardiovascular: Negative for chest pain, palpitations, claudication and leg swelling.   Gastrointestinal: Negative for abdominal pain, blood in stool, constipation, diarrhea, melena and vomiting.   Genitourinary: Negative for flank pain, frequency and urgency.   Musculoskeletal: Positive for neck pain. Negative for back pain, falls, joint pain and myalgias.   Skin: Negative.    Neurological: Positive for headaches. Negative for dizziness, tingling, tremors, sensory change, speech change, focal weakness, seizures, loss of consciousness and weakness.   Endo/Heme/Allergies: Does not bruise/bleed easily.   Psychiatric/Behavioral: Negative.        OBJECTIVE:   Vital Signs:  Pulse: 71 (01/06/20 1309)  BP: (!) 158/74 (01/06/20 1309)    Physical Exam:    Vital signs: All nursing notes and vital signs reviewed -- afebrile, vital signs stable.  Constitutional: Patient sitting comfortably in chair. Appears well developed and well nourished.  Skin: Exposed areas are  intact without abnormal markings, rashes or other lesions.  HEENT: Normocephalic. Normal conjunctivae.  Cardiovascular: Normal rate and regular rhythm.  Respiratory: Chest wall rises and falls symmetrically, without signs of respiratory distress.  Abdomen: Soft and non-tender.  Extremities: Warm and without edema. Calves supple, non-tender.  Psych/Behavior: Normal affect.    Neurological:    Mental status: Alert and oriented. Conversational and appropriate.       Cranial Nerves: VFF to confrontation. PERRL. EOMI without nystagmus. Facial STLT normal and symmetric. Strong, symmetric muscles of mastication. Facial strength full and symmetric. Hearing equal bilaterally to finger rub. Palate and uvula rise and fall normally in midline. Shoulder shrug 5/5 strength. Tongue midline.     Motor:    Upper:  Deltoids Triceps Biceps WE WF     R 5/5 5/5 5/5 5/5 5/5 5/5    L 5/5 5/5 5/5 5/5 5/5 5/5      Lower:  HF KE KF DF PF EHL    R 5/5 5/5 5/5 5/5 5/5 5/5    L 5/5 5/5 5/5 5/5 5/5 5/5     Sensory: Intact sensation to light touch in all extremities. Romberg negative.    Reflexes:          DTR: 2+ symmetrically throughout.     Simmons's: Negative.     Babinski's: Negative.     Clonus: Negative.    Cerebellar: Finger-to-nose and rapid alternating movements normal. Gait stable, fluid.      Diagnostic Results:  All imaging was independently reviewed by me.    CT orbit, dated 3/6/19:  1. Abnormal vasculature within R orbit possible fistual vs AVM (apparent nidus supero-medial to eye)  2. 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  Neuro intact.  Will need DSA to better evaluate. Other than HTN and being female, she does not have other risk factors for rupture should this be a true aneurysm.    1. Ordered cerebral 6 vessel angio (will call with results)  2. Possible referral to ophtho depending  upon angio   3. Will refer to Dr Fletcher if confirms aneurysm    The patient understands and agrees with the plan of care. All questions were answered.            .

## 2020-01-13 ENCOUNTER — OFFICE VISIT (OUTPATIENT)
Dept: INTERVENTIONAL RADIOLOGY/VASCULAR | Facility: CLINIC | Age: 75
End: 2020-01-13
Payer: MEDICARE

## 2020-01-13 DIAGNOSIS — Q28.3 CEREBRAL VASCULAR MALFORMATION: ICD-10-CM

## 2020-01-13 DIAGNOSIS — I67.1 CEREBRAL ANEURYSM WITHOUT RUPTURE: ICD-10-CM

## 2020-01-13 DIAGNOSIS — I10 ESSENTIAL HYPERTENSION: ICD-10-CM

## 2020-01-13 PROCEDURE — 99204 PR OFFICE/OUTPT VISIT, NEW, LEVL IV, 45-59 MIN: ICD-10-PCS | Mod: S$PBB,,, | Performed by: RADIOLOGY

## 2020-01-13 PROCEDURE — 99204 OFFICE O/P NEW MOD 45 MIN: CPT | Mod: S$PBB,,, | Performed by: RADIOLOGY

## 2020-01-13 PROCEDURE — 1159F MED LIST DOCD IN RCRD: CPT | Mod: ,,, | Performed by: RADIOLOGY

## 2020-01-13 PROCEDURE — 1159F PR MEDICATION LIST DOCUMENTED IN MEDICAL RECORD: ICD-10-PCS | Mod: ,,, | Performed by: RADIOLOGY

## 2020-01-13 NOTE — ASSESSMENT & PLAN NOTE
Maxillfacial CT angio revealed dilated right ophthalmic vein     Differentials being dural AVF v/s carotid cavernous fistula    - will perform 6 V angiogram at earliest available date.

## 2020-01-13 NOTE — ASSESSMENT & PLAN NOTE
Unruptured Acomm aneurysm     - 2.5 mm Acomm aneurysm incidental finding on NeuroImaging for headache   - will order cerebral angiogram at the earliest available date.

## 2020-01-13 NOTE — PROGRESS NOTES
Interventional Neurosurgery  Clinic Note    ___________________  ASSESSMENT & PLAN    Problem List Items Addressed This Visit        Neuro    Cerebral aneurysm without rupture    Current Assessment & Plan     Unruptured Acomm aneurysm     - 2.5 mm Acomm aneurysm incidental finding on NeuroImaging for headache   - will order cerebral angiogram at the earliest available date.          Cerebral vascular malformation    Current Assessment & Plan     Maxillfacial CT angio revealed dilated right ophthalmic vein     Differentials being dural AVF v/s carotid cavernous fistula    - will perform 6 V angiogram at earliest available date.             Cardiac/Vascular    HTN (hypertension)    Current Assessment & Plan     - risk factor for aneurysm   - rec SBP < 130 mmHg               Reason For Visit (Chief Complaint): headache    HPI: 74 y.o. AAF presented to the clinic for evaluation of incidental dilated right ophthalmic vein and Acomm aneurysm.     Pt reports of pain in the neck radiating to the head without any changes in vision. Denies any headache concerning for sentinel hemorrhage. She reports of blurry vision in both eyes and has cataract. Denies constant headache. Denies double vision. Considering constant neck pain and headache on the left temple, pt was referred to NeuroIR clinic.         Vessel Imaging:      Acomm aneurysm          Right eye ophthalmic vein        Relevant Labwork:  Recent Labs   Lab 12/26/19  1355   LDL Cholesterol 72.8   HDL 50   Triglycerides 76   Cholesterol 138       I independently viewed the above imaging studies in addition to reviewing the report.  I reviewed the above labwork.    Review of Systems  Msk: negative for muscle pain  Skin: negative for pruritis  Neuro: negative for headache  All others negative    Past Medical History  Past Medical History:   Diagnosis Date    Acid reflux     Anemia     Anxiety     Arthritis     Blood transfusion     Cataract     Depression     Dry  eyes     Heart murmur     Hypertension     Left lumbar radiculitis 5/19/2015    Multiple thyroid nodules 12/18/2012    Osteoporosis     Rheumatoid arthritis     Thoracic or lumbosacral neuritis or radiculitis, unspecified 10/30/2013     Family History  No relevant history   Social History  Never Smoker      Medication List with Changes/Refills   Current Medications    AMLODIPINE (NORVASC) 5 MG TABLET    Take 1 tablet (5 mg total) by mouth once daily.    AZELASTINE (ASTELIN) 137 MCG (0.1 %) NASAL SPRAY    1 spray (137 mcg total) by Nasal route 2 (two) times daily.    CICLOPIROX (PENLAC) 8 % SOLN    APPLY 1 APPLICATION TOPICALLY ONCE DAILY FOR 24 WEEKS    FLUTICASONE (FLONASE) 50 MCG/ACTUATION NASAL SPRAY    1 spray (50 mcg total) by Each Nare route once daily.    FOLIC ACID (FOLVITE) 1 MG TABLET    Take 2 tablets (2 mg total) by mouth once daily.    FUROSEMIDE (LASIX) 40 MG TABLET    Take 1 tablet (40 mg total) by mouth once daily.    GABAPENTIN (NEURONTIN) 100 MG CAPSULE    Take 1 capsule (100 mg total) by mouth 3 (three) times daily.    HYDROCORTISONE 2.5 % CREAM        HYDROXYZINE (ATARAX) 50 MG TABLET    Take 1 tablet (50 mg total) by mouth 3 (three) times daily as needed for Itching.    IRBESARTAN (AVAPRO) 300 MG TABLET    Take 1 tablet (300 mg total) by mouth every evening.    LORAZEPAM (ATIVAN) 1 MG TABLET    Take 1 tablet (1 mg total) by mouth every 6 (six) hours as needed for Anxiety.    METHOTREXATE 2.5 MG TAB    5 tabs po twice a day on monday    MISOPROSTOL (CYTOTEC) 100 MCG TAB    Take 1 tablet (100 mcg total) by mouth 2 (two) times daily.    NABUMETONE (RELAFEN) 500 MG TABLET    Take 1 tablet (500 mg total) by mouth 2 (two) times daily.    NYSTATIN-TRIAMCINOLONE (MYCOLOG II) CREAM    Apply to affected area 2 times daily    PANTOPRAZOLE (PROTONIX) 40 MG TABLET    Take 1 tablet (40 mg total) by mouth once daily. 1 Tablet, Delayed Release (E.C.) Oral Every day    VALACYCLOVIR (VALTREX) 500 MG TABLET     1 Tablet DAILY for suppression, increase to BID for outbreak       EXAM  Vital Signs:There were no vitals taken for this visit.  General: well appearing without discomfort   Mental Status:alert, oriented to person - place - age - month   Language: able to name, repeat, comprehend commands   Cranial Nerves: EOMI, PERRL, no facial asymmetry, tongue to midline, palate midline  Motor: 5/5 power in all extremities, normal tone  Sensory: intact light touch bilaterally  Coordination: no ataxia   Gait & Stance: normal        Abilio Orourke MD  NeuroIR fellow

## 2020-01-14 DIAGNOSIS — I67.1 CAROTID-CAVERNOUS FISTULA: ICD-10-CM

## 2020-01-14 DIAGNOSIS — I67.1 CEREBRAL ANEURYSM WITHOUT RUPTURE: Primary | ICD-10-CM

## 2020-01-20 ENCOUNTER — OFFICE VISIT (OUTPATIENT)
Dept: OPHTHALMOLOGY | Facility: CLINIC | Age: 75
End: 2020-01-20
Payer: MEDICARE

## 2020-01-20 DIAGNOSIS — H52.7 REFRACTIVE ERROR: ICD-10-CM

## 2020-01-20 DIAGNOSIS — Q28.3 CEREBRAL VASCULAR MALFORMATION: ICD-10-CM

## 2020-01-20 DIAGNOSIS — I10 ESSENTIAL HYPERTENSION: ICD-10-CM

## 2020-01-20 DIAGNOSIS — H25.13 NUCLEAR SCLEROSIS, BILATERAL: Primary | ICD-10-CM

## 2020-01-20 PROCEDURE — 99212 OFFICE O/P EST SF 10 MIN: CPT | Mod: PBBFAC,PO | Performed by: OPHTHALMOLOGY

## 2020-01-20 PROCEDURE — 99999 PR PBB SHADOW E&M-EST. PATIENT-LVL II: CPT | Mod: PBBFAC,,, | Performed by: OPHTHALMOLOGY

## 2020-01-20 PROCEDURE — 92014 PR EYE EXAM, EST PATIENT,COMPREHESV: ICD-10-PCS | Mod: S$PBB,,, | Performed by: OPHTHALMOLOGY

## 2020-01-20 PROCEDURE — 92014 COMPRE OPH EXAM EST PT 1/>: CPT | Mod: S$PBB,,, | Performed by: OPHTHALMOLOGY

## 2020-01-20 PROCEDURE — 99999 PR PBB SHADOW E&M-EST. PATIENT-LVL II: ICD-10-PCS | Mod: PBBFAC,,, | Performed by: OPHTHALMOLOGY

## 2020-01-20 PROCEDURE — 92136 OPHTHALMIC BIOMETRY: CPT | Mod: PBBFAC,PO | Performed by: OPHTHALMOLOGY

## 2020-01-20 PROCEDURE — 92136 BIOMETRY: ICD-10-PCS | Mod: 26,S$PBB,LT, | Performed by: OPHTHALMOLOGY

## 2020-01-20 NOTE — PROGRESS NOTES
Subjective:       Patient ID: Yulia Peralta is a 74 y.o. female.    Chief Complaint: Concerns About Ocular Health    HPI     DLS: 8/22/2019 Dr. Luu    74 y.o. Female states that early part of last year had an MRI done for   tumor behind the right eye. At the present time Yulia is going through a   series of test to fine out if the tumor is benign or not. Dr. Becerril   referred Yulia for possible anuerism behind the right eye. Pt states   that she in not aware of any bulging of the right eye. Experience migraine   headache over the last couple of days on and off. Sharp eye pain, right   eye medial area,3- 4 times within the last month. Denies diplopia. Blurred   vision at distance and near with correction.     Eye Meds: No gtts    Last edited by JORDAN Jacob on 1/20/2020  2:09 PM. (History)             Assessment:       1. Nuclear sclerosis, bilateral    2. Cerebral vascular malformation    3. Essential hypertension    4. Refractive error        Plan:       Visually significant cataract OU -Pt. Wants Sx.     CVM on scan-No ocular pathology at this time.  HTN-No retinopathy OU.  RE      Cataract Surgery Consent: Patient with a visually significant cataract with difficulties of ADLs, reading, driving, night vision, glare (any and all).  Discussed with Patient/Family/Caregiver: options, risks and benefits, expectations of cataract surgery, utilized an eye model with questions and answers to facilitate discussion.  Discussed lens options and patient understands that glasses may be required for optimal vision for distance and/or near vision after cataract surgery.  The Patient/Family/Caregiver  voice good understanding and patient wishes to proceed with surgery.  The patient will likely benefit from surgery and patient signed consent for Left Eye.  Pt to call to schedule CE OS 1st SN60WF 21.5,                                           OD 2nd SN60WF 21.0.  Control HTN.

## 2020-01-23 ENCOUNTER — HOSPITAL ENCOUNTER (OUTPATIENT)
Facility: HOSPITAL | Age: 75
Discharge: HOME | End: 2020-01-23
Attending: RADIOLOGY | Admitting: RADIOLOGY
Payer: MEDICARE

## 2020-01-23 VITALS
HEIGHT: 62 IN | RESPIRATION RATE: 16 BRPM | SYSTOLIC BLOOD PRESSURE: 141 MMHG | WEIGHT: 208 LBS | DIASTOLIC BLOOD PRESSURE: 76 MMHG | BODY MASS INDEX: 38.28 KG/M2 | OXYGEN SATURATION: 100 % | TEMPERATURE: 98 F | HEART RATE: 69 BPM

## 2020-01-23 DIAGNOSIS — I67.1 CEREBRAL ANEURYSM WITHOUT RUPTURE: ICD-10-CM

## 2020-01-23 DIAGNOSIS — I67.1 CAROTID-CAVERNOUS FISTULA: ICD-10-CM

## 2020-01-23 LAB
INR PPP: 1 (ref 0.8–1.2)
PROTHROMBIN TIME: 10.2 SEC (ref 9–12.5)

## 2020-01-23 PROCEDURE — 25500020 PHARM REV CODE 255: Performed by: RADIOLOGY

## 2020-01-23 PROCEDURE — 85610 PROTHROMBIN TIME: CPT

## 2020-01-23 PROCEDURE — 63600175 PHARM REV CODE 636 W HCPCS: Performed by: STUDENT IN AN ORGANIZED HEALTH CARE EDUCATION/TRAINING PROGRAM

## 2020-01-23 RX ORDER — HEPARIN SODIUM 1000 [USP'U]/ML
3000 INJECTION, SOLUTION INTRAVENOUS; SUBCUTANEOUS ONCE
Status: DISCONTINUED | OUTPATIENT
Start: 2020-01-23 | End: 2020-01-23 | Stop reason: HOSPADM

## 2020-01-23 RX ORDER — SODIUM CHLORIDE 9 MG/ML
INJECTION, SOLUTION INTRAVENOUS CONTINUOUS
Status: DISCONTINUED | OUTPATIENT
Start: 2020-01-23 | End: 2020-01-23 | Stop reason: HOSPADM

## 2020-01-23 RX ORDER — FENTANYL CITRATE 50 UG/ML
50 INJECTION, SOLUTION INTRAMUSCULAR; INTRAVENOUS
Status: DISCONTINUED | OUTPATIENT
Start: 2020-01-23 | End: 2020-01-23 | Stop reason: HOSPADM

## 2020-01-23 RX ORDER — FENTANYL CITRATE 50 UG/ML
INJECTION, SOLUTION INTRAMUSCULAR; INTRAVENOUS CODE/TRAUMA/SEDATION MEDICATION
Status: COMPLETED | OUTPATIENT
Start: 2020-01-23 | End: 2020-01-23

## 2020-01-23 RX ORDER — SODIUM CHLORIDE 0.9 % (FLUSH) 0.9 %
10 SYRINGE (ML) INJECTION
Status: DISCONTINUED | OUTPATIENT
Start: 2020-01-23 | End: 2020-01-23 | Stop reason: HOSPADM

## 2020-01-23 RX ORDER — MIDAZOLAM HYDROCHLORIDE 1 MG/ML
INJECTION INTRAMUSCULAR; INTRAVENOUS CODE/TRAUMA/SEDATION MEDICATION
Status: COMPLETED | OUTPATIENT
Start: 2020-01-23 | End: 2020-01-23

## 2020-01-23 RX ORDER — IODIXANOL 320 MG/ML
350 INJECTION, SOLUTION INTRAVASCULAR
Status: COMPLETED | OUTPATIENT
Start: 2020-01-23 | End: 2020-01-23

## 2020-01-23 RX ORDER — MIDAZOLAM HYDROCHLORIDE 1 MG/ML
1 INJECTION INTRAMUSCULAR; INTRAVENOUS
Status: DISCONTINUED | OUTPATIENT
Start: 2020-01-23 | End: 2020-01-23 | Stop reason: HOSPADM

## 2020-01-23 RX ADMIN — MIDAZOLAM HYDROCHLORIDE 0.5 MG: 1 INJECTION, SOLUTION INTRAMUSCULAR; INTRAVENOUS at 08:01

## 2020-01-23 RX ADMIN — FENTANYL CITRATE 50 MCG: 50 INJECTION, SOLUTION INTRAMUSCULAR; INTRAVENOUS at 08:01

## 2020-01-23 RX ADMIN — MIDAZOLAM HYDROCHLORIDE 1 MG: 1 INJECTION, SOLUTION INTRAMUSCULAR; INTRAVENOUS at 08:01

## 2020-01-23 RX ADMIN — MIDAZOLAM HYDROCHLORIDE 0.5 MG: 1 INJECTION, SOLUTION INTRAMUSCULAR; INTRAVENOUS at 09:01

## 2020-01-23 RX ADMIN — IODIXANOL 120 ML: 320 INJECTION, SOLUTION INTRAVASCULAR at 10:01

## 2020-01-23 RX ADMIN — FENTANYL CITRATE 25 MCG: 50 INJECTION, SOLUTION INTRAMUSCULAR; INTRAVENOUS at 09:01

## 2020-01-23 RX ADMIN — FENTANYL CITRATE 25 MCG: 50 INJECTION, SOLUTION INTRAMUSCULAR; INTRAVENOUS at 08:01

## 2020-01-23 NOTE — PROCEDURES
Neurointerventional Radiology Post-Procedure Note    Pre Op Diagnosis: ?? Dural AV fistula v/s CCF                                   Acomm aneurysm    Post Op Diagnosis: 1.7 mm wide neck Acomm aneurysm    Procedure: Cerebral angiogram    Procedure performed by: Fuentes WHITESIDE, Sean Orourke MD.    Written Informed Consent Obtained: Yes    Specimen Removed: NO    Estimated Blood Loss: less than 50     Procedure report:     A 6F sheath was placed into the right femoral artery and a 5F Justin catheter was advanced into the aortic arch.  The bilateral ICA, ECA and left vertebral arteries were subselected and angiography of the brain was performed after injection into each of these vessels.    Preliminary interpretation: 1.7 mm wide neck Acomm aneurysm. No Evidence of CCF or dAVF  Please see Imaging report for full details.    A right femoral artery angiogram was performed, the sheath removed and hemostasis achieved using angioseal.  No hematoma was present at the time of hemostasis.    The patient tolerated the procedure well.     Abilio Orourke MD  NeuroInterventional Radiology Fellow

## 2020-01-23 NOTE — H&P
Radiology History & Physical      SUBJECTIVE:     Chief Complaint: Unruptured Acomm aneurysm and cerebral vascular malformation    History of Present Illness:  Yulia Peralta is a 74 y.o. female with incidentally identified 2.5 mm Acomm aneurysm on CT performed for headache.  Additional asymmetric enhancement of the right superior ophthalmic vein raises the question of carotid cavernous fistula or dural AVF.  Pt presents for cerebral angiogram for further evaluation.    Past Medical History:   Diagnosis Date    Acid reflux     Anemia     Anxiety     Arthritis     Blood transfusion     Cataract     Depression     Dry eyes     Heart murmur     Hypertension     Left lumbar radiculitis 5/19/2015    Multiple thyroid nodules 12/18/2012    Osteoporosis     Rheumatoid arthritis     Thoracic or lumbosacral neuritis or radiculitis, unspecified 10/30/2013     Past Surgical History:   Procedure Laterality Date    gallstones  3351-0354    HYSTERECTOMY      JOINT REPLACEMENT  3972-8911     bilateral knee    MD REMOVAL OF OVARY/TUBE(S)      TONSILLECTOMY         Home Meds:   Prior to Admission medications    Medication Sig Start Date End Date Taking? Authorizing Provider   amLODIPine (NORVASC) 5 MG tablet Take 1 tablet (5 mg total) by mouth once daily. 10/21/19 10/20/20 Yes Ileana Martin MD   azelastine (ASTELIN) 137 mcg (0.1 %) nasal spray 1 spray (137 mcg total) by Nasal route 2 (two) times daily. 2/7/19 2/7/20 Yes Ileana Martin MD   ciclopirox (PENLAC) 8 % Soln APPLY 1 APPLICATION TOPICALLY ONCE DAILY FOR 24 WEEKS 9/14/19  Yes Historical Provider, MD   fluticasone (FLONASE) 50 mcg/actuation nasal spray 1 spray (50 mcg total) by Each Nare route once daily. 2/7/19  Yes Ileana Martin MD   folic acid (FOLVITE) 1 MG tablet Take 2 tablets (2 mg total) by mouth once daily. 10/21/19  Yes Ileana Martin MD   furosemide (LASIX) 40 MG tablet Take 1 tablet (40 mg total) by mouth once daily. 10/21/19   Yes Ileana Martin MD   gabapentin (NEURONTIN) 100 MG capsule Take 1 capsule (100 mg total) by mouth 3 (three) times daily.  Patient taking differently: Take 100 mg by mouth as needed.  10/21/19 10/20/20 Yes Ileana Martin MD   hydrOXYzine (ATARAX) 50 MG tablet Take 1 tablet (50 mg total) by mouth 3 (three) times daily as needed for Itching. 10/21/19  Yes Ileana Martin MD   irbesartan (AVAPRO) 300 MG tablet Take 1 tablet (300 mg total) by mouth every evening. 10/21/19 10/20/20 Yes Ileana Martin MD   miSOPROStol (CYTOTEC) 100 MCG Tab Take 1 tablet (100 mcg total) by mouth 2 (two) times daily. 1/7/20 1/6/21 Yes Usha Soares MD   nabumetone (RELAFEN) 500 MG tablet Take 1 tablet (500 mg total) by mouth 2 (two) times daily. 10/21/19  Yes Ileana Martin MD   pantoprazole (PROTONIX) 40 MG tablet Take 1 tablet (40 mg total) by mouth once daily. 1 Tablet, Delayed Release (E.C.) Oral Every day 10/21/19  Yes Ileana Martin MD   hydrocortisone 2.5 % cream as needed.  4/26/19   Historical Provider, MD   LORazepam (ATIVAN) 1 MG tablet Take 1 tablet (1 mg total) by mouth every 6 (six) hours as needed for Anxiety.  Patient not taking: Reported on 1/7/2020 10/15/19 11/14/19  Ileana Martin MD   methotrexate 2.5 MG Tab 5 tabs po twice a day on monday 1/7/20   Usha Soares MD   nystatin-triamcinolone (MYCOLOG II) cream Apply to affected area 2 times daily  Patient taking differently: as needed. Apply to affected area 2 times daily 6/20/17 1/7/20  Ally Zepeda MD   valacyclovir (VALTREX) 500 MG tablet 1 Tablet DAILY for suppression, increase to BID for outbreak 8/24/17   Francesca Stevens MD     Anticoagulants/Antiplatelets: no anticoagulation    Allergies:   Review of patient's allergies indicates:   Allergen Reactions    No known drug allergies      Sedation History:  no adverse reactions    Review of Systems:   Hematological: no known coagulopathies  Respiratory: no shortness of  breath  Cardiovascular: no chest pain  Gastrointestinal: no abdominal pain  Genito-Urinary: no dysuria  Musculoskeletal: negative  Neurological: no TIA or stroke symptoms         OBJECTIVE:     Vital Signs (Most Recent)  Temp: 98 °F (36.7 °C) (01/23/20 0623)  Pulse: 92 (01/23/20 0623)  Resp: 18 (01/23/20 0623)  BP: (!) 162/81 (01/23/20 0623)  SpO2: 98 % (01/23/20 0623)    Physical Exam:  ASA: 2  Mallampati: 3    General: no acute distress  Mental Status: alert and oriented to person, place and time  HEENT: normocephalic, atraumatic  Chest: unlabored breathing  Abdomen: nondistended  Extremity: moves all extremities    Laboratory  Lab Results   Component Value Date    INR 1.0 01/23/2020       Lab Results   Component Value Date    WBC 5.84 12/26/2019    HGB 10.9 (L) 12/26/2019    HCT 35.8 (L) 12/26/2019     (H) 12/26/2019     12/26/2019      Lab Results   Component Value Date    GLU 92 12/26/2019     12/26/2019    K 4.5 12/26/2019     12/26/2019    CO2 28 12/26/2019    BUN 11 12/26/2019    CREATININE 0.8 12/26/2019    CALCIUM 9.9 12/26/2019    ALT 24 12/26/2019    AST 24 12/26/2019    ALBUMIN 4.0 12/26/2019    BILITOT 0.4 12/26/2019       ASSESSMENT/PLAN:     Sedation Plan: Moderate  Patient will undergo cerebral angiogram.    Doemnica Tomlinson MD  Resident  Department of Radiology  Pager: [phone]

## 2020-01-23 NOTE — PROGRESS NOTES
Recovery time complete. Patient tolerated well. Hob elevated 90 degrees. No bleeding or hematoma noted at site. Discharge instructions given to patient and spouse. Verbalizes compliance and understanding. Discharged home via wheelchair per transport.

## 2020-01-23 NOTE — DISCHARGE INSTRUCTIONS
For scheduling: Call Ct at [phone]    For questions or concerns call: LATOSHA MON-FRI 8 AM- 5PM [phone]. Radiology resident on call [phone].    For immediate concerns that are not emergent, you may call our radiology clinic at: [phone]

## 2020-01-23 NOTE — PLAN OF CARE
Cerebral angiogram completed. NAD noted. 6 fr angioseal deployed at 0952. Pt to be flat for 2 hours till 11:52. Dressing applied to right groin, CDI.  PT to be transferred to [facility]. Report given at bedside.

## 2020-01-23 NOTE — DISCHARGE SUMMARY
Radiology Discharge Summary      Hospital Course: No complications    Admit Date: 1/23/2020  Discharge Date: 01/23/2020     Instructions Given to Patient: Yes  Diet: Resume prior diet  Activity: activity as tolerated    Description of Condition on Discharge: Stable  Vital Signs (Most Recent): Temp: 98.3 °F (36.8 °C) (01/23/20 1010)  Pulse: 65 (01/23/20 1040)  Resp: (!) 23 (01/23/20 1040)  BP: 129/64 (01/23/20 1040)  SpO2: 100 % (01/23/20 1040)    Discharge Disposition: Home    Discharge Diagnosis: Acomm aneurysm measuring 1.7 mm      Follow-up: with NeuroIR in 2 - 4 weeks    Abilio Orourke MD  NeuroIR fellow.

## 2020-02-02 ENCOUNTER — HOSPITAL ENCOUNTER (EMERGENCY)
Facility: HOSPITAL | Age: 75
Discharge: HOME | End: 2020-02-02
Attending: EMERGENCY MEDICINE
Payer: MEDICARE

## 2020-02-02 VITALS
TEMPERATURE: 98 F | HEIGHT: 62 IN | BODY MASS INDEX: 37.73 KG/M2 | HEART RATE: 63 BPM | WEIGHT: 205 LBS | DIASTOLIC BLOOD PRESSURE: 75 MMHG | OXYGEN SATURATION: 99 % | SYSTOLIC BLOOD PRESSURE: 137 MMHG | RESPIRATION RATE: 16 BRPM

## 2020-02-02 DIAGNOSIS — R19.09 GROIN SWELLING: ICD-10-CM

## 2020-02-02 DIAGNOSIS — I97.410 FEMORAL ARTERY HEMATOMA COMPLICATING CARDIAC CATHETERIZATION: Primary | ICD-10-CM

## 2020-02-02 LAB
ALBUMIN SERPL BCP-MCNC: 4.2 G/DL (ref 3.5–5.2)
ALP SERPL-CCNC: 110 U/L (ref 55–135)
ALT SERPL W/O P-5'-P-CCNC: 21 U/L (ref 10–44)
ANION GAP SERPL CALC-SCNC: 11 MMOL/L (ref 8–16)
AST SERPL-CCNC: 19 U/L (ref 10–40)
BASOPHILS # BLD AUTO: 0.02 K/UL (ref 0–0.2)
BASOPHILS NFR BLD: 0.3 % (ref 0–1.9)
BILIRUB SERPL-MCNC: 0.4 MG/DL (ref 0.1–1)
BUN SERPL-MCNC: 10 MG/DL (ref 8–23)
CALCIUM SERPL-MCNC: 10.1 MG/DL (ref 8.7–10.5)
CHLORIDE SERPL-SCNC: 104 MMOL/L (ref 95–110)
CO2 SERPL-SCNC: 28 MMOL/L (ref 23–29)
CREAT SERPL-MCNC: 0.7 MG/DL (ref 0.5–1.4)
DIFFERENTIAL METHOD: ABNORMAL
EOSINOPHIL # BLD AUTO: 0.2 K/UL (ref 0–0.5)
EOSINOPHIL NFR BLD: 2.5 % (ref 0–8)
ERYTHROCYTE [DISTWIDTH] IN BLOOD BY AUTOMATED COUNT: 14.2 % (ref 11.5–14.5)
EST. GFR  (AFRICAN AMERICAN): >60 ML/MIN/1.73 M^2
EST. GFR  (NON AFRICAN AMERICAN): >60 ML/MIN/1.73 M^2
GLUCOSE SERPL-MCNC: 96 MG/DL (ref 70–110)
HCT VFR BLD AUTO: 33.1 % (ref 37–48.5)
HGB BLD-MCNC: 10.3 G/DL (ref 12–16)
IMM GRANULOCYTES # BLD AUTO: 0.03 K/UL (ref 0–0.04)
IMM GRANULOCYTES NFR BLD AUTO: 0.4 % (ref 0–0.5)
LYMPHOCYTES # BLD AUTO: 1.2 K/UL (ref 1–4.8)
LYMPHOCYTES NFR BLD: 15.7 % (ref 18–48)
MCH RBC QN AUTO: 31.4 PG (ref 27–31)
MCHC RBC AUTO-ENTMCNC: 31.1 G/DL (ref 32–36)
MCV RBC AUTO: 101 FL (ref 82–98)
MONOCYTES # BLD AUTO: 1 K/UL (ref 0.3–1)
MONOCYTES NFR BLD: 13.3 % (ref 4–15)
NEUTROPHILS # BLD AUTO: 5.1 K/UL (ref 1.8–7.7)
NEUTROPHILS NFR BLD: 67.8 % (ref 38–73)
NRBC BLD-RTO: 0 /100 WBC
PLATELET # BLD AUTO: 189 K/UL (ref 150–350)
PMV BLD AUTO: 12.4 FL (ref 9.2–12.9)
POTASSIUM SERPL-SCNC: 4.1 MMOL/L (ref 3.5–5.1)
PROT SERPL-MCNC: 8.1 G/DL (ref 6–8.4)
RBC # BLD AUTO: 3.28 M/UL (ref 4–5.4)
SODIUM SERPL-SCNC: 143 MMOL/L (ref 136–145)
WBC # BLD AUTO: 7.52 K/UL (ref 3.9–12.7)

## 2020-02-02 PROCEDURE — 99284 PR EMERGENCY DEPT VISIT,LEVEL IV: ICD-10-PCS | Mod: ,,, | Performed by: PHYSICIAN ASSISTANT

## 2020-02-02 PROCEDURE — 99284 EMERGENCY DEPT VISIT MOD MDM: CPT | Mod: 25

## 2020-02-02 PROCEDURE — 99284 EMERGENCY DEPT VISIT MOD MDM: CPT | Mod: ,,, | Performed by: PHYSICIAN ASSISTANT

## 2020-02-02 PROCEDURE — 85025 COMPLETE CBC W/AUTO DIFF WBC: CPT

## 2020-02-02 PROCEDURE — 80053 COMPREHEN METABOLIC PANEL: CPT

## 2020-02-02 NOTE — ED PROVIDER NOTES
Encounter Date: 2/2/2020       History     Chief Complaint   Patient presents with    Groin Pain     had angiogram on the 23rd hasnt had any problem since now     74-year-old female with multiple comorbidities including arthritis on methotrexate, hypertension, anemia presents for a swollen nodule in the right side of her groin that she noticed last night.  She had a cerebral angiogram performed on 01/23/2020 (Dr. Dill), catheter was threaded through her right femoral artery.  States that she had some bruising over her groin area but no swelling until last night.  She denies any pain, redness, discharge, bleeding, fever or numbness/tingling/weakness in bilateral legs.        Review of patient's allergies indicates:   Allergen Reactions    No known drug allergies      Past Medical History:   Diagnosis Date    Acid reflux     Anemia     Anxiety     Arthritis     Blood transfusion     Cataract     Depression     Dry eyes     Heart murmur     Hypertension     Left lumbar radiculitis 5/19/2015    Multiple thyroid nodules 12/18/2012    Osteoporosis     Rheumatoid arthritis     Thoracic or lumbosacral neuritis or radiculitis, unspecified 10/30/2013     Past Surgical History:   Procedure Laterality Date    CEREBRAL ANGIOGRAM N/A 1/23/2020    Procedure: ANGIOGRAM-CEREBRAL;  Surgeon: [facility] Provider;  Location: [facility] OR [address] [place] [address];  Service: Radiology;  Laterality: N/A;  YEMI [address]    gallstones  2397-2480    HYSTERECTOMY      JOINT REPLACEMENT  [id number]     bilateral knee    MA REMOVAL OF OVARY/TUBE(S)      TONSILLECTOMY       Family History   Problem Relation Age of Onset    Cataracts Mother     Hypertension Mother     Hypertension Sister     No Known Problems Father     Glaucoma Brother     No Known Problems Maternal Aunt     No Known Problems Maternal Uncle     No Known Problems Paternal Aunt     No Known Problems Paternal Uncle     No Known Problems Maternal Grandmother      No Known Problems Maternal Grandfather     No Known Problems Paternal Grandmother     No Known Problems Paternal Grandfather     Lupus Neg Hx     Rheum arthritis Neg Hx     Psoriasis Neg Hx     Amblyopia Neg Hx     Blindness Neg Hx     Cancer Neg Hx     Diabetes Neg Hx     Macular degeneration Neg Hx     Retinal detachment Neg Hx     Strabismus Neg Hx     Stroke Neg Hx     Thyroid disease Neg Hx      Social History     Tobacco Use    Smoking status: Former Smoker    Smokeless tobacco: Never Used   Substance Use Topics    Alcohol use: Yes     Comment: socially    Drug use: No     Review of Systems   Constitutional: Negative for chills and fever.   HENT: Negative for sore throat.    Respiratory: Negative for shortness of breath.    Cardiovascular: Negative for chest pain.   Gastrointestinal: Negative for nausea.   Genitourinary: Negative for difficulty urinating, dysuria, hematuria, pelvic pain and urgency.   Musculoskeletal: Negative for back pain and neck pain.   Skin: Negative for rash.   Neurological: Negative for weakness.   Hematological: Does not bruise/bleed easily.       Physical Exam     Initial Vitals [02/02/20 1222]   BP Pulse Resp Temp SpO2   (!) 198/88 77 18 97.7 °F (36.5 °C) 97 %      MAP       --         Physical Exam    Nursing note and vitals reviewed.  Constitutional: She appears well-developed and well-nourished.   HENT:   Head: Normocephalic and atraumatic.   Eyes: EOM are normal. Pupils are equal, round, and reactive to light.   Neck: Normal range of motion. Neck supple.   Cardiovascular: Normal rate, regular rhythm, normal heart sounds and intact distal pulses. Exam reveals no gallop and no friction rub.    No murmur heard.  Pulmonary/Chest: Breath sounds normal. No respiratory distress. She has no wheezes. She has no rhonchi. She has no rales. She exhibits no tenderness.   Abdominal: Soft. Bowel sounds are normal. She exhibits no distension and no mass. There is no tenderness.  There is no rebound and no guarding.   Genitourinary:   Genitourinary Comments: There is a 5 cm raised nodule in the right groin.  Nontender to palpation.  There is some overlying ecchymosis but no cellulitis.  No discharge or bleeding.  No pulsation.   Musculoskeletal: Normal range of motion.   Neurological: She is alert and oriented to person, place, and time. She has normal strength. No sensory deficit.   Skin: Skin is warm and dry.   Psychiatric: She has a normal mood and affect.         ED Course   Procedures  Labs Reviewed   CBC W/ AUTO DIFFERENTIAL - Abnormal; Notable for the following components:       Result Value    RBC 3.28 (*)     Hemoglobin 10.3 (*)     Hematocrit 33.1 (*)     Mean Corpuscular Volume 101 (*)     Mean Corpuscular Hemoglobin 31.4 (*)     Mean Corpuscular Hemoglobin Conc 31.1 (*)     Lymph% 15.7 (*)     All other components within normal limits   COMPREHENSIVE METABOLIC PANEL          Imaging Results          US Lower Extremity Arteries Right (Final result)  Result time 02/02/20 14:40:10    Final result by Mg Neal MD (02/02/20 14:40:10)                 Impression:      Small right inguinal hematoma measuring 2.9 x 1.9 x 1.5 cm.  No evidence of pseudoaneurysm.    Electronically signed by resident: Sean Romeo  Date:    02/02/2020  Time:    13:54    Electronically signed by: Mg Neal MD  Date:    02/02/2020  Time:    14:40             Narrative:    EXAMINATION:  US LOWER EXTREMITY ARTERIES RIGHT    CLINICAL HISTORY:  Other intra-abdominal and pelvic swelling, mass and lump    TECHNIQUE:  Limited sonographic and color Doppler images were obtained over the patient's area of complaint within the right inguinal region/groin.    COMPARISON:  IR angiogram 01/23/2020.    FINDINGS:  There is a heterogeneous collection within the right groin measuring 2.9 x 1.9 x 1.5 cm.  No internal vascularity flow color Doppler, most compatible with a small hematoma.  There is normal flow within  the right common femoral artery and vein.                                 Medical Decision Making:   History:   Old Medical Records: I decided to obtain old medical records.  Old Records Summarized: records from previous admission(s).       <> Summary of Records: Patient had cerebral angiography performed on 01/23/2020 with Dr. Dill.  Initial Assessment:   74-year-old female presenting for swollen nodule in her groin s/p cerebral angiography performed on 01/23/2020.  She is initially hypertensive with otherwise normal vitals.  She is well-appearing.  She does have a raised, swollen nodule in her groin which is nontender.  Differential Diagnosis:   Pseudoaneurysm  Blood clot  Doubt infectious process given no tenderness, erythema or warmth  No signs of poor perfusion in the leg  Clinical Tests:   Lab Tests: Ordered and Reviewed  Radiological Study: Ordered and Reviewed  ED Management:  Will check labs and consult Neuro Interventional Radiology.    Labs are unrevealing.  Ultrasound shows right inguinal hematoma without evidence of pseudoaneurysm.  I discussed this result with Radiology resident who also evaluated the patient.  He feels that she is safe for discharge and follow up with PCP. Stressed the importance of follow-up, strict ED return precautions given.  Patient voiced understanding and is comfortable with discharge. I discussed this patient with my supervising physician.    Other:   I have discussed this case with another health care provider.       <> Summary of the Discussion: Discussed this patient with Dr. Mccarthy of Neuro Interventional Radiology as well as IR resident.  They recommend obtaining ultrasound of the arteries to evaluate for pseudoaneurysm.  Will order.                                 Clinical Impression:       ICD-10-CM ICD-9-CM   1. Femoral artery hematoma complicating cardiac catheterization I97.410 998.12   2. Groin swelling R19.09 789.30         Disposition:   Disposition:  Discharged  Condition: Stable                     Paz Coles PA-C  02/02/20 5084

## 2020-02-02 NOTE — ED TRIAGE NOTES
Yulia Peralta, a 74 y.o. female presents to the ED w/ complaint of angiogram on the 23rd. Pt c/o soreness with a lump in the right groin area.  Pt denies CP, SOB, dysuria, constipation, fever and chills.      Triage note:  Chief Complaint   Patient presents with    Groin Pain     had angiogram on the 23rd hasnt had any problem since now     Review of patient's allergies indicates:   Allergen Reactions    No known drug allergies      Past Medical History:   Diagnosis Date    Acid reflux     Anemia     Anxiety     Arthritis     Blood transfusion     Cataract     Depression     Dry eyes     Heart murmur     Hypertension     Left lumbar radiculitis 5/19/2015    Multiple thyroid nodules 12/18/2012    Osteoporosis     Rheumatoid arthritis     Thoracic or lumbosacral neuritis or radiculitis, unspecified 10/30/2013

## 2020-02-03 ENCOUNTER — PES CALL (OUTPATIENT)
Dept: ADMINISTRATIVE | Facility: CLINIC | Age: 75
End: 2020-02-03

## 2020-02-06 ENCOUNTER — PATIENT OUTREACH (OUTPATIENT)
Dept: ADMINISTRATIVE | Facility: OTHER | Age: 75
End: 2020-02-06

## 2020-02-10 ENCOUNTER — OFFICE VISIT (OUTPATIENT)
Dept: NEUROSURGERY | Facility: CLINIC | Age: 75
End: 2020-02-10
Payer: MEDICARE

## 2020-02-10 DIAGNOSIS — I67.1 CEREBRAL ANEURYSM: ICD-10-CM

## 2020-02-10 DIAGNOSIS — I10 ESSENTIAL HYPERTENSION: ICD-10-CM

## 2020-02-10 DIAGNOSIS — I67.1 CEREBRAL ANEURYSM WITHOUT RUPTURE: ICD-10-CM

## 2020-02-10 DIAGNOSIS — R20.2 NUMBNESS AND TINGLING OF BOTH LEGS BELOW KNEES: ICD-10-CM

## 2020-02-10 DIAGNOSIS — R20.0 NUMBNESS AND TINGLING OF BOTH LEGS BELOW KNEES: ICD-10-CM

## 2020-02-10 PROBLEM — Q28.3 CEREBRAL VASCULAR MALFORMATION: Status: RESOLVED | Noted: 2020-01-13 | Resolved: 2020-02-10

## 2020-02-10 PROCEDURE — 99999 PR PBB SHADOW E&M-EST. PATIENT-LVL I: CPT | Mod: PBBFAC,,,

## 2020-02-10 PROCEDURE — 99213 PR OFFICE/OUTPT VISIT, EST, LEVL III, 20-29 MIN: ICD-10-PCS | Mod: S$PBB,,, | Performed by: RADIOLOGY

## 2020-02-10 PROCEDURE — 99999 PR PBB SHADOW E&M-EST. PATIENT-LVL I: ICD-10-PCS | Mod: PBBFAC,,,

## 2020-02-10 PROCEDURE — 99213 OFFICE O/P EST LOW 20 MIN: CPT | Mod: S$PBB,,, | Performed by: RADIOLOGY

## 2020-02-10 PROCEDURE — 99211 OFF/OP EST MAY X REQ PHY/QHP: CPT | Mod: PBBFAC

## 2020-02-10 NOTE — ASSESSMENT & PLAN NOTE
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     - will follow up with MR angiogram at 1 year considering high risk for treatment over the benefit.  - recommend systolic blood pressure less than 130 mmHg  - exercise and Mediterranean diet

## 2020-02-10 NOTE — ASSESSMENT & PLAN NOTE
- patient has numbness in both legs below the knee  - recommend NCV  - follow-up with General Neurology

## 2020-02-10 NOTE — ASSESSMENT & PLAN NOTE
- risk factor for aneurysm growth and rupture.  - recommend systolic blood pressure less than 130 mmHg  - follow-up with primary care physician  - low salt Mediterranean diet

## 2020-02-10 NOTE — PROGRESS NOTES
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     - will follow up with MR angiogram at 1 year considering high risk for treatment over the benefit.  - recommend systolic blood pressure less than 130 mmHg  - exercise and Mediterranean diet              Cardiac/Vascular    HTN (hypertension)    Current Assessment & Plan     - risk factor for aneurysm growth and rupture.  - recommend systolic blood pressure less than 130 mmHg  - follow-up with primary care physician  - low salt Mediterranean diet            Other    Numbness and tingling of both legs below knees    Current Assessment & Plan     - patient has numbness in both legs below the knee  - recommend NCV  - follow-up with General Neurology               Reason For Visit (Chief Complaint):  Follow-up post cerebral angiogram    HPI: 74 y.o.  female presented to the clinic for followup post diagnostic cerebral angiogram.     Patient was complaining of headache and scalp pain radiating to the neck for last 2 years.  The pain lasted for about 5-8 seconds without any changes in vision.  Patient did not have to take any medication.  CT maxillofacial was performed which revealed A-comm aneurysm with dilated superior ophthalmic vein and thus diagnostic cerebral angiogram was performed.  Diagnostic angiogram revealed 1.7 mm wide and returning artery aneurysm.  There was no dural AV fistula or CCF on the angiogram as suspected on the CT.  Patient developed a swelling on the right groin for which she had to be in the ED on 2020.  Ultrasound of the right groin showed groin hematoma at the access site and the groins and was negative for pseudoaneurysm.  She denied any pain or pulsatile mass on the right groin.      Vessel Imagin.7  mm wide anterior communicating artery aneurysm      Relevant Labwork:  Recent Labs   Lab 12/26/19  1355   LDL Cholesterol 72.8   HDL 50   Triglycerides 76   Cholesterol 138       I independently viewed the above imaging studies in addition to reviewing the report.  I reviewed the above labwork.    Review of Systems   Constitutional: Negative for chills and fever.   HENT: Negative for hearing loss and tinnitus.    Eyes: Negative for blurred vision and double vision.   Respiratory: Negative for cough and hemoptysis.    Cardiovascular: Negative for chest pain and palpitations.   Gastrointestinal: Negative for heartburn and nausea.   Genitourinary: Negative for dysuria and urgency.   Musculoskeletal: Positive for back pain and joint pain. Negative for myalgias and neck pain.   Skin: Negative for itching and rash.   Neurological: Negative for dizziness and headaches.   Endo/Heme/Allergies: Negative for environmental allergies. Does not bruise/bleed easily.   Psychiatric/Behavioral: Negative for depression and suicidal ideas.     Past Medical History  Past Medical History:   Diagnosis Date    Acid reflux     Anemia     Anxiety     Arthritis     Blood transfusion     Cataract     Depression     Dry eyes     Heart murmur     Hypertension     Left lumbar radiculitis 5/19/2015    Multiple thyroid nodules 12/18/2012    Osteoporosis     Rheumatoid arthritis     Thoracic or lumbosacral neuritis or radiculitis, unspecified 10/30/2013     Family History  No relevant history   Social History  Never Smoker     Medication List with Changes/Refills   Current Medications    AMLODIPINE (NORVASC) 5 MG TABLET    Take 1 tablet (5 mg total) by mouth once daily.    AZELASTINE (ASTELIN) 137 MCG (0.1 %) NASAL SPRAY    1 spray (137 mcg total) by Nasal route 2 (two) times daily.    CICLOPIROX (PENLAC) 8 % SOLN    APPLY 1 APPLICATION TOPICALLY ONCE DAILY FOR 24 WEEKS    FLUTICASONE (FLONASE) 50 MCG/ACTUATION NASAL SPRAY    1 spray  (50 mcg total) by Each Nare route once daily.    FOLIC ACID (FOLVITE) 1 MG TABLET    Take 2 tablets (2 mg total) by mouth once daily.    FUROSEMIDE (LASIX) 40 MG TABLET    Take 1 tablet (40 mg total) by mouth once daily.    GABAPENTIN (NEURONTIN) 100 MG CAPSULE    Take 1 capsule (100 mg total) by mouth 3 (three) times daily.    HYDROCORTISONE 2.5 % CREAM    as needed.     HYDROXYZINE (ATARAX) 50 MG TABLET    Take 1 tablet (50 mg total) by mouth 3 (three) times daily as needed for Itching.    IRBESARTAN (AVAPRO) 300 MG TABLET    Take 1 tablet (300 mg total) by mouth every evening.    LORAZEPAM (ATIVAN) 1 MG TABLET    Take 1 tablet (1 mg total) by mouth every 6 (six) hours as needed for Anxiety.    METHOTREXATE 2.5 MG TAB    5 tabs po twice a day on monday    MISOPROSTOL (CYTOTEC) 100 MCG TAB    Take 1 tablet (100 mcg total) by mouth 2 (two) times daily.    NABUMETONE (RELAFEN) 500 MG TABLET    Take 1 tablet (500 mg total) by mouth 2 (two) times daily.    NYSTATIN-TRIAMCINOLONE (MYCOLOG II) CREAM    Apply to affected area 2 times daily    PANTOPRAZOLE (PROTONIX) 40 MG TABLET    Take 1 tablet (40 mg total) by mouth once daily. 1 Tablet, Delayed Release (E.C.) Oral Every day    VALACYCLOVIR (VALTREX) 500 MG TABLET    1 Tablet DAILY for suppression, increase to BID for outbreak       EXAM  Vital Signs:There were no vitals taken for this visit.  General: well appearing without discomfort   Mental Status:alert, oriented to person - place - age - month   Language: able to name, repeat, comprehend commands   Cranial Nerves: EOMI, PERRL, no facial asymmetry, tongue to midline, palate midline  Motor: 5/5 power in all extremities, normal tone  Sensory: intact light touch bilaterally, decreased pain and temperature bilaterally below knee joint in stocking fashion  Coordination: no ataxia   Gait & Stance: normal        Abilio Orourke MD  NeuroIR fellow    Patient was seen in conjunction with Neuro IR fellow. Agree with  assessment and plan as detailed in Fellow's note.    John Mccarthy MD  Department of Radiology  Pager: [phone]

## 2020-02-19 ENCOUNTER — LAB VISIT (OUTPATIENT)
Dept: LAB | Facility: HOSPITAL | Age: 75
End: 2020-02-19
Attending: FAMILY MEDICINE
Payer: MEDICARE

## 2020-02-19 ENCOUNTER — OFFICE VISIT (OUTPATIENT)
Dept: FAMILY MEDICINE | Facility: CLINIC | Age: 75
End: 2020-02-19
Payer: MEDICARE

## 2020-02-19 VITALS
TEMPERATURE: 98 F | WEIGHT: 203.94 LBS | SYSTOLIC BLOOD PRESSURE: 130 MMHG | BODY MASS INDEX: 37.53 KG/M2 | OXYGEN SATURATION: 99 % | DIASTOLIC BLOOD PRESSURE: 68 MMHG | HEIGHT: 62 IN | HEART RATE: 66 BPM

## 2020-02-19 DIAGNOSIS — E53.8 B12 DEFICIENCY: ICD-10-CM

## 2020-02-19 DIAGNOSIS — R79.9 ABNORMAL FINDING OF BLOOD CHEMISTRY, UNSPECIFIED: ICD-10-CM

## 2020-02-19 DIAGNOSIS — E53.8 DEFICIENCY OF OTHER SPECIFIED B GROUP VITAMINS: ICD-10-CM

## 2020-02-19 DIAGNOSIS — G62.9 NEUROPATHY: ICD-10-CM

## 2020-02-19 DIAGNOSIS — G62.9 NEUROPATHY: Primary | ICD-10-CM

## 2020-02-19 DIAGNOSIS — R20.2 PARESTHESIA OF SKIN: ICD-10-CM

## 2020-02-19 LAB
ESTIMATED AVG GLUCOSE: 105 MG/DL (ref 68–131)
FOLATE SERPL-MCNC: >40 NG/ML (ref 4–24)
HBA1C MFR BLD HPLC: 5.3 % (ref 4–5.6)
TSH SERPL DL<=0.005 MIU/L-ACNC: 0.54 UIU/ML (ref 0.4–4)
VIT B12 SERPL-MCNC: 360 PG/ML (ref 210–950)

## 2020-02-19 PROCEDURE — 99214 OFFICE O/P EST MOD 30 MIN: CPT | Mod: PBBFAC,PO | Performed by: FAMILY MEDICINE

## 2020-02-19 PROCEDURE — 99214 OFFICE O/P EST MOD 30 MIN: CPT | Mod: S$PBB,,, | Performed by: FAMILY MEDICINE

## 2020-02-19 PROCEDURE — 84443 ASSAY THYROID STIM HORMONE: CPT

## 2020-02-19 PROCEDURE — 99999 PR PBB SHADOW E&M-EST. PATIENT-LVL IV: CPT | Mod: PBBFAC,,, | Performed by: FAMILY MEDICINE

## 2020-02-19 PROCEDURE — 82607 VITAMIN B-12: CPT

## 2020-02-19 PROCEDURE — 99214 PR OFFICE/OUTPT VISIT, EST, LEVL IV, 30-39 MIN: ICD-10-PCS | Mod: S$PBB,,, | Performed by: FAMILY MEDICINE

## 2020-02-19 PROCEDURE — 83036 HEMOGLOBIN GLYCOSYLATED A1C: CPT

## 2020-02-19 PROCEDURE — 99999 PR PBB SHADOW E&M-EST. PATIENT-LVL IV: ICD-10-PCS | Mod: PBBFAC,,, | Performed by: FAMILY MEDICINE

## 2020-02-19 PROCEDURE — 82746 ASSAY OF FOLIC ACID SERUM: CPT

## 2020-02-19 NOTE — PROGRESS NOTES
Subjective:       Patient ID: Yulia Peralta is a 74 y.o. female.    Chief Complaint: Bilateral Leg Problems (possible numbness) and Discuss Angiogram Results    74 year old female with vitamin B12 his deficiency, thymoma, spinal stenosis of the lumbar region, rheumatoid arthritis, migraine headaches, hypertension, acid reflux presents  is here for follouwp on her test results. She had an angiogram, which showed:  Impression        1. A small 1.7 x 1.3  mm wide neck anterior communicating artery aneurysm is identified on the angiogram.  Recommend MRI in 1 year to follow-up this small aneurysm.    2.  No dural AV fistula or carotid-cavernous fistula were identified on this angiogram.        She has concerns about pains in her legs. She has numbness int he bottom fo her feet. She states it is not cold, but it is just numb.s he has a numbnes sin her anterior legs. She doesn't have it in her hands. She has no skin discoloration.       Past Medical History:  No date: Acid reflux  No date: Anemia  No date: Anxiety  No date: Arthritis  No date: Blood transfusion  No date: Cataract  No date: Depression  No date: Dry eyes  No date: Heart murmur  No date: Hypertension  5/19/2015: Left lumbar radiculitis  12/18/2012: Multiple thyroid nodules  No date: Osteoporosis  No date: Rheumatoid arthritis  10/30/2013: Thoracic or lumbosacral neuritis or radiculitis,   unspecified   Past Surgical History:  1/23/2020: CEREBRAL ANGIOGRAM; N/A      Comment:  Procedure: ANGIOGRAM-CEREBRAL;  Surgeon: [facility]               Provider;  Location: [facility] OR [address] [place] [address];  Service:                Radiology;  Laterality: N/A;   [address]  8444-8554: gallstones  No date: HYSTERECTOMY  7344-1173: JOINT REPLACEMENT      Comment:   bilateral knee  No date: AL REMOVAL OF OVARY/TUBE(S)  No date: TONSILLECTOMY  Review of patient's family history indicates:  Problem: Cataracts      Relation: Mother          Age of Onset: (Not Specified)  Problem:  Hypertension      Relation: Mother          Age of Onset: (Not Specified)  Problem: Hypertension      Relation: Sister          Age of Onset: (Not Specified)  Problem: No Known Problems      Relation: Father          Age of Onset: (Not Specified)  Problem: Glaucoma      Relation: Brother          Age of Onset: (Not Specified)  Problem: No Known Problems      Relation: Maternal Aunt          Age of Onset: (Not Specified)  Problem: No Known Problems      Relation: Maternal Uncle          Age of Onset: (Not Specified)  Problem: No Known Problems      Relation: Paternal Aunt          Age of Onset: (Not Specified)  Problem: No Known Problems      Relation: Paternal Uncle          Age of Onset: (Not Specified)  Problem: No Known Problems      Relation: Maternal Grandmother          Age of Onset: (Not Specified)  Problem: No Known Problems      Relation: Maternal Grandfather          Age of Onset: (Not Specified)  Problem: No Known Problems      Relation: Paternal Grandmother          Age of Onset: (Not Specified)  Problem: No Known Problems      Relation: Paternal Grandfather          Age of Onset: (Not Specified)  Problem: Lupus      Relation: Neg Hx          Age of Onset: (Not Specified)  Problem: Rheum arthritis      Relation: Neg Hx          Age of Onset: (Not Specified)  Problem: Psoriasis      Relation: Neg Hx          Age of Onset: (Not Specified)  Problem: Amblyopia      Relation: Neg Hx          Age of Onset: (Not Specified)  Problem: Blindness      Relation: Neg Hx          Age of Onset: (Not Specified)  Problem: Cancer      Relation: Neg Hx          Age of Onset: (Not Specified)  Problem: Diabetes      Relation: Neg Hx          Age of Onset: (Not Specified)  Problem: Macular degeneration      Relation: Neg Hx          Age of Onset: (Not Specified)  Problem: Retinal detachment      Relation: Neg Hx          Age of Onset: (Not Specified)  Problem: Strabismus      Relation: Neg Hx          Age of Onset: (Not  Specified)  Problem: Stroke      Relation: Neg Hx          Age of Onset: (Not Specified)  Problem: Thyroid disease      Relation: Neg Hx          Age of Onset: (Not Specified)    Social History    Socioeconomic History      Marital status:       Spouse name: Not on file      Number of children: Not on file      Years of education: Not on file      Highest education level: Not on file    Occupational History      Not on file    Social Needs      Financial resource strain: Not on file      Food insecurity:        Worry: Not on file        Inability: Not on file      Transportation needs:        Medical: Not on file        Non-medical: Not on file    Tobacco Use      Smoking status: Former Smoker      Smokeless tobacco: Never Used    Substance and Sexual Activity      Alcohol use: Yes        Comment: socially      Drug use: No      Sexual activity: Not on file    Lifestyle      Physical activity:        Days per week: Not on file        Minutes per session: Not on file      Stress: Only a little    Relationships      Social connections:        Talks on phone: Not on file        Gets together: Not on file        Attends Jewish service: Not on file        Active member of club or organization: Not on file        Attends meetings of clubs or organizations: Not on file        Relationship status: Not on file    Other Topics      Concerns:        Not on file    Social History Narrative      Adult Screenings updated and reviewed  6/12/14      Mammogram( for females) ordered for DIS      Pap ( for females) Dr Zepeda  Due for f/u   For  2014      Colonoscopy  Done once      Flu shot yearly done  2013      Td 2005      Pneumovax  Updated  12/3/13      Zostavax done 2012      Eye exam recommended yearly done 2013      Bone density  12/9/13      Thyroid ultrasound  11/6/2012 Normal sized thyroid containing several subcentimeter nodules, similar to the 5/12/11 exam.  No concerning nodules identified..        "        Review of Systems    Objective:       Vitals:    02/19/20 0826   BP: 130/68   Pulse: 66   Temp: 98 °F (36.7 °C)   TempSrc: Oral   SpO2: 99%   Weight: 92.5 kg (203 lb 14.8 oz)   Height: 5' 2" (1.575 m)       Physical Exam   Constitutional: She is oriented to person, place, and time. She appears well-developed and well-nourished. No distress.   HENT:   Head: Normocephalic and atraumatic.   Neck: Normal range of motion. Neck supple.   Cardiovascular: Normal rate, regular rhythm and normal heart sounds. Exam reveals no gallop and no friction rub.   No murmur heard.  Pulses:       Dorsalis pedis pulses are 2+ on the right side, and 2+ on the left side.   Pulmonary/Chest: Effort normal and breath sounds normal. No stridor. No respiratory distress. She has no wheezes. She has no rales.   Musculoskeletal:        Legs:  Neurological: She is alert and oriented to person, place, and time.   Skin: She is not diaphoretic.       Assessment:       1. Neuropathy    2. B12 deficiency    3. Deficiency of other specified B group vitamins     4. BMI 37.0-37.9, adult    5. Abnormal finding of blood chemistry, unspecified     6. Paresthesia of skin         Plan:       Yulia was seen today for bilateral leg problems (possible numbness) and discuss angiogram results.    Diagnoses and all orders for this visit:    Neuropathy  -     Vitamin B12; Future  -     Folate; Future  -     Hemoglobin A1c; Future  -     TSH; Future  Due for labs to show causes of neuropathy    B12 deficiency  -     Vitamin B12; Future    Deficiency of other specified B group vitamins   -     Folate; Future    BMI 37.0-37.9, adult  -     Hemoglobin A1c; Future  -     TSH; Future    Abnormal finding of blood chemistry, unspecified   -     Hemoglobin A1c; Future    Paresthesia of skin   -     TSH; Future           "

## 2020-03-27 ENCOUNTER — TELEPHONE (OUTPATIENT)
Dept: NEUROLOGY | Facility: CLINIC | Age: 75
End: 2020-03-27

## 2020-03-27 NOTE — TELEPHONE ENCOUNTER
She will call back when everything is back to normal.        ----- Message from Yasmeen Beckford sent at 3/27/2020  8:08 AM CDT -----  Contact: Self/  [phone]  Type: Patient Call Back    Who called:  Patient    What is the request in detail:  Patient returned staffs call, she stated she was called a couple days ago.  Thank you    Would the patient rather a call back or a response via [facility]?   Call back    Best call back number:   [phone]

## 2020-03-30 ENCOUNTER — TELEPHONE (OUTPATIENT)
Dept: RHEUMATOLOGY | Facility: CLINIC | Age: 75
End: 2020-03-30

## 2020-04-02 ENCOUNTER — LAB VISIT (OUTPATIENT)
Dept: LAB | Facility: HOSPITAL | Age: 75
End: 2020-04-02
Attending: INTERNAL MEDICINE
Payer: MEDICARE

## 2020-04-02 DIAGNOSIS — D84.9 IMMUNOSUPPRESSION: ICD-10-CM

## 2020-04-02 DIAGNOSIS — M06.9 RHEUMATOID ARTHRITIS OF HAND, UNSPECIFIED LATERALITY, UNSPECIFIED RHEUMATOID FACTOR PRESENCE: ICD-10-CM

## 2020-04-02 LAB
ALBUMIN SERPL BCP-MCNC: 3.9 G/DL (ref 3.5–5.2)
ALP SERPL-CCNC: 113 U/L (ref 55–135)
ALT SERPL W/O P-5'-P-CCNC: 25 U/L (ref 10–44)
ANION GAP SERPL CALC-SCNC: 10 MMOL/L (ref 8–16)
AST SERPL-CCNC: 19 U/L (ref 10–40)
BASOPHILS # BLD AUTO: 0.02 K/UL (ref 0–0.2)
BASOPHILS NFR BLD: 0.4 % (ref 0–1.9)
BILIRUB SERPL-MCNC: 0.4 MG/DL (ref 0.1–1)
BUN SERPL-MCNC: 13 MG/DL (ref 8–23)
CALCIUM SERPL-MCNC: 9.7 MG/DL (ref 8.7–10.5)
CHLORIDE SERPL-SCNC: 107 MMOL/L (ref 95–110)
CO2 SERPL-SCNC: 23 MMOL/L (ref 23–29)
CREAT SERPL-MCNC: 0.7 MG/DL (ref 0.5–1.4)
CRP SERPL-MCNC: 1.6 MG/L (ref 0–8.2)
DIFFERENTIAL METHOD: ABNORMAL
EOSINOPHIL # BLD AUTO: 0.2 K/UL (ref 0–0.5)
EOSINOPHIL NFR BLD: 3.5 % (ref 0–8)
ERYTHROCYTE [DISTWIDTH] IN BLOOD BY AUTOMATED COUNT: 13.6 % (ref 11.5–14.5)
ERYTHROCYTE [SEDIMENTATION RATE] IN BLOOD BY WESTERGREN METHOD: 30 MM/HR (ref 0–36)
EST. GFR  (AFRICAN AMERICAN): >60 ML/MIN/1.73 M^2
EST. GFR  (NON AFRICAN AMERICAN): >60 ML/MIN/1.73 M^2
GLUCOSE SERPL-MCNC: 100 MG/DL (ref 70–110)
HCT VFR BLD AUTO: 36.9 % (ref 37–48.5)
HGB BLD-MCNC: 11.3 G/DL (ref 12–16)
IMM GRANULOCYTES # BLD AUTO: 0.01 K/UL (ref 0–0.04)
IMM GRANULOCYTES NFR BLD AUTO: 0.2 % (ref 0–0.5)
LYMPHOCYTES # BLD AUTO: 1.2 K/UL (ref 1–4.8)
LYMPHOCYTES NFR BLD: 25 % (ref 18–48)
MCH RBC QN AUTO: 31 PG (ref 27–31)
MCHC RBC AUTO-ENTMCNC: 30.6 G/DL (ref 32–36)
MCV RBC AUTO: 101 FL (ref 82–98)
MONOCYTES # BLD AUTO: 0.7 K/UL (ref 0.3–1)
MONOCYTES NFR BLD: 13.3 % (ref 4–15)
NEUTROPHILS # BLD AUTO: 2.8 K/UL (ref 1.8–7.7)
NEUTROPHILS NFR BLD: 57.6 % (ref 38–73)
NRBC BLD-RTO: 0 /100 WBC
PLATELET # BLD AUTO: 143 K/UL (ref 150–350)
PLATELET BLD QL SMEAR: ABNORMAL
PMV BLD AUTO: 13.9 FL (ref 9.2–12.9)
POTASSIUM SERPL-SCNC: 4.4 MMOL/L (ref 3.5–5.1)
PROT SERPL-MCNC: 7.8 G/DL (ref 6–8.4)
RBC # BLD AUTO: 3.65 M/UL (ref 4–5.4)
SODIUM SERPL-SCNC: 140 MMOL/L (ref 136–145)
WBC # BLD AUTO: 4.88 K/UL (ref 3.9–12.7)

## 2020-04-02 PROCEDURE — 80053 COMPREHEN METABOLIC PANEL: CPT

## 2020-04-02 PROCEDURE — 36415 COLL VENOUS BLD VENIPUNCTURE: CPT | Mod: PO

## 2020-04-02 PROCEDURE — 85652 RBC SED RATE AUTOMATED: CPT

## 2020-04-02 PROCEDURE — 86140 C-REACTIVE PROTEIN: CPT

## 2020-04-02 PROCEDURE — 85025 COMPLETE CBC W/AUTO DIFF WBC: CPT

## 2020-04-03 ENCOUNTER — TELEPHONE (OUTPATIENT)
Dept: RHEUMATOLOGY | Facility: CLINIC | Age: 75
End: 2020-04-03

## 2020-04-03 ENCOUNTER — PATIENT OUTREACH (OUTPATIENT)
Dept: ADMINISTRATIVE | Facility: OTHER | Age: 75
End: 2020-04-03

## 2020-04-03 DIAGNOSIS — M06.9 RHEUMATOID ARTHRITIS OF HAND, UNSPECIFIED LATERALITY, UNSPECIFIED RHEUMATOID FACTOR PRESENCE: ICD-10-CM

## 2020-04-03 RX ORDER — NABUMETONE 500 MG/1
500 TABLET, FILM COATED ORAL 2 TIMES DAILY
Qty: 180 TABLET | Refills: 3 | Status: SHIPPED | OUTPATIENT
Start: 2020-04-03 | End: 2020-04-07 | Stop reason: SDUPTHER

## 2020-04-03 RX ORDER — METHOTREXATE 2.5 MG/1
TABLET ORAL
Qty: 160 TABLET | Refills: 0 | Status: SHIPPED | OUTPATIENT
Start: 2020-04-03 | End: 2020-04-07 | Stop reason: SDUPTHER

## 2020-04-03 NOTE — TELEPHONE ENCOUNTER
Staff to inform patient that labs shows the inflammation tests are normal and there are no significant changes in the labs.  We will repeat labs in 3 months.

## 2020-04-07 ENCOUNTER — OFFICE VISIT (OUTPATIENT)
Dept: RHEUMATOLOGY | Facility: CLINIC | Age: 75
End: 2020-04-07
Payer: MEDICARE

## 2020-04-07 DIAGNOSIS — D84.9 IMMUNOSUPPRESSION: ICD-10-CM

## 2020-04-07 DIAGNOSIS — M05.79 RHEUMATOID ARTHRITIS INVOLVING MULTIPLE SITES WITH POSITIVE RHEUMATOID FACTOR: Primary | ICD-10-CM

## 2020-04-07 DIAGNOSIS — Z79.1 NSAID LONG-TERM USE: ICD-10-CM

## 2020-04-07 DIAGNOSIS — K21.9 GASTROESOPHAGEAL REFLUX DISEASE, ESOPHAGITIS PRESENCE NOT SPECIFIED: ICD-10-CM

## 2020-04-07 DIAGNOSIS — M06.9 RHEUMATOID ARTHRITIS OF HAND, UNSPECIFIED LATERALITY, UNSPECIFIED RHEUMATOID FACTOR PRESENCE: ICD-10-CM

## 2020-04-07 PROCEDURE — 99999 PR PBB SHADOW E&M-EST. PATIENT-LVL I: ICD-10-PCS | Mod: PBBFAC,,, | Performed by: INTERNAL MEDICINE

## 2020-04-07 PROCEDURE — 99214 OFFICE O/P EST MOD 30 MIN: CPT | Mod: S$PBB,,, | Performed by: INTERNAL MEDICINE

## 2020-04-07 PROCEDURE — 99999 PR PBB SHADOW E&M-EST. PATIENT-LVL I: CPT | Mod: PBBFAC,,, | Performed by: INTERNAL MEDICINE

## 2020-04-07 PROCEDURE — 99211 OFF/OP EST MAY X REQ PHY/QHP: CPT | Mod: PBBFAC | Performed by: INTERNAL MEDICINE

## 2020-04-07 PROCEDURE — 99214 PR OFFICE/OUTPT VISIT, EST, LEVL IV, 30-39 MIN: ICD-10-PCS | Mod: S$PBB,,, | Performed by: INTERNAL MEDICINE

## 2020-04-07 RX ORDER — MISOPROSTOL 100 UG/1
100 TABLET ORAL 2 TIMES DAILY
Qty: 180 TABLET | Refills: 3 | Status: SHIPPED | OUTPATIENT
Start: 2020-04-07 | End: 2021-06-04

## 2020-04-07 RX ORDER — FOLIC ACID 1 MG/1
2 TABLET ORAL DAILY
Qty: 180 TABLET | Refills: 3 | Status: SHIPPED | OUTPATIENT
Start: 2020-04-07 | End: 2021-06-04

## 2020-04-07 RX ORDER — NABUMETONE 500 MG/1
500 TABLET, FILM COATED ORAL 2 TIMES DAILY
Qty: 180 TABLET | Refills: 3 | Status: SHIPPED | OUTPATIENT
Start: 2020-04-07 | End: 2021-06-04

## 2020-04-07 RX ORDER — METHOTREXATE 2.5 MG/1
TABLET ORAL
Qty: 160 TABLET | Refills: 0 | Status: SHIPPED | OUTPATIENT
Start: 2020-04-07 | End: 2020-08-17 | Stop reason: SDUPTHER

## 2020-04-07 NOTE — PROGRESS NOTES
Subjective:       Patient ID: Yulia Peralta is a 74 y.o. female.    Chief Complaint: Rheumatoid arthritis    HPI:  Yulia Peralta is a 74 y.o. female with a history of rheumatoid arthritis for 25 years.  She had been on methotrexate for the past 5 years and her current dose is methotrexate 10 tabs qweek (5 on Monday and 5 on Tuesday).  Off prednisone    History of gout some years ago.        Interval History:  Concerned about daughter who was hospitalized for eye lid swelling and diagnosed with lupus.    Recurrent rash.  Saw 2 different dermatologists.  Dr. Morin biopsy.  Also saw Dr. Packer.    Biopsy she was told was normal.  The rash comes and goes.   Went October 2019 to [facility] to check thymoma.  Everything was fine and she will follow in 2 years.     Feels joints are doing well except 8-9/10 ache left outer lower leg and left foot.  Worsened with being on feet all day or staying .   Denies morning stiffness.  Improves with resting feet and rubbing with alcohol.  Worse with walking.    Seeing neurosurgery regarding aneurysm anterior communicating artery on CT.     Review of Systems   Constitutional: Positive for fatigue.   HENT: Negative.    Eyes: Negative.    Cardiovascular: Negative.    Gastrointestinal: Negative.    Endocrine: Negative.    Genitourinary: Negative.    Musculoskeletal: Positive for arthralgias and joint swelling.   Skin: Negative for rash.   Allergic/Immunologic: Negative.    Neurological: Positive for headaches.   Hematological: Bruises/bleeds easily.   Psychiatric/Behavioral: Negative.          Objective:   There were no vitals taken for this visit.  Virtual visit       Physical Exam   Constitutional: She is oriented to person, place, and time.   Neurological: She is alert and oriented to person, place, and time.   Psychiatric: Mood and affect normal.            LABS    Component      Latest Ref Rng & Units 4/2/2020   WBC      3.90 - 12.70 K/uL 4.88   RBC      4.00 - 5.40 M/uL  3.65 (L)   Hemoglobin      12.0 - 16.0 g/dL 11.3 (L)   Hematocrit      37.0 - 48.5 % 36.9 (L)   MCV      82 - 98 fL 101 (H)   MCH      27.0 - 31.0 pg 31.0   MCHC      32.0 - 36.0 g/dL 30.6 (L)   RDW      11.5 - 14.5 % 13.6   Platelets      150 - 350 K/uL 143 (L)   MPV      9.2 - 12.9 fL 13.9 (H)   Immature Granulocytes      0.0 - 0.5 % 0.2   Gran # (ANC)      1.8 - 7.7 K/uL 2.8   Immature Grans (Abs)      0.00 - 0.04 K/uL 0.01   Lymph #      1.0 - 4.8 K/uL 1.2   Mono #      0.3 - 1.0 K/uL 0.7   Eos #      0.0 - 0.5 K/uL 0.2   Baso #      0.00 - 0.20 K/uL 0.02   nRBC      0 /100 WBC 0   Gran%      38.0 - 73.0 % 57.6   Lymph%      18.0 - 48.0 % 25.0   Mono%      4.0 - 15.0 % 13.3   Eosinophil%      0.0 - 8.0 % 3.5   Basophil%      0.0 - 1.9 % 0.4   Platelet Estimate       Appears normal   Differential Method       Automated   Sodium      136 - 145 mmol/L 140   Potassium      3.5 - 5.1 mmol/L 4.4   Chloride      95 - 110 mmol/L 107   CO2      23 - 29 mmol/L 23   Glucose      70 - 110 mg/dL 100   BUN, Bld      8 - 23 mg/dL 13   Creatinine      0.5 - 1.4 mg/dL 0.7   Calcium      8.7 - 10.5 mg/dL 9.7   PROTEIN TOTAL      6.0 - 8.4 g/dL 7.8   Albumin      3.5 - 5.2 g/dL 3.9   BILIRUBIN TOTAL      0.1 - 1.0 mg/dL 0.4   Alkaline Phosphatase      55 - 135 U/L 113   AST      10 - 40 U/L 19   ALT      10 - 44 U/L 25   Anion Gap      8 - 16 mmol/L 10   eGFR if African American      >60 mL/min/1.73 m:2 >60.0   eGFR if non African American      >60 mL/min/1.73 m:2 >60.0   Sed Rate      0 - 36 mm/Hr 30   CRP      0.0 - 8.2 mg/L 1.6         Assessment:       1. Rheumatoid arthritis manifested by rheumatoid nodule, polyarthritis in the past, and contractures of the elbows.    Treated in the past with gold. Plaquenil did not work in the past and she never took SSZ.   Still doing well. Continue MTX (Monday 5 in morning and 5 in evening) and folic acid   2. Encounter for long-term (current) use of other medications    3. Fatigue.   4.  Thymoma.  Presented as chest pain found to have a small mass on CT evaluated by cardiothoracic surgery who said it was too small to biopsy.  She decided to go for second opinion at [facility]. Biopsy was negative. MRI repeat stable and most recent 10/2017 stable  5. 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 Felt not to have hepatitis C by hepatology.  6. Right anserine bursitis.   7. Obesity  8. HTN.   9. Chronic back pain.  Bulging disc.  May be cause of paresthesias in legs with standing.  10.  Paresthesia of in left leg.  May be due to #9  11.  Right clavicle mass.  Evaluated by x-ray   12.  Trigger fingers.  Left 4th and right 3rd and right 5th.  Therapy helped in past.    13.  Left lower leg pain and swelling.  History of trauma  14.  Anterior communicating artery aneurysm  Plan:       1. Continue methotrexate 10 tabs qweek.  2. Check CRP, ESR, CBC, and CMP.   3. Continue Nabumetone.  Patient says GI ok with her continuing despite healing ulcer on EGD.   Patient on misoprostol  4. Continue folic acid 2 tabs.    5. Patient to discuss with primary doctor sleep apnea since she has HA, snoring and fatigue  6. Consider OT for arms if pain returns and no improvement with home exercises from previous PT  7. Topical arthritis OTC ointment to feet and ankles.  8.  Stressed compliance with gabapentin     Return to the office in 3 months     The patient location is: home  The chief complaint leading to consultation is: RA  Visit type: Virtual visit with synchronous audio  Total time spent with patient: 20 min  Each patient to whom he or she provides medical services by telemedicine is:  (1) informed of the relationship between the physician and patient and the respective role of any other health care provider with respect to management of the patient; and (2) notified that he or she may decline to receive medical services by telemedicine and may withdraw from such care at any time.    Notes: see above

## 2020-04-23 ENCOUNTER — TELEPHONE (OUTPATIENT)
Dept: RHEUMATOLOGY | Facility: CLINIC | Age: 75
End: 2020-04-23

## 2020-04-23 DIAGNOSIS — M06.9 RHEUMATOID ARTHRITIS OF HAND, UNSPECIFIED LATERALITY, UNSPECIFIED RHEUMATOID FACTOR PRESENCE: ICD-10-CM

## 2020-04-23 NOTE — TELEPHONE ENCOUNTER
Patient reports pharmacy only send her 80 tabs though the prescription was for 160.  The patient is to contact the pharmacy in find out why the only sent have the prescription.

## 2020-04-23 NOTE — TELEPHONE ENCOUNTER
----- Message from Brianne Chan MA sent at 4/20/2020  9:44 AM CDT -----  Contact: pt      ----- Message -----  From: Brianne Chan MA  Sent: 4/14/2020   4:20 PM CDT  To: Brianne Chan MA        ----- Message -----  From: Mahsa Gonzales  Sent: 4/14/2020   3:37 PM CDT  To: Johny MURRAY Staff    Pt would like to receive a call back regarding methotrexate 2.5 MG Tab. Pt states the prescription will not last 3 months because the pharmacist only gave her 80 pills. Please contact the pt to advise.    Contact info [phone] or

## 2020-04-27 ENCOUNTER — LAB VISIT (OUTPATIENT)
Dept: FAMILY MEDICINE | Facility: CLINIC | Age: 75
End: 2020-04-27
Payer: MEDICARE

## 2020-04-27 DIAGNOSIS — Z20.822 SUSPECTED COVID-19 VIRUS INFECTION: ICD-10-CM

## 2020-04-27 DIAGNOSIS — Z20.822 SUSPECTED COVID-19 VIRUS INFECTION: Primary | ICD-10-CM

## 2020-04-27 PROCEDURE — U0002 COVID-19 LAB TEST NON-CDC: HCPCS

## 2020-04-28 ENCOUNTER — TELEPHONE (OUTPATIENT)
Dept: FAMILY MEDICINE | Facility: CLINIC | Age: 75
End: 2020-04-28

## 2020-04-28 LAB — SARS-COV-2 RNA RESP QL NAA+PROBE: NOT DETECTED

## 2020-04-28 NOTE — TELEPHONE ENCOUNTER
----- Message from SEKOU Stack sent at 4/28/2020  7:01 AM CDT -----      ----- Message -----  From: [facility] [employer] [facility] [employer]  Sent: 4/28/2020  12:57 AM CDT  To: Cristobal Watts Jr., NP

## 2020-05-06 DIAGNOSIS — I10 ESSENTIAL HYPERTENSION: ICD-10-CM

## 2020-05-06 RX ORDER — AMLODIPINE BESYLATE 5 MG/1
TABLET ORAL
Qty: 30 TABLET | Refills: 1 | Status: SHIPPED | OUTPATIENT
Start: 2020-05-06 | End: 2020-08-26 | Stop reason: SDUPTHER

## 2020-05-06 RX ORDER — FUROSEMIDE 40 MG/1
TABLET ORAL
Qty: 90 TABLET | Refills: 1 | Status: SHIPPED | OUTPATIENT
Start: 2020-05-06 | End: 2021-01-25

## 2020-05-14 ENCOUNTER — PATIENT OUTREACH (OUTPATIENT)
Dept: ADMINISTRATIVE | Facility: OTHER | Age: 75
End: 2020-05-14

## 2020-05-18 ENCOUNTER — DOCUMENTATION ONLY (OUTPATIENT)
Dept: NEUROSURGERY | Facility: CLINIC | Age: 75
End: 2020-05-18

## 2020-05-18 NOTE — PROGRESS NOTES
"Called and spoke with patient to follow up on canceled appointment. Patient stated "I received appoint notice in the mail on Saturday. I spoke with Dr Jenkins on Friday and he informed of the results of my test and that whatever I had was very small and they were going to just watch it." I informed patient the appt was scheduled because Dr Jenkins sent a message to our office, requesting patient be scheduled an appointment to see Dr Linares, for evaluation.   Patient replied "He did tell me he wanted me to see Dr Linares, but I'm just not up to it right now, with all this COVID stuff." I offered to patientchange appointment to virtual visit, to allay fears.  Patient replied, "I will call in a few weeks to schedule that visit."  I verbalized understanding.  "

## 2020-05-22 ENCOUNTER — OFFICE VISIT (OUTPATIENT)
Dept: FAMILY MEDICINE | Facility: CLINIC | Age: 75
End: 2020-05-22
Payer: MEDICARE

## 2020-05-22 VITALS
BODY MASS INDEX: 37.32 KG/M2 | DIASTOLIC BLOOD PRESSURE: 66 MMHG | WEIGHT: 202.81 LBS | TEMPERATURE: 99 F | OXYGEN SATURATION: 98 % | SYSTOLIC BLOOD PRESSURE: 138 MMHG | HEIGHT: 62 IN | HEART RATE: 71 BPM

## 2020-05-22 DIAGNOSIS — Z20.822 SUSPECTED COVID-19 VIRUS INFECTION: Primary | ICD-10-CM

## 2020-05-22 DIAGNOSIS — K21.9 GASTROESOPHAGEAL REFLUX DISEASE WITHOUT ESOPHAGITIS: ICD-10-CM

## 2020-05-22 PROCEDURE — 99213 PR OFFICE/OUTPT VISIT, EST, LEVL III, 20-29 MIN: ICD-10-PCS | Mod: S$PBB,,, | Performed by: PHYSICIAN ASSISTANT

## 2020-05-22 PROCEDURE — 99214 OFFICE O/P EST MOD 30 MIN: CPT | Mod: PBBFAC,PO | Performed by: PHYSICIAN ASSISTANT

## 2020-05-22 PROCEDURE — 99999 PR PBB SHADOW E&M-EST. PATIENT-LVL IV: CPT | Mod: PBBFAC,,, | Performed by: PHYSICIAN ASSISTANT

## 2020-05-22 PROCEDURE — 99213 OFFICE O/P EST LOW 20 MIN: CPT | Mod: S$PBB,,, | Performed by: PHYSICIAN ASSISTANT

## 2020-05-22 PROCEDURE — 99999 PR PBB SHADOW E&M-EST. PATIENT-LVL IV: ICD-10-PCS | Mod: PBBFAC,,, | Performed by: PHYSICIAN ASSISTANT

## 2020-05-22 PROCEDURE — U0003 INFECTIOUS AGENT DETECTION BY NUCLEIC ACID (DNA OR RNA); SEVERE ACUTE RESPIRATORY SYNDROME CORONAVIRUS 2 (SARS-COV-2) (CORONAVIRUS DISEASE [COVID-19]), AMPLIFIED PROBE TECHNIQUE, MAKING USE OF HIGH THROUGHPUT TECHNOLOGIES AS DESCRIBED BY CMS-2020-01-R: HCPCS

## 2020-05-22 RX ORDER — FAMOTIDINE 40 MG/1
20 TABLET, FILM COATED ORAL 2 TIMES DAILY PRN
Qty: 15 TABLET | Refills: 2 | Status: SHIPPED | OUTPATIENT
Start: 2020-05-22 | End: 2021-01-06 | Stop reason: SDUPTHER

## 2020-05-23 ENCOUNTER — TELEPHONE (OUTPATIENT)
Dept: FAMILY MEDICINE | Facility: CLINIC | Age: 75
End: 2020-05-23

## 2020-05-23 LAB — SARS-COV-2 RNA RESP QL NAA+PROBE: NOT DETECTED

## 2020-06-08 ENCOUNTER — HOSPITAL ENCOUNTER (EMERGENCY)
Facility: HOSPITAL | Age: 75
Discharge: HOME | End: 2020-06-08
Attending: EMERGENCY MEDICINE
Payer: MEDICARE

## 2020-06-08 VITALS
TEMPERATURE: 98 F | DIASTOLIC BLOOD PRESSURE: 82 MMHG | WEIGHT: 202 LBS | RESPIRATION RATE: 19 BRPM | SYSTOLIC BLOOD PRESSURE: 172 MMHG | BODY MASS INDEX: 37.17 KG/M2 | HEIGHT: 62 IN | HEART RATE: 66 BPM | OXYGEN SATURATION: 100 %

## 2020-06-08 DIAGNOSIS — K21.9 GASTROESOPHAGEAL REFLUX DISEASE, ESOPHAGITIS PRESENCE NOT SPECIFIED: ICD-10-CM

## 2020-06-08 DIAGNOSIS — R10.9 ABDOMINAL PAIN: ICD-10-CM

## 2020-06-08 DIAGNOSIS — R10.13 EPIGASTRIC PAIN: ICD-10-CM

## 2020-06-08 LAB
ALBUMIN SERPL BCP-MCNC: 3.9 G/DL (ref 3.5–5.2)
ALP SERPL-CCNC: 92 U/L (ref 55–135)
ALT SERPL W/O P-5'-P-CCNC: 22 U/L (ref 10–44)
ANION GAP SERPL CALC-SCNC: 9 MMOL/L (ref 8–16)
AST SERPL-CCNC: 18 U/L (ref 10–40)
BASOPHILS # BLD AUTO: 0.01 K/UL (ref 0–0.2)
BASOPHILS NFR BLD: 0.1 % (ref 0–1.9)
BILIRUB SERPL-MCNC: 0.4 MG/DL (ref 0.1–1)
BILIRUB UR QL STRIP: NEGATIVE
BUN SERPL-MCNC: 9 MG/DL (ref 8–23)
CALCIUM SERPL-MCNC: 9.6 MG/DL (ref 8.7–10.5)
CHLORIDE SERPL-SCNC: 106 MMOL/L (ref 95–110)
CLARITY UR: CLEAR
CO2 SERPL-SCNC: 24 MMOL/L (ref 23–29)
COLOR UR: YELLOW
CREAT SERPL-MCNC: 0.7 MG/DL (ref 0.5–1.4)
DIFFERENTIAL METHOD: ABNORMAL
EOSINOPHIL # BLD AUTO: 0.2 K/UL (ref 0–0.5)
EOSINOPHIL NFR BLD: 2.4 % (ref 0–8)
ERYTHROCYTE [DISTWIDTH] IN BLOOD BY AUTOMATED COUNT: 14.6 % (ref 11.5–14.5)
EST. GFR  (AFRICAN AMERICAN): >60 ML/MIN/1.73 M^2
EST. GFR  (NON AFRICAN AMERICAN): >60 ML/MIN/1.73 M^2
GLUCOSE SERPL-MCNC: 117 MG/DL (ref 70–110)
GLUCOSE UR QL STRIP: NEGATIVE
HCT VFR BLD AUTO: 28.5 % (ref 37–48.5)
HGB BLD-MCNC: 9.2 G/DL (ref 12–16)
HGB UR QL STRIP: NEGATIVE
IMM GRANULOCYTES # BLD AUTO: 0.02 K/UL (ref 0–0.04)
IMM GRANULOCYTES NFR BLD AUTO: 0.2 % (ref 0–0.5)
KETONES UR QL STRIP: NEGATIVE
LEUKOCYTE ESTERASE UR QL STRIP: ABNORMAL
LIPASE SERPL-CCNC: 26 U/L (ref 4–60)
LYMPHOCYTES # BLD AUTO: 1.3 K/UL (ref 1–4.8)
LYMPHOCYTES NFR BLD: 16.7 % (ref 18–48)
MCH RBC QN AUTO: 31.2 PG (ref 27–31)
MCHC RBC AUTO-ENTMCNC: 32.3 G/DL (ref 32–36)
MCV RBC AUTO: 97 FL (ref 82–98)
MICROSCOPIC COMMENT: ABNORMAL
MONOCYTES # BLD AUTO: 1.4 K/UL (ref 0.3–1)
MONOCYTES NFR BLD: 17.1 % (ref 4–15)
NEUTROPHILS # BLD AUTO: 5.1 K/UL (ref 1.8–7.7)
NEUTROPHILS NFR BLD: 63.5 % (ref 38–73)
NITRITE UR QL STRIP: NEGATIVE
NRBC BLD-RTO: 0 /100 WBC
PH UR STRIP: 7 [PH] (ref 5–8)
PLATELET # BLD AUTO: 154 K/UL (ref 150–350)
PMV BLD AUTO: 13 FL (ref 9.2–12.9)
POTASSIUM SERPL-SCNC: 3.7 MMOL/L (ref 3.5–5.1)
PROT SERPL-MCNC: 7.6 G/DL (ref 6–8.4)
PROT UR QL STRIP: NEGATIVE
RBC # BLD AUTO: 2.95 M/UL (ref 4–5.4)
RBC #/AREA URNS HPF: 0 /HPF (ref 0–4)
SODIUM SERPL-SCNC: 139 MMOL/L (ref 136–145)
SP GR UR STRIP: 1.01 (ref 1–1.03)
SQUAMOUS #/AREA URNS HPF: ABNORMAL /HPF
TROPONIN I SERPL DL<=0.01 NG/ML-MCNC: <0.006 NG/ML (ref 0–0.03)
URN SPEC COLLECT METH UR: ABNORMAL
UROBILINOGEN UR STRIP-ACNC: ABNORMAL EU/DL
WBC # BLD AUTO: 8.02 K/UL (ref 3.9–12.7)
WBC #/AREA URNS HPF: 35 /HPF (ref 0–5)

## 2020-06-08 PROCEDURE — 96361 HYDRATE IV INFUSION ADD-ON: CPT

## 2020-06-08 PROCEDURE — 84484 ASSAY OF TROPONIN QUANT: CPT

## 2020-06-08 PROCEDURE — 96375 TX/PRO/DX INJ NEW DRUG ADDON: CPT

## 2020-06-08 PROCEDURE — 96374 THER/PROPH/DIAG INJ IV PUSH: CPT

## 2020-06-08 PROCEDURE — 25000003 PHARM REV CODE 250: Performed by: EMERGENCY MEDICINE

## 2020-06-08 PROCEDURE — 99285 EMERGENCY DEPT VISIT HI MDM: CPT | Mod: 25

## 2020-06-08 PROCEDURE — 85025 COMPLETE CBC W/AUTO DIFF WBC: CPT

## 2020-06-08 PROCEDURE — 93005 ELECTROCARDIOGRAM TRACING: CPT

## 2020-06-08 PROCEDURE — 63600175 PHARM REV CODE 636 W HCPCS: Performed by: EMERGENCY MEDICINE

## 2020-06-08 PROCEDURE — 87086 URINE CULTURE/COLONY COUNT: CPT

## 2020-06-08 PROCEDURE — 81000 URINALYSIS NONAUTO W/SCOPE: CPT

## 2020-06-08 PROCEDURE — 80053 COMPREHEN METABOLIC PANEL: CPT

## 2020-06-08 PROCEDURE — 93010 ELECTROCARDIOGRAM REPORT: CPT | Mod: ,,, | Performed by: INTERNAL MEDICINE

## 2020-06-08 PROCEDURE — S0028 INJECTION, FAMOTIDINE, 20 MG: HCPCS | Performed by: EMERGENCY MEDICINE

## 2020-06-08 PROCEDURE — 83690 ASSAY OF LIPASE: CPT

## 2020-06-08 PROCEDURE — 93010 EKG 12-LEAD: ICD-10-PCS | Mod: ,,, | Performed by: INTERNAL MEDICINE

## 2020-06-08 RX ORDER — MAG HYDROX/ALUMINUM HYD/SIMETH 200-200-20
30 SUSPENSION, ORAL (FINAL DOSE FORM) ORAL
Status: COMPLETED | OUTPATIENT
Start: 2020-06-08 | End: 2020-06-08

## 2020-06-08 RX ORDER — LIDOCAINE HYDROCHLORIDE 20 MG/ML
5 SOLUTION OROPHARYNGEAL
Status: COMPLETED | OUTPATIENT
Start: 2020-06-08 | End: 2020-06-08

## 2020-06-08 RX ORDER — MAG HYDROX/ALUMINUM HYD/SIMETH 200-200-20
30 SUSPENSION, ORAL (FINAL DOSE FORM) ORAL
Status: DISCONTINUED | OUTPATIENT
Start: 2020-06-08 | End: 2020-06-08

## 2020-06-08 RX ORDER — MORPHINE SULFATE 10 MG/ML
4 INJECTION INTRAMUSCULAR; INTRAVENOUS; SUBCUTANEOUS
Status: COMPLETED | OUTPATIENT
Start: 2020-06-08 | End: 2020-06-08

## 2020-06-08 RX ORDER — FAMOTIDINE 10 MG/ML
20 INJECTION INTRAVENOUS
Status: COMPLETED | OUTPATIENT
Start: 2020-06-08 | End: 2020-06-08

## 2020-06-08 RX ORDER — PANTOPRAZOLE SODIUM 40 MG/1
40 TABLET, DELAYED RELEASE ORAL DAILY
Qty: 90 TABLET | Refills: 3 | Status: SHIPPED | OUTPATIENT
Start: 2020-06-08 | End: 2020-08-21 | Stop reason: SDUPTHER

## 2020-06-08 RX ADMIN — SODIUM CHLORIDE 1000 ML: 0.9 INJECTION, SOLUTION INTRAVENOUS at 03:06

## 2020-06-08 RX ADMIN — LIDOCAINE HYDROCHLORIDE 5 ML: 20 SOLUTION ORAL; TOPICAL at 02:06

## 2020-06-08 RX ADMIN — FAMOTIDINE 20 MG: 10 INJECTION INTRAVENOUS at 03:06

## 2020-06-08 RX ADMIN — MORPHINE SULFATE 4 MG: 10 INJECTION INTRAVENOUS at 03:06

## 2020-06-08 RX ADMIN — ALUMINUM HYDROXIDE, MAGNESIUM HYDROXIDE, AND SIMETHICONE 30 ML: 200; 200; 20 SUSPENSION ORAL at 02:06

## 2020-06-08 NOTE — ED PROVIDER NOTES
Encounter Date: 6/8/2020       History     Chief Complaint   Patient presents with    Abdominal Pain     Abdominal/epigastric burning pains that radiates to her back. Also with complaints of left breast pain. Symptoms for the past week but pain is at its worse tonight. Tried antacids without relief. Denies vomiting. Scheduled to see GI on 6/23/20.     75-year-old female with history of acid reflux, anemia, rheumatoid arthritis presents with epigastric pain.  She describes the pain it is a sharp burning sensation that radiates to her back.  She had a similar episode several years ago and states her symptoms improved after receiving medication in the emergency department.  She denies nausea, vomiting, urinary symptoms, constipation, diarrhea, fever or chills.  She did not take any antacids tonight prior to coming to the emergency department.  Her pain started around 10:00 p.m..  Her past surgical history includes cholecystectomy and hysterectomy.        Review of patient's allergies indicates:   Allergen Reactions    No known drug allergies      Past Medical History:   Diagnosis Date    Acid reflux     Anemia     Anxiety     Arthritis     Blood transfusion     Cataract     Depression     Dry eyes     Heart murmur     Hypertension     Left lumbar radiculitis 5/19/2015    Multiple thyroid nodules 12/18/2012    Osteoporosis     Rheumatoid arthritis     Thoracic or lumbosacral neuritis or radiculitis, unspecified 10/30/2013     Past Surgical History:   Procedure Laterality Date    CEREBRAL ANGIOGRAM N/A 1/23/2020    Procedure: ANGIOGRAM-CEREBRAL;  Surgeon: Hira Diagnostic Provider;  Location: [facility] OR [address] [place] [address];  Service: Radiology;  Laterality: N/A;  /Fuentes    gallstones  [id number]    HYSTERECTOMY      JOINT REPLACEMENT  [id number]     bilateral knee    IA REMOVAL OF OVARY/TUBE(S)      TONSILLECTOMY       Family History   Problem Relation Age of Onset    Cataracts Mother     Hypertension  Mother     Hypertension Sister     No Known Problems Father     Glaucoma Brother     No Known Problems Maternal Aunt     No Known Problems Maternal Uncle     No Known Problems Paternal Aunt     No Known Problems Paternal Uncle     No Known Problems Maternal Grandmother     No Known Problems Maternal Grandfather     No Known Problems Paternal Grandmother     No Known Problems Paternal Grandfather     Lupus Neg Hx     Rheum arthritis Neg Hx     Psoriasis Neg Hx     Amblyopia Neg Hx     Blindness Neg Hx     Cancer Neg Hx     Diabetes Neg Hx     Macular degeneration Neg Hx     Retinal detachment Neg Hx     Strabismus Neg Hx     Stroke Neg Hx     Thyroid disease Neg Hx      Social History     Tobacco Use    Smoking status: Former Smoker    Smokeless tobacco: Never Used   Substance Use Topics    Alcohol use: Yes     Comment: socially    Drug use: No     Review of Systems   Constitutional: Negative for chills and fever.   HENT: Negative for congestion and sore throat.    Eyes: Negative for visual disturbance.   Respiratory: Negative for cough and shortness of breath.    Cardiovascular: Negative for chest pain.   Gastrointestinal: Positive for abdominal pain. Negative for nausea and vomiting.   Genitourinary: Negative for dysuria and vaginal discharge.   Skin: Negative for rash.   Neurological: Negative for headaches.   Psychiatric/Behavioral: Negative for decreased concentration.       Physical Exam     Initial Vitals [06/08/20 0148]   BP Pulse Resp Temp SpO2   (!) 178/80 75 20 98.3 °F (36.8 °C) 99 %      MAP       --         Physical Exam    Nursing note and vitals reviewed.  Constitutional: She appears well-developed and well-nourished. She is not diaphoretic. No distress.   HENT:   Mouth/Throat: Oropharynx is clear and moist.   Eyes: Pupils are equal, round, and reactive to light.   Neck: Neck supple.   Cardiovascular: Normal rate and regular rhythm.   Pulmonary/Chest: Breath sounds normal.    Abdominal: Soft. There is tenderness in the epigastric area.   Musculoskeletal: She exhibits no edema.   Neurological: She is alert and oriented to person, place, and time.   Skin: Skin is warm and dry.   Psychiatric: She has a normal mood and affect.         ED Course   Procedures  Labs Reviewed   CBC W/ AUTO DIFFERENTIAL - Abnormal; Notable for the following components:       Result Value    RBC 2.95 (*)     Hemoglobin 9.2 (*)     Hematocrit 28.5 (*)     Mean Corpuscular Hemoglobin 31.2 (*)     RDW 14.6 (*)     MPV 13.0 (*)     Mono # 1.4 (*)     Lymph% 16.7 (*)     Mono% 17.1 (*)     All other components within normal limits   COMPREHENSIVE METABOLIC PANEL - Abnormal; Notable for the following components:    Glucose 117 (*)     All other components within normal limits   TROPONIN I   LIPASE   URINALYSIS, REFLEX TO URINE CULTURE   URINALYSIS MICROSCOPIC        ECG Results          EKG 12-lead (Preliminary result)  Result time 06/08/20 02:44:48    ED Interpretation by Desirae Hernandez MD (06/08/20 02:44:48)    NSR rate 63, normal NV interval, QTc 403 ms. No STEMI.                             Imaging Results          X-Ray Chest AP Portable (Final result)  Result time 06/08/20 04:09:22    Final result by Rush Jeffery MD (06/08/20 04:09:22)                 Impression:      Chronic changes are noted there is no radiographic evidence for superimposed acute intrathoracic process.      Electronically signed by: Rush Jeffery  Date:    06/08/2020  Time:    04:09             Narrative:    EXAMINATION:  XR CHEST AP PORTABLE    CLINICAL HISTORY:  epigastric pain;    TECHNIQUE:  Single frontal view of the chest was performed.    COMPARISON:  None    FINDINGS:  Single portable chest view is submitted.  When accounting for depth of inspiration, portable technique and rotation the cardiomediastinal silhouette appears stable.  Density overlying the left aortopulmonary window likely relates to granulomatous  calcification.  There is no evidence for confluent infiltrate or consolidation, significant pleural effusion or pneumothorax.  The osseous structures demonstrate chronic change.                               US Abdominal Aorta (Final result)  Result time 06/08/20 03:50:59    Final result by Rush Jeffery MD (06/08/20 03:50:59)                 Impression:      The distal aorta is not identified sonographically due to bowel gas shadowing however there is no sonographic evidence for aneurysmal dilatation of the proximal or mid abdominal aorta.      Electronically signed by: Rush Jeffery  Date:    06/08/2020  Time:    03:50             Narrative:    EXAMINATION:  US ABDOMINAL AORTA    CLINICAL HISTORY:  Unspecified abdominal pain    TECHNIQUE:  Limited ultrasound was performed of the abdominal aorta, with cross sectional diameter measurements obtained.    COMPARISON:  None.    FINDINGS:  Targeted sonographic evaluation of the aorta was performed.  The proximal and mid aorta are identified sonographically however the distal aorta is obscured due to bowel gas shadowing.    The proximal aorta measures 2.1 x 2.4 cm on transverse imaging, the mid aorta measures 2 x 1.4 cm on transverse imaging.  Doppler imaging demonstrates flow within the abdominal aorta measuring up to approximately 71.4 centimeters/second.  There is no additional sonographic evidence for acute process of the abdomen on submitted imaging.                                 Medical Decision Making:   Initial Assessment:   75-year-old female presenting with epigastric pain radiating to her back.  On exam, she has tenderness in the epigastrium.  Differential includes but not limited to pancreatitis, GERD, aortic aneurysm.  Low suspicion for ACS.  Workup initiated with labs, aortic ultrasound, will treat with Maalox and viscous lidocaine.  ED Management:  Care signed out to Dr. Bruno at shift change pending labs, reassessment, and final disposition.    Desirae Hernandez MD                     ED Course as of Jun 08 0454   Mon Jun 08, 2020   0316 Patient reports only mild improvement in her pain.  Will give Pepcid IV and morphine.    [LH]      ED Course User Index  [LH] Desirae Hernandez MD                Clinical Impression:       ICD-10-CM ICD-9-CM   1. Epigastric pain R10.13 789.06   2. Abdominal pain R10.9 789.00     This dictation has been generated using M-Modal Fluency Direct dictation; some phonetic errors may occur.                              Desirae Hernandez MD  06/08/20 [id number]

## 2020-06-08 NOTE — ED TRIAGE NOTES
Pt arrives to the ED reports abdominal pain that started last night around 10 PM. Denies taking pain medicine at home, denies N&V, SOB, nor chest pain . Pt reports hx of HTN, arthritis, and anxiety.

## 2020-06-10 LAB — BACTERIA UR CULT: NORMAL

## 2020-07-09 ENCOUNTER — PATIENT OUTREACH (OUTPATIENT)
Dept: ADMINISTRATIVE | Facility: HOSPITAL | Age: 75
End: 2020-07-09

## 2020-07-09 NOTE — PROGRESS NOTES
A request was sent today for patient's colonoscopy record.    Patient scheduled for a repeat Colonoscopy on 07/27/20 w/ Dr. Figueroa.

## 2020-07-09 NOTE — TELEPHONE ENCOUNTER
Sedimentation rate is elevated but is normal for her age We'll continue to monitor labs. We will await response to physical therapy and add sulfasalazine and if symptoms continue   Patient states that he went to the HEARING. I called nurse  Jon Severe [phone] # [phone] and LMOVM checking on the hearing results so that we can proceed with treatment.

## 2020-07-14 ENCOUNTER — PATIENT OUTREACH (OUTPATIENT)
Dept: ADMINISTRATIVE | Facility: HOSPITAL | Age: 75
End: 2020-07-14

## 2020-07-21 ENCOUNTER — PES CALL (OUTPATIENT)
Dept: ADMINISTRATIVE | Facility: CLINIC | Age: 75
End: 2020-07-21

## 2020-08-03 ENCOUNTER — PATIENT OUTREACH (OUTPATIENT)
Dept: ADMINISTRATIVE | Facility: HOSPITAL | Age: 75
End: 2020-08-03

## 2020-08-03 RX ORDER — SODIUM, POTASSIUM,MAG SULFATES 17.5-3.13G
SOLUTION, RECONSTITUTED, ORAL ORAL
Status: ON HOLD | COMMUNITY
Start: 2020-07-01 | End: 2021-01-26

## 2020-08-04 ENCOUNTER — TELEPHONE (OUTPATIENT)
Dept: FAMILY MEDICINE | Facility: CLINIC | Age: 75
End: 2020-08-04

## 2020-08-04 DIAGNOSIS — Z01.419 WELL WOMAN EXAM: Primary | ICD-10-CM

## 2020-08-04 NOTE — TELEPHONE ENCOUNTER
Her colonoscopy report was receivid showing polyps. She has to follow up with GI and repeat her colonoscopy in 3 years.

## 2020-08-05 NOTE — TELEPHONE ENCOUNTER
Call placed to Pt, and informed of Provider response. Pt ask that she be recommended a woman gyn. Please advise.

## 2020-08-06 ENCOUNTER — PATIENT OUTREACH (OUTPATIENT)
Dept: ADMINISTRATIVE | Facility: OTHER | Age: 75
End: 2020-08-06

## 2020-08-07 ENCOUNTER — OFFICE VISIT (OUTPATIENT)
Dept: OBSTETRICS AND GYNECOLOGY | Facility: CLINIC | Age: 75
End: 2020-08-07
Payer: MEDICARE

## 2020-08-07 VITALS
HEIGHT: 62 IN | SYSTOLIC BLOOD PRESSURE: 160 MMHG | WEIGHT: 204.25 LBS | BODY MASS INDEX: 37.59 KG/M2 | DIASTOLIC BLOOD PRESSURE: 72 MMHG

## 2020-08-07 DIAGNOSIS — Z12.31 BREAST CANCER SCREENING BY MAMMOGRAM: ICD-10-CM

## 2020-08-07 DIAGNOSIS — R30.0 DYSURIA: ICD-10-CM

## 2020-08-07 DIAGNOSIS — N89.8 VAGINAL DISCHARGE: ICD-10-CM

## 2020-08-07 DIAGNOSIS — Z01.419 WELL WOMAN EXAM: Primary | ICD-10-CM

## 2020-08-07 DIAGNOSIS — B37.2 CANDIDIASIS, INTERTRIGO: ICD-10-CM

## 2020-08-07 LAB
BILIRUB SERPL-MCNC: ABNORMAL MG/DL
BLOOD URINE, POC: 250
CLARITY, POC UA: ABNORMAL
COLOR, POC UA: YELLOW
GLUCOSE UR QL STRIP: ABNORMAL
KETONES UR QL STRIP: ABNORMAL
LEUKOCYTE ESTERASE URINE, POC: 2
NITRITE, POC UA: ABNORMAL
PH, POC UA: 5
PROTEIN, POC: ABNORMAL
SPECIFIC GRAVITY, POC UA: 1.02
UROBILINOGEN, POC UA: ABNORMAL

## 2020-08-07 PROCEDURE — 87510 GARDNER VAG DNA DIR PROBE: CPT

## 2020-08-07 PROCEDURE — 99999 PR PBB SHADOW E&M-EST. PATIENT-LVL IV: ICD-10-PCS | Mod: PBBFAC,,, | Performed by: OBSTETRICS & GYNECOLOGY

## 2020-08-07 PROCEDURE — 87086 URINE CULTURE/COLONY COUNT: CPT

## 2020-08-07 PROCEDURE — G0101 CA SCREEN;PELVIC/BREAST EXAM: HCPCS | Mod: PBBFAC | Performed by: OBSTETRICS & GYNECOLOGY

## 2020-08-07 PROCEDURE — 81002 URINALYSIS NONAUTO W/O SCOPE: CPT | Mod: PBBFAC | Performed by: OBSTETRICS & GYNECOLOGY

## 2020-08-07 PROCEDURE — 87481 CANDIDA DNA AMP PROBE: CPT | Mod: 59

## 2020-08-07 PROCEDURE — 99999 PR PBB SHADOW E&M-EST. PATIENT-LVL IV: CPT | Mod: PBBFAC,,, | Performed by: OBSTETRICS & GYNECOLOGY

## 2020-08-07 PROCEDURE — G0101 CA SCREEN;PELVIC/BREAST EXAM: HCPCS | Mod: S$PBB,,, | Performed by: OBSTETRICS & GYNECOLOGY

## 2020-08-07 PROCEDURE — G0101 PR CA SCREEN;PELVIC/BREAST EXAM: ICD-10-PCS | Mod: S$PBB,,, | Performed by: OBSTETRICS & GYNECOLOGY

## 2020-08-07 PROCEDURE — 99214 OFFICE O/P EST MOD 30 MIN: CPT | Mod: PBBFAC,25 | Performed by: OBSTETRICS & GYNECOLOGY

## 2020-08-07 RX ORDER — NYSTATIN AND TRIAMCINOLONE ACETONIDE 100000; 1 [USP'U]/G; MG/G
CREAM TOPICAL
Qty: 90 G | Refills: 1 | Status: SHIPPED | OUTPATIENT
Start: 2020-08-07 | End: 2021-03-29 | Stop reason: SDUPTHER

## 2020-08-07 NOTE — PATIENT INSTRUCTIONS
Prevention Guidelines, Women Ages 65 and Older  Screening tests and vaccines are an important part of managing your health. Health counseling is essential, too. Below are guidelines for these, for women ages 65 and older. Talk with your healthcare provider to make sure youre up to date on what you need.  Screening Who needs it How often   Type 2 diabetes or prediabetes All adults beginning at age 45 and adults without symptoms at any age who are overweight or obese and have 1 or more additional risk factors for diabetes At least every 3 years   Alcohol misuse All women in this age group At routine exams   Blood pressure All women in this age group Every 2 years if your blood pressure is less than 120/80 mm Hg; yearly if your systolic blood pressure is 120 to 139 mm Hg, or your diastolic blood pressure reading is 80 to 89 mm Hg   Breast cancer All women in this age group Yearly mammogram and clinical breast exam1   Cervical cancer Only women who had abnormal screening results before age 65 Talk with your healthcare provider   Chlamydia Women at increased risk for infection At routine exams   Colorectal cancer All women in this age group1 Flexible sigmoidoscopy every 5 years, or colonoscopy every 10 years, or double-contrast barium enema every 5 years; yearly fecal occult blood test or fecal immunochemical test; or a stool DNA test as often as your healthcare provider advises; talk with your healthcare provider about which tests are best for you   Depression All women in this age group At routine exams   Gonorrhea Sexually active women at increased risk for infection At routine exams   Hepatitis C Anyone at increased risk; 1 time for those born between 1945 and 1965 At routine exams   High cholesterol or triglycerides All women in this age group who are at risk for coronary artery disease At least every 5 years   HIV Women at increased risk for infection - talk with your healthcare provider At routine exams   Lung  cancer Adults age 55 to 80 who have smoked Yearly screening in smokers with 30 pack-year history of smoking or who quit within 15 years   Obesity All women in this age group At routine exams   Osteoporosis All women in this age group Bone density test at age 65, then follow-up as advised by your healthcare provider   Syphilis Women at increased risk for infection - talk with your healthcare provider At routine exams   Thyroid-Stimulating Hormone (TSH) All women in this age group Every 5 years   Tuberculosis Women at increased risk for infection - talk with your healthcare provider Ask your healthcare provider   Vision All women in this age group Every 1 to 2 years; if you have a chronic health condition, ask your healthcare provider if you need exams more often   Vaccine Who needs it How often   Chickenpox (varicella) All women in this age group who have no record of this infection or vaccine 2 doses; second dose should be given at least 4 weeks after the first dose   Hepatitis A Women at increased risk for infection - talk with your healthcare provider 2 doses given 6 months apart   Hepatitis B Women at increased risk for infection - talk with your healthcare provider 3 doses over 6 months; second dose should be given 1 month after the first dose; the third dose should be given at least 2 months after the second dose and at least 4 months after the first dose   Haemophilus influenza Type B (HIB) Women at increased risk for infection - talk with your healthcare provider 1 to 3 doses   Influenza (flu) All women in this age group Once a year   Pneumococcal conjugate vaccine (PCV13) and pneumococcal polysaccharide vaccine (PPSV23) All women in this age group 1 dose of each vaccine   Tetanus/diphtheria/pertussis (Td/Tdap) booster All women in this age group Td every 10 years, or a one-time dose of Tdap instead of a Td booster after age 18, then Td every 10 years   Zoster All women in this age group 1 dose   Counseling  Who needs it How often   Diet and exercise Women who are overweight or obese When diagnosed, and then at routine exams   Fall prevention (exercise and vitamin D supplements) All women in this age group At routine exams   Sexually transmitted infection prevention Women at increased risk for infection - talk with your healthcare provider At routine exams   Use of daily aspirin Women ages 55 and up in this age group who are at risk for cardiovascular health problems such as stroke When your risk is known   Use of tobacco and the health effects it can cause All women in this age group Every exam   1[employer]  Date Last Reviewed: 8/9/2015  © 8441-6777 [employer]. [address] [place] [address], [place] [address]. All rights reserved. This information is not intended as a substitute for professional medical care. Always follow your healthcare professional's instructions.

## 2020-08-07 NOTE — PROGRESS NOTES
History & Physical  Gynecology      SUBJECTIVE:     Chief Complaint: Well Woman       History of Present Illness:  Annual Exam-Postmenopausal  Ms. Peralta is a 74 y/o female who presents for annual exam. The patient c/o pain with urination. he patient is not sexually active. GYN screening history: last mammogram: was normal. The patient is not taking hormone replacement therapy. Patient denies post-menopausal vaginal bleeding. The patient wears seatbelts: yes. The patient participates in regular exercise: no. Has the patient ever been transfused or tattooed?: no. The patient reports that there is not domestic violence in her life.    Review of patient's allergies indicates:   Allergen Reactions    No known drug allergies        Past Medical History:   Diagnosis Date    Abnormal colonoscopy 07/24/2020    colon polyps nad repeat in 3 years.     Acid reflux     Anemia     Anxiety     Arthritis     Blood transfusion     Cataract     Depression     Dry eyes     Heart murmur     Hypertension     Left lumbar radiculitis 5/19/2015    Multiple thyroid nodules 12/18/2012    Osteoporosis     Rheumatoid arthritis     Thoracic or lumbosacral neuritis or radiculitis, unspecified 10/30/2013     Past Surgical History:   Procedure Laterality Date    CEREBRAL ANGIOGRAM N/A 1/23/2020    Procedure: ANGIOGRAM-CEREBRAL;  Surgeon: Dosc Diagnostic Provider;  Location: [facility] OR [address] [place] [address];  Service: Radiology;  Laterality: N/A;  /[place]    gallstones  [id number]    HYSTERECTOMY      JOINT REPLACEMENT  [id number]     bilateral knee    ND REMOVAL OF OVARY/TUBE(S)      TONSILLECTOMY       OB History    No obstetric history on file.       Family History   Problem Relation Age of Onset    Cataracts Mother     Hypertension Mother     Hypertension Sister     No Known Problems Father     Glaucoma Brother     No Known Problems Maternal Aunt     No Known Problems Maternal Uncle     No Known Problems Paternal Aunt      No Known Problems Paternal Uncle     No Known Problems Maternal Grandmother     No Known Problems Maternal Grandfather     No Known Problems Paternal Grandmother     No Known Problems Paternal Grandfather     Lupus Neg Hx     Rheum arthritis Neg Hx     Psoriasis Neg Hx     Amblyopia Neg Hx     Blindness Neg Hx     Cancer Neg Hx     Diabetes Neg Hx     Macular degeneration Neg Hx     Retinal detachment Neg Hx     Strabismus Neg Hx     Stroke Neg Hx     Thyroid disease Neg Hx      Social History     Tobacco Use    Smoking status: Former Smoker    Smokeless tobacco: Never Used   Substance Use Topics    Alcohol use: Yes     Comment: socially    Drug use: No       Current Outpatient Medications   Medication Sig    amLODIPine (NORVASC) 5 MG tablet TAKE ONE TABLET BY MOUTH EVERY DAY    ciclopirox (PENLAC) 8 % Soln APPLY 1 APPLICATION TOPICALLY ONCE DAILY FOR 24 WEEKS    famotidine (PEPCID) 40 MG tablet Take 0.5 tablets (20 mg total) by mouth 2 (two) times daily as needed for Heartburn.    fluticasone (FLONASE) 50 mcg/actuation nasal spray 1 spray (50 mcg total) by Each Nare route once daily.    folic acid (FOLVITE) 1 MG tablet Take 2 tablets (2 mg total) by mouth once daily.    furosemide (LASIX) 40 MG tablet TAKE ONE TABLET BY MOUTH EVERY DAY    gabapentin (NEURONTIN) 100 MG capsule Take 1 capsule (100 mg total) by mouth 3 (three) times daily. (Patient taking differently: Take 100 mg by mouth as needed. )    hydrocortisone 2.5 % cream as needed.     hydrOXYzine (ATARAX) 50 MG tablet Take 1 tablet (50 mg total) by mouth 3 (three) times daily as needed for Itching.    irbesartan (AVAPRO) 300 MG tablet Take 1 tablet (300 mg total) by mouth every evening.    methotrexate 2.5 MG Tab 5 tabs po twice a day on monday    miSOPROStoL (CYTOTEC) 100 MCG Tab Take 1 tablet (100 mcg total) by mouth 2 (two) times daily.    nabumetone (RELAFEN) 500 MG tablet Take 1 tablet (500 mg total) by mouth 2 (two)  times daily.    pantoprazole (PROTONIX) 40 MG tablet Take 1 tablet (40 mg total) by mouth once daily. 1 Tablet, Delayed Release (E.C.) Oral Every day    SUPREP BOWEL PREP KIT 17.5-3.13-1.6 gram SolR USE AS DIRECTED    valacyclovir (VALTREX) 500 MG tablet 1 Tablet DAILY for suppression, increase to BID for outbreak    water Liqd 150 mL with Milk of Magnesia 400 mg/5 mL Susp 400 mg, diphenhydrAMINE 12.5 mg/5 mL Elix 60 mg, nystatin 100,000 unit/mL Susp 500,000 Units Take 5 mLs by mouth.    azelastine (ASTELIN) 137 mcg (0.1 %) nasal spray 1 spray (137 mcg total) by Nasal route 2 (two) times daily.    nystatin-triamcinolone (MYCOLOG II) cream Apply to affected area 2 times daily     No current facility-administered medications for this visit.      Review of Systems:  Review of Systems   Constitutional: Negative for chills and fever.   Respiratory: Negative for cough and wheezing.    Cardiovascular: Negative for chest pain and palpitations.   Gastrointestinal: Negative for abdominal pain, nausea and vomiting.   Genitourinary: Positive for vaginal discharge. Negative for dysuria, frequency, hematuria, pelvic pain, vaginal bleeding and vaginal pain.   Psychiatric/Behavioral: Negative for depression.        OBJECTIVE:     Physical Exam:  Physical Exam  Vitals signs and nursing note reviewed. Exam conducted with a chaperone present.   Constitutional:       Appearance: She is well-developed.   Cardiovascular:      Rate and Rhythm: Normal rate.   Pulmonary:      Effort: Pulmonary effort is normal. No respiratory distress.   Chest:      Breasts: Breasts are symmetrical.       Abdominal:      General: There is no distension.      Palpations: Abdomen is soft.      Tenderness: There is no abdominal tenderness.   Genitourinary:     Vagina: Vaginal discharge present.   Skin:     General: Skin is warm and dry.   Neurological:      Mental Status: She is alert and oriented to person, place, and time.           ASSESSMENT:        ICD-10-CM ICD-9-CM    1. Well woman exam  Z01.419 V72.31 Ambulatory referral/consult to Obstetrics / Gynecology      Mammo Digital Screening Bilat   2. Vaginal discharge  N89.8 623.5 Vaginosis Screen by DNA Probe   3. Breast cancer screening by mammogram  Z12.31 V76.12 Mammo Digital Screening Bilat   4. Candidiasis, intertrigo  B37.2 112.3 nystatin-triamcinolone (MYCOLOG II) cream   5. Dysuria  R30.0 788.1 POCT URINE DIPSTICK WITHOUT MICROSCOPE      Urine culture        Plan:      Yulai was seen today for well woman.    Diagnoses and all orders for this visit:    Well woman exam  -     Ambulatory referral/consult to Obstetrics / Gynecology  -     Mammo Digital Screening Bilat; Future    Vaginal discharge  -     Vaginosis Screen by DNA Probe  - Discussed with patient how douching/body wash/scented soaps/bubble baths can shift her vaginal gwendolyn and natural vaginal pH which can cause overgrowth of yeast or gardnerella which is the bacteria that causes BV. Discussed with patient to use only mild, unscented soaps such as Dove unscented and Dial and water to wash her vagina.     Breast cancer screening by mammogram  -     Mammo Digital Screening Bilat; Future    Candidiasis, intertrigo  -     nystatin-triamcinolone (MYCOLOG II) cream; Apply to affected area 2 times daily    Dysuria  -     POCT URINE DIPSTICK WITHOUT MICROSCOPE  -     Urine culture    Orders Placed This Encounter   Procedures    Vaginosis Screen by DNA Probe    Urine culture    Mammo Digital Screening Bilat    POCT URINE DIPSTICK WITHOUT MICROSCOPE       Follow up in about 1 year (around 8/7/2021) for Well Woman/Annual.    Counseling time: 15 minutes    Shanti Hanson

## 2020-08-07 NOTE — LETTER
August 7, 2020      Ileana Martin MD  [address]  [place] [address]           [facility] [place] - OB/ GYN  [address]  [facility] [address]  Phone: [phone]          Patient: Yulia Peralta   MR Number: [id number]   YOB: 1945   Date of Visit: 8/7/2020       Dear Dr. Ileana Martin:    Thank you for referring Yulia Peralta to me for evaluation. Attached you will find relevant portions of my assessment and plan of care.    If you have questions, please do not hesitate to call me. I look forward to following Yulia Peralta along with you.    Sincerely,    Shanti Hanson MD    Enclosure  CC:  No Recipients    If you would like to receive this communication electronically, please contact [email or web address] or [phone] to request more information on [employer] access.    For providers and/or their staff who would like to refer a patient to [facility], please contact us through our one-stop-shop provider referral line, [facility], at [phone].    If you feel you have received this communication in error or would no longer like to receive these types of communications, please e-mail [email or web address]

## 2020-08-09 LAB — BACTERIA UR CULT: NORMAL

## 2020-08-11 LAB
CANDIDA RRNA VAG QL PROBE: NEGATIVE
G VAGINALIS RRNA GENITAL QL PROBE: NEGATIVE
T VAGINALIS RRNA GENITAL QL PROBE: NEGATIVE

## 2020-08-17 DIAGNOSIS — M06.9 RHEUMATOID ARTHRITIS OF HAND, UNSPECIFIED LATERALITY, UNSPECIFIED RHEUMATOID FACTOR PRESENCE: ICD-10-CM

## 2020-08-18 RX ORDER — METHOTREXATE 2.5 MG/1
TABLET ORAL
Qty: 160 TABLET | Refills: 0 | Status: SHIPPED | OUTPATIENT
Start: 2020-08-18 | End: 2020-12-01 | Stop reason: SDUPTHER

## 2020-08-21 ENCOUNTER — OFFICE VISIT (OUTPATIENT)
Dept: FAMILY MEDICINE | Facility: CLINIC | Age: 75
End: 2020-08-21
Payer: MEDICARE

## 2020-08-21 VITALS
OXYGEN SATURATION: 99 % | SYSTOLIC BLOOD PRESSURE: 126 MMHG | HEART RATE: 80 BPM | WEIGHT: 204.56 LBS | BODY MASS INDEX: 37.64 KG/M2 | DIASTOLIC BLOOD PRESSURE: 84 MMHG | TEMPERATURE: 98 F | HEIGHT: 62 IN

## 2020-08-21 DIAGNOSIS — M54.50 CHRONIC LOW BACK PAIN WITHOUT SCIATICA, UNSPECIFIED BACK PAIN LATERALITY: Primary | ICD-10-CM

## 2020-08-21 DIAGNOSIS — K21.9 GASTROESOPHAGEAL REFLUX DISEASE, ESOPHAGITIS PRESENCE NOT SPECIFIED: ICD-10-CM

## 2020-08-21 DIAGNOSIS — G89.29 CHRONIC LOW BACK PAIN WITHOUT SCIATICA, UNSPECIFIED BACK PAIN LATERALITY: Primary | ICD-10-CM

## 2020-08-21 PROCEDURE — 99214 PR OFFICE/OUTPT VISIT, EST, LEVL IV, 30-39 MIN: ICD-10-PCS | Mod: S$PBB,,, | Performed by: FAMILY MEDICINE

## 2020-08-21 PROCEDURE — 99214 OFFICE O/P EST MOD 30 MIN: CPT | Mod: S$PBB,,, | Performed by: FAMILY MEDICINE

## 2020-08-21 PROCEDURE — 99213 OFFICE O/P EST LOW 20 MIN: CPT | Mod: PBBFAC,PO | Performed by: FAMILY MEDICINE

## 2020-08-21 PROCEDURE — 99999 PR PBB SHADOW E&M-EST. PATIENT-LVL III: CPT | Mod: PBBFAC,,, | Performed by: FAMILY MEDICINE

## 2020-08-21 PROCEDURE — 99999 PR PBB SHADOW E&M-EST. PATIENT-LVL III: ICD-10-PCS | Mod: PBBFAC,,, | Performed by: FAMILY MEDICINE

## 2020-08-21 RX ORDER — METHOCARBAMOL 500 MG/1
500 TABLET, FILM COATED ORAL 2 TIMES DAILY PRN
Qty: 30 TABLET | Refills: 0 | Status: SHIPPED | OUTPATIENT
Start: 2020-08-21 | End: 2020-08-31

## 2020-08-21 RX ORDER — PANTOPRAZOLE SODIUM 40 MG/1
40 TABLET, DELAYED RELEASE ORAL DAILY
Qty: 90 TABLET | Refills: 1 | Status: SHIPPED | OUTPATIENT
Start: 2020-08-21 | End: 2020-08-21 | Stop reason: SDUPTHER

## 2020-08-21 RX ORDER — PANTOPRAZOLE SODIUM 40 MG/1
40 TABLET, DELAYED RELEASE ORAL DAILY
Qty: 90 TABLET | Refills: 1 | Status: SHIPPED | OUTPATIENT
Start: 2020-08-21 | End: 2021-01-06 | Stop reason: SDUPTHER

## 2020-08-21 RX ORDER — DICLOFENAC SODIUM 10 MG/G
GEL TOPICAL
Qty: 100 G | Refills: 5 | Status: SHIPPED | OUTPATIENT
Start: 2020-08-21 | End: 2021-01-21

## 2020-08-21 NOTE — TELEPHONE ENCOUNTER
----- Message from Livier Huggins sent at 8/21/2020 10:00 AM CDT -----  Type: RX Refill Request    Who Called: Self     Have you contacted your pharmacy: yes     Refill or New Rx:# refill     RX Name and Strength: pantoprazole (PROTONIX) 40 MG tablet    Is this a 30 day or 90 day RX: 90 day     Preferred Pharmacy with phone number: [facility] [place] - [address] [facility] [phone] (Phone)  [phone] (Fax)    Local or Mail Order: mail order     Would the patient rather a call back or a response via My [facility]? Call back     Best Call Back Number: [phone]    Additional Info: medication was sent to [facility] but prefer this medication to be sent to [facility].

## 2020-08-21 NOTE — PROGRESS NOTES
Subjective:       Patient ID: Yulia Peralta is a 75 y.o. female.    Chief Complaint: Left Side/ Hip Pain radiating downward, Chest Pain x 3 days, and Headache    75 yea rold female presents for left hip pain. She has pain her for 3 months. She states she has it with leaning. She states she is feeling it more now. She has rubbed the area. She has treated with lidocaine.       She has been having heart burn. She has been taking pepcid, but it is not effective. She started this about 3 days ago. She had an EGD and colonoscopy done. He had an ulcer done. She had polyps on her colonscopy and she has to repeat in 2023.       Past Medical History:   Diagnosis Date    Abnormal colonoscopy 07/24/2020    colon polyps nad repeat in 3 years.     Acid reflux     Anemia     Anxiety     Arthritis     Blood transfusion     Cataract     Depression     Dry eyes     H/O colonoscopy 07/22/2020    dr. nicholas. repeat in 3 years.     Heart murmur     Hypertension     Left lumbar radiculitis 5/19/2015    Multiple thyroid nodules 12/18/2012    Osteoporosis     Rheumatoid arthritis     Thoracic or lumbosacral neuritis or radiculitis, unspecified 10/30/2013      Past Surgical History:   Procedure Laterality Date    CEREBRAL ANGIOGRAM N/A 1/23/2020    Procedure: ANGIOGRAM-CEREBRAL;  Surgeon: [facility] Diagnostic Provider;  Location: [facility] OR [address] [place] [address];  Service: Radiology;  Laterality: N/A;  /[place]uentes    gallstones  [id number]    HYSTERECTOMY      JOINT REPLACEMENT  [id number]     bilateral knee    WA REMOVAL OF OVARY/TUBE(S)      TONSILLECTOMY       Family History   Problem Relation Age of Onset    Cataracts Mother     Hypertension Mother     Hypertension Sister     No Known Problems Father     Glaucoma Brother     No Known Problems Maternal Aunt     No Known Problems Maternal Uncle     No Known Problems Paternal Aunt     No Known Problems Paternal Uncle     No Known Problems Maternal Grandmother     No  Known Problems Maternal Grandfather     No Known Problems Paternal Grandmother     No Known Problems Paternal Grandfather     Lupus Neg Hx     Rheum arthritis Neg Hx     Psoriasis Neg Hx     Amblyopia Neg Hx     Blindness Neg Hx     Cancer Neg Hx     Diabetes Neg Hx     Macular degeneration Neg Hx     Retinal detachment Neg Hx     Strabismus Neg Hx     Stroke Neg Hx     Thyroid disease Neg Hx      Social History     Socioeconomic History    Marital status:      Spouse name: Not on file    Number of children: Not on file    Years of education: Not on file    Highest education level: Not on file   Occupational History    Not on file   Social Needs    Financial resource strain: Not on file    Food insecurity     Worry: Not on file     Inability: Not on file    Transportation needs     Medical: Not on file     Non-medical: Not on file   Tobacco Use    Smoking status: Former Smoker    Smokeless tobacco: Never Used   Substance and Sexual Activity    Alcohol use: Yes     Comment: socially    Drug use: No    Sexual activity: Yes     Partners: Male   Lifestyle    Physical activity     Days per week: Not on file     Minutes per session: Not on file    Stress: Only a little   Relationships    Social connections     Talks on phone: Not on file     Gets together: Not on file     Attends [employer] service: Not on file     Active member of club or organization: Not on file     Attends meetings of clubs or organizations: Not on file     Relationship status: Not on file   Other Topics Concern    Not on file   Social History Narrative    Adult Screenings updated and reviewed  6/12/14    Mammogram( for females) ordered for DIS    Pap ( for females) Dr Zepeda  Due for f/u   For  2014    Colonoscopy  Done once    Flu shot yearly done  2013    Td 2005    Pneumovax  Updated  12/3/13    Zostavax done 2012    Eye exam recommended yearly done 2013    Bone density  12/9/13    Thyroid ultrasound   "11/6/2012 Normal sized thyroid containing several subcentimeter nodules, similar to the 5/12/11 exam.  No concerning nodules identified..            Review of Systems      Objective:       Vitals:    08/21/20 0821 08/21/20 0859   BP: (!) 150/98 126/84   Pulse: 80    Temp: 97.8 °F (36.6 °C)    TempSrc: Oral    SpO2: 99%    Weight: 92.8 kg (204 lb 9.4 oz)    Height: 5' 2" (1.575 m)        Physical Exam  Neck:      Musculoskeletal: Normal range of motion and neck supple.   Cardiovascular:      Rate and Rhythm: Normal rate and regular rhythm.      Pulses: Normal pulses.      Heart sounds: Normal heart sounds. No murmur. No friction rub. No gallop.    Skin:               Assessment:       1. Chronic low back pain without sciatica, unspecified back pain laterality    2. Gastroesophageal reflux disease, esophagitis presence not specified        Plan:       Yulia was seen today for left side/ hip pain radiating downward, chest pain x 3 days and headache.    Diagnoses and all orders for this visit:    Chronic low back pain without sciatica, unspecified back pain laterality  -     diclofenac sodium (VOLTAREN) 1 % Gel; Lower extremities: Apply the gel (4 g) to the affected area 4 times daily. Do not apply more than 16 g daily to any one affected joint of the lower extremities.  -     methocarbamoL (ROBAXIN) 500 MG Tab; Take 1 tablet (500 mg total) by mouth 2 (two) times daily as needed.    Gastroesophageal reflux disease, esophagitis presence not specified  -     pantoprazole (PROTONIX) 40 MG tablet; Take 1 tablet (40 mg total) by mouth once daily. 1 Tablet, Delayed Release (E.C.) Oral Every day  Stable. Refilled meds.            "

## 2020-08-26 DIAGNOSIS — I10 ESSENTIAL HYPERTENSION: ICD-10-CM

## 2020-08-26 NOTE — TELEPHONE ENCOUNTER
----- Message from Michael Matson sent at 8/26/2020  3:38 PM CDT -----  Regarding: self  Type: RX Refill Request    Who Called:  self    Have you contacted your pharmacy: no     Refill or New Rx: refill     RX Name and Strength: amLODIPine (NORVASC) 5 MG tablet   90 day supply    Preferred Pharmacy with phone number: [facility] [place] [address] [facility] [phone] (Phone)  [phone] (Fax)        Local or Mail Order: local     Ordering Provider: Mario    Would the patient rather a call back or a response via [facility]? Call     Best Call Back Number: [phone]

## 2020-08-27 ENCOUNTER — TELEPHONE (OUTPATIENT)
Dept: FAMILY MEDICINE | Facility: CLINIC | Age: 75
End: 2020-08-27

## 2020-08-27 RX ORDER — AMLODIPINE BESYLATE 5 MG/1
5 TABLET ORAL DAILY
Qty: 30 TABLET | Refills: 1 | Status: SHIPPED | OUTPATIENT
Start: 2020-08-27 | End: 2020-08-31

## 2020-08-27 NOTE — TELEPHONE ENCOUNTER
----- Message from Michelle Jesse sent at 8/27/2020  2:06 PM CDT -----  Contact: Self [phone]  Type: Patient Call Back    Who called: Self     What is the request in detail: pt is calling because she would like to speak to the nurse about a rash on her legs that is itching and now she is having difficulty walking    Can the clinic reply by [facility]? Call back    Would the patient rather a call back or a response via [facility]? Call back    Best call back number: [phone]

## 2020-08-28 ENCOUNTER — OFFICE VISIT (OUTPATIENT)
Dept: FAMILY MEDICINE | Facility: CLINIC | Age: 75
End: 2020-08-28
Payer: MEDICARE

## 2020-08-28 VITALS
RESPIRATION RATE: 16 BRPM | DIASTOLIC BLOOD PRESSURE: 70 MMHG | SYSTOLIC BLOOD PRESSURE: 132 MMHG | OXYGEN SATURATION: 97 % | TEMPERATURE: 98 F | BODY MASS INDEX: 37.61 KG/M2 | HEIGHT: 62 IN | WEIGHT: 204.38 LBS | HEART RATE: 62 BPM

## 2020-08-28 DIAGNOSIS — R21 RASH OF ENTIRE BODY: ICD-10-CM

## 2020-08-28 PROCEDURE — 99999 PR PBB SHADOW E&M-EST. PATIENT-LVL V: CPT | Mod: PBBFAC,,, | Performed by: PHYSICIAN ASSISTANT

## 2020-08-28 PROCEDURE — 99999 PR PBB SHADOW E&M-EST. PATIENT-LVL V: ICD-10-PCS | Mod: PBBFAC,,, | Performed by: PHYSICIAN ASSISTANT

## 2020-08-28 PROCEDURE — 99214 OFFICE O/P EST MOD 30 MIN: CPT | Mod: S$PBB,,, | Performed by: PHYSICIAN ASSISTANT

## 2020-08-28 PROCEDURE — 99215 OFFICE O/P EST HI 40 MIN: CPT | Mod: PBBFAC,PO | Performed by: PHYSICIAN ASSISTANT

## 2020-08-28 PROCEDURE — 99214 PR OFFICE/OUTPT VISIT, EST, LEVL IV, 30-39 MIN: ICD-10-PCS | Mod: S$PBB,,, | Performed by: PHYSICIAN ASSISTANT

## 2020-08-28 RX ORDER — HYDROXYZINE HYDROCHLORIDE 50 MG/1
50 TABLET, FILM COATED ORAL 3 TIMES DAILY PRN
Qty: 60 TABLET | Refills: 0 | Status: SHIPPED | OUTPATIENT
Start: 2020-08-28 | End: 2021-05-19

## 2020-08-28 RX ORDER — TRIAMCINOLONE ACETONIDE 1 MG/G
CREAM TOPICAL 3 TIMES DAILY
Qty: 80 G | Refills: 1 | Status: SHIPPED | OUTPATIENT
Start: 2020-08-28 | End: 2021-06-07 | Stop reason: SDUPTHER

## 2020-08-28 NOTE — PROGRESS NOTES
Subjective:       Patient ID: Yulia Peralta is a 75 y.o. female with multiple medical diagnoses as listed in the medical history and problem list that presents for Rash (legs and arms; itching, pain/soreness X week; tried several OTC medications)  .    Chief Complaint: Rash (legs and arms; itching, pain/soreness X week; tried several OTC medications)      Rash  This is a new problem. The current episode started 1 to 4 weeks ago. The problem has been gradually worsening since onset. The rash is diffuse (mostly arms and legs but back itches and so does neck ). The rash is characterized by itchiness and redness (soreness ). Treatments tried: witch hazel    denies new or change in products     Review of Systems   Skin: Positive for rash.         PAST MEDICAL HISTORY:  Past Medical History:   Diagnosis Date    Abnormal colonoscopy 07/24/2020    colon polyps nad repeat in 3 years.     Acid reflux     Anemia     Anxiety     Arthritis     Blood transfusion     Cataract     Depression     Dry eyes     H/O colonoscopy 07/22/2020    dr. nicholas. repeat in 3 years.     Heart murmur     Hypertension     Left lumbar radiculitis 5/19/2015    Multiple thyroid nodules 12/18/2012    Osteoporosis     Rheumatoid arthritis     Thoracic or lumbosacral neuritis or radiculitis, unspecified 10/30/2013       SOCIAL HISTORY:  Social History     Socioeconomic History    Marital status:      Spouse name: Not on file    Number of children: Not on file    Years of education: Not on file    Highest education level: Not on file   Occupational History    Not on file   Social Needs    Financial resource strain: Not on file    Food insecurity     Worry: Not on file     Inability: Not on file    Transportation needs     Medical: Not on file     Non-medical: Not on file   Tobacco Use    Smoking status: Former Smoker    Smokeless tobacco: Never Used   Substance and Sexual Activity    Alcohol use: Yes     Comment:  socially    Drug use: No    Sexual activity: Yes     Partners: Male   Lifestyle    Physical activity     Days per week: Not on file     Minutes per session: Not on file    Stress: Only a little   Relationships    Social connections     Talks on phone: Not on file     Gets together: Not on file     Attends Z[employer]ast[employer] service: Not on file     Active member of club or organization: Not on file     Attends meetings of clubs or organizations: Not on file     Relationship status: Not on file   Other Topics Concern    Not on file   Social History Narrative    Adult Screenings updated and reviewed  6/12/14    Mammogram( for females) ordered for DIS    Pap ( for females) Dr Zepeda  Due for f/u   For  2014    Colonoscopy  Done once    Flu shot yearly done  2013    Td 2005    Pneumovax  Updated  12/3/13    Zostavax done 2012    Eye exam recommended yearly done 2013    Bone density  12/9/13    Thyroid ultrasound  11/6/2012 Normal sized thyroid containing several subcentimeter nodules, similar to the 5/12/11 exam.  No concerning nodules identified..            ALLERGIES AND MEDICATIONS: updated and reviewed.  Review of patient's allergies indicates:   Allergen Reactions    No known drug allergies      Current Outpatient Medications   Medication Sig Dispense Refill    amLODIPine (NORVASC) 5 MG tablet Take 1 tablet (5 mg total) by mouth once daily. 30 tablet 1    azelastine (ASTELIN) 137 mcg (0.1 %) nasal spray 1 spray (137 mcg total) by Nasal route 2 (two) times daily. 30 mL 1    diclofenac sodium (VOLTAREN) 1 % Gel Lower extremities: Apply the gel (4 g) to the affected area 4 times daily. Do not apply more than 16 g daily to any one affected joint of the lower extremities. 100 g 5    famotidine (PEPCID) 40 MG tablet Take 0.5 tablets (20 mg total) by mouth 2 (two) times daily as needed for Heartburn. 15 tablet 2    fluticasone (FLONASE) 50 mcg/actuation nasal spray 1 spray (50 mcg total) by Each Nare route once daily.  16 g 5    folic acid (FOLVITE) 1 MG tablet Take 2 tablets (2 mg total) by mouth once daily. 180 tablet 3    furosemide (LASIX) 40 MG tablet TAKE ONE TABLET BY MOUTH EVERY DAY 90 tablet 1    gabapentin (NEURONTIN) 100 MG capsule Take 1 capsule (100 mg total) by mouth 3 (three) times daily. (Patient taking differently: Take 100 mg by mouth as needed. ) 270 capsule 1    irbesartan (AVAPRO) 300 MG tablet Take 1 tablet (300 mg total) by mouth every evening. 90 tablet 3    methocarbamoL (ROBAXIN) 500 MG Tab Take 1 tablet (500 mg total) by mouth 2 (two) times daily as needed. 30 tablet 0    methotrexate 2.5 MG Tab 5 tabs po twice a day on monday 160 tablet 0    miSOPROStoL (CYTOTEC) 100 MCG Tab Take 1 tablet (100 mcg total) by mouth 2 (two) times daily. 180 tablet 3    nabumetone (RELAFEN) 500 MG tablet Take 1 tablet (500 mg total) by mouth 2 (two) times daily. 180 tablet 3    nystatin-triamcinolone (MYCOLOG II) cream Apply to affected area 2 times daily 90 g 1    pantoprazole (PROTONIX) 40 MG tablet Take 1 tablet (40 mg total) by mouth once daily. 1 Tablet, Delayed Release (E.C.) Oral Every day 90 tablet 1    valacyclovir (VALTREX) 500 MG tablet 1 Tablet DAILY for suppression, increase to BID for outbreak 180 tablet 2    water Liqd 150 mL with Milk of Magnesia 400 mg/5 mL Susp 400 mg, diphenhydrAMINE 12.5 mg/5 mL Elix 60 mg, nystatin 100,000 unit/mL Susp 500,000 Units Take 5 mLs by mouth.      ciclopirox (PENLAC) 8 % Soln APPLY 1 APPLICATION TOPICALLY ONCE DAILY FOR 24 WEEKS  2    hydrocortisone 2.5 % cream as needed.   0    hydrOXYzine (ATARAX) 50 MG tablet Take 1 tablet (50 mg total) by mouth 3 (three) times daily as needed for Itching. 60 tablet 0    SUPREP BOWEL PREP KIT 17.5-3.13-1.6 gram SolR USE AS DIRECTED      triamcinolone acetonide 0.1% (KENALOG) 0.1 % cream Apply topically 3 (three) times daily. 80 g 1     No current facility-administered medications for this visit.          Objective:   BP  "132/70 (BP Location: Right arm, Patient Position: Sitting, BP Method: Medium (Manual))   Pulse 62   Temp 98.3 °F (36.8 °C) (Oral)   Resp 16   Ht 5' 2" (1.575 m)   Wt 92.7 kg (204 lb 5.9 oz)   SpO2 97%   BMI 37.38 kg/m²      Physical Exam  HENT:      Head: Normocephalic and atraumatic.   Skin:     General: Skin is warm and dry.      Findings: Erythema present. No bruising, lesion or rash (a scab on her hand ).   Neurological:      Mental Status: She is alert and oriented to person, place, and time.             Assessment:       1. Rash of entire body        Plan:       Rash of entire body  -     hydrOXYzine (ATARAX) 50 MG tablet; Take 1 tablet (50 mg total) by mouth 3 (three) times daily as needed for Itching.  Dispense: 60 tablet; Refill: 0  -     triamcinolone acetonide 0.1% (KENALOG) 0.1 % cream; Apply topically 3 (three) times daily.  Dispense: 80 g; Refill: 1  -     Ambulatory referral/consult to Dermatology; Future; Expected date: 09/04/2020    she has an anti histmaine at home and will take that  Aware to stay cool  Use cold compress to help with itch         No follow-ups on file.    "

## 2020-09-06 ENCOUNTER — PATIENT OUTREACH (OUTPATIENT)
Dept: ADMINISTRATIVE | Facility: OTHER | Age: 75
End: 2020-09-06

## 2020-09-09 ENCOUNTER — OFFICE VISIT (OUTPATIENT)
Dept: DERMATOLOGY | Facility: CLINIC | Age: 75
End: 2020-09-09
Payer: MEDICARE

## 2020-09-09 DIAGNOSIS — L30.9 ECZEMA OF LOWER LEG: ICD-10-CM

## 2020-09-09 DIAGNOSIS — R21 RASH OF ENTIRE BODY: ICD-10-CM

## 2020-09-09 DIAGNOSIS — L60.3 NAIL DYSTROPHY: ICD-10-CM

## 2020-09-09 DIAGNOSIS — L90.5 SCAR: ICD-10-CM

## 2020-09-09 DIAGNOSIS — D22.9 NEVUS: ICD-10-CM

## 2020-09-09 DIAGNOSIS — L81.9 HYPERPIGMENTATION: Primary | ICD-10-CM

## 2020-09-09 PROCEDURE — 99203 OFFICE O/P NEW LOW 30 MIN: CPT | Mod: S$PBB,,, | Performed by: DERMATOLOGY

## 2020-09-09 PROCEDURE — 99203 PR OFFICE/OUTPT VISIT, NEW, LEVL III, 30-44 MIN: ICD-10-PCS | Mod: S$PBB,,, | Performed by: DERMATOLOGY

## 2020-09-09 PROCEDURE — 99999 PR PBB SHADOW E&M-EST. PATIENT-LVL IV: CPT | Mod: PBBFAC,,, | Performed by: DERMATOLOGY

## 2020-09-09 PROCEDURE — 99214 OFFICE O/P EST MOD 30 MIN: CPT | Mod: PBBFAC | Performed by: DERMATOLOGY

## 2020-09-09 PROCEDURE — 99999 PR PBB SHADOW E&M-EST. PATIENT-LVL IV: ICD-10-PCS | Mod: PBBFAC,,, | Performed by: DERMATOLOGY

## 2020-09-09 NOTE — PROGRESS NOTES
Subjective:       Patient ID:  Yulia Peralta is a 75 y.o. female who presents for   Chief Complaint   Patient presents with    Nail Problem     fingernails, X2yrs, cracking, otc tx     Rash      legs and arms ,X1yr, red bumps that burn an itch, comes and goes, otc tx      Nail Problem - Initial  Affected locations: right fingers and left fingers  Duration: 2 years  Signs / symptoms: cracking  Severity: mild to moderate  Timing: constant  Aggravated by: nothing  Relieving factors/Treatments tried: OTC antifungals  Improvement on treatment: no relief    Rash - Initial  Affected locations: left arm, right arm, right lower leg and left lower leg  Duration: 1 year  Signs / symptoms: itching, burning and non-healing  Timing: intermittent  Improvement on treatment: no relief        Review of Systems   Constitutional: Negative.    HENT: Negative.    Respiratory: Negative.    Musculoskeletal: Positive for joint swelling. Negative for muscle weakness.   Skin: Positive for rash. Negative for daily sunscreen use.        Objective:    Physical Exam   Constitutional: She appears well-developed.   Eyes: No conjunctival no injection.   Cardiovascular: There is no local extremity swelling and no dependent edema.     Neurological: She is alert and oriented to person, place, and time.   Psychiatric: She has a normal mood and affect.   Skin:   Areas Examined (abnormalities noted in diagram):   RUE Inspected  LUE Inspection Performed  RLE Inspected  LLE Inspection Performed  Nails and Digits Inspection Performed                       Diagram Legend     Erythematous scaling macule/papule c/w actinic keratosis       Vascular papule c/w angioma      Pigmented verrucoid papule/plaque c/w seborrheic keratosis      Yellow umbilicated papule c/w sebaceous hyperplasia      Irregularly shaped tan macule c/w lentigo     1-2 mm smooth white papules consistent with Milia      Movable subcutaneous cyst with punctum c/w epidermal inclusion  cyst      Subcutaneous movable cyst c/w pilar cyst      Firm pink to brown papule c/w dermatofibroma      Pedunculated fleshy papule(s) c/w skin tag(s)      Evenly pigmented macule c/w junctional nevus     Mildly variegated pigmented, slightly irregular-bordered macule c/w mildly atypical nevus      Flesh colored to evenly pigmented papule c/w intradermal nevus       Pink pearly papule/plaque c/w basal cell carcinoma      Erythematous hyperkeratotic cursted plaque c/w SCC      Surgical scar with no sign of skin cancer recurrence      Open and closed comedones      Inflammatory papules and pustules      Verrucoid papule consistent consistent with wart     Erythematous eczematous patches and plaques     Dystrophic onycholytic nail with subungual debris c/w onychomycosis     Umbilicated papule    Erythematous-base heme-crusted tan verrucoid plaque consistent with inflamed seborrheic keratosis     Erythematous Silvery Scaling Plaque c/w Psoriasis     See annotation      Assessment / Plan:        Hyperpigmentation  Discussed with patient the benign nature of these lesions and that no treatment is indicated.    Mild stasis changes.  Discussed with the patient the risk of color scars or hyperpigmentation that could take months to resolve.    Rash of entire body  -     Ambulatory referral/consult to Dermatology  Mild.  No major rash noted today.    Scar  Discussed with patient the etiology and pathogenesis of the disease or skin lesion(s) and possible treatments and aggravators.    Discussed with patient the benign nature of these lesions and that no treatment is indicated.    Pt's tac bid prn.  Reviewed with patient different treatment options and associated risks.    Nail dystrophy  Instructed patient to avoid hot water on the fingernails and toenails and to cut them short.  Can try Amlactin cream nightly and to watch for skin irritation.  If this occurs, use less often.  Discussed that toenails changes are common after  the age of thirty with decreased blood flow and venous back pressure.  Fingernail changes can be spontaneous also with changes later in life.  This may be a chronic condition without much improvement with limited treatments.    Nevus  Discussed with patient the benign nature of these lesions and that no treatment is indicated.      Eczema of lower leg  Focal and mild.  Discussed with patient the etiology and pathogenesis of the disease or skin lesion(s) and possible treatments and aggravators.    Good skin care regimen discussed including limiting to one bath or shower/day, using lukewarm water with mild soap and moisturizing cream to skin 1 - 2x/day. Brochure was provided and reviewed with patient.  Pt's tac bid focally prn.  Patient and or guardian to monitor this area/lesion or these areas/lesions for changes or worsening.  Patient and or guardian to contact us if any changes are noted for such.    DF  Discussed with patient the benign nature of these lesions and that no treatment is indicated.               Follow up if symptoms worsen or fail to improve.

## 2020-09-09 NOTE — PATIENT INSTRUCTIONS
one bath or shower/day, using lukewarm water with mild soap like Dove and coconut oil to moisturize skin 1 - 2x/day.     Over the counter Lactic acid 12% to fingernails

## 2020-09-09 NOTE — LETTER
September 9, 2020      SEKOU Stack  [address] [place] [address]           [facility] - Dermatology 11th Fl  [address]  [facility] [address]  Phone: [phone]  Fax: [phone]          Patient: Yulia Peralta   MR Number: [id number]   YOB: 1945   Date of Visit: 9/9/2020       Dear Cal Dan:    Thank you for referring Yulia Peralta to me for evaluation. Attached you will find relevant portions of my assessment and plan of care.    If you have questions, please do not hesitate to call me. I look forward to following Yulia Peralta along with you.    Sincerely,    Preston Peralta MD    Enclosure  CC:  No Recipients    If you would like to receive this communication electronically, please contact [email or web address] or [phone] to request more information on [employer] Link access.    For providers and/or their staff who would like to refer a patient to [facility], please contact us through our one-stop-shop provider referral line, [facility], at [phone].    If you feel you have received this communication in error or would no longer like to receive these types of communications, please e-mail [email or web address]

## 2020-09-15 ENCOUNTER — HOSPITAL ENCOUNTER (OUTPATIENT)
Dept: RADIOLOGY | Facility: HOSPITAL | Age: 75
Discharge: HOME | End: 2020-09-15
Attending: OBSTETRICS & GYNECOLOGY
Payer: MEDICARE

## 2020-09-15 VITALS — BODY MASS INDEX: 37.54 KG/M2 | WEIGHT: 204 LBS | HEIGHT: 62 IN

## 2020-09-15 DIAGNOSIS — Z12.31 BREAST CANCER SCREENING BY MAMMOGRAM: ICD-10-CM

## 2020-09-15 DIAGNOSIS — Z01.419 WELL WOMAN EXAM: ICD-10-CM

## 2020-09-15 PROCEDURE — 77067 MAMMO DIGITAL SCREENING BILAT WITH TOMO: ICD-10-PCS | Mod: 26,,, | Performed by: RADIOLOGY

## 2020-09-15 PROCEDURE — 77067 SCR MAMMO BI INCL CAD: CPT | Mod: 26,,, | Performed by: RADIOLOGY

## 2020-09-15 PROCEDURE — 77063 BREAST TOMOSYNTHESIS BI: CPT | Mod: 26,,, | Performed by: RADIOLOGY

## 2020-09-15 PROCEDURE — 77063 MAMMO DIGITAL SCREENING BILAT WITH TOMO: ICD-10-PCS | Mod: 26,,, | Performed by: RADIOLOGY

## 2020-09-15 PROCEDURE — 77067 SCR MAMMO BI INCL CAD: CPT | Mod: TC

## 2020-10-01 NOTE — TELEPHONE ENCOUNTER
Labs do not show evidence of flare based on normal C-reactive protein and sedimentation rate.  We'll monitor and have the staff schedule a follow-up appointment.  
English

## 2020-10-06 ENCOUNTER — PES CALL (OUTPATIENT)
Dept: ADMINISTRATIVE | Facility: CLINIC | Age: 75
End: 2020-10-06

## 2020-11-09 ENCOUNTER — CLINICAL SUPPORT (OUTPATIENT)
Dept: FAMILY MEDICINE | Facility: CLINIC | Age: 75
End: 2020-11-09
Payer: MEDICARE

## 2020-11-09 DIAGNOSIS — Z23 NEED FOR INFLUENZA VACCINATION: Primary | ICD-10-CM

## 2020-11-09 PROCEDURE — G0008 ADMIN INFLUENZA VIRUS VAC: HCPCS | Mod: PBBFAC,PO

## 2020-11-09 PROCEDURE — 90694 VACC AIIV4 NO PRSRV 0.5ML IM: CPT | Mod: PBBFAC,PO

## 2020-11-16 ENCOUNTER — OFFICE VISIT (OUTPATIENT)
Dept: FAMILY MEDICINE | Facility: CLINIC | Age: 75
End: 2020-11-16
Payer: MEDICARE

## 2020-11-16 ENCOUNTER — LAB VISIT (OUTPATIENT)
Dept: LAB | Facility: HOSPITAL | Age: 75
End: 2020-11-16
Attending: INTERNAL MEDICINE
Payer: MEDICARE

## 2020-11-16 VITALS
SYSTOLIC BLOOD PRESSURE: 138 MMHG | DIASTOLIC BLOOD PRESSURE: 84 MMHG | OXYGEN SATURATION: 99 % | WEIGHT: 202.81 LBS | HEIGHT: 62 IN | RESPIRATION RATE: 20 BRPM | TEMPERATURE: 98 F | HEART RATE: 78 BPM | BODY MASS INDEX: 37.32 KG/M2

## 2020-11-16 DIAGNOSIS — E53.8 B12 DEFICIENCY: ICD-10-CM

## 2020-11-16 DIAGNOSIS — E66.01 CLASS 2 SEVERE OBESITY DUE TO EXCESS CALORIES WITH SERIOUS COMORBIDITY AND BODY MASS INDEX (BMI) OF 37.0 TO 37.9 IN ADULT: ICD-10-CM

## 2020-11-16 DIAGNOSIS — R73.9 ELEVATED BLOOD SUGAR: ICD-10-CM

## 2020-11-16 DIAGNOSIS — I77.1 TORTUOUS AORTA: ICD-10-CM

## 2020-11-16 DIAGNOSIS — M05.79 RHEUMATOID ARTHRITIS INVOLVING MULTIPLE SITES WITH POSITIVE RHEUMATOID FACTOR: ICD-10-CM

## 2020-11-16 DIAGNOSIS — I10 ESSENTIAL HYPERTENSION: ICD-10-CM

## 2020-11-16 DIAGNOSIS — R42 LIGHT HEADEDNESS: ICD-10-CM

## 2020-11-16 DIAGNOSIS — H25.9 AGE-RELATED CATARACT OF BOTH EYES, UNSPECIFIED AGE-RELATED CATARACT TYPE: ICD-10-CM

## 2020-11-16 DIAGNOSIS — H61.23 IMPACTED CERUMEN OF BOTH EARS: ICD-10-CM

## 2020-11-16 DIAGNOSIS — I67.1 CEREBRAL ANEURYSM WITHOUT RUPTURE: ICD-10-CM

## 2020-11-16 LAB
ALBUMIN SERPL BCP-MCNC: 4.1 G/DL (ref 3.5–5.2)
ALP SERPL-CCNC: 104 U/L (ref 55–135)
ALT SERPL W/O P-5'-P-CCNC: 24 U/L (ref 10–44)
ANION GAP SERPL CALC-SCNC: 10 MMOL/L (ref 8–16)
AST SERPL-CCNC: 22 U/L (ref 10–40)
BASOPHILS # BLD AUTO: 0.01 K/UL (ref 0–0.2)
BASOPHILS NFR BLD: 0.2 % (ref 0–1.9)
BILIRUB SERPL-MCNC: 0.4 MG/DL (ref 0.1–1)
BUN SERPL-MCNC: 11 MG/DL (ref 8–23)
CALCIUM SERPL-MCNC: 9.5 MG/DL (ref 8.7–10.5)
CHLORIDE SERPL-SCNC: 106 MMOL/L (ref 95–110)
CHOLEST SERPL-MCNC: 144 MG/DL (ref 120–199)
CHOLEST/HDLC SERPL: 2.7 {RATIO} (ref 2–5)
CO2 SERPL-SCNC: 28 MMOL/L (ref 23–29)
CREAT SERPL-MCNC: 0.8 MG/DL (ref 0.5–1.4)
CRP SERPL-MCNC: 1.1 MG/L (ref 0–8.2)
DIFFERENTIAL METHOD: ABNORMAL
EOSINOPHIL # BLD AUTO: 0.3 K/UL (ref 0–0.5)
EOSINOPHIL NFR BLD: 4.5 % (ref 0–8)
ERYTHROCYTE [DISTWIDTH] IN BLOOD BY AUTOMATED COUNT: 14.4 % (ref 11.5–14.5)
ERYTHROCYTE [SEDIMENTATION RATE] IN BLOOD BY WESTERGREN METHOD: 20 MM/HR (ref 0–36)
EST. GFR  (AFRICAN AMERICAN): >60 ML/MIN/1.73 M^2
EST. GFR  (NON AFRICAN AMERICAN): >60 ML/MIN/1.73 M^2
ESTIMATED AVG GLUCOSE: 105 MG/DL (ref 68–131)
GLUCOSE SERPL-MCNC: 151 MG/DL (ref 70–110)
HBA1C MFR BLD HPLC: 5.3 % (ref 4–5.6)
HCT VFR BLD AUTO: 35.8 % (ref 37–48.5)
HDLC SERPL-MCNC: 53 MG/DL (ref 40–75)
HDLC SERPL: 36.8 % (ref 20–50)
HGB BLD-MCNC: 11.3 G/DL (ref 12–16)
IMM GRANULOCYTES # BLD AUTO: 0.02 K/UL (ref 0–0.04)
IMM GRANULOCYTES NFR BLD AUTO: 0.3 % (ref 0–0.5)
LDLC SERPL CALC-MCNC: 65.6 MG/DL (ref 63–159)
LYMPHOCYTES # BLD AUTO: 1.1 K/UL (ref 1–4.8)
LYMPHOCYTES NFR BLD: 17.4 % (ref 18–48)
MCH RBC QN AUTO: 30.6 PG (ref 27–31)
MCHC RBC AUTO-ENTMCNC: 31.6 G/DL (ref 32–36)
MCV RBC AUTO: 97 FL (ref 82–98)
MONOCYTES # BLD AUTO: 0.8 K/UL (ref 0.3–1)
MONOCYTES NFR BLD: 13.4 % (ref 4–15)
NEUTROPHILS # BLD AUTO: 3.9 K/UL (ref 1.8–7.7)
NEUTROPHILS NFR BLD: 64.2 % (ref 38–73)
NONHDLC SERPL-MCNC: 91 MG/DL
NRBC BLD-RTO: 0 /100 WBC
PLATELET # BLD AUTO: 165 K/UL (ref 150–350)
PMV BLD AUTO: 13.5 FL (ref 9.2–12.9)
POTASSIUM SERPL-SCNC: 3.8 MMOL/L (ref 3.5–5.1)
PROT SERPL-MCNC: 7.9 G/DL (ref 6–8.4)
RBC # BLD AUTO: 3.69 M/UL (ref 4–5.4)
SODIUM SERPL-SCNC: 144 MMOL/L (ref 136–145)
TRIGL SERPL-MCNC: 127 MG/DL (ref 30–150)
TSH SERPL DL<=0.005 MIU/L-ACNC: 0.91 UIU/ML (ref 0.4–4)
VIT B12 SERPL-MCNC: 386 PG/ML (ref 210–950)
WBC # BLD AUTO: 6.03 K/UL (ref 3.9–12.7)

## 2020-11-16 PROCEDURE — 82607 VITAMIN B-12: CPT

## 2020-11-16 PROCEDURE — 99999 PR PBB SHADOW E&M-EST. PATIENT-LVL V: CPT | Mod: PBBFAC,,, | Performed by: INTERNAL MEDICINE

## 2020-11-16 PROCEDURE — 80061 LIPID PANEL: CPT

## 2020-11-16 PROCEDURE — 85025 COMPLETE CBC W/AUTO DIFF WBC: CPT

## 2020-11-16 PROCEDURE — 83036 HEMOGLOBIN GLYCOSYLATED A1C: CPT

## 2020-11-16 PROCEDURE — 86140 C-REACTIVE PROTEIN: CPT

## 2020-11-16 PROCEDURE — 84443 ASSAY THYROID STIM HORMONE: CPT

## 2020-11-16 PROCEDURE — 36415 COLL VENOUS BLD VENIPUNCTURE: CPT | Mod: PO

## 2020-11-16 PROCEDURE — 99214 OFFICE O/P EST MOD 30 MIN: CPT | Mod: S$PBB,,, | Performed by: INTERNAL MEDICINE

## 2020-11-16 PROCEDURE — 99214 PR OFFICE/OUTPT VISIT, EST, LEVL IV, 30-39 MIN: ICD-10-PCS | Mod: S$PBB,,, | Performed by: INTERNAL MEDICINE

## 2020-11-16 PROCEDURE — 99999 PR PBB SHADOW E&M-EST. PATIENT-LVL V: ICD-10-PCS | Mod: PBBFAC,,, | Performed by: INTERNAL MEDICINE

## 2020-11-16 PROCEDURE — 80053 COMPREHEN METABOLIC PANEL: CPT

## 2020-11-16 PROCEDURE — 99215 OFFICE O/P EST HI 40 MIN: CPT | Mod: PBBFAC,PO | Performed by: INTERNAL MEDICINE

## 2020-11-16 PROCEDURE — 85652 RBC SED RATE AUTOMATED: CPT

## 2020-11-16 NOTE — PROGRESS NOTES
"Subjective:       Patient ID: Yulia Peralta is a pleasant 75 y.o. Black or  female patient    Chief Complaint: Dizziness and Hypertension      Patient is a new pt to me but established pt from Dr. Martin, last visit on 08/21/2020, see her last notes and the list of problems below.    HPI    She comes today due to her feeling "light-headed and off balance",  with blurry vision x 5 weeks.   She denies headaches, but states that she has something "not right" on the upper part of the head. No runny nose, no congestion, but she tried anyway sinus lavage with improvement for one week.  She denies that she has real headache, the sensation she reports is not pulsatile, no nausea, no vomiting.  She has a H/O migraines.  She has foggy vision and tries to clean her glasses several times a day.  She is concerned, as she was diagnosed last year with a small cerebral aneurysm (unruptured Acomm aneurysm measuring 1.7 mm wide.  Last visit at this regard was on 2/10/2019 with Dr. Johnston, Neurosurgery.   Was to follow up with MR angiogram at 1 year considering high risk for treatment over the benefit.  She was to have surgery for cataract but did not f-up.      Patient Active Problem List   Diagnosis    Rheumatoid arthritis    Multiple thyroid nodules    Anemia of other chronic disease    Migraine headache    Acid reflux    HTN (hypertension)    GERD (gastroesophageal reflux disease)    Degeneration of lumbar or lumbosacral intervertebral disc    Spinal stenosis, lumbar region, with neurogenic claudication    Acquired spondylolisthesis    Genital herpes in women    Vitamin B 12 deficiency    Acquired scoliosis    Palpitations    Left shoulder pain    Immunosuppression    Anserine bursitis    Class 2 obesity due to excess calories in adult    Left lumbar radiculitis    Chest pain, atypical    Abnormal CT scan, chest    Bilateral ankle pain    Thymoma    Abnormal LFTs    Cerebral " microvascular disease    Pain and swelling of left lower leg    Tortuous aorta    Nuclear sclerosis, bilateral    Refractive error    Severe obesity (BMI 35.0-39.9) with comorbidity    Cerebral aneurysm without rupture    Numbness and tingling of both legs below knees    Impacted cerumen of both ears    Light headedness          ACTIVE MEDICAL ISSUES:  Documented in Problem List     PAST MEDICAL HISTORY  Documented     PAST SURGICAL HISTORY:  Documented     SOCIAL HISTORY:  Documented     FAMILY HISTORY:  Documented     ALLERGIES AND MEDICATIONS: updated and reviewed.  Documented    Review of Systems   Constitutional:        Lightheaded       Objective:      Physical Exam  Vitals signs and nursing note reviewed.   Constitutional:       Appearance: Normal appearance. She is obese.   HENT:      Right Ear: There is impacted cerumen.      Left Ear: There is impacted cerumen.   Eyes:      Comments: Bilateral cataract   Cardiovascular:      Rate and Rhythm: Normal rate and regular rhythm.      Pulses: Normal pulses.      Heart sounds: Normal heart sounds.   Pulmonary:      Effort: Pulmonary effort is normal.      Breath sounds: Normal breath sounds.   Skin:     General: Skin is warm and dry.   Neurological:      General: No focal deficit present.      Mental Status: She is alert and oriented to person, place, and time.      Cranial Nerves: No cranial nerve deficit.      Sensory: No sensory deficit.      Motor: No weakness.      Coordination: Coordination normal.      Gait: Gait normal.   Psychiatric:         Mood and Affect: Mood normal.         Behavior: Behavior normal.         Thought Content: Thought content normal.         Judgment: Judgment normal.         Vitals:    11/16/20 1422 11/16/20 1511   BP: (!) 152/74 138/84   BP Location: Right arm Right arm   Patient Position: Sitting Sitting   BP Method: Large (Manual) Large (Manual)   Pulse: 78    Resp: 20    Temp: 98.2 °F (36.8 °C)    TempSrc: Oral    SpO2:  "99%    Weight: 92 kg (202 lb 13.2 oz)    Height: 5' 2" (1.575 m)      Body mass index is 37.1 kg/m².    RESULTS: Reviewed labs from last 12 months    Assessment:       1. Essential hypertension    2. Light headedness    3. Impacted cerumen of both ears    4. Cerebral aneurysm without rupture    5. Class 2 severe obesity due to excess calories with serious comorbidity and body mass index (BMI) of 37.0 to 37.9 in adult    6. Rheumatoid arthritis involving multiple sites with positive rheumatoid factor    7. Age-related cataract of both eyes, unspecified age-related cataract type    8. Tortuous aorta    9. B12 deficiency    10. Elevated blood sugar        Plan:   Yulia was seen today for dizziness and hypertension.    Diagnoses and all orders for this visit:    Essential hypertension  -     CBC Auto Differential; Future  -     Comprehensive Metabolic Panel; Future  -     TSH; Future  -     Lipid Panel; Future    BP at goal at recheck, blood work fine.    Light headedness    Difficult to figure out origin as symptoms are very atypical.  PE with no major finding.  I doubt it come from the problem due to an recent.  I wonder if the fact that she has impacted cerumen bilaterally may affect her balance.    Blood work with no relevant finding.  Advised patient to monitor her symptoms.  She will follow-up with the Rheumatologist.  She will see her ENT at [place] the remove her impacted cerumen and see if she feels better.  See also below cataract.    Impacted cerumen of both ears    See above.  Patient will see her ENT at .    Cerebral aneurysm without rupture    See notes from neurosurgery.  She is supposed to have a follow-up MRI in February.  I have no real element of concern today but I told her to the have a low threshold to seek for medical attention if any concerning symptoms.  Discussed about importance to have good BP control.    Class 2 severe obesity due to excess calories with serious comorbidity and " body mass index (BMI) of 37.0 to 37.9 in adult  -     Hemoglobin A1C; Future    Has to work on the lifestyle.  I checked for DM as concern for the patient but A1c is fine.    Rheumatoid arthritis involving multiple sites with positive rheumatoid factor  -     Ambulatory referral/consult to Rheumatology; Future  -     Sedimentation rate; Future  -     C-reactive protein; Future    Patient will follow-up with Rheumatologist, I did blood work as done before each visit.  Everything came back fine.    Age-related cataract of both eyes, unspecified age-related cataract type    Advised her to follow-up at this regard as she has blurry vision.    Tortuous aorta    As per CXR 5/21/14.    B12 deficiency  -     Vitamin B12; Future    B 12 386. No real deficiency, but she may benefit of taking 1000 UI QD OTC.    Elevated blood sugar  -     Hemoglobin A1C; Future    A1c fine.    Follow up if symptoms worsen or fail to improve.    This note was created by combination of typed  and M-Modal dictation.  Transcription errors may be present.  If there are any questions, please contact me.

## 2020-11-16 NOTE — PROGRESS NOTES
Health Maintenance Due   Topic Date Due    TETANUS VACCINE      Shingles Vaccine (2 of 3)

## 2020-11-17 ENCOUNTER — TELEPHONE (OUTPATIENT)
Dept: RHEUMATOLOGY | Facility: CLINIC | Age: 75
End: 2020-11-17

## 2020-11-17 ENCOUNTER — TELEPHONE (OUTPATIENT)
Dept: FAMILY MEDICINE | Facility: CLINIC | Age: 75
End: 2020-11-17

## 2020-11-17 PROBLEM — R42 LIGHT HEADEDNESS: Status: ACTIVE | Noted: 2020-11-17

## 2020-11-17 PROBLEM — H61.23 IMPACTED CERUMEN OF BOTH EARS: Status: ACTIVE | Noted: 2020-11-17

## 2020-11-29 ENCOUNTER — PATIENT OUTREACH (OUTPATIENT)
Dept: ADMINISTRATIVE | Facility: OTHER | Age: 75
End: 2020-11-29

## 2020-11-30 ENCOUNTER — OFFICE VISIT (OUTPATIENT)
Dept: OPHTHALMOLOGY | Facility: CLINIC | Age: 75
End: 2020-11-30
Payer: MEDICARE

## 2020-11-30 DIAGNOSIS — I10 ESSENTIAL HYPERTENSION: ICD-10-CM

## 2020-11-30 DIAGNOSIS — H25.13 NUCLEAR SCLEROSIS, BILATERAL: Primary | ICD-10-CM

## 2020-11-30 DIAGNOSIS — H52.7 REFRACTIVE ERROR: ICD-10-CM

## 2020-11-30 PROCEDURE — 92014 COMPRE OPH EXAM EST PT 1/>: CPT | Mod: S$PBB,,, | Performed by: OPHTHALMOLOGY

## 2020-11-30 PROCEDURE — 99999 PR PBB SHADOW E&M-EST. PATIENT-LVL III: CPT | Mod: PBBFAC,,, | Performed by: OPHTHALMOLOGY

## 2020-11-30 PROCEDURE — 92136 OPHTHALMIC BIOMETRY: CPT | Mod: PBBFAC,PO | Performed by: OPHTHALMOLOGY

## 2020-11-30 PROCEDURE — 99999 PR PBB SHADOW E&M-EST. PATIENT-LVL III: ICD-10-PCS | Mod: PBBFAC,,, | Performed by: OPHTHALMOLOGY

## 2020-11-30 PROCEDURE — 99213 OFFICE O/P EST LOW 20 MIN: CPT | Mod: PBBFAC,PO | Performed by: OPHTHALMOLOGY

## 2020-11-30 PROCEDURE — 92014 PR EYE EXAM, EST PATIENT,COMPREHESV: ICD-10-PCS | Mod: S$PBB,,, | Performed by: OPHTHALMOLOGY

## 2020-11-30 PROCEDURE — 92136 BIOMETRY: ICD-10-PCS | Mod: 26,S$PBB,LT, | Performed by: OPHTHALMOLOGY

## 2020-11-30 RX ORDER — NEPAFENAC 3 MG/ML
1 SUSPENSION/ DROPS OPHTHALMIC DAILY
Qty: 3 ML | Refills: 1 | Status: SHIPPED | OUTPATIENT
Start: 2021-01-23 | End: 2021-02-22

## 2020-11-30 RX ORDER — DUREZOL 0.5 MG/ML
1 EMULSION OPHTHALMIC 4 TIMES DAILY
Qty: 5 ML | Refills: 1 | Status: SHIPPED | OUTPATIENT
Start: 2021-01-26 | End: 2021-02-25

## 2020-11-30 RX ORDER — OFLOXACIN 3 MG/ML
1 SOLUTION/ DROPS OPHTHALMIC 4 TIMES DAILY
Qty: 5 ML | Refills: 1 | Status: SHIPPED | OUTPATIENT
Start: 2021-01-23 | End: 2021-02-02

## 2020-11-30 NOTE — PROGRESS NOTES
Subjective:       Patient ID: Yulia Peralta is a 75 y.o. female.    Chief Complaint: Cataract and Hypertensive Eye Exam    HPI     75 y.o. female is here for Cataract Check and Hypertensive Eye Exam. For   a month pt f/o dizziness and trouble focusing within the last 2 months.   Have trouble reading the clock with correction. Blurred vision at distance   with correction. Do have trouble with glare. Denies eye pain and f/f.     Eye Med's: No gtt     Last edited by JORDAN Jacob on 11/30/2020 11:11 AM. (History)             Assessment:       1. Nuclear sclerosis, bilateral    2. Essential hypertension    3. Refractive error        Plan:       Visually significant cataract OU -Pt. Wants Sx.     HTN-No retinopathy OU.  RE      Cataract Surgery Consent: Patient with a visually significant cataract with difficulties of ADLs, reading, driving, night vision, glare (any and all).  Discussed with Patient/Family/Caregiver: options, risks and benefits, expectations of cataract surgery, utilized an eye model with questions and answers to facilitate discussion.  Discussed lens options and patient understands that glasses may be required for optimal vision for distance and/or near vision after cataract surgery.  The Patient/Family/Caregiver  voice good understanding and patient wishes to proceed with surgery.  The patient will likely benefit from surgery and patient signed consent for Left Eye.  CE OS 1/26/2021 SN60WF 22.0,        OD 2/23/2021 SN60WF 21.5.  Control HTN.

## 2020-12-01 ENCOUNTER — TELEPHONE (OUTPATIENT)
Dept: FAMILY MEDICINE | Facility: CLINIC | Age: 75
End: 2020-12-01

## 2020-12-01 DIAGNOSIS — M06.9 RHEUMATOID ARTHRITIS OF HAND: ICD-10-CM

## 2020-12-01 DIAGNOSIS — I10 ESSENTIAL HYPERTENSION: ICD-10-CM

## 2020-12-01 RX ORDER — IRBESARTAN 300 MG/1
300 TABLET ORAL NIGHTLY
Qty: 90 TABLET | Refills: 3 | Status: SHIPPED | OUTPATIENT
Start: 2020-12-01 | End: 2021-03-29 | Stop reason: SDUPTHER

## 2020-12-01 NOTE — TELEPHONE ENCOUNTER
----- Message from Elise Watts sent at 12/1/2020  3:39 PM CST -----  Contact: Patient [phone]  Type: Patient Call Back    Who called:Patient    What is the request in detail: Just got off the phone with Mirta. Asking if she can speak back to you . Please call.     Would the patient rather a call back or a response via My [facility]? Call back    Best call back number: [phone]

## 2020-12-01 NOTE — TELEPHONE ENCOUNTER
Return call to Pt, and she states that she wants me to relay a message to the Provider. She has been having lightheadedness for 2 month's. She experiences dizziness and off balance off and on for about 2 month's. She would like to know what's next, and who does she need to see. Please advise.

## 2020-12-01 NOTE — TELEPHONE ENCOUNTER
----- Message from Elise Watts sent at 12/1/2020  3:28 PM CST -----  Contact: Patient [phone]  Type: RX Refill Request    Who Called: Patient     Have you contacted your pharmacy: NO    Refill or New Rx: Refill    RX Name and Strength: irbesartan (AVAPRO) 300 MG tablet    Is this a 30 day or 90 day RX: 90 day    Preferred Pharmacy with phone number: .  .  [facility] - [address] [facility]  [address] [place] [address]  Phone: [phone] Fax: [phone]    Local or Mail Order: Local    Would the patient rather a call back or a response via My [facility]? Call back    Best Call Back Number:[phone]

## 2020-12-02 RX ORDER — METHOTREXATE 2.5 MG/1
TABLET ORAL
Qty: 160 TABLET | Refills: 0 | Status: SHIPPED | OUTPATIENT
Start: 2020-12-02 | End: 2021-03-16

## 2020-12-14 ENCOUNTER — TELEPHONE (OUTPATIENT)
Dept: RHEUMATOLOGY | Facility: CLINIC | Age: 75
End: 2020-12-14

## 2020-12-14 ENCOUNTER — TELEPHONE (OUTPATIENT)
Dept: FAMILY MEDICINE | Facility: CLINIC | Age: 75
End: 2020-12-14

## 2020-12-14 ENCOUNTER — TELEPHONE (OUTPATIENT)
Dept: OPTOMETRY | Facility: CLINIC | Age: 75
End: 2020-12-14

## 2020-12-14 NOTE — TELEPHONE ENCOUNTER
----- Message from Karena Dave sent at 12/14/2020 10:43 AM CST -----  Type:  Patient Returning Call    Who Called: self    Who Left Message for Patient: unknown    Does the patient know what this is regarding?: no    Would the patient rather a call back or a response via My [facility]? call    Best Call Back Number:[phone]           
Returned pts call no answer waiting on a call back   
no

## 2020-12-14 NOTE — TELEPHONE ENCOUNTER
----- Message from Mirta Lutz LPN sent at 12/14/2020 11:50 AM CST -----    ----- Message -----  From: Karena Dave  Sent: 12/14/2020  10:31 AM CST  To: Mario EARLY Staff    Type:  Patient Returning Call    Who Called: self    Who Left Message for Patient: michelle    Does the patient know what this is regarding?:no    Would the patient rather a call back or a response via My [facility]? call    Best Call Back Number:.[phone]

## 2020-12-16 ENCOUNTER — PES CALL (OUTPATIENT)
Dept: ADMINISTRATIVE | Facility: CLINIC | Age: 75
End: 2020-12-16

## 2020-12-21 ENCOUNTER — TELEPHONE (OUTPATIENT)
Dept: FAMILY MEDICINE | Facility: CLINIC | Age: 75
End: 2020-12-21

## 2020-12-21 DIAGNOSIS — R51.9 CHRONIC NONINTRACTABLE HEADACHE, UNSPECIFIED HEADACHE TYPE: Primary | ICD-10-CM

## 2020-12-21 DIAGNOSIS — G89.29 CHRONIC NONINTRACTABLE HEADACHE, UNSPECIFIED HEADACHE TYPE: Primary | ICD-10-CM

## 2020-12-21 NOTE — TELEPHONE ENCOUNTER
There is no Dr. Dill in the [facility] system.  Is there someone else shew would like to see. I only see a radiologist with that name.

## 2020-12-21 NOTE — TELEPHONE ENCOUNTER
----- Message from Kelalok LunaDano sent at 12/21/2020  2:51 PM CST -----  Regarding: self  Type:  Patient Requesting Referral    Who Called: Self    Referral to What Specialty:Neurologist    Reason for Referral: Dizziness,headaches  Does the patient want the referral with a specific physician?:  Is the specialist an [facility]    Would the patient rather a call back or a response via My [facility]? Call     Best Call Back Number:[phone]    Additional Information:  if  unavailable then any DrZina In the clinic

## 2020-12-22 ENCOUNTER — TELEPHONE (OUTPATIENT)
Dept: OPHTHALMOLOGY | Facility: CLINIC | Age: 75
End: 2020-12-22

## 2020-12-22 DIAGNOSIS — Z13.9 SCREENING PROCEDURE: Primary | ICD-10-CM

## 2020-12-22 DIAGNOSIS — H25.12 NUCLEAR SCLEROSIS, LEFT: ICD-10-CM

## 2020-12-24 ENCOUNTER — TELEPHONE (OUTPATIENT)
Dept: INTERVENTIONAL RADIOLOGY/VASCULAR | Facility: CLINIC | Age: 75
End: 2020-12-24

## 2020-12-24 ENCOUNTER — TELEPHONE (OUTPATIENT)
Dept: INTERVENTIONAL RADIOLOGY/VASCULAR | Facility: HOSPITAL | Age: 75
End: 2020-12-24

## 2020-12-24 NOTE — TELEPHONE ENCOUNTER
Spoke to pt, pt stated she's having headaches, dizziness, light headed, and off balance for 2 months now.  Pt wanted to know if she needs to follow up with her Neurologist or see Dr. Dill.  Pt schedule to have Cataract Surgery on 1/26/21. Also, schedule pt for her 1 yr MRA Brain 1/11/21, I didn't schedule her a f/u appt at this time, pt have to see when she can come in.  Forward message to provider.

## 2020-12-24 NOTE — TELEPHONE ENCOUNTER
I spoke with Ms. Peralta.  No red flags: thunderclap h/a, visual change.  She is having cataract surgery in jan.  She is going to schedule to see PCP right after the new year.  She has seen PCP for symptoms recently.  Needs follow up.  Will also schedule neuro follow up.  Should be able to see PCP while she waits on neuro.      Will present for MRA scheduled in Jan.  Will see us after for followup.      She needs an appt. With us for follow up after MRA.      Discussed to ER for any acute worsening of symptoms.    KA  ===View-only below this line===  ----- Message -----  From: Shari uHff MA  Sent: 12/24/2020   9:20 AM CST  To: Sean Dill MD, Sylvie Rey PA-C    Spoke to pt, pt stated she's having headaches, dizziness, light headed, and off balance for 2 months now.  Pt wanted to know if she needs to follow up with her Neurologist or see Dr. Dill.  Pt schedule to have Cataract Surgery on 1/26/21. Also, schedule pt for her 1 yr MRA Brain 1/11/21, I didn't schedule her a f/u appt at this time, pt have to see when she can come in.  Can you please advise if  I need to schedule pt with us or she need to go to her Neurologist?    Manasa  ----- Message -----  From: Sylvie Rey PA-C  Sent: 12/21/2020   4:19 PM CST  To: Sean Dill MD, Sylvie Rey PA-C, *    VM forwarded from [facility].  This patient called his office voicemail looking to make an appt.      Manasa,   Can you touch base and get more details?      Looks like [facility] did angio in Jan.  At office visit follow up was MRA in 1 year.  There is an order in.      Let me know what you find out.      Thanks!  Kirsten

## 2020-12-30 ENCOUNTER — TELEPHONE (OUTPATIENT)
Dept: FAMILY MEDICINE | Facility: CLINIC | Age: 75
End: 2020-12-30

## 2020-12-30 NOTE — TELEPHONE ENCOUNTER
Return call to Pt, Pt transferred to referral team for assistance with scheduling Neurology referral appointment.

## 2020-12-30 NOTE — TELEPHONE ENCOUNTER
----- Message from Elise Watts sent at 12/30/2020  3:07 PM CST -----  Contact: Patient [phone]  Type: Patient Call Back    Who called: Call back    What is the request in detail: Calling to find out if Dr aMrtin scheduled her to see someone for being off balanced, dizziness, and headaches? Please call pt.     Would the patient rather a call back or a response via My [facility]? Call back    Best call back number:[phone]

## 2021-01-06 DIAGNOSIS — I10 ESSENTIAL HYPERTENSION: ICD-10-CM

## 2021-01-06 DIAGNOSIS — K21.9 GASTROESOPHAGEAL REFLUX DISEASE WITHOUT ESOPHAGITIS: ICD-10-CM

## 2021-01-06 DIAGNOSIS — K21.9 GASTROESOPHAGEAL REFLUX DISEASE: ICD-10-CM

## 2021-01-06 RX ORDER — PANTOPRAZOLE SODIUM 40 MG/1
40 TABLET, DELAYED RELEASE ORAL DAILY
Qty: 90 TABLET | Refills: 1 | Status: SHIPPED | OUTPATIENT
Start: 2021-01-06 | End: 2021-03-29 | Stop reason: SDUPTHER

## 2021-01-06 RX ORDER — FAMOTIDINE 40 MG/1
20 TABLET, FILM COATED ORAL 2 TIMES DAILY PRN
Qty: 15 TABLET | Refills: 2 | Status: SHIPPED | OUTPATIENT
Start: 2021-01-06 | End: 2024-03-15

## 2021-01-06 RX ORDER — AMLODIPINE BESYLATE 5 MG/1
5 TABLET ORAL DAILY
Qty: 30 TABLET | Refills: 1 | Status: SHIPPED | OUTPATIENT
Start: 2021-01-06 | End: 2021-01-25 | Stop reason: SDUPTHER

## 2021-01-11 ENCOUNTER — HOSPITAL ENCOUNTER (OUTPATIENT)
Dept: RADIOLOGY | Facility: HOSPITAL | Age: 76
Discharge: HOME | End: 2021-01-11
Attending: STUDENT IN AN ORGANIZED HEALTH CARE EDUCATION/TRAINING PROGRAM
Payer: MEDICARE

## 2021-01-11 DIAGNOSIS — I67.1 CEREBRAL ANEURYSM: ICD-10-CM

## 2021-01-11 PROCEDURE — 70544 MRA BRAIN WITHOUT CONTRAST: ICD-10-PCS | Mod: 26,,, | Performed by: RADIOLOGY

## 2021-01-11 PROCEDURE — 70544 MR ANGIOGRAPHY HEAD W/O DYE: CPT | Mod: 26,,, | Performed by: RADIOLOGY

## 2021-01-11 PROCEDURE — 70544 MR ANGIOGRAPHY HEAD W/O DYE: CPT | Mod: TC

## 2021-01-12 ENCOUNTER — TELEPHONE (OUTPATIENT)
Dept: OPHTHALMOLOGY | Facility: CLINIC | Age: 76
End: 2021-01-12

## 2021-01-12 DIAGNOSIS — Z13.9 SCREENING PROCEDURE: Primary | ICD-10-CM

## 2021-01-12 DIAGNOSIS — H25.11 NUCLEAR SCLEROSIS, RIGHT: ICD-10-CM

## 2021-01-22 ENCOUNTER — TELEPHONE (OUTPATIENT)
Dept: OPHTHALMOLOGY | Facility: CLINIC | Age: 76
End: 2021-01-22

## 2021-01-23 ENCOUNTER — LAB VISIT (OUTPATIENT)
Dept: INTERNAL MEDICINE | Facility: CLINIC | Age: 76
End: 2021-01-23
Payer: MEDICARE

## 2021-01-23 DIAGNOSIS — Z13.9 SCREENING PROCEDURE: ICD-10-CM

## 2021-01-23 PROCEDURE — U0003 INFECTIOUS AGENT DETECTION BY NUCLEIC ACID (DNA OR RNA); SEVERE ACUTE RESPIRATORY SYNDROME CORONAVIRUS 2 (SARS-COV-2) (CORONAVIRUS DISEASE [COVID-19]), AMPLIFIED PROBE TECHNIQUE, MAKING USE OF HIGH THROUGHPUT TECHNOLOGIES AS DESCRIBED BY CMS-2020-01-R: HCPCS

## 2021-01-24 LAB — SARS-COV-2 RNA RESP QL NAA+PROBE: NOT DETECTED

## 2021-01-25 ENCOUNTER — TELEPHONE (OUTPATIENT)
Dept: OPHTHALMOLOGY | Facility: CLINIC | Age: 76
End: 2021-01-25

## 2021-01-25 ENCOUNTER — TELEPHONE (OUTPATIENT)
Dept: NEUROLOGY | Facility: CLINIC | Age: 76
End: 2021-01-25

## 2021-01-26 ENCOUNTER — ANESTHESIA (OUTPATIENT)
Dept: SURGERY | Facility: OTHER | Age: 76
End: 2021-01-26
Payer: MEDICARE

## 2021-01-26 ENCOUNTER — ANESTHESIA EVENT (OUTPATIENT)
Dept: SURGERY | Facility: OTHER | Age: 76
End: 2021-01-26
Payer: MEDICARE

## 2021-01-26 ENCOUNTER — HOSPITAL ENCOUNTER (OUTPATIENT)
Facility: OTHER | Age: 76
Discharge: HOME | End: 2021-01-26
Attending: OPHTHALMOLOGY | Admitting: OPHTHALMOLOGY
Payer: MEDICARE

## 2021-01-26 VITALS
DIASTOLIC BLOOD PRESSURE: 72 MMHG | OXYGEN SATURATION: 100 % | HEIGHT: 62 IN | RESPIRATION RATE: 16 BRPM | TEMPERATURE: 98 F | SYSTOLIC BLOOD PRESSURE: 164 MMHG | BODY MASS INDEX: 38.46 KG/M2 | WEIGHT: 209 LBS | HEART RATE: 73 BPM

## 2021-01-26 DIAGNOSIS — H25.12 NUCLEAR SCLEROTIC CATARACT OF LEFT EYE: Primary | ICD-10-CM

## 2021-01-26 PROCEDURE — 37000009 HC ANESTHESIA EA ADD 15 MINS: Performed by: OPHTHALMOLOGY

## 2021-01-26 PROCEDURE — 66984 PR REMOVAL, CATARACT, W/INSRT INTRAOC LENS, W/O ENDO CYCLO: ICD-10-PCS | Mod: LT,,, | Performed by: OPHTHALMOLOGY

## 2021-01-26 PROCEDURE — 36000706: Performed by: OPHTHALMOLOGY

## 2021-01-26 PROCEDURE — 63600175 PHARM REV CODE 636 W HCPCS: Performed by: OPHTHALMOLOGY

## 2021-01-26 PROCEDURE — 63600175 PHARM REV CODE 636 W HCPCS: Performed by: NURSE ANESTHETIST, CERTIFIED REGISTERED

## 2021-01-26 PROCEDURE — 37000008 HC ANESTHESIA 1ST 15 MINUTES: Performed by: OPHTHALMOLOGY

## 2021-01-26 PROCEDURE — 71000015 HC POSTOP RECOV 1ST HR: Performed by: OPHTHALMOLOGY

## 2021-01-26 PROCEDURE — 66984 XCAPSL CTRC RMVL W/O ECP: CPT | Mod: LT,,, | Performed by: OPHTHALMOLOGY

## 2021-01-26 PROCEDURE — V2632 POST CHMBR INTRAOCULAR LENS: HCPCS | Performed by: OPHTHALMOLOGY

## 2021-01-26 PROCEDURE — 36000707: Performed by: OPHTHALMOLOGY

## 2021-01-26 PROCEDURE — 25000003 PHARM REV CODE 250: Performed by: OPHTHALMOLOGY

## 2021-01-26 DEVICE — LENS 22.0: Type: IMPLANTABLE DEVICE | Site: EYE | Status: FUNCTIONAL

## 2021-01-26 RX ORDER — HYDROCODONE BITARTRATE AND ACETAMINOPHEN 5; 325 MG/1; MG/1
1 TABLET ORAL EVERY 4 HOURS PRN
Status: DISCONTINUED | OUTPATIENT
Start: 2021-01-26 | End: 2021-01-26 | Stop reason: HOSPADM

## 2021-01-26 RX ORDER — CYCLOPENTOLATE HYDROCHLORIDE 10 MG/ML
1 SOLUTION/ DROPS OPHTHALMIC
Status: DISPENSED | OUTPATIENT
Start: 2021-01-26

## 2021-01-26 RX ORDER — OFLOXACIN 3 MG/ML
1 SOLUTION/ DROPS OPHTHALMIC
Status: DISPENSED | OUTPATIENT
Start: 2021-01-26

## 2021-01-26 RX ORDER — TROPICAMIDE 10 MG/ML
1 SOLUTION/ DROPS OPHTHALMIC
Status: COMPLETED | OUTPATIENT
Start: 2021-01-26 | End: 2021-01-26

## 2021-01-26 RX ORDER — TETRACAINE HYDROCHLORIDE 5 MG/ML
1 SOLUTION OPHTHALMIC
Status: COMPLETED | OUTPATIENT
Start: 2021-01-26 | End: 2021-01-26

## 2021-01-26 RX ORDER — TETRACAINE HYDROCHLORIDE 5 MG/ML
SOLUTION OPHTHALMIC
Status: DISCONTINUED | OUTPATIENT
Start: 2021-01-26 | End: 2021-01-26 | Stop reason: HOSPADM

## 2021-01-26 RX ORDER — MIDAZOLAM HYDROCHLORIDE 1 MG/ML
INJECTION INTRAMUSCULAR; INTRAVENOUS
Status: DISCONTINUED | OUTPATIENT
Start: 2021-01-26 | End: 2021-01-26

## 2021-01-26 RX ORDER — FENTANYL CITRATE 50 UG/ML
INJECTION, SOLUTION INTRAMUSCULAR; INTRAVENOUS
Status: DISCONTINUED | OUTPATIENT
Start: 2021-01-26 | End: 2021-01-26

## 2021-01-26 RX ORDER — PREDNISOLONE ACETATE 10 MG/ML
SUSPENSION/ DROPS OPHTHALMIC
Status: DISCONTINUED | OUTPATIENT
Start: 2021-01-26 | End: 2021-01-26 | Stop reason: HOSPADM

## 2021-01-26 RX ORDER — SODIUM CHLORIDE 0.9 % (FLUSH) 0.9 %
10 SYRINGE (ML) INJECTION
Status: ACTIVE | OUTPATIENT
Start: 2021-01-26

## 2021-01-26 RX ORDER — ACETAMINOPHEN 325 MG/1
650 TABLET ORAL EVERY 4 HOURS PRN
Status: DISCONTINUED | OUTPATIENT
Start: 2021-01-26 | End: 2021-01-26 | Stop reason: HOSPADM

## 2021-01-26 RX ORDER — LIDOCAINE HYDROCHLORIDE 40 MG/ML
INJECTION, SOLUTION RETROBULBAR
Status: DISCONTINUED | OUTPATIENT
Start: 2021-01-26 | End: 2021-01-26 | Stop reason: HOSPADM

## 2021-01-26 RX ORDER — EPINEPHRINE 1 MG/ML
INJECTION, SOLUTION INTRACARDIAC; INTRAMUSCULAR; INTRAVENOUS; SUBCUTANEOUS
Status: DISCONTINUED | OUTPATIENT
Start: 2021-01-26 | End: 2021-01-26 | Stop reason: HOSPADM

## 2021-01-26 RX ORDER — PHENYLEPHRINE HYDROCHLORIDE 25 MG/ML
1 SOLUTION/ DROPS OPHTHALMIC
Status: COMPLETED | OUTPATIENT
Start: 2021-01-26 | End: 2021-01-26

## 2021-01-26 RX ADMIN — TROPICAMIDE 1 DROP: 10 SOLUTION/ DROPS OPHTHALMIC at 09:01

## 2021-01-26 RX ADMIN — TETRACAINE HYDROCHLORIDE 1 DROP: 5 SOLUTION OPHTHALMIC at 09:01

## 2021-01-26 RX ADMIN — PHENYLEPHRINE HYDROCHLORIDE 1 DROP: 25 SOLUTION/ DROPS OPHTHALMIC at 09:01

## 2021-01-26 RX ADMIN — OFLOXACIN 1 DROP: 3 SOLUTION OPHTHALMIC at 09:01

## 2021-01-26 RX ADMIN — FENTANYL CITRATE 50 MCG: 50 INJECTION, SOLUTION INTRAMUSCULAR; INTRAVENOUS at 11:01

## 2021-01-26 RX ADMIN — MIDAZOLAM HYDROCHLORIDE 1 MG: 1 INJECTION, SOLUTION INTRAMUSCULAR; INTRAVENOUS at 10:01

## 2021-01-26 RX ADMIN — CYCLOPENTOLATE HYDROCHLORIDE 1 DROP: 10 SOLUTION/ DROPS OPHTHALMIC at 09:01

## 2021-01-27 ENCOUNTER — OFFICE VISIT (OUTPATIENT)
Dept: OPHTHALMOLOGY | Facility: CLINIC | Age: 76
End: 2021-01-27
Payer: MEDICARE

## 2021-01-27 VITALS — DIASTOLIC BLOOD PRESSURE: 76 MMHG | SYSTOLIC BLOOD PRESSURE: 139 MMHG | HEART RATE: 64 BPM

## 2021-01-27 DIAGNOSIS — Z98.890 POST-OPERATIVE STATE: Primary | ICD-10-CM

## 2021-01-27 PROCEDURE — 99213 OFFICE O/P EST LOW 20 MIN: CPT | Mod: PBBFAC,PO | Performed by: OPHTHALMOLOGY

## 2021-01-27 PROCEDURE — 99024 POSTOP FOLLOW-UP VISIT: CPT | Mod: POP,,, | Performed by: OPHTHALMOLOGY

## 2021-01-27 PROCEDURE — 99024 PR POST-OP FOLLOW-UP VISIT: ICD-10-PCS | Mod: POP,,, | Performed by: OPHTHALMOLOGY

## 2021-01-27 PROCEDURE — 99999 PR PBB SHADOW E&M-EST. PATIENT-LVL III: ICD-10-PCS | Mod: PBBFAC,,, | Performed by: OPHTHALMOLOGY

## 2021-01-27 PROCEDURE — 99999 PR PBB SHADOW E&M-EST. PATIENT-LVL III: CPT | Mod: PBBFAC,,, | Performed by: OPHTHALMOLOGY

## 2021-01-28 ENCOUNTER — TELEPHONE (OUTPATIENT)
Dept: OPHTHALMOLOGY | Facility: CLINIC | Age: 76
End: 2021-01-28

## 2021-01-28 ENCOUNTER — OFFICE VISIT (OUTPATIENT)
Dept: OPHTHALMOLOGY | Facility: CLINIC | Age: 76
End: 2021-01-28
Payer: MEDICARE

## 2021-01-28 DIAGNOSIS — Z98.890 POST-OPERATIVE STATE: Primary | ICD-10-CM

## 2021-01-28 DIAGNOSIS — H43.812 VITREOUS DETACHMENT OF LEFT EYE: ICD-10-CM

## 2021-01-28 PROCEDURE — 99213 OFFICE O/P EST LOW 20 MIN: CPT | Mod: PBBFAC,PO | Performed by: OPHTHALMOLOGY

## 2021-01-28 PROCEDURE — 99024 POSTOP FOLLOW-UP VISIT: CPT | Mod: POP,,, | Performed by: OPHTHALMOLOGY

## 2021-01-28 PROCEDURE — 99024 PR POST-OP FOLLOW-UP VISIT: ICD-10-PCS | Mod: POP,,, | Performed by: OPHTHALMOLOGY

## 2021-01-28 PROCEDURE — 99999 PR PBB SHADOW E&M-EST. PATIENT-LVL III: CPT | Mod: PBBFAC,,, | Performed by: OPHTHALMOLOGY

## 2021-01-28 PROCEDURE — 99999 PR PBB SHADOW E&M-EST. PATIENT-LVL III: ICD-10-PCS | Mod: PBBFAC,,, | Performed by: OPHTHALMOLOGY

## 2021-02-04 ENCOUNTER — OFFICE VISIT (OUTPATIENT)
Dept: OPHTHALMOLOGY | Facility: CLINIC | Age: 76
End: 2021-02-04
Payer: MEDICARE

## 2021-02-04 DIAGNOSIS — Z98.890 POST-OPERATIVE STATE: Primary | ICD-10-CM

## 2021-02-04 DIAGNOSIS — H25.11 NUCLEAR SCLEROSIS, RIGHT: ICD-10-CM

## 2021-02-04 PROCEDURE — 99999 PR PBB SHADOW E&M-EST. PATIENT-LVL III: ICD-10-PCS | Mod: PBBFAC,,, | Performed by: OPHTHALMOLOGY

## 2021-02-04 PROCEDURE — 92136 PR OPHTHAL BIOMETRY,INTRAOC LENS POW CALC: ICD-10-PCS | Mod: 26,S$PBB,RT, | Performed by: OPHTHALMOLOGY

## 2021-02-04 PROCEDURE — 92136 OPHTHALMIC BIOMETRY: CPT | Mod: PBBFAC,PO | Performed by: OPHTHALMOLOGY

## 2021-02-04 PROCEDURE — 99999 PR PBB SHADOW E&M-EST. PATIENT-LVL III: CPT | Mod: PBBFAC,,, | Performed by: OPHTHALMOLOGY

## 2021-02-04 PROCEDURE — 99024 PR POST-OP FOLLOW-UP VISIT: ICD-10-PCS | Mod: POP,,, | Performed by: OPHTHALMOLOGY

## 2021-02-04 PROCEDURE — 99024 POSTOP FOLLOW-UP VISIT: CPT | Mod: POP,,, | Performed by: OPHTHALMOLOGY

## 2021-02-04 PROCEDURE — 99213 OFFICE O/P EST LOW 20 MIN: CPT | Mod: PBBFAC,PO,25 | Performed by: OPHTHALMOLOGY

## 2021-02-04 PROCEDURE — 92136 OPHTHALMIC BIOMETRY: CPT | Mod: 26,S$PBB,RT, | Performed by: OPHTHALMOLOGY

## 2021-02-08 ENCOUNTER — CLINICAL SUPPORT (OUTPATIENT)
Dept: NEUROSURGERY | Facility: CLINIC | Age: 76
End: 2021-02-08
Payer: MEDICARE

## 2021-02-08 VITALS
HEIGHT: 62 IN | HEART RATE: 79 BPM | SYSTOLIC BLOOD PRESSURE: 130 MMHG | DIASTOLIC BLOOD PRESSURE: 78 MMHG | WEIGHT: 202.38 LBS | BODY MASS INDEX: 37.24 KG/M2

## 2021-02-08 DIAGNOSIS — I67.1 CEREBRAL ANEURYSM WITHOUT RUPTURE: Primary | ICD-10-CM

## 2021-02-08 PROCEDURE — 99213 OFFICE O/P EST LOW 20 MIN: CPT | Mod: S$PBB,,, | Performed by: PHYSICIAN ASSISTANT

## 2021-02-08 PROCEDURE — 99999 PR PBB SHADOW E&M-EST. PATIENT-LVL III: ICD-10-PCS | Mod: PBBFAC,,,

## 2021-02-08 PROCEDURE — 99999 PR PBB SHADOW E&M-EST. PATIENT-LVL III: CPT | Mod: PBBFAC,,,

## 2021-02-08 PROCEDURE — 99213 PR OFFICE/OUTPT VISIT, EST, LEVL III, 20-29 MIN: ICD-10-PCS | Mod: S$PBB,,, | Performed by: PHYSICIAN ASSISTANT

## 2021-02-08 PROCEDURE — 99213 OFFICE O/P EST LOW 20 MIN: CPT | Mod: PBBFAC

## 2021-02-19 ENCOUNTER — TELEPHONE (OUTPATIENT)
Dept: OPHTHALMOLOGY | Facility: CLINIC | Age: 76
End: 2021-02-19

## 2021-02-20 ENCOUNTER — LAB VISIT (OUTPATIENT)
Dept: INTERNAL MEDICINE | Facility: CLINIC | Age: 76
End: 2021-02-20
Payer: MEDICARE

## 2021-02-20 DIAGNOSIS — Z13.9 SCREENING PROCEDURE: ICD-10-CM

## 2021-02-20 PROCEDURE — U0005 INFEC AGEN DETEC AMPLI PROBE: HCPCS

## 2021-02-20 PROCEDURE — U0003 INFECTIOUS AGENT DETECTION BY NUCLEIC ACID (DNA OR RNA); SEVERE ACUTE RESPIRATORY SYNDROME CORONAVIRUS 2 (SARS-COV-2) (CORONAVIRUS DISEASE [COVID-19]), AMPLIFIED PROBE TECHNIQUE, MAKING USE OF HIGH THROUGHPUT TECHNOLOGIES AS DESCRIBED BY CMS-2020-01-R: HCPCS

## 2021-02-21 LAB — SARS-COV-2 RNA RESP QL NAA+PROBE: NOT DETECTED

## 2021-02-22 ENCOUNTER — TELEPHONE (OUTPATIENT)
Dept: OPHTHALMOLOGY | Facility: CLINIC | Age: 76
End: 2021-02-22

## 2021-02-23 ENCOUNTER — ANESTHESIA EVENT (OUTPATIENT)
Dept: SURGERY | Facility: OTHER | Age: 76
End: 2021-02-23
Payer: MEDICARE

## 2021-02-23 ENCOUNTER — ANESTHESIA (OUTPATIENT)
Dept: SURGERY | Facility: OTHER | Age: 76
End: 2021-02-23
Payer: MEDICARE

## 2021-02-23 ENCOUNTER — HOSPITAL ENCOUNTER (OUTPATIENT)
Facility: OTHER | Age: 76
Discharge: HOME | End: 2021-02-23
Attending: OPHTHALMOLOGY | Admitting: OPHTHALMOLOGY
Payer: MEDICARE

## 2021-02-23 VITALS
RESPIRATION RATE: 18 BRPM | OXYGEN SATURATION: 97 % | HEART RATE: 80 BPM | TEMPERATURE: 97 F | SYSTOLIC BLOOD PRESSURE: 154 MMHG | WEIGHT: 204 LBS | DIASTOLIC BLOOD PRESSURE: 69 MMHG | BODY MASS INDEX: 37.54 KG/M2 | HEIGHT: 62 IN

## 2021-02-23 DIAGNOSIS — H25.11 NUCLEAR SCLEROTIC CATARACT OF RIGHT EYE: Primary | ICD-10-CM

## 2021-02-23 DIAGNOSIS — H25.12 NUCLEAR SCLEROTIC CATARACT OF LEFT EYE: ICD-10-CM

## 2021-02-23 PROCEDURE — 63600175 PHARM REV CODE 636 W HCPCS: Performed by: NURSE ANESTHETIST, CERTIFIED REGISTERED

## 2021-02-23 PROCEDURE — 71000015 HC POSTOP RECOV 1ST HR: Performed by: OPHTHALMOLOGY

## 2021-02-23 PROCEDURE — V2632 POST CHMBR INTRAOCULAR LENS: HCPCS | Performed by: OPHTHALMOLOGY

## 2021-02-23 PROCEDURE — 37000008 HC ANESTHESIA 1ST 15 MINUTES: Performed by: OPHTHALMOLOGY

## 2021-02-23 PROCEDURE — 36000706: Performed by: OPHTHALMOLOGY

## 2021-02-23 PROCEDURE — 37000009 HC ANESTHESIA EA ADD 15 MINS: Performed by: OPHTHALMOLOGY

## 2021-02-23 PROCEDURE — 66984 XCAPSL CTRC RMVL W/O ECP: CPT | Mod: 79,RT,, | Performed by: OPHTHALMOLOGY

## 2021-02-23 PROCEDURE — 36000707: Performed by: OPHTHALMOLOGY

## 2021-02-23 PROCEDURE — 25000003 PHARM REV CODE 250: Performed by: OPHTHALMOLOGY

## 2021-02-23 PROCEDURE — 63600175 PHARM REV CODE 636 W HCPCS: Performed by: OPHTHALMOLOGY

## 2021-02-23 PROCEDURE — 66984 PR REMOVAL, CATARACT, W/INSRT INTRAOC LENS, W/O ENDO CYCLO: ICD-10-PCS | Mod: 79,RT,, | Performed by: OPHTHALMOLOGY

## 2021-02-23 DEVICE — LENS 21.5 ACRYSOF: Type: IMPLANTABLE DEVICE | Site: EYE | Status: FUNCTIONAL

## 2021-02-23 RX ORDER — LIDOCAINE HYDROCHLORIDE 40 MG/ML
INJECTION, SOLUTION RETROBULBAR
Status: DISCONTINUED | OUTPATIENT
Start: 2021-02-23 | End: 2021-02-23 | Stop reason: HOSPADM

## 2021-02-23 RX ORDER — FENTANYL CITRATE 50 UG/ML
INJECTION, SOLUTION INTRAMUSCULAR; INTRAVENOUS
Status: DISCONTINUED | OUTPATIENT
Start: 2021-02-23 | End: 2021-02-23

## 2021-02-23 RX ORDER — TETRACAINE HYDROCHLORIDE 5 MG/ML
SOLUTION OPHTHALMIC
Status: DISCONTINUED | OUTPATIENT
Start: 2021-02-23 | End: 2021-02-23 | Stop reason: HOSPADM

## 2021-02-23 RX ORDER — HYDROCODONE BITARTRATE AND ACETAMINOPHEN 5; 325 MG/1; MG/1
1 TABLET ORAL EVERY 4 HOURS PRN
Status: CANCELLED | OUTPATIENT
Start: 2021-02-23

## 2021-02-23 RX ORDER — PHENYLEPHRINE HYDROCHLORIDE 25 MG/ML
1 SOLUTION/ DROPS OPHTHALMIC
Status: COMPLETED | OUTPATIENT
Start: 2021-02-23 | End: 2021-02-23

## 2021-02-23 RX ORDER — CYCLOPENTOLATE HYDROCHLORIDE 10 MG/ML
1 SOLUTION/ DROPS OPHTHALMIC
Status: DISCONTINUED | OUTPATIENT
Start: 2021-02-23 | End: 2021-02-23 | Stop reason: HOSPADM

## 2021-02-23 RX ORDER — TETRACAINE HYDROCHLORIDE 5 MG/ML
1 SOLUTION OPHTHALMIC
Status: COMPLETED | OUTPATIENT
Start: 2021-02-23 | End: 2021-02-23

## 2021-02-23 RX ORDER — EPINEPHRINE 1 MG/ML
INJECTION, SOLUTION INTRACARDIAC; INTRAMUSCULAR; INTRAVENOUS; SUBCUTANEOUS
Status: DISCONTINUED | OUTPATIENT
Start: 2021-02-23 | End: 2021-02-23 | Stop reason: HOSPADM

## 2021-02-23 RX ORDER — SODIUM CHLORIDE 0.9 % (FLUSH) 0.9 %
10 SYRINGE (ML) INJECTION
Status: DISCONTINUED | OUTPATIENT
Start: 2021-02-23 | End: 2021-02-23 | Stop reason: HOSPADM

## 2021-02-23 RX ORDER — TROPICAMIDE 10 MG/ML
1 SOLUTION/ DROPS OPHTHALMIC
Status: COMPLETED | OUTPATIENT
Start: 2021-02-23 | End: 2021-02-23

## 2021-02-23 RX ORDER — OFLOXACIN 3 MG/ML
1 SOLUTION/ DROPS OPHTHALMIC
Status: DISCONTINUED | OUTPATIENT
Start: 2021-02-23 | End: 2021-02-23 | Stop reason: HOSPADM

## 2021-02-23 RX ORDER — ACETAMINOPHEN 325 MG/1
650 TABLET ORAL EVERY 4 HOURS PRN
Status: DISCONTINUED | OUTPATIENT
Start: 2021-02-23 | End: 2021-02-23 | Stop reason: HOSPADM

## 2021-02-23 RX ORDER — PREDNISOLONE ACETATE 10 MG/ML
SUSPENSION/ DROPS OPHTHALMIC
Status: DISCONTINUED | OUTPATIENT
Start: 2021-02-23 | End: 2021-02-23 | Stop reason: HOSPADM

## 2021-02-23 RX ADMIN — CYCLOPENTOLATE HYDROCHLORIDE 1 DROP: 10 SOLUTION/ DROPS OPHTHALMIC at 11:02

## 2021-02-23 RX ADMIN — FENTANYL CITRATE 50 MCG: 50 INJECTION, SOLUTION INTRAMUSCULAR; INTRAVENOUS at 12:02

## 2021-02-23 RX ADMIN — PHENYLEPHRINE HYDROCHLORIDE 1 DROP: 25 SOLUTION/ DROPS OPHTHALMIC at 11:02

## 2021-02-23 RX ADMIN — TROPICAMIDE 1 DROP: 10 SOLUTION/ DROPS OPHTHALMIC at 11:02

## 2021-02-23 RX ADMIN — OFLOXACIN 1 DROP: 3 SOLUTION OPHTHALMIC at 11:02

## 2021-02-23 RX ADMIN — TETRACAINE HYDROCHLORIDE 1 DROP: 5 SOLUTION OPHTHALMIC at 11:02

## 2021-02-24 ENCOUNTER — OFFICE VISIT (OUTPATIENT)
Dept: OPHTHALMOLOGY | Facility: CLINIC | Age: 76
End: 2021-02-24
Payer: MEDICARE

## 2021-02-24 VITALS — SYSTOLIC BLOOD PRESSURE: 142 MMHG | DIASTOLIC BLOOD PRESSURE: 76 MMHG | HEART RATE: 61 BPM

## 2021-02-24 DIAGNOSIS — Z98.890 POST-OPERATIVE STATE: Primary | ICD-10-CM

## 2021-02-24 PROCEDURE — 99999 PR PBB SHADOW E&M-EST. PATIENT-LVL III: CPT | Mod: PBBFAC,,, | Performed by: OPHTHALMOLOGY

## 2021-02-24 PROCEDURE — 99213 OFFICE O/P EST LOW 20 MIN: CPT | Mod: PBBFAC,PO | Performed by: OPHTHALMOLOGY

## 2021-02-24 PROCEDURE — 99999 PR PBB SHADOW E&M-EST. PATIENT-LVL III: ICD-10-PCS | Mod: PBBFAC,,, | Performed by: OPHTHALMOLOGY

## 2021-02-24 PROCEDURE — 99024 POSTOP FOLLOW-UP VISIT: CPT | Mod: POP,,, | Performed by: OPHTHALMOLOGY

## 2021-02-24 PROCEDURE — 99024 PR POST-OP FOLLOW-UP VISIT: ICD-10-PCS | Mod: POP,,, | Performed by: OPHTHALMOLOGY

## 2021-02-25 ENCOUNTER — PES CALL (OUTPATIENT)
Dept: ADMINISTRATIVE | Facility: CLINIC | Age: 76
End: 2021-02-25

## 2021-03-01 ENCOUNTER — PATIENT OUTREACH (OUTPATIENT)
Dept: ADMINISTRATIVE | Facility: OTHER | Age: 76
End: 2021-03-01

## 2021-03-01 ENCOUNTER — OFFICE VISIT (OUTPATIENT)
Dept: RHEUMATOLOGY | Facility: CLINIC | Age: 76
End: 2021-03-01
Payer: MEDICARE

## 2021-03-01 ENCOUNTER — LAB VISIT (OUTPATIENT)
Dept: LAB | Facility: HOSPITAL | Age: 76
End: 2021-03-01
Attending: INTERNAL MEDICINE
Payer: MEDICARE

## 2021-03-01 DIAGNOSIS — D84.9 IMMUNOSUPPRESSION: ICD-10-CM

## 2021-03-01 DIAGNOSIS — M05.79 RHEUMATOID ARTHRITIS INVOLVING MULTIPLE SITES WITH POSITIVE RHEUMATOID FACTOR: ICD-10-CM

## 2021-03-01 DIAGNOSIS — M10.9 GOUT, ARTHRITIS: ICD-10-CM

## 2021-03-01 DIAGNOSIS — M05.79 RHEUMATOID ARTHRITIS INVOLVING MULTIPLE SITES WITH POSITIVE RHEUMATOID FACTOR: Primary | ICD-10-CM

## 2021-03-01 LAB
ALBUMIN SERPL BCP-MCNC: 4 G/DL (ref 3.5–5.2)
ALP SERPL-CCNC: 107 U/L (ref 55–135)
ALT SERPL W/O P-5'-P-CCNC: 18 U/L (ref 10–44)
ANION GAP SERPL CALC-SCNC: 9 MMOL/L (ref 8–16)
AST SERPL-CCNC: 17 U/L (ref 10–40)
BILIRUB SERPL-MCNC: 0.3 MG/DL (ref 0.1–1)
BUN SERPL-MCNC: 12 MG/DL (ref 8–23)
CALCIUM SERPL-MCNC: 9.3 MG/DL (ref 8.7–10.5)
CHLORIDE SERPL-SCNC: 105 MMOL/L (ref 95–110)
CO2 SERPL-SCNC: 27 MMOL/L (ref 23–29)
CREAT SERPL-MCNC: 0.8 MG/DL (ref 0.5–1.4)
CRP SERPL-MCNC: 1.5 MG/L (ref 0–8.2)
ERYTHROCYTE [SEDIMENTATION RATE] IN BLOOD BY WESTERGREN METHOD: 29 MM/HR (ref 0–36)
EST. GFR  (AFRICAN AMERICAN): >60 ML/MIN/1.73 M^2
EST. GFR  (NON AFRICAN AMERICAN): >60 ML/MIN/1.73 M^2
GLUCOSE SERPL-MCNC: 94 MG/DL (ref 70–110)
POTASSIUM SERPL-SCNC: 4.1 MMOL/L (ref 3.5–5.1)
PROT SERPL-MCNC: 7.7 G/DL (ref 6–8.4)
SODIUM SERPL-SCNC: 141 MMOL/L (ref 136–145)
URATE SERPL-MCNC: 6.4 MG/DL (ref 2.4–5.7)

## 2021-03-01 PROCEDURE — 99999 PR PBB SHADOW E&M-EST. PATIENT-LVL III: CPT | Mod: PBBFAC,,, | Performed by: INTERNAL MEDICINE

## 2021-03-01 PROCEDURE — 99214 PR OFFICE/OUTPT VISIT, EST, LEVL IV, 30-39 MIN: ICD-10-PCS | Mod: S$PBB,,, | Performed by: INTERNAL MEDICINE

## 2021-03-01 PROCEDURE — 99999 PR PBB SHADOW E&M-EST. PATIENT-LVL III: ICD-10-PCS | Mod: PBBFAC,,, | Performed by: INTERNAL MEDICINE

## 2021-03-01 PROCEDURE — 36415 COLL VENOUS BLD VENIPUNCTURE: CPT | Mod: PO

## 2021-03-01 PROCEDURE — 85652 RBC SED RATE AUTOMATED: CPT

## 2021-03-01 PROCEDURE — 85025 COMPLETE CBC W/AUTO DIFF WBC: CPT

## 2021-03-01 PROCEDURE — 99213 OFFICE O/P EST LOW 20 MIN: CPT | Mod: PBBFAC | Performed by: INTERNAL MEDICINE

## 2021-03-01 PROCEDURE — 80053 COMPREHEN METABOLIC PANEL: CPT

## 2021-03-01 PROCEDURE — 86140 C-REACTIVE PROTEIN: CPT

## 2021-03-01 PROCEDURE — 84550 ASSAY OF BLOOD/URIC ACID: CPT

## 2021-03-01 PROCEDURE — 99214 OFFICE O/P EST MOD 30 MIN: CPT | Mod: S$PBB,,, | Performed by: INTERNAL MEDICINE

## 2021-03-01 ASSESSMENT — ROUTINE ASSESSMENT OF PATIENT INDEX DATA (RAPID3)
PATIENT GLOBAL ASSESSMENT SCORE: 0
PAIN SCORE: 0
MDHAQ FUNCTION SCORE: 0
PSYCHOLOGICAL DISTRESS SCORE: 0
FATIGUE SCORE: 0
TOTAL RAPID3 SCORE: 0

## 2021-03-02 ENCOUNTER — TELEPHONE (OUTPATIENT)
Dept: RHEUMATOLOGY | Facility: CLINIC | Age: 76
End: 2021-03-02

## 2021-03-02 LAB
BASOPHILS # BLD AUTO: 0.02 K/UL (ref 0–0.2)
BASOPHILS NFR BLD: 0.4 % (ref 0–1.9)
DIFFERENTIAL METHOD: ABNORMAL
EOSINOPHIL # BLD AUTO: 0.3 K/UL (ref 0–0.5)
EOSINOPHIL NFR BLD: 4.4 % (ref 0–8)
ERYTHROCYTE [DISTWIDTH] IN BLOOD BY AUTOMATED COUNT: 14.2 % (ref 11.5–14.5)
GIANT PLATELETS BLD QL SMEAR: PRESENT
HCT VFR BLD AUTO: 34.6 % (ref 37–48.5)
HGB BLD-MCNC: 10.8 G/DL (ref 12–16)
IMM GRANULOCYTES # BLD AUTO: 0.01 K/UL (ref 0–0.04)
IMM GRANULOCYTES NFR BLD AUTO: 0.2 % (ref 0–0.5)
LYMPHOCYTES # BLD AUTO: 1.1 K/UL (ref 1–4.8)
LYMPHOCYTES NFR BLD: 19.5 % (ref 18–48)
MCH RBC QN AUTO: 31 PG (ref 27–31)
MCHC RBC AUTO-ENTMCNC: 31.2 G/DL (ref 32–36)
MCV RBC AUTO: 99 FL (ref 82–98)
MONOCYTES # BLD AUTO: 0.9 K/UL (ref 0.3–1)
MONOCYTES NFR BLD: 16.6 % (ref 4–15)
NEUTROPHILS # BLD AUTO: 3.3 K/UL (ref 1.8–7.7)
NEUTROPHILS NFR BLD: 58.9 % (ref 38–73)
NRBC BLD-RTO: 0 /100 WBC
PLATELET # BLD AUTO: 122 K/UL (ref 150–350)
PLATELET BLD QL SMEAR: ABNORMAL
PMV BLD AUTO: 13.8 FL (ref 9.2–12.9)
RBC # BLD AUTO: 3.48 M/UL (ref 4–5.4)
WBC # BLD AUTO: 5.65 K/UL (ref 3.9–12.7)

## 2021-03-04 ENCOUNTER — OFFICE VISIT (OUTPATIENT)
Dept: OPHTHALMOLOGY | Facility: CLINIC | Age: 76
End: 2021-03-04
Payer: MEDICARE

## 2021-03-04 DIAGNOSIS — Z98.890 POST-OPERATIVE STATE: Primary | ICD-10-CM

## 2021-03-04 PROCEDURE — 99999 PR PBB SHADOW E&M-EST. PATIENT-LVL III: CPT | Mod: PBBFAC,,, | Performed by: OPHTHALMOLOGY

## 2021-03-04 PROCEDURE — 99999 PR PBB SHADOW E&M-EST. PATIENT-LVL III: ICD-10-PCS | Mod: PBBFAC,,, | Performed by: OPHTHALMOLOGY

## 2021-03-04 PROCEDURE — 99024 POSTOP FOLLOW-UP VISIT: CPT | Mod: POP,,, | Performed by: OPHTHALMOLOGY

## 2021-03-04 PROCEDURE — 99213 OFFICE O/P EST LOW 20 MIN: CPT | Mod: PBBFAC,PO | Performed by: OPHTHALMOLOGY

## 2021-03-04 PROCEDURE — 99024 PR POST-OP FOLLOW-UP VISIT: ICD-10-PCS | Mod: POP,,, | Performed by: OPHTHALMOLOGY

## 2021-03-11 ENCOUNTER — TELEPHONE (OUTPATIENT)
Dept: OBSTETRICS AND GYNECOLOGY | Facility: CLINIC | Age: 76
End: 2021-03-11

## 2021-03-23 ENCOUNTER — OFFICE VISIT (OUTPATIENT)
Dept: FAMILY MEDICINE | Facility: CLINIC | Age: 76
End: 2021-03-23
Payer: MEDICARE

## 2021-03-23 VITALS
DIASTOLIC BLOOD PRESSURE: 76 MMHG | HEIGHT: 62 IN | TEMPERATURE: 98 F | BODY MASS INDEX: 36.35 KG/M2 | WEIGHT: 197.56 LBS | HEART RATE: 66 BPM | OXYGEN SATURATION: 99 % | RESPIRATION RATE: 17 BRPM | SYSTOLIC BLOOD PRESSURE: 138 MMHG

## 2021-03-23 DIAGNOSIS — M43.10 ACQUIRED SPONDYLOLISTHESIS: ICD-10-CM

## 2021-03-23 DIAGNOSIS — M79.89 PAIN AND SWELLING OF LEFT LOWER LEG: ICD-10-CM

## 2021-03-23 DIAGNOSIS — M79.662 PAIN AND SWELLING OF LEFT LOWER LEG: ICD-10-CM

## 2021-03-23 DIAGNOSIS — M70.50 ANSERINE BURSITIS: ICD-10-CM

## 2021-03-23 DIAGNOSIS — R07.89 CHEST PAIN, ATYPICAL: ICD-10-CM

## 2021-03-23 DIAGNOSIS — I77.1 TORTUOUS AORTA: ICD-10-CM

## 2021-03-23 DIAGNOSIS — M25.512 LEFT SHOULDER PAIN, UNSPECIFIED CHRONICITY: ICD-10-CM

## 2021-03-23 DIAGNOSIS — I67.89 CEREBRAL MICROVASCULAR DISEASE: ICD-10-CM

## 2021-03-23 DIAGNOSIS — M25.571 BILATERAL ANKLE PAIN, UNSPECIFIED CHRONICITY: ICD-10-CM

## 2021-03-23 DIAGNOSIS — R20.2 NUMBNESS AND TINGLING OF BOTH LEGS BELOW KNEES: ICD-10-CM

## 2021-03-23 DIAGNOSIS — R20.0 NUMBNESS AND TINGLING OF BOTH LEGS BELOW KNEES: ICD-10-CM

## 2021-03-23 DIAGNOSIS — I10 ESSENTIAL HYPERTENSION: ICD-10-CM

## 2021-03-23 DIAGNOSIS — R00.2 PALPITATIONS: ICD-10-CM

## 2021-03-23 DIAGNOSIS — E66.01 SEVERE OBESITY (BMI 35.0-39.9) WITH COMORBIDITY: ICD-10-CM

## 2021-03-23 DIAGNOSIS — G43.819 OTHER MIGRAINE WITHOUT STATUS MIGRAINOSUS, INTRACTABLE: ICD-10-CM

## 2021-03-23 DIAGNOSIS — D84.9 IMMUNOSUPPRESSION: ICD-10-CM

## 2021-03-23 DIAGNOSIS — M51.37 DEGENERATION OF LUMBAR OR LUMBOSACRAL INTERVERTEBRAL DISC: ICD-10-CM

## 2021-03-23 DIAGNOSIS — M05.79 RHEUMATOID ARTHRITIS INVOLVING MULTIPLE SITES WITH POSITIVE RHEUMATOID FACTOR: ICD-10-CM

## 2021-03-23 DIAGNOSIS — M48.062 SPINAL STENOSIS, LUMBAR REGION, WITH NEUROGENIC CLAUDICATION: ICD-10-CM

## 2021-03-23 DIAGNOSIS — K21.9 GASTROESOPHAGEAL REFLUX DISEASE WITHOUT ESOPHAGITIS: ICD-10-CM

## 2021-03-23 DIAGNOSIS — M54.16 LEFT LUMBAR RADICULITIS: ICD-10-CM

## 2021-03-23 DIAGNOSIS — R79.89 ABNORMAL LFTS: ICD-10-CM

## 2021-03-23 DIAGNOSIS — R93.89 ABNORMAL CT SCAN, CHEST: ICD-10-CM

## 2021-03-23 DIAGNOSIS — E20.0 IDIOPATHIC HYPOPARATHYROIDISM: ICD-10-CM

## 2021-03-23 DIAGNOSIS — Z23 NEED FOR SHINGLES VACCINE: Primary | ICD-10-CM

## 2021-03-23 DIAGNOSIS — E53.8 VITAMIN B 12 DEFICIENCY: ICD-10-CM

## 2021-03-23 DIAGNOSIS — Z00.00 ENCOUNTER FOR PREVENTIVE HEALTH EXAMINATION: ICD-10-CM

## 2021-03-23 DIAGNOSIS — M25.572 BILATERAL ANKLE PAIN, UNSPECIFIED CHRONICITY: ICD-10-CM

## 2021-03-23 DIAGNOSIS — R42 LIGHT HEADEDNESS: ICD-10-CM

## 2021-03-23 DIAGNOSIS — E04.2 MULTIPLE THYROID NODULES: ICD-10-CM

## 2021-03-23 DIAGNOSIS — I67.1 CEREBRAL ANEURYSM WITHOUT RUPTURE: ICD-10-CM

## 2021-03-23 DIAGNOSIS — K21.9 GASTROESOPHAGEAL REFLUX DISEASE, UNSPECIFIED WHETHER ESOPHAGITIS PRESENT: ICD-10-CM

## 2021-03-23 DIAGNOSIS — M41.9 ACQUIRED SCOLIOSIS: ICD-10-CM

## 2021-03-23 DIAGNOSIS — A60.09 GENITAL HERPES IN WOMEN: ICD-10-CM

## 2021-03-23 DIAGNOSIS — E66.01 CLASS 2 SEVERE OBESITY DUE TO EXCESS CALORIES WITH SERIOUS COMORBIDITY AND BODY MASS INDEX (BMI) OF 37.0 TO 37.9 IN ADULT: ICD-10-CM

## 2021-03-23 DIAGNOSIS — D49.89 THYMOMA: ICD-10-CM

## 2021-03-23 PROCEDURE — G0439 PR MEDICARE ANNUAL WELLNESS SUBSEQUENT VISIT: ICD-10-PCS | Mod: ,,, | Performed by: NURSE PRACTITIONER

## 2021-03-23 PROCEDURE — G0439 PPPS, SUBSEQ VISIT: HCPCS | Mod: ,,, | Performed by: NURSE PRACTITIONER

## 2021-03-23 PROCEDURE — 99215 OFFICE O/P EST HI 40 MIN: CPT | Mod: PBBFAC,PO | Performed by: NURSE PRACTITIONER

## 2021-03-23 PROCEDURE — 99999 PR PBB SHADOW E&M-EST. PATIENT-LVL V: ICD-10-PCS | Mod: PBBFAC,,, | Performed by: NURSE PRACTITIONER

## 2021-03-23 PROCEDURE — 99999 PR PBB SHADOW E&M-EST. PATIENT-LVL V: CPT | Mod: PBBFAC,,, | Performed by: NURSE PRACTITIONER

## 2021-03-26 ENCOUNTER — OFFICE VISIT (OUTPATIENT)
Dept: OPHTHALMOLOGY | Facility: CLINIC | Age: 76
End: 2021-03-26
Payer: MEDICARE

## 2021-03-26 DIAGNOSIS — H52.7 REFRACTIVE ERROR: ICD-10-CM

## 2021-03-26 DIAGNOSIS — Z98.890 POST-OPERATIVE STATE: Primary | ICD-10-CM

## 2021-03-26 PROCEDURE — 99024 POSTOP FOLLOW-UP VISIT: CPT | Mod: POP,,, | Performed by: OPHTHALMOLOGY

## 2021-03-26 PROCEDURE — 99999 PR PBB SHADOW E&M-EST. PATIENT-LVL III: CPT | Mod: PBBFAC,,, | Performed by: OPHTHALMOLOGY

## 2021-03-26 PROCEDURE — 99024 PR POST-OP FOLLOW-UP VISIT: ICD-10-PCS | Mod: POP,,, | Performed by: OPHTHALMOLOGY

## 2021-03-26 PROCEDURE — 99999 PR PBB SHADOW E&M-EST. PATIENT-LVL III: ICD-10-PCS | Mod: PBBFAC,,, | Performed by: OPHTHALMOLOGY

## 2021-03-26 PROCEDURE — 99213 OFFICE O/P EST LOW 20 MIN: CPT | Mod: PBBFAC,PO | Performed by: OPHTHALMOLOGY

## 2021-03-29 ENCOUNTER — OFFICE VISIT (OUTPATIENT)
Dept: FAMILY MEDICINE | Facility: CLINIC | Age: 76
End: 2021-03-29
Payer: MEDICARE

## 2021-03-29 VITALS
DIASTOLIC BLOOD PRESSURE: 80 MMHG | HEART RATE: 76 BPM | HEIGHT: 62 IN | OXYGEN SATURATION: 98 % | SYSTOLIC BLOOD PRESSURE: 130 MMHG | TEMPERATURE: 98 F | WEIGHT: 198.44 LBS | BODY MASS INDEX: 36.52 KG/M2

## 2021-03-29 DIAGNOSIS — I10 ESSENTIAL HYPERTENSION: ICD-10-CM

## 2021-03-29 DIAGNOSIS — M41.9 ACQUIRED SCOLIOSIS: ICD-10-CM

## 2021-03-29 DIAGNOSIS — R09.89 DECREASED PULSES IN FEET: ICD-10-CM

## 2021-03-29 DIAGNOSIS — M43.10 ACQUIRED SPONDYLOLISTHESIS: ICD-10-CM

## 2021-03-29 DIAGNOSIS — M48.062 SPINAL STENOSIS, LUMBAR REGION, WITH NEUROGENIC CLAUDICATION: ICD-10-CM

## 2021-03-29 DIAGNOSIS — B37.2 CANDIDIASIS, INTERTRIGO: ICD-10-CM

## 2021-03-29 DIAGNOSIS — R21 RASH OF ENTIRE BODY: ICD-10-CM

## 2021-03-29 DIAGNOSIS — M47.819 SPONDYLOSIS WITHOUT MYELOPATHY: ICD-10-CM

## 2021-03-29 DIAGNOSIS — K21.9 GASTROESOPHAGEAL REFLUX DISEASE WITHOUT ESOPHAGITIS: ICD-10-CM

## 2021-03-29 DIAGNOSIS — M51.37 DEGENERATION OF LUMBAR OR LUMBOSACRAL INTERVERTEBRAL DISC: ICD-10-CM

## 2021-03-29 DIAGNOSIS — R20.0 NUMBNESS: Primary | ICD-10-CM

## 2021-03-29 DIAGNOSIS — M54.16 LEFT LUMBAR RADICULITIS: ICD-10-CM

## 2021-03-29 PROCEDURE — 99999 PR PBB SHADOW E&M-EST. PATIENT-LVL III: CPT | Mod: PBBFAC,,, | Performed by: FAMILY MEDICINE

## 2021-03-29 PROCEDURE — 99214 PR OFFICE/OUTPT VISIT, EST, LEVL IV, 30-39 MIN: ICD-10-PCS | Mod: S$PBB,,, | Performed by: FAMILY MEDICINE

## 2021-03-29 PROCEDURE — 99214 OFFICE O/P EST MOD 30 MIN: CPT | Mod: S$PBB,,, | Performed by: FAMILY MEDICINE

## 2021-03-29 PROCEDURE — 99999 PR PBB SHADOW E&M-EST. PATIENT-LVL III: ICD-10-PCS | Mod: PBBFAC,,, | Performed by: FAMILY MEDICINE

## 2021-03-29 PROCEDURE — 99213 OFFICE O/P EST LOW 20 MIN: CPT | Mod: PBBFAC,PO | Performed by: FAMILY MEDICINE

## 2021-03-29 RX ORDER — HYDROXYZINE HYDROCHLORIDE 50 MG/1
50 TABLET, FILM COATED ORAL 3 TIMES DAILY PRN
Qty: 60 TABLET | Refills: 0 | Status: CANCELLED | OUTPATIENT
Start: 2021-03-29

## 2021-03-29 RX ORDER — PANTOPRAZOLE SODIUM 40 MG/1
40 TABLET, DELAYED RELEASE ORAL DAILY
Qty: 90 TABLET | Refills: 1 | Status: SHIPPED | OUTPATIENT
Start: 2021-03-29 | End: 2021-06-07 | Stop reason: SDUPTHER

## 2021-03-29 RX ORDER — AMLODIPINE BESYLATE 5 MG/1
5 TABLET ORAL DAILY
Qty: 30 TABLET | Refills: 1 | Status: SHIPPED | OUTPATIENT
Start: 2021-03-29 | End: 2021-06-07

## 2021-03-29 RX ORDER — IRBESARTAN 300 MG/1
300 TABLET ORAL NIGHTLY
Qty: 90 TABLET | Refills: 3 | Status: SHIPPED | OUTPATIENT
Start: 2021-03-29 | End: 2022-03-29 | Stop reason: SDUPTHER

## 2021-03-29 RX ORDER — FLUTICASONE PROPIONATE 50 MCG
1 SPRAY, SUSPENSION (ML) NASAL DAILY
Qty: 16 G | Refills: 5 | Status: CANCELLED | OUTPATIENT
Start: 2021-03-29

## 2021-03-29 RX ORDER — NYSTATIN AND TRIAMCINOLONE ACETONIDE 100000; 1 [USP'U]/G; MG/G
CREAM TOPICAL
Qty: 90 G | Refills: 3 | Status: SHIPPED | OUTPATIENT
Start: 2021-03-29 | End: 2022-09-01 | Stop reason: SDUPTHER

## 2021-03-29 RX ORDER — FUROSEMIDE 40 MG/1
40 TABLET ORAL DAILY
Qty: 90 TABLET | Refills: 1 | Status: SHIPPED | OUTPATIENT
Start: 2021-03-29 | End: 2021-10-04 | Stop reason: SDUPTHER

## 2021-03-29 RX ORDER — GABAPENTIN 100 MG/1
100 CAPSULE ORAL 3 TIMES DAILY
Qty: 270 CAPSULE | Refills: 1 | Status: SHIPPED | OUTPATIENT
Start: 2021-03-29 | End: 2021-05-19

## 2021-04-08 ENCOUNTER — HOSPITAL ENCOUNTER (OUTPATIENT)
Dept: CARDIOLOGY | Facility: HOSPITAL | Age: 76
Discharge: HOME | End: 2021-04-08
Attending: FAMILY MEDICINE
Payer: MEDICARE

## 2021-04-08 DIAGNOSIS — R09.89 DECREASED PULSES IN FEET: ICD-10-CM

## 2021-04-08 DIAGNOSIS — R20.0 NUMBNESS: ICD-10-CM

## 2021-04-08 LAB
LEFT ABI: 1.08
LEFT ARM BP: 132 MMHG
LEFT DORSALIS PEDIS: 142 MMHG
LEFT POSTERIOR TIBIAL: 138 MMHG
RIGHT ABI: 1.15
RIGHT ARM BP: 124 MMHG
RIGHT DORSALIS PEDIS: 140 MMHG
RIGHT POSTERIOR TIBIAL: 152 MMHG

## 2021-04-08 PROCEDURE — 93922 UPR/L XTREMITY ART 2 LEVELS: CPT | Mod: 26,,, | Performed by: INTERNAL MEDICINE

## 2021-04-08 PROCEDURE — 93922 UPR/L XTREMITY ART 2 LEVELS: CPT

## 2021-04-08 PROCEDURE — 93922 ANKLE BRACHIAL INDICES (ABI): ICD-10-PCS | Mod: 26,,, | Performed by: INTERNAL MEDICINE

## 2021-04-20 ENCOUNTER — OFFICE VISIT (OUTPATIENT)
Dept: NEUROLOGY | Facility: CLINIC | Age: 76
End: 2021-04-20
Payer: MEDICARE

## 2021-04-20 VITALS
SYSTOLIC BLOOD PRESSURE: 145 MMHG | HEIGHT: 62 IN | WEIGHT: 196.88 LBS | DIASTOLIC BLOOD PRESSURE: 66 MMHG | BODY MASS INDEX: 36.23 KG/M2 | HEART RATE: 67 BPM

## 2021-04-20 DIAGNOSIS — R51.9 CHRONIC NONINTRACTABLE HEADACHE, UNSPECIFIED HEADACHE TYPE: ICD-10-CM

## 2021-04-20 DIAGNOSIS — R42 DIZZINESS AND GIDDINESS: ICD-10-CM

## 2021-04-20 DIAGNOSIS — G89.29 CHRONIC NONINTRACTABLE HEADACHE, UNSPECIFIED HEADACHE TYPE: ICD-10-CM

## 2021-04-20 DIAGNOSIS — R26.81 GAIT INSTABILITY: Primary | ICD-10-CM

## 2021-04-20 PROCEDURE — 99204 PR OFFICE/OUTPT VISIT, NEW, LEVL IV, 45-59 MIN: ICD-10-PCS | Mod: S$PBB,,, | Performed by: NEUROLOGICAL SURGERY

## 2021-04-20 PROCEDURE — 99999 PR PBB SHADOW E&M-EST. PATIENT-LVL IV: CPT | Mod: PBBFAC,,, | Performed by: NEUROLOGICAL SURGERY

## 2021-04-20 PROCEDURE — 99204 OFFICE O/P NEW MOD 45 MIN: CPT | Mod: S$PBB,,, | Performed by: NEUROLOGICAL SURGERY

## 2021-04-20 PROCEDURE — 99999 PR PBB SHADOW E&M-EST. PATIENT-LVL IV: ICD-10-PCS | Mod: PBBFAC,,, | Performed by: NEUROLOGICAL SURGERY

## 2021-04-20 PROCEDURE — 99214 OFFICE O/P EST MOD 30 MIN: CPT | Mod: PBBFAC | Performed by: NEUROLOGICAL SURGERY

## 2021-04-28 ENCOUNTER — HOSPITAL ENCOUNTER (OUTPATIENT)
Dept: RADIOLOGY | Facility: HOSPITAL | Age: 76
Discharge: HOME | End: 2021-04-28
Attending: NEUROLOGICAL SURGERY
Payer: MEDICARE

## 2021-04-28 DIAGNOSIS — R42 DIZZINESS AND GIDDINESS: ICD-10-CM

## 2021-04-28 DIAGNOSIS — R51.9 CHRONIC NONINTRACTABLE HEADACHE, UNSPECIFIED HEADACHE TYPE: ICD-10-CM

## 2021-04-28 DIAGNOSIS — R26.81 GAIT INSTABILITY: ICD-10-CM

## 2021-04-28 DIAGNOSIS — G89.29 CHRONIC NONINTRACTABLE HEADACHE, UNSPECIFIED HEADACHE TYPE: ICD-10-CM

## 2021-04-28 PROCEDURE — 70551 MRI BRAIN STEM W/O DYE: CPT | Mod: 26,,, | Performed by: RADIOLOGY

## 2021-04-28 PROCEDURE — 70551 MRI BRAIN WITHOUT CONTRAST: ICD-10-PCS | Mod: 26,,, | Performed by: RADIOLOGY

## 2021-04-28 PROCEDURE — 72148 MRI LUMBAR SPINE WITHOUT CONTRAST: ICD-10-PCS | Mod: 26,,, | Performed by: RADIOLOGY

## 2021-04-28 PROCEDURE — 70551 MRI BRAIN STEM W/O DYE: CPT | Mod: TC

## 2021-04-28 PROCEDURE — 72148 MRI LUMBAR SPINE W/O DYE: CPT | Mod: 26,,, | Performed by: RADIOLOGY

## 2021-04-28 PROCEDURE — 72148 MRI LUMBAR SPINE W/O DYE: CPT | Mod: TC

## 2021-04-30 ENCOUNTER — EXTERNAL CHRONIC CARE MANAGEMENT (OUTPATIENT)
Dept: PRIMARY CARE CLINIC | Facility: CLINIC | Age: 76
End: 2021-04-30
Payer: MEDICARE

## 2021-04-30 PROCEDURE — 99490 CHRNC CARE MGMT STAFF 1ST 20: CPT | Mod: S$PBB,,, | Performed by: FAMILY MEDICINE

## 2021-04-30 PROCEDURE — 99490 CHRNC CARE MGMT STAFF 1ST 20: CPT | Mod: PBBFAC,PO | Performed by: FAMILY MEDICINE

## 2021-04-30 PROCEDURE — 99490 PR CHRONIC CARE MGMT, 1ST 20 MIN: ICD-10-PCS | Mod: S$PBB,,, | Performed by: FAMILY MEDICINE

## 2021-05-11 ENCOUNTER — OFFICE VISIT (OUTPATIENT)
Dept: NEUROLOGY | Facility: CLINIC | Age: 76
End: 2021-05-11
Payer: MEDICARE

## 2021-05-11 VITALS
HEIGHT: 62 IN | WEIGHT: 196.88 LBS | HEART RATE: 68 BPM | BODY MASS INDEX: 36.23 KG/M2 | SYSTOLIC BLOOD PRESSURE: 153 MMHG | DIASTOLIC BLOOD PRESSURE: 70 MMHG

## 2021-05-11 DIAGNOSIS — M54.17 LUMBOSACRAL RADICULOPATHY: Primary | ICD-10-CM

## 2021-05-11 PROCEDURE — 99999 PR PBB SHADOW E&M-EST. PATIENT-LVL III: ICD-10-PCS | Mod: PBBFAC,,, | Performed by: NEUROLOGICAL SURGERY

## 2021-05-11 PROCEDURE — 99213 OFFICE O/P EST LOW 20 MIN: CPT | Mod: PBBFAC | Performed by: NEUROLOGICAL SURGERY

## 2021-05-11 PROCEDURE — 99215 PR OFFICE/OUTPT VISIT, EST, LEVL V, 40-54 MIN: ICD-10-PCS | Mod: S$PBB,,, | Performed by: NEUROLOGICAL SURGERY

## 2021-05-11 PROCEDURE — 99999 PR PBB SHADOW E&M-EST. PATIENT-LVL III: CPT | Mod: PBBFAC,,, | Performed by: NEUROLOGICAL SURGERY

## 2021-05-11 PROCEDURE — 99215 OFFICE O/P EST HI 40 MIN: CPT | Mod: S$PBB,,, | Performed by: NEUROLOGICAL SURGERY

## 2021-05-18 ENCOUNTER — PATIENT OUTREACH (OUTPATIENT)
Dept: ADMINISTRATIVE | Facility: OTHER | Age: 76
End: 2021-05-18

## 2021-05-18 ENCOUNTER — TELEPHONE (OUTPATIENT)
Dept: PAIN MEDICINE | Facility: CLINIC | Age: 76
End: 2021-05-18

## 2021-05-19 ENCOUNTER — OFFICE VISIT (OUTPATIENT)
Dept: PAIN MEDICINE | Facility: CLINIC | Age: 76
End: 2021-05-19
Payer: MEDICARE

## 2021-05-19 VITALS
WEIGHT: 196.31 LBS | SYSTOLIC BLOOD PRESSURE: 147 MMHG | HEIGHT: 62 IN | OXYGEN SATURATION: 98 % | HEART RATE: 79 BPM | BODY MASS INDEX: 36.12 KG/M2 | DIASTOLIC BLOOD PRESSURE: 67 MMHG

## 2021-05-19 DIAGNOSIS — M47.816 LUMBAR SPONDYLOSIS: ICD-10-CM

## 2021-05-19 DIAGNOSIS — M51.37 DEGENERATION OF LUMBAR OR LUMBOSACRAL INTERVERTEBRAL DISC: ICD-10-CM

## 2021-05-19 DIAGNOSIS — M41.9 ACQUIRED SCOLIOSIS: ICD-10-CM

## 2021-05-19 DIAGNOSIS — M43.10 ACQUIRED SPONDYLOLISTHESIS: ICD-10-CM

## 2021-05-19 DIAGNOSIS — M48.062 SPINAL STENOSIS, LUMBAR REGION, WITH NEUROGENIC CLAUDICATION: ICD-10-CM

## 2021-05-19 DIAGNOSIS — M54.16 LEFT LUMBAR RADICULITIS: ICD-10-CM

## 2021-05-19 DIAGNOSIS — M47.819 SPONDYLOSIS WITHOUT MYELOPATHY: ICD-10-CM

## 2021-05-19 DIAGNOSIS — M48.062 SPINAL STENOSIS OF LUMBAR REGION WITH NEUROGENIC CLAUDICATION: ICD-10-CM

## 2021-05-19 DIAGNOSIS — M51.36 DDD (DEGENERATIVE DISC DISEASE), LUMBAR: Primary | ICD-10-CM

## 2021-05-19 DIAGNOSIS — M54.17 LUMBOSACRAL RADICULOPATHY: ICD-10-CM

## 2021-05-19 DIAGNOSIS — M54.16 LUMBAR RADICULOPATHY: ICD-10-CM

## 2021-05-19 PROCEDURE — 99999 PR PBB SHADOW E&M-EST. PATIENT-LVL V: CPT | Mod: PBBFAC,,, | Performed by: PAIN MEDICINE

## 2021-05-19 PROCEDURE — 99215 OFFICE O/P EST HI 40 MIN: CPT | Mod: PBBFAC,PN | Performed by: PAIN MEDICINE

## 2021-05-19 PROCEDURE — 99204 OFFICE O/P NEW MOD 45 MIN: CPT | Mod: S$PBB,,, | Performed by: PAIN MEDICINE

## 2021-05-19 PROCEDURE — 99204 PR OFFICE/OUTPT VISIT, NEW, LEVL IV, 45-59 MIN: ICD-10-PCS | Mod: S$PBB,,, | Performed by: PAIN MEDICINE

## 2021-05-19 PROCEDURE — 99999 PR PBB SHADOW E&M-EST. PATIENT-LVL V: ICD-10-PCS | Mod: PBBFAC,,, | Performed by: PAIN MEDICINE

## 2021-05-19 RX ORDER — GABAPENTIN 300 MG/1
600 CAPSULE ORAL 3 TIMES DAILY
Qty: 180 CAPSULE | Refills: 11 | Status: SHIPPED | OUTPATIENT
Start: 2021-05-19 | End: 2023-09-11

## 2021-05-19 RX ORDER — GABAPENTIN 300 MG/1
600 CAPSULE ORAL 3 TIMES DAILY
Qty: 180 CAPSULE | Refills: 11 | Status: SHIPPED | OUTPATIENT
Start: 2021-05-19 | End: 2021-05-19

## 2021-05-21 ENCOUNTER — TELEPHONE (OUTPATIENT)
Dept: OPHTHALMOLOGY | Facility: CLINIC | Age: 76
End: 2021-05-21

## 2021-05-27 ENCOUNTER — OFFICE VISIT (OUTPATIENT)
Dept: OPHTHALMOLOGY | Facility: CLINIC | Age: 76
End: 2021-05-27
Payer: MEDICARE

## 2021-05-27 DIAGNOSIS — H52.7 REFRACTIVE ERROR: Primary | ICD-10-CM

## 2021-05-27 PROCEDURE — 99999 PR PBB SHADOW E&M-EST. PATIENT-LVL III: CPT | Mod: PBBFAC,,, | Performed by: OPHTHALMOLOGY

## 2021-05-27 PROCEDURE — 99213 OFFICE O/P EST LOW 20 MIN: CPT | Mod: PBBFAC,PO | Performed by: OPHTHALMOLOGY

## 2021-05-27 PROCEDURE — 99499 UNLISTED E&M SERVICE: CPT | Mod: S$PBB,,, | Performed by: OPHTHALMOLOGY

## 2021-05-27 PROCEDURE — 99999 PR PBB SHADOW E&M-EST. PATIENT-LVL III: ICD-10-PCS | Mod: PBBFAC,,, | Performed by: OPHTHALMOLOGY

## 2021-05-27 PROCEDURE — 99499 NO LOS: ICD-10-PCS | Mod: S$PBB,,, | Performed by: OPHTHALMOLOGY

## 2021-05-31 ENCOUNTER — EXTERNAL CHRONIC CARE MANAGEMENT (OUTPATIENT)
Dept: PRIMARY CARE CLINIC | Facility: CLINIC | Age: 76
End: 2021-05-31
Payer: MEDICARE

## 2021-05-31 PROCEDURE — 99490 PR CHRONIC CARE MGMT, 1ST 20 MIN: ICD-10-PCS | Mod: S$PBB,,, | Performed by: FAMILY MEDICINE

## 2021-05-31 PROCEDURE — 99490 CHRNC CARE MGMT STAFF 1ST 20: CPT | Mod: PBBFAC,PO | Performed by: FAMILY MEDICINE

## 2021-05-31 PROCEDURE — 99490 CHRNC CARE MGMT STAFF 1ST 20: CPT | Mod: S$PBB,,, | Performed by: FAMILY MEDICINE

## 2021-06-03 DIAGNOSIS — M06.9 RHEUMATOID ARTHRITIS OF HAND: ICD-10-CM

## 2021-06-03 DIAGNOSIS — M05.79 RHEUMATOID ARTHRITIS INVOLVING MULTIPLE SITES WITH POSITIVE RHEUMATOID FACTOR: ICD-10-CM

## 2021-06-03 DIAGNOSIS — Z79.1 NSAID LONG-TERM USE: ICD-10-CM

## 2021-06-03 DIAGNOSIS — M51.36 DDD (DEGENERATIVE DISC DISEASE), LUMBAR: ICD-10-CM

## 2021-06-03 DIAGNOSIS — K21.9 GASTROESOPHAGEAL REFLUX DISEASE: ICD-10-CM

## 2021-06-03 DIAGNOSIS — D84.9 IMMUNOSUPPRESSION: Primary | ICD-10-CM

## 2021-06-03 DIAGNOSIS — M10.9 GOUT, ARTHRITIS: ICD-10-CM

## 2021-06-04 RX ORDER — NABUMETONE 500 MG/1
TABLET, FILM COATED ORAL
Qty: 180 TABLET | Refills: 3 | Status: SHIPPED | OUTPATIENT
Start: 2021-06-04 | End: 2021-07-20 | Stop reason: SDUPTHER

## 2021-06-04 RX ORDER — METHOTREXATE 2.5 MG/1
TABLET ORAL
Qty: 120 TABLET | Refills: 0 | Status: SHIPPED | OUTPATIENT
Start: 2021-06-04 | End: 2021-07-21 | Stop reason: SDUPTHER

## 2021-06-04 RX ORDER — MISOPROSTOL 100 MCG
TABLET ORAL
Qty: 120 TABLET | Refills: 5 | Status: SHIPPED | OUTPATIENT
Start: 2021-06-04 | End: 2021-07-20 | Stop reason: SDUPTHER

## 2021-06-04 RX ORDER — FOLIC ACID 1 MG/1
TABLET ORAL
Qty: 180 TABLET | Refills: 3 | Status: SHIPPED | OUTPATIENT
Start: 2021-06-04 | End: 2022-05-18

## 2021-06-17 ENCOUNTER — CLINICAL SUPPORT (OUTPATIENT)
Dept: REHABILITATION | Facility: HOSPITAL | Age: 76
End: 2021-06-17
Attending: NEUROLOGICAL SURGERY
Payer: MEDICARE

## 2021-06-17 DIAGNOSIS — M54.17 LUMBOSACRAL RADICULOPATHY: ICD-10-CM

## 2021-06-17 DIAGNOSIS — M25.69 DECREASED ROM OF TRUNK AND BACK: ICD-10-CM

## 2021-06-17 DIAGNOSIS — R29.898 DECREASED STRENGTH OF TRUNK AND BACK: ICD-10-CM

## 2021-06-17 PROCEDURE — 97110 THERAPEUTIC EXERCISES: CPT | Mod: PN

## 2021-06-17 PROCEDURE — 97162 PT EVAL MOD COMPLEX 30 MIN: CPT | Mod: PN

## 2021-06-22 ENCOUNTER — CLINICAL SUPPORT (OUTPATIENT)
Dept: REHABILITATION | Facility: HOSPITAL | Age: 76
End: 2021-06-22
Attending: NEUROLOGICAL SURGERY
Payer: MEDICARE

## 2021-06-22 DIAGNOSIS — M25.69 DECREASED ROM OF TRUNK AND BACK: Primary | ICD-10-CM

## 2021-06-22 DIAGNOSIS — R29.898 DECREASED STRENGTH OF TRUNK AND BACK: ICD-10-CM

## 2021-06-22 PROCEDURE — 97110 THERAPEUTIC EXERCISES: CPT | Mod: PN | Performed by: PHYSICAL MEDICINE & REHABILITATION

## 2021-06-30 ENCOUNTER — EXTERNAL CHRONIC CARE MANAGEMENT (OUTPATIENT)
Dept: PRIMARY CARE CLINIC | Facility: CLINIC | Age: 76
End: 2021-06-30
Payer: MEDICARE

## 2021-06-30 PROCEDURE — 99490 CHRNC CARE MGMT STAFF 1ST 20: CPT | Mod: PBBFAC,PO | Performed by: FAMILY MEDICINE

## 2021-06-30 PROCEDURE — 99490 PR CHRONIC CARE MGMT, 1ST 20 MIN: ICD-10-PCS | Mod: S$PBB,,, | Performed by: FAMILY MEDICINE

## 2021-06-30 PROCEDURE — 99490 CHRNC CARE MGMT STAFF 1ST 20: CPT | Mod: S$PBB,,, | Performed by: FAMILY MEDICINE

## 2021-07-08 ENCOUNTER — CLINICAL SUPPORT (OUTPATIENT)
Dept: REHABILITATION | Facility: HOSPITAL | Age: 76
End: 2021-07-08
Attending: NEUROLOGICAL SURGERY
Payer: MEDICARE

## 2021-07-08 DIAGNOSIS — M25.69 DECREASED ROM OF TRUNK AND BACK: Primary | ICD-10-CM

## 2021-07-08 DIAGNOSIS — R29.898 DECREASED STRENGTH OF TRUNK AND BACK: ICD-10-CM

## 2021-07-08 PROCEDURE — 97110 THERAPEUTIC EXERCISES: CPT | Mod: PN,CQ

## 2021-07-12 ENCOUNTER — CLINICAL SUPPORT (OUTPATIENT)
Dept: REHABILITATION | Facility: HOSPITAL | Age: 76
End: 2021-07-12
Attending: NEUROLOGICAL SURGERY
Payer: MEDICARE

## 2021-07-12 DIAGNOSIS — M25.69 DECREASED ROM OF TRUNK AND BACK: ICD-10-CM

## 2021-07-12 DIAGNOSIS — R29.898 DECREASED STRENGTH OF TRUNK AND BACK: ICD-10-CM

## 2021-07-12 PROCEDURE — 97110 THERAPEUTIC EXERCISES: CPT | Mod: PN,CQ

## 2021-07-14 ENCOUNTER — TELEPHONE (OUTPATIENT)
Dept: PAIN MEDICINE | Facility: CLINIC | Age: 76
End: 2021-07-14

## 2021-07-14 ENCOUNTER — CLINICAL SUPPORT (OUTPATIENT)
Dept: REHABILITATION | Facility: HOSPITAL | Age: 76
End: 2021-07-14
Attending: NEUROLOGICAL SURGERY
Payer: MEDICARE

## 2021-07-14 DIAGNOSIS — M25.69 DECREASED ROM OF TRUNK AND BACK: ICD-10-CM

## 2021-07-14 DIAGNOSIS — R29.898 DECREASED STRENGTH OF TRUNK AND BACK: ICD-10-CM

## 2021-07-14 PROCEDURE — 97110 THERAPEUTIC EXERCISES: CPT | Mod: PN,CQ

## 2021-07-19 ENCOUNTER — CLINICAL SUPPORT (OUTPATIENT)
Dept: REHABILITATION | Facility: HOSPITAL | Age: 76
End: 2021-07-19
Attending: NEUROLOGICAL SURGERY
Payer: MEDICARE

## 2021-07-19 DIAGNOSIS — R29.898 DECREASED STRENGTH OF TRUNK AND BACK: ICD-10-CM

## 2021-07-19 DIAGNOSIS — M25.69 DECREASED ROM OF TRUNK AND BACK: ICD-10-CM

## 2021-07-19 PROCEDURE — 97110 THERAPEUTIC EXERCISES: CPT | Mod: PN,CQ

## 2021-07-20 ENCOUNTER — HOSPITAL ENCOUNTER (OUTPATIENT)
Dept: RADIOLOGY | Facility: HOSPITAL | Age: 76
Discharge: HOME | End: 2021-07-20
Attending: INTERNAL MEDICINE
Payer: MEDICARE

## 2021-07-20 ENCOUNTER — OFFICE VISIT (OUTPATIENT)
Dept: RHEUMATOLOGY | Facility: CLINIC | Age: 76
End: 2021-07-20
Payer: MEDICARE

## 2021-07-20 VITALS
HEART RATE: 68 BPM | DIASTOLIC BLOOD PRESSURE: 68 MMHG | HEIGHT: 62 IN | WEIGHT: 204.56 LBS | BODY MASS INDEX: 37.64 KG/M2 | SYSTOLIC BLOOD PRESSURE: 148 MMHG

## 2021-07-20 DIAGNOSIS — M05.79 RHEUMATOID ARTHRITIS INVOLVING MULTIPLE SITES WITH POSITIVE RHEUMATOID FACTOR: ICD-10-CM

## 2021-07-20 DIAGNOSIS — M51.36 DDD (DEGENERATIVE DISC DISEASE), LUMBAR: ICD-10-CM

## 2021-07-20 DIAGNOSIS — D84.9 IMMUNOSUPPRESSION: ICD-10-CM

## 2021-07-20 DIAGNOSIS — M10.9 GOUT, ARTHRITIS: ICD-10-CM

## 2021-07-20 DIAGNOSIS — M05.79 RHEUMATOID ARTHRITIS INVOLVING MULTIPLE SITES WITH POSITIVE RHEUMATOID FACTOR: Primary | ICD-10-CM

## 2021-07-20 DIAGNOSIS — K21.9 GASTROESOPHAGEAL REFLUX DISEASE: ICD-10-CM

## 2021-07-20 DIAGNOSIS — Z79.1 NSAID LONG-TERM USE: ICD-10-CM

## 2021-07-20 PROCEDURE — 77077 JOINT SURVEY SINGLE VIEW: CPT | Mod: TC

## 2021-07-20 PROCEDURE — 99213 OFFICE O/P EST LOW 20 MIN: CPT | Mod: PBBFAC | Performed by: INTERNAL MEDICINE

## 2021-07-20 PROCEDURE — 99999 PR PBB SHADOW E&M-EST. PATIENT-LVL III: CPT | Mod: PBBFAC,,, | Performed by: INTERNAL MEDICINE

## 2021-07-20 PROCEDURE — 99214 OFFICE O/P EST MOD 30 MIN: CPT | Mod: S$PBB,,, | Performed by: INTERNAL MEDICINE

## 2021-07-20 PROCEDURE — 99999 PR PBB SHADOW E&M-EST. PATIENT-LVL III: ICD-10-PCS | Mod: PBBFAC,,, | Performed by: INTERNAL MEDICINE

## 2021-07-20 PROCEDURE — 77077 JOINT SURVEY SINGLE VIEW: CPT | Mod: 26,,, | Performed by: RADIOLOGY

## 2021-07-20 PROCEDURE — 77077 XR ARTHRITIS SURVEY: ICD-10-PCS | Mod: 26,,, | Performed by: RADIOLOGY

## 2021-07-20 PROCEDURE — 99214 PR OFFICE/OUTPT VISIT, EST, LEVL IV, 30-39 MIN: ICD-10-PCS | Mod: S$PBB,,, | Performed by: INTERNAL MEDICINE

## 2021-07-20 RX ORDER — MISOPROSTOL 100 UG/1
100 TABLET ORAL 2 TIMES DAILY
Qty: 120 TABLET | Refills: 5 | Status: SHIPPED | OUTPATIENT
Start: 2021-07-20 | End: 2021-10-04 | Stop reason: SDUPTHER

## 2021-07-20 RX ORDER — NABUMETONE 500 MG/1
500 TABLET, FILM COATED ORAL 2 TIMES DAILY
Qty: 180 TABLET | Refills: 3 | Status: SHIPPED | OUTPATIENT
Start: 2021-07-20 | End: 2022-01-31 | Stop reason: SDUPTHER

## 2021-07-20 ASSESSMENT — ROUTINE ASSESSMENT OF PATIENT INDEX DATA (RAPID3)
PATIENT GLOBAL ASSESSMENT SCORE: 1
MDHAQ FUNCTION SCORE: 0.7
PSYCHOLOGICAL DISTRESS SCORE: 2.2
TOTAL RAPID3 SCORE: 2.78
FATIGUE SCORE: 2.5
AM STIFFNESS SCORE: 0, NO
PAIN SCORE: 5

## 2021-07-21 ENCOUNTER — CLINICAL SUPPORT (OUTPATIENT)
Dept: REHABILITATION | Facility: HOSPITAL | Age: 76
End: 2021-07-21
Payer: MEDICARE

## 2021-07-21 ENCOUNTER — TELEPHONE (OUTPATIENT)
Dept: RHEUMATOLOGY | Facility: CLINIC | Age: 76
End: 2021-07-21

## 2021-07-21 DIAGNOSIS — M05.79 RHEUMATOID ARTHRITIS INVOLVING MULTIPLE SITES WITH POSITIVE RHEUMATOID FACTOR: ICD-10-CM

## 2021-07-21 DIAGNOSIS — K21.9 GASTROESOPHAGEAL REFLUX DISEASE: ICD-10-CM

## 2021-07-21 DIAGNOSIS — M25.69 DECREASED ROM OF TRUNK AND BACK: ICD-10-CM

## 2021-07-21 DIAGNOSIS — M10.9 GOUT, ARTHRITIS: ICD-10-CM

## 2021-07-21 DIAGNOSIS — M05.79 RHEUMATOID ARTHRITIS INVOLVING MULTIPLE SITES WITH POSITIVE RHEUMATOID FACTOR: Primary | ICD-10-CM

## 2021-07-21 DIAGNOSIS — Z79.1 NSAID LONG-TERM USE: ICD-10-CM

## 2021-07-21 DIAGNOSIS — D84.9 IMMUNOSUPPRESSION: ICD-10-CM

## 2021-07-21 DIAGNOSIS — R29.898 DECREASED STRENGTH OF TRUNK AND BACK: ICD-10-CM

## 2021-07-21 PROCEDURE — 97110 THERAPEUTIC EXERCISES: CPT | Mod: PN

## 2021-07-21 RX ORDER — METHOTREXATE 2.5 MG/1
TABLET ORAL
Qty: 120 TABLET | Refills: 0 | Status: SHIPPED | OUTPATIENT
Start: 2021-07-21 | End: 2021-10-04 | Stop reason: SDUPTHER

## 2021-07-26 ENCOUNTER — CLINICAL SUPPORT (OUTPATIENT)
Dept: REHABILITATION | Facility: HOSPITAL | Age: 76
End: 2021-07-26
Attending: NEUROLOGICAL SURGERY
Payer: MEDICARE

## 2021-07-26 DIAGNOSIS — R29.898 DECREASED STRENGTH OF TRUNK AND BACK: ICD-10-CM

## 2021-07-26 DIAGNOSIS — M25.69 DECREASED ROM OF TRUNK AND BACK: ICD-10-CM

## 2021-07-26 PROCEDURE — 97110 THERAPEUTIC EXERCISES: CPT | Mod: PN,CQ

## 2021-07-28 ENCOUNTER — CLINICAL SUPPORT (OUTPATIENT)
Dept: REHABILITATION | Facility: HOSPITAL | Age: 76
End: 2021-07-28
Attending: NEUROLOGICAL SURGERY
Payer: MEDICARE

## 2021-07-28 DIAGNOSIS — M25.69 DECREASED ROM OF TRUNK AND BACK: ICD-10-CM

## 2021-07-28 DIAGNOSIS — R29.898 DECREASED STRENGTH OF TRUNK AND BACK: ICD-10-CM

## 2021-07-28 PROCEDURE — 97110 THERAPEUTIC EXERCISES: CPT | Mod: PN

## 2021-07-31 ENCOUNTER — EXTERNAL CHRONIC CARE MANAGEMENT (OUTPATIENT)
Dept: PRIMARY CARE CLINIC | Facility: CLINIC | Age: 76
End: 2021-07-31
Payer: MEDICARE

## 2021-07-31 PROCEDURE — 99490 PR CHRONIC CARE MGMT, 1ST 20 MIN: ICD-10-PCS | Mod: S$PBB,,, | Performed by: FAMILY MEDICINE

## 2021-07-31 PROCEDURE — 99490 CHRNC CARE MGMT STAFF 1ST 20: CPT | Mod: S$PBB,,, | Performed by: FAMILY MEDICINE

## 2021-07-31 PROCEDURE — 99490 CHRNC CARE MGMT STAFF 1ST 20: CPT | Mod: PBBFAC,PO | Performed by: FAMILY MEDICINE

## 2021-08-03 ENCOUNTER — OFFICE VISIT (OUTPATIENT)
Dept: CARDIOLOGY | Facility: CLINIC | Age: 76
End: 2021-08-03
Payer: MEDICARE

## 2021-08-03 VITALS
SYSTOLIC BLOOD PRESSURE: 120 MMHG | BODY MASS INDEX: 36.68 KG/M2 | DIASTOLIC BLOOD PRESSURE: 80 MMHG | HEART RATE: 77 BPM | OXYGEN SATURATION: 98 % | HEIGHT: 62 IN | WEIGHT: 199.31 LBS

## 2021-08-03 DIAGNOSIS — R20.0 NUMBNESS AND TINGLING OF BOTH LOWER EXTREMITIES: ICD-10-CM

## 2021-08-03 DIAGNOSIS — E66.9 OBESITY (BMI 30-39.9): ICD-10-CM

## 2021-08-03 DIAGNOSIS — M79.662 PAIN IN BOTH LOWER LEGS: Primary | ICD-10-CM

## 2021-08-03 DIAGNOSIS — M51.36 DDD (DEGENERATIVE DISC DISEASE), LUMBAR: ICD-10-CM

## 2021-08-03 DIAGNOSIS — R20.2 NUMBNESS AND TINGLING OF BOTH LOWER EXTREMITIES: ICD-10-CM

## 2021-08-03 DIAGNOSIS — I73.9 CLAUDICATION OF BOTH LOWER EXTREMITIES: ICD-10-CM

## 2021-08-03 DIAGNOSIS — M79.661 PAIN IN BOTH LOWER LEGS: Primary | ICD-10-CM

## 2021-08-03 PROCEDURE — 99999 PR PBB SHADOW E&M-EST. PATIENT-LVL V: ICD-10-PCS | Mod: PBBFAC,,, | Performed by: INTERNAL MEDICINE

## 2021-08-03 PROCEDURE — 99215 OFFICE O/P EST HI 40 MIN: CPT | Mod: PBBFAC | Performed by: INTERNAL MEDICINE

## 2021-08-03 PROCEDURE — 99205 PR OFFICE/OUTPT VISIT, NEW, LEVL V, 60-74 MIN: ICD-10-PCS | Mod: S$PBB,,, | Performed by: INTERNAL MEDICINE

## 2021-08-03 PROCEDURE — 99999 PR PBB SHADOW E&M-EST. PATIENT-LVL V: CPT | Mod: PBBFAC,,, | Performed by: INTERNAL MEDICINE

## 2021-08-03 PROCEDURE — 99205 OFFICE O/P NEW HI 60 MIN: CPT | Mod: S$PBB,,, | Performed by: INTERNAL MEDICINE

## 2021-08-04 ENCOUNTER — CLINICAL SUPPORT (OUTPATIENT)
Dept: REHABILITATION | Facility: HOSPITAL | Age: 76
End: 2021-08-04
Attending: NEUROLOGICAL SURGERY
Payer: MEDICARE

## 2021-08-04 ENCOUNTER — TELEPHONE (OUTPATIENT)
Dept: NEUROSURGERY | Facility: CLINIC | Age: 76
End: 2021-08-04

## 2021-08-04 ENCOUNTER — LAB VISIT (OUTPATIENT)
Dept: LAB | Facility: HOSPITAL | Age: 76
End: 2021-08-04
Attending: INTERNAL MEDICINE
Payer: MEDICARE

## 2021-08-04 DIAGNOSIS — M25.69 DECREASED ROM OF TRUNK AND BACK: ICD-10-CM

## 2021-08-04 DIAGNOSIS — R29.898 DECREASED STRENGTH OF TRUNK AND BACK: ICD-10-CM

## 2021-08-04 DIAGNOSIS — M79.662 PAIN IN BOTH LOWER LEGS: ICD-10-CM

## 2021-08-04 DIAGNOSIS — M79.661 PAIN IN BOTH LOWER LEGS: ICD-10-CM

## 2021-08-04 DIAGNOSIS — E66.9 OBESITY (BMI 30-39.9): ICD-10-CM

## 2021-08-04 LAB
ALBUMIN SERPL BCP-MCNC: 3.8 G/DL (ref 3.5–5.2)
ALP SERPL-CCNC: 94 U/L (ref 55–135)
ALT SERPL W/O P-5'-P-CCNC: 30 U/L (ref 10–44)
ANION GAP SERPL CALC-SCNC: 7 MMOL/L (ref 8–16)
AST SERPL-CCNC: 26 U/L (ref 10–40)
BILIRUB SERPL-MCNC: 0.6 MG/DL (ref 0.1–1)
BUN SERPL-MCNC: 12 MG/DL (ref 8–23)
CALCIUM SERPL-MCNC: 9.8 MG/DL (ref 8.7–10.5)
CHLORIDE SERPL-SCNC: 106 MMOL/L (ref 95–110)
CHOLEST SERPL-MCNC: 145 MG/DL (ref 120–199)
CHOLEST/HDLC SERPL: 2.9 {RATIO} (ref 2–5)
CO2 SERPL-SCNC: 28 MMOL/L (ref 23–29)
CREAT SERPL-MCNC: 0.8 MG/DL (ref 0.5–1.4)
EST. GFR  (AFRICAN AMERICAN): >60 ML/MIN/1.73 M^2
EST. GFR  (NON AFRICAN AMERICAN): >60 ML/MIN/1.73 M^2
GLUCOSE SERPL-MCNC: 100 MG/DL (ref 70–110)
HDLC SERPL-MCNC: 50 MG/DL (ref 40–75)
HDLC SERPL: 34.5 % (ref 20–50)
LDLC SERPL CALC-MCNC: 85 MG/DL (ref 63–159)
NONHDLC SERPL-MCNC: 95 MG/DL
POTASSIUM SERPL-SCNC: 4.3 MMOL/L (ref 3.5–5.1)
PROT SERPL-MCNC: 7.3 G/DL (ref 6–8.4)
SODIUM SERPL-SCNC: 141 MMOL/L (ref 136–145)
TRIGL SERPL-MCNC: 50 MG/DL (ref 30–150)

## 2021-08-04 PROCEDURE — 97110 THERAPEUTIC EXERCISES: CPT | Mod: PN

## 2021-08-04 PROCEDURE — 80053 COMPREHEN METABOLIC PANEL: CPT | Performed by: INTERNAL MEDICINE

## 2021-08-04 PROCEDURE — 80061 LIPID PANEL: CPT | Performed by: INTERNAL MEDICINE

## 2021-08-04 PROCEDURE — 36415 COLL VENOUS BLD VENIPUNCTURE: CPT | Mod: PO | Performed by: INTERNAL MEDICINE

## 2021-08-09 ENCOUNTER — CLINICAL SUPPORT (OUTPATIENT)
Dept: REHABILITATION | Facility: HOSPITAL | Age: 76
End: 2021-08-09
Attending: NEUROLOGICAL SURGERY
Payer: MEDICARE

## 2021-08-09 DIAGNOSIS — M25.69 DECREASED ROM OF TRUNK AND BACK: ICD-10-CM

## 2021-08-09 DIAGNOSIS — R29.898 DECREASED STRENGTH OF TRUNK AND BACK: ICD-10-CM

## 2021-08-09 PROCEDURE — 97110 THERAPEUTIC EXERCISES: CPT | Mod: PN

## 2021-08-13 ENCOUNTER — HOSPITAL ENCOUNTER (OUTPATIENT)
Dept: RADIOLOGY | Facility: HOSPITAL | Age: 76
Discharge: HOME | End: 2021-08-13
Attending: INTERNAL MEDICINE
Payer: MEDICARE

## 2021-08-13 ENCOUNTER — HOSPITAL ENCOUNTER (OUTPATIENT)
Dept: CARDIOLOGY | Facility: HOSPITAL | Age: 76
Discharge: HOME | End: 2021-08-13
Attending: INTERNAL MEDICINE
Payer: MEDICARE

## 2021-08-13 DIAGNOSIS — I73.9 CLAUDICATION OF BOTH LOWER EXTREMITIES: ICD-10-CM

## 2021-08-13 DIAGNOSIS — M79.662 PAIN IN BOTH LOWER LEGS: ICD-10-CM

## 2021-08-13 DIAGNOSIS — M79.661 PAIN IN BOTH LOWER LEGS: ICD-10-CM

## 2021-08-13 LAB
IMMEDIATE ARM BP: 149 MMHG
IMMEDIATE LEFT ABI: 1.21
IMMEDIATE LEFT TIBIAL: 180 MMHG
IMMEDIATE RIGHT ABI: 1.2
IMMEDIATE RIGHT TIBIAL: 179 MMHG
LEFT ABI: 1.3
LEFT ARM BP: 145 MMHG
LEFT DORSALIS PEDIS: 171 MMHG
LEFT POSTERIOR TIBIAL: 190 MMHG
LEFT TBI: 0.93
LEFT TOE PRESSURE: 136 MMHG
RIGHT ABI: 1.22
RIGHT ARM BP: 146 MMHG
RIGHT DORSALIS PEDIS: 171 MMHG
RIGHT POSTERIOR TIBIAL: 178 MMHG
RIGHT TBI: 0.73
RIGHT TOE PRESSURE: 106 MMHG
TOE RAISES: 50

## 2021-08-13 PROCEDURE — 75635 CT ANGIO ABDOMINAL ARTERIES: CPT | Mod: 26,,, | Performed by: RADIOLOGY

## 2021-08-13 PROCEDURE — 93924 ANKLE BRACHIAL INDICES (ABI): ICD-10-PCS | Mod: 26,,, | Performed by: INTERNAL MEDICINE

## 2021-08-13 PROCEDURE — 93924 LWR XTR VASC STDY BILAT: CPT | Mod: 26,,, | Performed by: INTERNAL MEDICINE

## 2021-08-13 PROCEDURE — 25500020 PHARM REV CODE 255: Performed by: INTERNAL MEDICINE

## 2021-08-13 PROCEDURE — 75635 CTA RUNOFF ABD PEL BILAT LOWER EXT: ICD-10-PCS | Mod: 26,,, | Performed by: RADIOLOGY

## 2021-08-13 PROCEDURE — 93924 LWR XTR VASC STDY BILAT: CPT

## 2021-08-13 PROCEDURE — 75635 CT ANGIO ABDOMINAL ARTERIES: CPT | Mod: TC

## 2021-08-13 RX ADMIN — IOHEXOL 125 ML: 350 INJECTION, SOLUTION INTRAVENOUS at 07:08

## 2021-08-18 ENCOUNTER — OFFICE VISIT (OUTPATIENT)
Dept: NEUROSURGERY | Facility: CLINIC | Age: 76
End: 2021-08-18
Payer: MEDICARE

## 2021-08-18 VITALS
DIASTOLIC BLOOD PRESSURE: 63 MMHG | WEIGHT: 199.31 LBS | HEART RATE: 65 BPM | BODY MASS INDEX: 36.45 KG/M2 | SYSTOLIC BLOOD PRESSURE: 136 MMHG

## 2021-08-18 DIAGNOSIS — I67.1 CEREBRAL ANEURYSM: Primary | ICD-10-CM

## 2021-08-18 DIAGNOSIS — M79.662 PAIN IN BOTH LOWER LEGS: ICD-10-CM

## 2021-08-18 DIAGNOSIS — R20.0 NUMBNESS AND TINGLING OF BOTH LOWER EXTREMITIES: ICD-10-CM

## 2021-08-18 DIAGNOSIS — M51.36 DDD (DEGENERATIVE DISC DISEASE), LUMBAR: ICD-10-CM

## 2021-08-18 DIAGNOSIS — R20.2 NUMBNESS AND TINGLING OF BOTH LOWER EXTREMITIES: ICD-10-CM

## 2021-08-18 DIAGNOSIS — M79.661 PAIN IN BOTH LOWER LEGS: ICD-10-CM

## 2021-08-18 PROCEDURE — 99214 OFFICE O/P EST MOD 30 MIN: CPT | Mod: PBBFAC | Performed by: NEUROLOGICAL SURGERY

## 2021-08-18 PROCEDURE — 99999 PR PBB SHADOW E&M-EST. PATIENT-LVL IV: CPT | Mod: PBBFAC,,, | Performed by: NEUROLOGICAL SURGERY

## 2021-08-18 PROCEDURE — 99214 PR OFFICE/OUTPT VISIT, EST, LEVL IV, 30-39 MIN: ICD-10-PCS | Mod: S$PBB,,, | Performed by: NEUROLOGICAL SURGERY

## 2021-08-18 PROCEDURE — 99999 PR PBB SHADOW E&M-EST. PATIENT-LVL IV: ICD-10-PCS | Mod: PBBFAC,,, | Performed by: NEUROLOGICAL SURGERY

## 2021-08-18 PROCEDURE — 99214 OFFICE O/P EST MOD 30 MIN: CPT | Mod: S$PBB,,, | Performed by: NEUROLOGICAL SURGERY

## 2021-08-19 ENCOUNTER — TELEPHONE (OUTPATIENT)
Dept: NEUROSURGERY | Facility: CLINIC | Age: 76
End: 2021-08-19

## 2021-08-24 ENCOUNTER — CLINICAL SUPPORT (OUTPATIENT)
Dept: REHABILITATION | Facility: HOSPITAL | Age: 76
End: 2021-08-24
Attending: NEUROLOGICAL SURGERY
Payer: MEDICARE

## 2021-08-24 DIAGNOSIS — R29.898 DECREASED STRENGTH OF TRUNK AND BACK: ICD-10-CM

## 2021-08-24 DIAGNOSIS — M25.69 DECREASED ROM OF TRUNK AND BACK: ICD-10-CM

## 2021-08-24 PROCEDURE — 97110 THERAPEUTIC EXERCISES: CPT | Mod: PN

## 2021-08-31 ENCOUNTER — EXTERNAL CHRONIC CARE MANAGEMENT (OUTPATIENT)
Dept: PRIMARY CARE CLINIC | Facility: CLINIC | Age: 76
End: 2021-08-31
Payer: MEDICARE

## 2021-08-31 PROCEDURE — 99490 PR CHRONIC CARE MGMT, 1ST 20 MIN: ICD-10-PCS | Mod: S$PBB,,, | Performed by: FAMILY MEDICINE

## 2021-08-31 PROCEDURE — 99439 PR CHRONIC CARE MGMT, EA ADDTL 20 MIN: ICD-10-PCS | Mod: S$PBB,,, | Performed by: FAMILY MEDICINE

## 2021-08-31 PROCEDURE — 99490 CHRNC CARE MGMT STAFF 1ST 20: CPT | Mod: S$PBB,,, | Performed by: FAMILY MEDICINE

## 2021-08-31 PROCEDURE — 99490 CHRNC CARE MGMT STAFF 1ST 20: CPT | Mod: PBBFAC,PO | Performed by: FAMILY MEDICINE

## 2021-08-31 PROCEDURE — 99439 CHRNC CARE MGMT STAF EA ADDL: CPT | Mod: S$PBB,,, | Performed by: FAMILY MEDICINE

## 2021-08-31 PROCEDURE — 99439 CHRNC CARE MGMT STAF EA ADDL: CPT | Mod: PBBFAC,PO | Performed by: FAMILY MEDICINE

## 2021-09-10 ENCOUNTER — TELEPHONE (OUTPATIENT)
Dept: NEUROSURGERY | Facility: CLINIC | Age: 76
End: 2021-09-10

## 2021-09-13 ENCOUNTER — OFFICE VISIT (OUTPATIENT)
Dept: NEUROSURGERY | Facility: CLINIC | Age: 76
End: 2021-09-13
Payer: MEDICARE

## 2021-09-13 VITALS
WEIGHT: 201 LBS | BODY MASS INDEX: 36.99 KG/M2 | SYSTOLIC BLOOD PRESSURE: 136 MMHG | DIASTOLIC BLOOD PRESSURE: 77 MMHG | TEMPERATURE: 97 F | HEIGHT: 62 IN | HEART RATE: 61 BPM

## 2021-09-13 DIAGNOSIS — I67.1 CAROTID-CAVERNOUS FISTULA: Primary | ICD-10-CM

## 2021-09-13 DIAGNOSIS — I67.1 CEREBRAL ANEURYSM: ICD-10-CM

## 2021-09-13 DIAGNOSIS — I67.1 CEREBRAL ANEURYSM WITHOUT RUPTURE: ICD-10-CM

## 2021-09-13 PROCEDURE — 99999 PR PBB SHADOW E&M-EST. PATIENT-LVL III: ICD-10-PCS | Mod: PBBFAC,,, | Performed by: NEUROLOGICAL SURGERY

## 2021-09-13 PROCEDURE — 99213 OFFICE O/P EST LOW 20 MIN: CPT | Mod: PBBFAC | Performed by: NEUROLOGICAL SURGERY

## 2021-09-13 PROCEDURE — 99417 PR PROLONGED SVC, OUTPT, W/WO DIRECT PT CONTACT,  EA ADDTL 15 MIN: ICD-10-PCS | Mod: S$PBB,,, | Performed by: NEUROLOGICAL SURGERY

## 2021-09-13 PROCEDURE — 99999 PR PBB SHADOW E&M-EST. PATIENT-LVL III: CPT | Mod: PBBFAC,,, | Performed by: NEUROLOGICAL SURGERY

## 2021-09-13 PROCEDURE — 99215 PR OFFICE/OUTPT VISIT, EST, LEVL V, 40-54 MIN: ICD-10-PCS | Mod: S$PBB,,, | Performed by: NEUROLOGICAL SURGERY

## 2021-09-13 PROCEDURE — 99215 OFFICE O/P EST HI 40 MIN: CPT | Mod: S$PBB,,, | Performed by: NEUROLOGICAL SURGERY

## 2021-09-13 PROCEDURE — 99417 PROLNG OP E/M EACH 15 MIN: CPT | Mod: S$PBB,,, | Performed by: NEUROLOGICAL SURGERY

## 2021-09-15 ENCOUNTER — CLINICAL SUPPORT (OUTPATIENT)
Dept: REHABILITATION | Facility: HOSPITAL | Age: 76
End: 2021-09-15
Attending: NEUROLOGICAL SURGERY
Payer: MEDICARE

## 2021-09-15 DIAGNOSIS — M25.69 DECREASED ROM OF TRUNK AND BACK: ICD-10-CM

## 2021-09-15 DIAGNOSIS — R29.898 DECREASED STRENGTH OF TRUNK AND BACK: ICD-10-CM

## 2021-09-15 PROCEDURE — 97110 THERAPEUTIC EXERCISES: CPT | Mod: PN

## 2021-09-22 ENCOUNTER — CLINICAL SUPPORT (OUTPATIENT)
Dept: REHABILITATION | Facility: HOSPITAL | Age: 76
End: 2021-09-22
Attending: NEUROLOGICAL SURGERY
Payer: MEDICARE

## 2021-09-22 DIAGNOSIS — R29.898 DECREASED STRENGTH OF TRUNK AND BACK: ICD-10-CM

## 2021-09-22 DIAGNOSIS — M25.69 DECREASED ROM OF TRUNK AND BACK: ICD-10-CM

## 2021-09-22 PROCEDURE — 97110 THERAPEUTIC EXERCISES: CPT | Mod: PN

## 2021-09-24 ENCOUNTER — TELEPHONE (OUTPATIENT)
Dept: NEUROSURGERY | Facility: CLINIC | Age: 76
End: 2021-09-24

## 2021-09-24 DIAGNOSIS — I67.1 CAROTID-CAVERNOUS FISTULA: Primary | ICD-10-CM

## 2021-09-27 ENCOUNTER — INITIAL CONSULT (OUTPATIENT)
Dept: VASCULAR SURGERY | Facility: CLINIC | Age: 76
End: 2021-09-27
Payer: MEDICARE

## 2021-09-27 VITALS
WEIGHT: 202.5 LBS | HEIGHT: 62 IN | SYSTOLIC BLOOD PRESSURE: 130 MMHG | DIASTOLIC BLOOD PRESSURE: 70 MMHG | BODY MASS INDEX: 37.26 KG/M2

## 2021-09-27 DIAGNOSIS — M79.662 PAIN IN BOTH LOWER LEGS: ICD-10-CM

## 2021-09-27 DIAGNOSIS — M79.661 PAIN IN BOTH LOWER LEGS: ICD-10-CM

## 2021-09-27 PROCEDURE — 99999 PR PBB SHADOW E&M-EST. PATIENT-LVL III: ICD-10-PCS | Mod: PBBFAC,,, | Performed by: SURGERY

## 2021-09-27 PROCEDURE — 99204 OFFICE O/P NEW MOD 45 MIN: CPT | Mod: S$PBB,,, | Performed by: SURGERY

## 2021-09-27 PROCEDURE — 99213 OFFICE O/P EST LOW 20 MIN: CPT | Mod: PBBFAC | Performed by: SURGERY

## 2021-09-27 PROCEDURE — 99999 PR PBB SHADOW E&M-EST. PATIENT-LVL III: CPT | Mod: PBBFAC,,, | Performed by: SURGERY

## 2021-09-27 PROCEDURE — 99204 PR OFFICE/OUTPT VISIT, NEW, LEVL IV, 45-59 MIN: ICD-10-PCS | Mod: S$PBB,,, | Performed by: SURGERY

## 2021-09-30 ENCOUNTER — EXTERNAL CHRONIC CARE MANAGEMENT (OUTPATIENT)
Dept: PRIMARY CARE CLINIC | Facility: CLINIC | Age: 76
End: 2021-09-30
Payer: MEDICARE

## 2021-09-30 PROCEDURE — 99490 CHRNC CARE MGMT STAFF 1ST 20: CPT | Mod: PBBFAC,PO | Performed by: FAMILY MEDICINE

## 2021-09-30 PROCEDURE — 99490 PR CHRONIC CARE MGMT, 1ST 20 MIN: ICD-10-PCS | Mod: S$PBB,,, | Performed by: FAMILY MEDICINE

## 2021-09-30 PROCEDURE — 99490 CHRNC CARE MGMT STAFF 1ST 20: CPT | Mod: S$PBB,,, | Performed by: FAMILY MEDICINE

## 2021-10-01 ENCOUNTER — CLINICAL SUPPORT (OUTPATIENT)
Dept: REHABILITATION | Facility: HOSPITAL | Age: 76
End: 2021-10-01
Attending: NEUROLOGICAL SURGERY
Payer: MEDICARE

## 2021-10-01 DIAGNOSIS — M25.69 DECREASED ROM OF TRUNK AND BACK: ICD-10-CM

## 2021-10-01 DIAGNOSIS — R29.898 DECREASED STRENGTH OF TRUNK AND BACK: ICD-10-CM

## 2021-10-01 PROCEDURE — 97110 THERAPEUTIC EXERCISES: CPT | Mod: PN

## 2021-10-05 ENCOUNTER — TELEPHONE (OUTPATIENT)
Dept: NEUROSURGERY | Facility: CLINIC | Age: 76
End: 2021-10-05

## 2021-10-07 ENCOUNTER — CLINICAL SUPPORT (OUTPATIENT)
Dept: REHABILITATION | Facility: HOSPITAL | Age: 76
End: 2021-10-07
Attending: NEUROLOGICAL SURGERY
Payer: MEDICARE

## 2021-10-07 DIAGNOSIS — R29.898 DECREASED STRENGTH OF TRUNK AND BACK: ICD-10-CM

## 2021-10-07 DIAGNOSIS — M25.69 DECREASED ROM OF TRUNK AND BACK: ICD-10-CM

## 2021-10-07 PROCEDURE — 97110 THERAPEUTIC EXERCISES: CPT | Mod: PN

## 2021-10-08 ENCOUNTER — HOSPITAL ENCOUNTER (OUTPATIENT)
Dept: CARDIOLOGY | Facility: HOSPITAL | Age: 76
Discharge: HOME | End: 2021-10-08
Attending: NEUROLOGICAL SURGERY
Payer: MEDICARE

## 2021-10-08 ENCOUNTER — HOSPITAL ENCOUNTER (OUTPATIENT)
Dept: RADIOLOGY | Facility: HOSPITAL | Age: 76
Discharge: HOME | End: 2021-10-08
Attending: NEUROLOGICAL SURGERY
Payer: MEDICARE

## 2021-10-08 DIAGNOSIS — I67.1 CAROTID-CAVERNOUS FISTULA: ICD-10-CM

## 2021-10-08 LAB
LEFT ARM DIASTOLIC BLOOD PRESSURE: 77 MMHG
LEFT ARM SYSTOLIC BLOOD PRESSURE: 136 MMHG
LEFT CBA DIAS: 13 CM/S
LEFT CBA SYS: 61 CM/S
LEFT CCA DIST DIAS: 19 CM/S
LEFT CCA DIST SYS: 72 CM/S
LEFT CCA MID DIAS: 17 CM/S
LEFT CCA MID SYS: 79 CM/S
LEFT CCA PROX DIAS: 17 CM/S
LEFT CCA PROX SYS: 99 CM/S
LEFT ECA DIAS: 12 CM/S
LEFT ECA SYS: 76 CM/S
LEFT ICA DIST DIAS: 26 CM/S
LEFT ICA DIST SYS: 81 CM/S
LEFT ICA MID DIAS: 30 CM/S
LEFT ICA MID SYS: 128 CM/S
LEFT ICA PROX DIAS: 31 CM/S
LEFT ICA PROX SYS: 142 CM/S
LEFT VERTEBRAL DIAS: 19 CM/S
LEFT VERTEBRAL SYS: 71 CM/S
OHS CV CAROTID RIGHT ICA EDV HIGHEST: 37
OHS CV CAROTID ULTRASOUND LEFT ICA/CCA RATIO: 1.97
OHS CV CAROTID ULTRASOUND RIGHT ICA/CCA RATIO: 1.52
OHS CV PV CAROTID LEFT HIGHEST CCA: 99
OHS CV PV CAROTID LEFT HIGHEST ICA: 142
OHS CV PV CAROTID RIGHT HIGHEST CCA: 105
OHS CV PV CAROTID RIGHT HIGHEST ICA: 114
OHS CV US CAROTID LEFT HIGHEST EDV: 31
RIGHT ARM DIASTOLIC BLOOD PRESSURE: 70 MMHG
RIGHT ARM SYSTOLIC BLOOD PRESSURE: 130 MMHG
RIGHT CBA DIAS: 11 CM/S
RIGHT CBA SYS: 72 CM/S
RIGHT CCA DIST DIAS: 15 CM/S
RIGHT CCA DIST SYS: 75 CM/S
RIGHT CCA MID DIAS: 21 CM/S
RIGHT CCA MID SYS: 102 CM/S
RIGHT CCA PROX DIAS: 16 CM/S
RIGHT CCA PROX SYS: 105 CM/S
RIGHT ECA DIAS: 13 CM/S
RIGHT ECA SYS: 92 CM/S
RIGHT ICA DIST DIAS: 37 CM/S
RIGHT ICA DIST SYS: 114 CM/S
RIGHT ICA MID DIAS: 30 CM/S
RIGHT ICA MID SYS: 96 CM/S
RIGHT ICA PROX DIAS: 12 CM/S
RIGHT ICA PROX SYS: 61 CM/S
RIGHT VERTEBRAL DIAS: 11 CM/S
RIGHT VERTEBRAL SYS: 55 CM/S

## 2021-10-08 PROCEDURE — 93880 EXTRACRANIAL BILAT STUDY: CPT | Mod: 26,,, | Performed by: INTERNAL MEDICINE

## 2021-10-08 PROCEDURE — 25500020 PHARM REV CODE 255: Performed by: NEUROLOGICAL SURGERY

## 2021-10-08 PROCEDURE — 70546 MR ANGIOGRAPH HEAD W/O&W/DYE: CPT | Mod: 26,,, | Performed by: RADIOLOGY

## 2021-10-08 PROCEDURE — 70546 MR ANGIOGRAPH HEAD W/O&W/DYE: CPT | Mod: TC

## 2021-10-08 PROCEDURE — 70546 MRA BRAIN WITH AND WITHOUT: ICD-10-PCS | Mod: 26,,, | Performed by: RADIOLOGY

## 2021-10-08 PROCEDURE — 93880 CV US DOPPLER CAROTID (CUPID ONLY): ICD-10-PCS | Mod: 26,,, | Performed by: INTERNAL MEDICINE

## 2021-10-08 PROCEDURE — A9585 GADOBUTROL INJECTION: HCPCS | Performed by: NEUROLOGICAL SURGERY

## 2021-10-08 PROCEDURE — 93880 EXTRACRANIAL BILAT STUDY: CPT

## 2021-10-08 RX ORDER — GADOBUTROL 604.72 MG/ML
10 INJECTION INTRAVENOUS
Status: COMPLETED | OUTPATIENT
Start: 2021-10-08 | End: 2021-10-08

## 2021-10-08 RX ADMIN — GADOBUTROL 10 ML: 604.72 INJECTION INTRAVENOUS at 11:10

## 2021-10-18 DIAGNOSIS — Z12.31 SCREENING MAMMOGRAM, ENCOUNTER FOR: Primary | ICD-10-CM

## 2021-10-20 ENCOUNTER — TELEPHONE (OUTPATIENT)
Dept: FAMILY MEDICINE | Facility: CLINIC | Age: 76
End: 2021-10-20

## 2021-10-21 ENCOUNTER — HOSPITAL ENCOUNTER (OUTPATIENT)
Dept: RADIOLOGY | Facility: HOSPITAL | Age: 76
Discharge: HOME | End: 2021-10-21
Attending: OBSTETRICS & GYNECOLOGY
Payer: MEDICARE

## 2021-10-21 ENCOUNTER — IMMUNIZATION (OUTPATIENT)
Dept: PHARMACY | Facility: CLINIC | Age: 76
End: 2021-10-21
Payer: MEDICARE

## 2021-10-21 DIAGNOSIS — Z23 NEED FOR VACCINATION: Primary | ICD-10-CM

## 2021-10-21 DIAGNOSIS — Z12.31 SCREENING MAMMOGRAM, ENCOUNTER FOR: ICD-10-CM

## 2021-10-21 PROCEDURE — 77067 SCR MAMMO BI INCL CAD: CPT | Mod: TC

## 2021-10-21 PROCEDURE — 77067 SCR MAMMO BI INCL CAD: CPT | Mod: 26,,, | Performed by: RADIOLOGY

## 2021-10-21 PROCEDURE — 77063 MAMMO DIGITAL SCREENING BILAT WITH TOMO: ICD-10-PCS | Mod: 26,,, | Performed by: RADIOLOGY

## 2021-10-21 PROCEDURE — 77067 MAMMO DIGITAL SCREENING BILAT WITH TOMO: ICD-10-PCS | Mod: 26,,, | Performed by: RADIOLOGY

## 2021-10-21 PROCEDURE — 77063 BREAST TOMOSYNTHESIS BI: CPT | Mod: 26,,, | Performed by: RADIOLOGY

## 2021-10-25 ENCOUNTER — OFFICE VISIT (OUTPATIENT)
Dept: NEUROSURGERY | Facility: CLINIC | Age: 76
End: 2021-10-25
Payer: MEDICARE

## 2021-10-25 VITALS — HEART RATE: 65 BPM | SYSTOLIC BLOOD PRESSURE: 146 MMHG | DIASTOLIC BLOOD PRESSURE: 75 MMHG

## 2021-10-25 DIAGNOSIS — I67.1 CEREBRAL ANEURYSM WITHOUT RUPTURE: Primary | ICD-10-CM

## 2021-10-25 PROCEDURE — 99999 PR PBB SHADOW E&M-EST. PATIENT-LVL I: CPT | Mod: PBBFAC,,, | Performed by: NEUROLOGICAL SURGERY

## 2021-10-25 PROCEDURE — 99211 OFF/OP EST MAY X REQ PHY/QHP: CPT | Mod: PBBFAC | Performed by: NEUROLOGICAL SURGERY

## 2021-10-25 PROCEDURE — 99214 PR OFFICE/OUTPT VISIT, EST, LEVL IV, 30-39 MIN: ICD-10-PCS | Mod: S$PBB,,, | Performed by: NEUROLOGICAL SURGERY

## 2021-10-25 PROCEDURE — 99999 PR PBB SHADOW E&M-EST. PATIENT-LVL I: ICD-10-PCS | Mod: PBBFAC,,, | Performed by: NEUROLOGICAL SURGERY

## 2021-10-25 PROCEDURE — 99214 OFFICE O/P EST MOD 30 MIN: CPT | Mod: S$PBB,,, | Performed by: NEUROLOGICAL SURGERY

## 2021-10-31 ENCOUNTER — EXTERNAL CHRONIC CARE MANAGEMENT (OUTPATIENT)
Dept: PRIMARY CARE CLINIC | Facility: CLINIC | Age: 76
End: 2021-10-31
Payer: MEDICARE

## 2021-10-31 PROCEDURE — 99490 CHRNC CARE MGMT STAFF 1ST 20: CPT | Mod: PBBFAC,PO | Performed by: FAMILY MEDICINE

## 2021-10-31 PROCEDURE — 99439 CHRNC CARE MGMT STAF EA ADDL: CPT | Mod: PBBFAC,PO | Performed by: FAMILY MEDICINE

## 2021-10-31 PROCEDURE — 99490 PR CHRONIC CARE MGMT, 1ST 20 MIN: ICD-10-PCS | Mod: S$PBB,,, | Performed by: FAMILY MEDICINE

## 2021-10-31 PROCEDURE — 99490 CHRNC CARE MGMT STAFF 1ST 20: CPT | Mod: S$PBB,,, | Performed by: FAMILY MEDICINE

## 2021-10-31 PROCEDURE — 99439 CHRNC CARE MGMT STAF EA ADDL: CPT | Mod: S$PBB,,, | Performed by: FAMILY MEDICINE

## 2021-10-31 PROCEDURE — 99439 PR CHRONIC CARE MGMT, EA ADDTL 20 MIN: ICD-10-PCS | Mod: S$PBB,,, | Performed by: FAMILY MEDICINE

## 2021-11-05 ENCOUNTER — OFFICE VISIT (OUTPATIENT)
Dept: CARDIOLOGY | Facility: CLINIC | Age: 76
End: 2021-11-05
Payer: MEDICARE

## 2021-11-05 VITALS
DIASTOLIC BLOOD PRESSURE: 68 MMHG | HEART RATE: 63 BPM | BODY MASS INDEX: 36.44 KG/M2 | SYSTOLIC BLOOD PRESSURE: 118 MMHG | HEIGHT: 62 IN | WEIGHT: 198 LBS

## 2021-11-05 DIAGNOSIS — M47.816 LUMBAR SPONDYLOSIS: ICD-10-CM

## 2021-11-05 DIAGNOSIS — R20.0 NUMBNESS AND TINGLING OF BOTH LOWER EXTREMITIES: ICD-10-CM

## 2021-11-05 DIAGNOSIS — I73.9 PAD (PERIPHERAL ARTERY DISEASE): ICD-10-CM

## 2021-11-05 DIAGNOSIS — E66.9 OBESITY (BMI 30-39.9): ICD-10-CM

## 2021-11-05 DIAGNOSIS — R20.2 NUMBNESS AND TINGLING OF BOTH LEGS BELOW KNEES: ICD-10-CM

## 2021-11-05 DIAGNOSIS — I10 PRIMARY HYPERTENSION: ICD-10-CM

## 2021-11-05 DIAGNOSIS — M79.661 PAIN IN BOTH LOWER LEGS: ICD-10-CM

## 2021-11-05 DIAGNOSIS — I73.9 CLAUDICATION OF BOTH LOWER EXTREMITIES: ICD-10-CM

## 2021-11-05 DIAGNOSIS — I65.23 BILATERAL CAROTID ARTERY STENOSIS: ICD-10-CM

## 2021-11-05 DIAGNOSIS — E78.2 MIXED HYPERLIPIDEMIA: ICD-10-CM

## 2021-11-05 DIAGNOSIS — R20.0 NUMBNESS AND TINGLING OF BOTH LEGS BELOW KNEES: ICD-10-CM

## 2021-11-05 DIAGNOSIS — E66.01 SEVERE OBESITY (BMI 35.0-39.9) WITH COMORBIDITY: ICD-10-CM

## 2021-11-05 DIAGNOSIS — M51.26 DISPLACEMENT OF LUMBAR INTERVERTEBRAL DISC WITHOUT MYELOPATHY: ICD-10-CM

## 2021-11-05 DIAGNOSIS — R20.2 NUMBNESS AND TINGLING OF BOTH LOWER EXTREMITIES: ICD-10-CM

## 2021-11-05 DIAGNOSIS — M79.662 PAIN IN BOTH LOWER LEGS: ICD-10-CM

## 2021-11-05 DIAGNOSIS — M48.062 SPINAL STENOSIS, LUMBAR REGION, WITH NEUROGENIC CLAUDICATION: ICD-10-CM

## 2021-11-05 PROCEDURE — 99214 OFFICE O/P EST MOD 30 MIN: CPT | Mod: PBBFAC | Performed by: INTERNAL MEDICINE

## 2021-11-05 PROCEDURE — 99215 OFFICE O/P EST HI 40 MIN: CPT | Mod: S$PBB,,, | Performed by: INTERNAL MEDICINE

## 2021-11-05 PROCEDURE — 99999 PR PBB SHADOW E&M-EST. PATIENT-LVL IV: ICD-10-PCS | Mod: PBBFAC,,, | Performed by: INTERNAL MEDICINE

## 2021-11-05 PROCEDURE — 99215 PR OFFICE/OUTPT VISIT, EST, LEVL V, 40-54 MIN: ICD-10-PCS | Mod: S$PBB,,, | Performed by: INTERNAL MEDICINE

## 2021-11-05 PROCEDURE — 99999 PR PBB SHADOW E&M-EST. PATIENT-LVL IV: CPT | Mod: PBBFAC,,, | Performed by: INTERNAL MEDICINE

## 2021-11-05 RX ORDER — ATORVASTATIN CALCIUM 40 MG/1
40 TABLET, FILM COATED ORAL DAILY
Qty: 90 TABLET | Refills: 3 | Status: SHIPPED | OUTPATIENT
Start: 2021-11-05 | End: 2023-09-11 | Stop reason: SDUPTHER

## 2021-11-05 RX ORDER — METHOTREXATE 2.5 MG/1
2.5 TABLET ORAL
COMMUNITY
End: 2022-02-11

## 2021-11-05 RX ORDER — ATORVASTATIN CALCIUM 40 MG/1
40 TABLET, FILM COATED ORAL DAILY
Qty: 90 TABLET | Refills: 3 | Status: SHIPPED | OUTPATIENT
Start: 2021-11-05 | End: 2021-11-05 | Stop reason: SDUPTHER

## 2021-11-05 RX ORDER — PENICILLIN V POTASSIUM 500 MG/1
500 TABLET, FILM COATED ORAL 4 TIMES DAILY
COMMUNITY
Start: 2021-10-27 | End: 2022-02-10

## 2021-11-09 ENCOUNTER — OFFICE VISIT (OUTPATIENT)
Dept: RHEUMATOLOGY | Facility: CLINIC | Age: 76
End: 2021-11-09
Payer: MEDICARE

## 2021-11-09 VITALS
DIASTOLIC BLOOD PRESSURE: 70 MMHG | BODY MASS INDEX: 37.61 KG/M2 | WEIGHT: 204.38 LBS | HEART RATE: 61 BPM | SYSTOLIC BLOOD PRESSURE: 135 MMHG | HEIGHT: 62 IN

## 2021-11-09 DIAGNOSIS — Z79.1 NSAID LONG-TERM USE: ICD-10-CM

## 2021-11-09 DIAGNOSIS — D84.9 IMMUNOSUPPRESSION: ICD-10-CM

## 2021-11-09 DIAGNOSIS — M05.79 RHEUMATOID ARTHRITIS INVOLVING MULTIPLE SITES WITH POSITIVE RHEUMATOID FACTOR: Primary | ICD-10-CM

## 2021-11-09 DIAGNOSIS — K21.9 GASTROESOPHAGEAL REFLUX DISEASE, UNSPECIFIED WHETHER ESOPHAGITIS PRESENT: ICD-10-CM

## 2021-11-09 DIAGNOSIS — M10.9 GOUT, ARTHRITIS: ICD-10-CM

## 2021-11-09 PROCEDURE — 99214 OFFICE O/P EST MOD 30 MIN: CPT | Mod: PBBFAC | Performed by: INTERNAL MEDICINE

## 2021-11-09 PROCEDURE — 99214 OFFICE O/P EST MOD 30 MIN: CPT | Mod: S$PBB,,, | Performed by: INTERNAL MEDICINE

## 2021-11-09 PROCEDURE — 99999 PR PBB SHADOW E&M-EST. PATIENT-LVL IV: CPT | Mod: PBBFAC,,, | Performed by: INTERNAL MEDICINE

## 2021-11-09 PROCEDURE — 99999 PR PBB SHADOW E&M-EST. PATIENT-LVL IV: ICD-10-PCS | Mod: PBBFAC,,, | Performed by: INTERNAL MEDICINE

## 2021-11-09 PROCEDURE — 99214 PR OFFICE/OUTPT VISIT, EST, LEVL IV, 30-39 MIN: ICD-10-PCS | Mod: S$PBB,,, | Performed by: INTERNAL MEDICINE

## 2021-11-09 ASSESSMENT — ROUTINE ASSESSMENT OF PATIENT INDEX DATA (RAPID3)
AM STIFFNESS SCORE: 1, YES
FATIGUE SCORE: 5
MDHAQ FUNCTION SCORE: 0.6
PAIN SCORE: 7.5
TOTAL RAPID3 SCORE: 3.33
PATIENT GLOBAL ASSESSMENT SCORE: 0.5
PSYCHOLOGICAL DISTRESS SCORE: 2.2

## 2021-11-10 ENCOUNTER — IMMUNIZATION (OUTPATIENT)
Dept: PHARMACY | Facility: CLINIC | Age: 76
End: 2021-11-10
Payer: MEDICARE

## 2021-11-30 ENCOUNTER — EXTERNAL CHRONIC CARE MANAGEMENT (OUTPATIENT)
Dept: PRIMARY CARE CLINIC | Facility: CLINIC | Age: 76
End: 2021-11-30
Payer: MEDICARE

## 2021-11-30 PROCEDURE — 99439 CHRNC CARE MGMT STAF EA ADDL: CPT | Mod: PBBFAC,PO | Performed by: FAMILY MEDICINE

## 2021-11-30 PROCEDURE — 99490 CHRNC CARE MGMT STAFF 1ST 20: CPT | Mod: PBBFAC,25,PO | Performed by: FAMILY MEDICINE

## 2021-11-30 PROCEDURE — 99490 PR CHRONIC CARE MGMT, 1ST 20 MIN: ICD-10-PCS | Mod: S$PBB,,, | Performed by: FAMILY MEDICINE

## 2021-11-30 PROCEDURE — 99439 PR CHRONIC CARE MGMT, EA ADDTL 20 MIN: ICD-10-PCS | Mod: S$PBB,,, | Performed by: FAMILY MEDICINE

## 2021-11-30 PROCEDURE — 99490 CHRNC CARE MGMT STAFF 1ST 20: CPT | Mod: S$PBB,,, | Performed by: FAMILY MEDICINE

## 2021-11-30 PROCEDURE — 99439 CHRNC CARE MGMT STAF EA ADDL: CPT | Mod: S$PBB,,, | Performed by: FAMILY MEDICINE

## 2021-12-17 ENCOUNTER — TELEPHONE (OUTPATIENT)
Dept: RHEUMATOLOGY | Facility: CLINIC | Age: 76
End: 2021-12-17
Payer: MEDICARE

## 2021-12-17 ENCOUNTER — TELEPHONE (OUTPATIENT)
Dept: FAMILY MEDICINE | Facility: CLINIC | Age: 76
End: 2021-12-17
Payer: MEDICARE

## 2021-12-17 DIAGNOSIS — M05.79 RHEUMATOID ARTHRITIS INVOLVING MULTIPLE SITES WITH POSITIVE RHEUMATOID FACTOR: Primary | ICD-10-CM

## 2021-12-17 RX ORDER — PREDNISONE 10 MG/1
10 TABLET ORAL DAILY
Qty: 30 TABLET | Refills: 0 | Status: SHIPPED | OUTPATIENT
Start: 2021-12-17 | End: 2022-02-10

## 2021-12-31 ENCOUNTER — EXTERNAL CHRONIC CARE MANAGEMENT (OUTPATIENT)
Dept: PRIMARY CARE CLINIC | Facility: CLINIC | Age: 76
End: 2021-12-31
Payer: MEDICARE

## 2021-12-31 PROCEDURE — 99490 PR CHRONIC CARE MGMT, 1ST 20 MIN: ICD-10-PCS | Mod: S$PBB,,, | Performed by: FAMILY MEDICINE

## 2021-12-31 PROCEDURE — 99490 CHRNC CARE MGMT STAFF 1ST 20: CPT | Mod: PBBFAC,PO | Performed by: FAMILY MEDICINE

## 2021-12-31 PROCEDURE — 99490 CHRNC CARE MGMT STAFF 1ST 20: CPT | Mod: S$PBB,,, | Performed by: FAMILY MEDICINE

## 2022-01-06 ENCOUNTER — TELEPHONE (OUTPATIENT)
Dept: FAMILY MEDICINE | Facility: CLINIC | Age: 77
End: 2022-01-06
Payer: MEDICARE

## 2022-01-06 DIAGNOSIS — J01.00 ACUTE NON-RECURRENT MAXILLARY SINUSITIS: Primary | ICD-10-CM

## 2022-01-06 RX ORDER — AMOXICILLIN 500 MG/1
500 TABLET, FILM COATED ORAL EVERY 12 HOURS
Qty: 14 TABLET | Refills: 0 | Status: SHIPPED | OUTPATIENT
Start: 2022-01-06 | End: 2022-01-13

## 2022-01-20 ENCOUNTER — DOCUMENTATION ONLY (OUTPATIENT)
Dept: REHABILITATION | Facility: HOSPITAL | Age: 77
End: 2022-01-20
Payer: MEDICARE

## 2022-01-21 NOTE — PROGRESS NOTES
Outpatient Physical Therapy Discharge Summary    Date: 01/20/2022      Date of PT eval: 6/17/21  Number of PT visits: 16  Date of last visit: 10/7/21    Assessment:  Patient has not attended since 10/7/21.     Per 10/7/21:    Short term goals: 6 weeks or 10 visits   1.  Pt will demonstrate increased lumbar ROM by at least 3 degrees from the initial ROM value with improvements noted in functional ROM and ability to perform ADLs.  (appropriate & ongoing)       1a. Pt will be able to perform side-bending ROM to WNL and c/ no discomfort.   2.  Pt will demonstrate increased MedX average isometric strength value by 40% from initial test, resulting in improved ability to perform bending, lifting, and carrying activities safely and confidently.  GOAL MET 08/04/21  3.  Pt will report a reduction in worst pain score by 1-2 points for improved tolerance for sitting/standing.  (appropriate & ongoing)  4.  Pt will be able to perform HEP correctly with minimal cueing or supervision from therapist to encourage independent management of symptoms. GOAL MET 08/04/21     Long term goals: 10 weeks or 20 visits   1. Pt will demonstrate increased lumbar ROM by at least 6 degrees from initial ROM value, resulting in improved ability to perform functional fwd bending while standing and sitting. (appropriate & ongoing)  2. Pt will demonstrate increased MedX average isometric strength value by 80% from initial test, resulting in improved ability to perform bending, lifting, and carrying activities safely and confidently. GOAL MET 08/04/21  3. Pt to demonstrate ability to independently control and/or reduce their pain through posture positioning and mechanical movements throughout a typical day. (appropriate & ongoing)  4.  Pt will demonstrate reduced pain and improved functional outcomes as reported on the FOTO by reaching a limitation score of < or = 25% or less in order to demonstrate subjective improvement in pt's condition.  (appropriate  & ongoing)  5. Pt will demonstrate independence with HEP at discharge. (appropriate & ongoing)  6.  Pt will report being able to wear heels for at least 30 minutes with pain no more than a 2/10.  (appropriate & ongoing)           Plan: Discharge from outpatient physical therapy due to not attending since Oct 2021 with no contact from patient.

## 2022-01-28 ENCOUNTER — PES CALL (OUTPATIENT)
Dept: ADMINISTRATIVE | Facility: CLINIC | Age: 77
End: 2022-01-28
Payer: MEDICARE

## 2022-01-31 ENCOUNTER — EXTERNAL CHRONIC CARE MANAGEMENT (OUTPATIENT)
Dept: PRIMARY CARE CLINIC | Facility: CLINIC | Age: 77
End: 2022-01-31
Payer: MEDICARE

## 2022-01-31 DIAGNOSIS — I10 ESSENTIAL HYPERTENSION: ICD-10-CM

## 2022-01-31 DIAGNOSIS — K21.9 GASTROESOPHAGEAL REFLUX DISEASE: ICD-10-CM

## 2022-01-31 DIAGNOSIS — M51.36 DDD (DEGENERATIVE DISC DISEASE), LUMBAR: ICD-10-CM

## 2022-01-31 DIAGNOSIS — M10.9 GOUT, ARTHRITIS: ICD-10-CM

## 2022-01-31 DIAGNOSIS — D84.9 IMMUNOSUPPRESSION: ICD-10-CM

## 2022-01-31 DIAGNOSIS — Z79.1 NSAID LONG-TERM USE: ICD-10-CM

## 2022-01-31 DIAGNOSIS — M05.79 RHEUMATOID ARTHRITIS INVOLVING MULTIPLE SITES WITH POSITIVE RHEUMATOID FACTOR: ICD-10-CM

## 2022-01-31 PROCEDURE — 99490 PR CHRONIC CARE MGMT, 1ST 20 MIN: ICD-10-PCS | Mod: S$PBB,,, | Performed by: FAMILY MEDICINE

## 2022-01-31 PROCEDURE — 99490 CHRNC CARE MGMT STAFF 1ST 20: CPT | Mod: PBBFAC,PO | Performed by: FAMILY MEDICINE

## 2022-01-31 PROCEDURE — 99490 CHRNC CARE MGMT STAFF 1ST 20: CPT | Mod: S$PBB,,, | Performed by: FAMILY MEDICINE

## 2022-01-31 NOTE — TELEPHONE ENCOUNTER
----- Message from Stefany Ling sent at 1/31/2022  1:21 PM CST -----  Regarding: PT NEEDS REFILLS  PT STATES SHE NEEDS REFILLS ON ALL HER MEDICINES WOULD LIKE A CALL BACK TO GET THEM ORDERED. THANK YOU

## 2022-01-31 NOTE — TELEPHONE ENCOUNTER
Care Due:                  Date            Visit Type   Department     Provider  --------------------------------------------------------------------------------                                             [facility] FAMILY                                           MEDICINE/INTERN  Last Visit: 03-      None         AL NELSON Mix  Next Visit: None Scheduled  None         None Found                                                            Last  Test          Frequency    Reason                     Performed    Due Date  --------------------------------------------------------------------------------    Office Visit  12 months..  irbesartan...............  03- 03-    Powered by [employer]. Reference number: [id number].   1/31/2022 3:16:32 PM CST

## 2022-02-01 RX ORDER — NABUMETONE 500 MG/1
500 TABLET, FILM COATED ORAL 2 TIMES DAILY
Qty: 180 TABLET | Refills: 3 | Status: SHIPPED | OUTPATIENT
Start: 2022-02-01 | End: 2022-10-10 | Stop reason: SDUPTHER

## 2022-02-01 RX ORDER — FUROSEMIDE 40 MG/1
40 TABLET ORAL DAILY
Qty: 90 TABLET | Refills: 1 | Status: SHIPPED | OUTPATIENT
Start: 2022-02-01 | End: 2022-10-10 | Stop reason: SDUPTHER

## 2022-02-01 RX ORDER — AMLODIPINE BESYLATE 5 MG/1
5 TABLET ORAL DAILY
Qty: 30 TABLET | Refills: 1 | Status: SHIPPED | OUTPATIENT
Start: 2022-02-01 | End: 2022-03-29 | Stop reason: SDUPTHER

## 2022-02-04 ENCOUNTER — HOSPITAL ENCOUNTER (OUTPATIENT)
Dept: CARDIOLOGY | Facility: HOSPITAL | Age: 77
Discharge: HOME | End: 2022-02-04
Attending: INTERNAL MEDICINE
Payer: MEDICARE

## 2022-02-04 VITALS — HEIGHT: 62 IN | WEIGHT: 204 LBS | BODY MASS INDEX: 37.54 KG/M2

## 2022-02-04 DIAGNOSIS — I73.9 PAD (PERIPHERAL ARTERY DISEASE): ICD-10-CM

## 2022-02-04 LAB
AV INDEX (PROSTH): 0.95
AV MEAN GRADIENT: 6 MMHG
AV PEAK GRADIENT: 14 MMHG
AV VALVE AREA: 2.42 CM2
AV VELOCITY RATIO: 0.93
BSA FOR ECHO PROCEDURE: 2.01 M2
CV ECHO LV RWT: 0.38 CM
DOP CALC AO PEAK VEL: 1.84 M/S
DOP CALC AO VTI: 37.23 CM
DOP CALC LVOT AREA: 2.5 CM2
DOP CALC LVOT DIAMETER: 1.8 CM
DOP CALC LVOT PEAK VEL: 1.71 M/S
DOP CALC LVOT STROKE VOLUME: 90.19 CM3
DOP CALCLVOT PEAK VEL VTI: 35.46 CM
E WAVE DECELERATION TIME: 274.36 MSEC
E/A RATIO: 0.65
E/E' RATIO: 13.5 M/S
ECHO LV POSTERIOR WALL: 1.01 CM (ref 0.6–1.1)
EJECTION FRACTION: 65 %
FRACTIONAL SHORTENING: 30 % (ref 28–44)
INTERVENTRICULAR SEPTUM: 1.44 CM (ref 0.6–1.1)
IVRT: 166.09 MSEC
LA MAJOR: 6.42 CM
LA MINOR: 6.23 CM
LA WIDTH: 5.45 CM
LEFT ATRIUM SIZE: 5.13 CM
LEFT ATRIUM VOLUME INDEX: 77.9 ML/M2
LEFT ATRIUM VOLUME: 150.28 CM3
LEFT INTERNAL DIMENSION IN SYSTOLE: 3.74 CM (ref 2.1–4)
LEFT VENTRICLE DIASTOLIC VOLUME INDEX: 72.25 ML/M2
LEFT VENTRICLE DIASTOLIC VOLUME: 139.45 ML
LEFT VENTRICLE MASS INDEX: 140 G/M2
LEFT VENTRICLE SYSTOLIC VOLUME INDEX: 30.9 ML/M2
LEFT VENTRICLE SYSTOLIC VOLUME: 59.7 ML
LEFT VENTRICULAR INTERNAL DIMENSION IN DIASTOLE: 5.37 CM (ref 3.5–6)
LEFT VENTRICULAR MASS: 269.63 G
LV LATERAL E/E' RATIO: 11.57 M/S
LV SEPTAL E/E' RATIO: 16.2 M/S
MV PEAK A VEL: 1.25 M/S
MV PEAK E VEL: 0.81 M/S
MV STENOSIS PRESSURE HALF TIME: 79.56 MS
MV VALVE AREA P 1/2 METHOD: 2.77 CM2
PISA TR MAX VEL: 3.15 M/S
PV PEAK VELOCITY: 1.06 CM/S
RA MAJOR: 5.88 CM
RA PRESSURE: 3 MMHG
RA WIDTH: 4.59 CM
RIGHT VENTRICULAR END-DIASTOLIC DIMENSION: 4.51 CM
SINUS: 3.04 CM
STJ: 2.57 CM
TDI LATERAL: 0.07 M/S
TDI SEPTAL: 0.05 M/S
TDI: 0.06 M/S
TR MAX PG: 40 MMHG
TRICUSPID ANNULAR PLANE SYSTOLIC EXCURSION: 2.03 CM
TV REST PULMONARY ARTERY PRESSURE: 43 MMHG

## 2022-02-04 PROCEDURE — 93306 TTE W/DOPPLER COMPLETE: CPT

## 2022-02-04 PROCEDURE — 93306 TTE W/DOPPLER COMPLETE: CPT | Mod: 26,,, | Performed by: INTERNAL MEDICINE

## 2022-02-04 PROCEDURE — 93306 ECHO (CUPID ONLY): ICD-10-PCS | Mod: 26,,, | Performed by: INTERNAL MEDICINE

## 2022-02-10 ENCOUNTER — OFFICE VISIT (OUTPATIENT)
Dept: CARDIOLOGY | Facility: CLINIC | Age: 77
End: 2022-02-10
Payer: MEDICARE

## 2022-02-10 VITALS
DIASTOLIC BLOOD PRESSURE: 82 MMHG | WEIGHT: 201.06 LBS | OXYGEN SATURATION: 99 % | HEART RATE: 67 BPM | BODY MASS INDEX: 37 KG/M2 | HEIGHT: 62 IN | SYSTOLIC BLOOD PRESSURE: 110 MMHG

## 2022-02-10 DIAGNOSIS — R20.2 NUMBNESS AND TINGLING OF BOTH LOWER EXTREMITIES: ICD-10-CM

## 2022-02-10 DIAGNOSIS — R20.0 NUMBNESS AND TINGLING OF BOTH LOWER EXTREMITIES: ICD-10-CM

## 2022-02-10 DIAGNOSIS — E78.2 MIXED HYPERLIPIDEMIA: ICD-10-CM

## 2022-02-10 DIAGNOSIS — I73.9 CLAUDICATION OF BOTH LOWER EXTREMITIES: ICD-10-CM

## 2022-02-10 DIAGNOSIS — M79.662 PAIN IN BOTH LOWER LEGS: Primary | ICD-10-CM

## 2022-02-10 DIAGNOSIS — M54.17 LUMBOSACRAL RADICULOPATHY: ICD-10-CM

## 2022-02-10 DIAGNOSIS — I73.9 PAD (PERIPHERAL ARTERY DISEASE): ICD-10-CM

## 2022-02-10 DIAGNOSIS — R26.81 GAIT INSTABILITY: ICD-10-CM

## 2022-02-10 DIAGNOSIS — M79.672 LEFT FOOT PAIN: ICD-10-CM

## 2022-02-10 DIAGNOSIS — E66.01 SEVERE OBESITY (BMI 35.0-39.9) WITH COMORBIDITY: ICD-10-CM

## 2022-02-10 DIAGNOSIS — M47.816 LUMBAR SPONDYLOSIS: ICD-10-CM

## 2022-02-10 DIAGNOSIS — I10 PRIMARY HYPERTENSION: ICD-10-CM

## 2022-02-10 DIAGNOSIS — I65.23 BILATERAL CAROTID ARTERY STENOSIS: ICD-10-CM

## 2022-02-10 DIAGNOSIS — R20.2 NUMBNESS AND TINGLING OF BOTH LEGS BELOW KNEES: ICD-10-CM

## 2022-02-10 DIAGNOSIS — R20.0 NUMBNESS AND TINGLING OF BOTH LEGS BELOW KNEES: ICD-10-CM

## 2022-02-10 DIAGNOSIS — M79.661 PAIN IN BOTH LOWER LEGS: Primary | ICD-10-CM

## 2022-02-10 PROCEDURE — 99215 PR OFFICE/OUTPT VISIT, EST, LEVL V, 40-54 MIN: ICD-10-PCS | Mod: S$PBB,,, | Performed by: INTERNAL MEDICINE

## 2022-02-10 PROCEDURE — 99999 PR PBB SHADOW E&M-EST. PATIENT-LVL V: CPT | Mod: PBBFAC,,, | Performed by: INTERNAL MEDICINE

## 2022-02-10 PROCEDURE — 99215 OFFICE O/P EST HI 40 MIN: CPT | Mod: PBBFAC | Performed by: INTERNAL MEDICINE

## 2022-02-10 PROCEDURE — 99999 PR PBB SHADOW E&M-EST. PATIENT-LVL V: ICD-10-PCS | Mod: PBBFAC,,, | Performed by: INTERNAL MEDICINE

## 2022-02-10 PROCEDURE — 99215 OFFICE O/P EST HI 40 MIN: CPT | Mod: S$PBB,,, | Performed by: INTERNAL MEDICINE

## 2022-02-10 NOTE — PATIENT INSTRUCTIONS
Assessment/Plan:  Yulia Peralta is a 76 y.o. female with PAD, HTN, HLD, Rheumatoid arthritis, lumbar degenerative disc disease, severe obesity, who presents for a follow up appointment.     1. Pain/ with numbness/tingling in both legs and feet- ONESIMO Study on 8/13/2021 revealed normal rest and exercise ONESIMO bilaterally.  CTA Abd/Pelvis with Iliofemoral Runoff on 8/13/2021 revealed mild scattered atherosclerotic plaque.  Probable stenosis of the proximal left posterior tibial artery estimated 50%.  Otherwise arteries of the abdomen and lower extremities demonstrate no significant stenosis.  MRI Lumbar Spine 4/28/2021 revealed severe degenerative disc disease at all levels of the lumbar spine.  Severe canal stenosis at L4-5 and moderate to severe canal stenosis at L5-S1.  Hence, symptoms are likely due to severe lumbar disc disease, and not flow limiting PAD.  Continue management of lumbar disc disease per Neurosurgery.      2. PAD- Pt has no claudication or tissue loss.  CTA abd/pelvis as above.  Echo from 2/4/2022 revealed EF 65%; grade I left ventricular diastolic dysfunction; severe left atrial enlargement; moderate right atrial enlargement; mild mitral regurgitation; normal central venous pressure (3 mmHg); estimated PA systolic pressure is 43 mmHg.  Continue ASA 81 mg daily and atorvastatin 40 mg daily.      3. Obesity- Encourage diet, exercise and weight loss.     4. Carotid Artery Disease- Carotid Ultrasound on 10/8/2021 20-39% right internal carotid artery stenosis.  There is 40-49% left internal carotid artery stenosis.  Continue ASA 81 mg daily and atorvastatin 40 mg daily.     5. Left Foot Pain- Likely due to degenerative/structural abnormalities.  Check MRI left foot/ankle.  Refer to Podiatry for evaluation.    6. Gait Instability- Refer to physical therapy.      7. HLD- LDL at goal of <70.  Continue ASA 81 mg daily and atorvastatin 40 mg daily.    8. HTN- Stable.  Continue current medications.    9.  Pulmonary HTN- Pt is asymptomatic.  Continue current medications.     Follow up in 4 months

## 2022-02-10 NOTE — PROGRESS NOTES
"[facility]      Chief Complaint   Patient presents with    Spinal stenosis, lumbar region, with neurogenic claudication       Patient ID: Yulia Peralta is a 76 y.o. female with PAD, HTN, Rheumatoid arthritis, lumbar degenerative disc disease, obesity, who presents for a follow up appointment.  Pertinent history/events are as follows:     -Pt presents for evaluation of pain, numbness/tingling in both legs and feet.      -At our initial clinic visit on 8/3/2021, Mrs. Peralta reports pain, numbness/tingling in both legs and feet.  States symptoms are "constant, but improve with walking".  She has no tissue loss.  Plan:   Pain/ with numbness/tingling in both legs and feet- Etiology likely multifactorial with contributions from degenerative joint disease, PAD, neuropathic pain, and RA.  MRI Lumbar Spine 4/28/2021 revealed severe degenerative disc disease at all levels of the lumbar spine.  Severe canal stenosis at L4-5 and moderate to severe canal stenosis at L5-S1.  Check exercise ONESIMO and CTA abd/pelvis with iliofemoral runoff.  Pt to elevate legs when resting.  Refer to Neurosurgery for evaluation.    Obesity- Encourage diet, exercise and weight loss.     -At follow up clinic visit on 11/5/2021, Mrs. Peralta reported continued pain, numbness/tingling in both legs and feet as described at clinic visit on 8/3/2021.  ONESIMO Study on 8/13/2021 revealed normal rest and exercise ONESIMO bilaterally.  Normal PVR waveforms bilaterally.CTA Abd/Pelvis with Iliofemoral Runoff on 8/13/2021 revealed mild scattered atherosclerotic plaque.  Probable stenosis of the proximal left posterior tibial artery estimated 50%.  Otherwise arteries of the abdomen and lower extremities demonstrate no significant stenosis.  Plan:   Pain/ with numbness/tingling in both legs and feet- ONESIMO Study on 8/13/2021 revealed normal rest and exercise ONESIMO bilaterally.  CTA Abd/Pelvis with Iliofemoral Runoff on 8/13/2021 revealed mild scattered " atherosclerotic plaque.  Probable stenosis of the proximal left posterior tibial artery estimated 50%.  Otherwise arteries of the abdomen and lower extremities demonstrate no significant stenosis.  MRI Lumbar Spine 4/28/2021 revealed severe degenerative disc disease at all levels of the lumbar spine.  Severe canal stenosis at L4-5 and moderate to severe canal stenosis at L5-S1.  Hence, symptoms are likely due to severe lumbar disc disease, and not flow limiting PAD.  Continue management of lumbar disc disease per Neurosurgery.    PAD- Pt has no claudication or tissue loss.  CTA abd/pelvis as above.  Check echo.  Start ASA 81 mg daily and atorvastatin 40 mg daily.    Obesity- Encourage diet, exercise and weight loss.   Carotid Artery Disease- Carotid Ultrasound on 10/8/2021 20-39% right internal carotid artery stenosis.  There is 40-49% left internal carotid artery stenosis.  Start ASA 81 mg daily and atorvastatin 40 mg daily.     HPI:  Mrs. Peralta reports no chest pain, SOB, LE edema, TIA symptoms or syncope.  Her main complaint today is severe pain in left foot when bearing weight on it.  She has no claudication or tissue loss.  Echo from 2/4/2022 revealed EF 65%; grade I left ventricular diastolic dysfunction; severe left atrial enlargement; moderate right atrial enlargement; mild mitral regurgitation; normal central venous pressure (3 mmHg); estimated PA systolic pressure is 43 mmHg.    Past Medical History:   Diagnosis Date    Abnormal colonoscopy 07/24/2020    colon polyps nad repeat in 3 years.     Acid reflux     Anemia     Anxiety     Arthritis     Blood transfusion     Cataract     Depression     Dry eyes     H/O colonoscopy 07/22/2020    dr. nicholas. repeat in 3 years.     Heart murmur     Hypertension     Left lumbar radiculitis 5/19/2015    Mixed hyperlipidemia 11/5/2021    Multiple thyroid nodules 12/18/2012    Osteoporosis     Rheumatoid arthritis     Thoracic or lumbosacral  neuritis or radiculitis, unspecified 10/30/2013     Past Surgical History:   Procedure Laterality Date    CATARACT EXTRACTION W/  INTRAOCULAR LENS IMPLANT Left 1/26/2021    Procedure: EXTRACTION, CATARACT, WITH IOL INSERTION;  Surgeon: Elvis Pete MD;  Location: [facility];  Service: Ophthalmology;  Laterality: Left;    CATARACT EXTRACTION W/  INTRAOCULAR LENS IMPLANT Right 2/23/2021    Procedure: EXTRACTION, CATARACT, WITH IOL INSERTION;  Surgeon: Elvis Pete MD;  Location: [facility];  Service: Ophthalmology;  Laterality: Right;    CEREBRAL ANGIOGRAM N/A 1/23/2020    Procedure: ANGIOGRAM-CEREBRAL;  Surgeon: Hira Diagnostic Provider;  Location: [address];  Service: Radiology;  Laterality: N/A;  /[place]inn    gallstones  3965-1961    HYSTERECTOMY      JOINT REPLACEMENT  5312-1610     bilateral knee    OOPHORECTOMY      [place] REMOVAL OF OVARY/TUBE(S)      TONSILLECTOMY       Social History     Socioeconomic History    Marital status:      Spouse name: Ric     Number of children: 2    Highest education level: Some college, no degree   Occupational History    Occupation: retired    Tobacco Use    Smoking status: Former Smoker    Smokeless tobacco: Never Used   Substance and Sexual Activity    Alcohol use: Yes     Comment: socially    Drug use: No    Sexual activity: Not Currently     Partners: Male   Social History Narrative    Adult Screenings updated and reviewed  6/12/14    Mammogram( for females) ordered for DIS    Pap ( for females) Dr Zepeda  Due for f/u   For  2014    Colonoscopy  Done once    Flu shot yearly done  2013    Td 2005    Pneumovax  Updated  12/3/13    Zostavax done 2012    Eye exam recommended yearly done 2013    Bone density  12/9/13    Thyroid ultrasound  11/6/2012 Normal sized thyroid containing several subcentimeter nodules, similar to the 5/12/11 exam.  No concerning nodules identified..          Social Determinants of Health     Financial  Resource Strain: Low Risk     Difficulty of Paying Living Expenses: Not hard at all   Food Insecurity: No Food Insecurity    Worried About Running Out of Food in the Last Year: Never true    Ran Out of Food in the Last Year: Never true   Transportation Needs: No Transportation Needs    Lack of Transportation (Medical): No    Lack of Transportation (Non-Medical): No   Physical Activity: Inactive    Days of Exercise per Week: 0 days    Minutes of Exercise per Session: 0 min   Stress: No Stress Concern Present    Feeling of Stress : Only a little   Social Connections: Unknown    Frequency of Communication with Friends and Family: Three times a week    Frequency of Social Gatherings with Friends and Family: Three times a week    Active Member of Clubs or Organizations: No    Attends Club or Organization Meetings: Never    Marital Status:    Housing Stability: Unknown    Unable to Pay for Housing in the Last Year: No    Unstable Housing in the Last Year: No     Family History   Problem Relation Age of Onset    Cataracts Mother     Hypertension Mother     Hypertension Sister     No Known Problems Father     Glaucoma Brother     Breast cancer Maternal Aunt     No Known Problems Maternal Uncle     No Known Problems Paternal Aunt     No Known Problems Paternal Uncle     No Known Problems Maternal Grandmother     No Known Problems Maternal Grandfather     No Known Problems Paternal Grandmother     No Known Problems Paternal Grandfather     Lupus Neg Hx     Rheum arthritis Neg Hx     Psoriasis Neg Hx     Amblyopia Neg Hx     Blindness Neg Hx     Cancer Neg Hx     Diabetes Neg Hx     Macular degeneration Neg Hx     Retinal detachment Neg Hx     Strabismus Neg Hx     Stroke Neg Hx     Thyroid disease Neg Hx        Review of patient's allergies indicates:   Allergen Reactions    No known drug allergies        Medication List with Changes/Refills   Current Medications    AMLODIPINE  (NORVASC) 5 MG TABLET    Take 1 tablet (5 mg total) by mouth once daily.    ATORVASTATIN (LIPITOR) 40 MG TABLET    Take 1 tablet (40 mg total) by mouth once daily.    AZELASTINE (ASTELIN) 137 MCG (0.1 %) NASAL SPRAY    1 spray (137 mcg total) by Nasal route 2 (two) times daily.    FAMOTIDINE (PEPCID) 40 MG TABLET    Take 0.5 tablets (20 mg total) by mouth 2 (two) times daily as needed for Heartburn.    FLUTICASONE (FLONASE) 50 MCG/ACTUATION NASAL SPRAY    1 spray (50 mcg total) by Each Nare route once daily.    FOLIC ACID (FOLVITE) 1 MG TABLET    TAKE TWO TABLETS BY MOUTH EVERY DAY    FUROSEMIDE (LASIX) 40 MG TABLET    Take 1 tablet (40 mg total) by mouth once daily.    GABAPENTIN (NEURONTIN) 300 MG CAPSULE    Take 2 capsules (600 mg total) by mouth 3 (three) times daily.    HYDROCORTISONE 2.5 % CREAM    as needed.     IRBESARTAN (AVAPRO) 300 MG TABLET    Take 1 tablet (300 mg total) by mouth every evening.    METHOTREXATE 2.5 MG TAB    Take 2.5 mg by mouth. Twice a day on Monday. 5 at a time    MISOPROSTOL (CYTOTEC) 100 MCG TAB    Take 1 tablet (100 mcg total) by mouth 2 (two) times daily.    NABUMETONE (RELAFEN) 500 MG TABLET    Take 1 tablet (500 mg total) by mouth 2 (two) times daily.    NYSTATIN-TRIAMCINOLONE (MYCOLOG II) CREAM    Apply to affected area 2 times daily    PANTOPRAZOLE (PROTONIX) 40 MG TABLET    TAKE ONE TABLET BY MOUTH EVERY DAY    SARS-COV-2, COVID-19, (MODERNA COVID-19) 100 MCG/0.5 ML INJECTION    Inject into the muscle.    TRIAMCINOLONE ACETONIDE 0.1% (KENALOG) 0.1 % CREAM    Apply topically 3 (three) times daily.    VALACYCLOVIR (VALTREX) 500 MG TABLET    1 Tablet DAILY for suppression, increase to BID for outbreak    VARICELLA-ZOSTER GE-AS01B, PF, (SHINGRIX) 50 MCG/0.5 ML INJECTION    Inject into the muscle.   Discontinued Medications    PENICILLIN V POTASSIUM (VEETID) 500 MG TABLET    Take 500 mg by mouth 4 (four) times daily.    PREDNISONE (DELTASONE) 10 MG TABLET    Take 1 tablet (10 mg  "total) by mouth once daily. Take for rheumatoid arthritis flare for 7 days and if no improvement then contact office.       Review of Systems  Constitution: Denies chills, fever, and sweats.  HENT: Denies headaches or blurry vision.  Cardiovascular: Denies chest pain or irregular heart beat.  Respiratory: Denies cough or shortness of breath.  Gastrointestinal: Denies abdominal pain, nausea, or vomiting.  Musculoskeletal: Positive for left foot pain.  Neurological: Denies dizziness or focal weakness.  Psychiatric/Behavioral: Normal mental status.  Hematologic/Lymphatic: Denies bleeding problem or easy bruising/bleeding.  Skin: Denies rash or suspicious lesions    Physical Examination  /82   Pulse 67   Ht 5' 2" (1.575 m)   Wt 91.2 kg (201 lb 1 oz)   SpO2 99%   BMI 36.77 kg/m²     Constitutional: No acute distress, conversant  HEENT: Sclera anicteric, Pupils equal, round and reactive to light, extraocular motions intact, Oropharynx clear  Neck: No JVD, no carotid bruits  Cardiovascular: regular rate and rhythm, no murmur, rubs or gallops, normal S1/S2  Pulmonary: Clear to auscultation bilaterally  Abdominal: Abdomen soft, nontender, nondistended, positive bowel sounds  Extremities: No lower extremity edema,   Pulses:  Carotid pulses are 2+ on the right side, and 2+ on the left side.  Radial pulses are 2+ on the right side, and 2+ on the left side.   Femoral pulses are 2+ on the right side, and 2+ on the left side.  Popliteal pulses are 2+ on the right side, and 2+ on the left side.   Dorsalis pedis pulses are 2+ on the right side, and 2+ on the left side.   Posterior tibial pulses are 2+ on the right side, and 2+ on the left side.    Skin: No ecchymosis, erythema, or ulcers  Psych: Alert and oriented x 3, appropriate affect  Neuro: CNII-XII intact, no focal deficits    Labs:  Most Recent Data  CBC:   Lab Results   Component Value Date    WBC 6.95 07/20/2021    HGB 11.0 (L) 07/20/2021    HCT 33.9 (L) " 07/20/2021     07/20/2021    MCV 96 07/20/2021    RDW 14.9 (H) 07/20/2021     BMP:   Lab Results   Component Value Date     10/08/2021    K 4.4 10/08/2021     10/08/2021    CO2 25 10/08/2021    BUN 21 10/08/2021    CREATININE 0.8 10/08/2021    GLU 93 10/08/2021    CALCIUM 10.1 10/08/2021     LFTS;   Lab Results   Component Value Date    PROT 7.3 08/04/2021    ALBUMIN 3.8 08/04/2021    BILITOT 0.6 08/04/2021    AST 26 08/04/2021    ALKPHOS 94 08/04/2021    ALT 30 08/04/2021     COAGS:   Lab Results   Component Value Date    INR 1.0 01/23/2020     FLP:   Lab Results   Component Value Date    CHOL 120 02/04/2022    HDL 55 02/04/2022    LDLCALC 54.0 (L) 02/04/2022    TRIG 55 02/04/2022    CHOLHDL 45.8 02/04/2022     CARDIAC:   Lab Results   Component Value Date    TROPONINI <0.006 06/08/2020    BNP 27 06/12/2014       Echo 2/4/2022:  · The left ventricle is normal in size with eccentric hypertrophy and normal systolic function.  · The estimated ejection fraction is 65%.  · Grade I left ventricular diastolic dysfunction.  · Severe left atrial enlargement.  · Normal right ventricular size with normal right ventricular systolic function.  · Moderate right atrial enlargement.  · Mild mitral regurgitation.  · Normal central venous pressure (3 mmHg).  · The estimated PA systolic pressure is 43 mmHg.  · There is pulmonary hypertension.  · LVOT mean grad 6.6 mm Hg, max PG 11.7 mm Hg    Carotid Ultrasound 10/8/2021:  · There is 20-39% right internal carotid artery stenosis.  · There is 40-49% left internal carotid artery stenosis.    CTA Abd/Pelvis with Iliofemoral Runoff 8/13/2021:   1. Mild scattered atherosclerotic plaque.  Probable stenosis of the proximal left posterior tibial artery estimated 50%.  Otherwise arteries of the abdomen and lower extremities demonstrate no significant stenosis, noting that the popliteal artery and trifurcation are partially obscured by extensive beam hardening artifact  related to knee arthroplasty.  2. Small hiatal hernia  3. Mild hepatomegaly  4. Post cholecystectomy and hysterectomy  5. Colonic diverticula without evidence of diverticulitis  6. Osteoarthritis involving the spine, hips, and feet.    ONESIMO Study 8/13/2021:  Normal rest and exercise ONESIMO bilaterally.  Normal PVR waveforms bilaterally.    MRI Lumbar Spine 4/28/2021:  Severe degenerative disc disease at all levels of the lumbar spine.   Severe canal stenosis at L4-5 and moderate to severe canal stenosis at L5-S1, please see details of each levels above.    Echo 8/1/2016:  CONCLUSIONS     1 - Normal left ventricular systolic function (EF 60-65%).  Normal wall motion.     2 - Eccentric hypertrophy.     3 - Left ventricular diastolic dysfunction.     4 - Trivial mitral regurgitation.     5 - Trivial tricuspid regurgitation.     6 - Pulmonary hypertension. The estimated PA systolic pressure is 42 mmHg.       Assessment/Plan:  Yulia Peralta is a 76 y.o. female with PAD, HTN, HLD, Rheumatoid arthritis, lumbar degenerative disc disease, severe obesity, who presents for a follow up appointment.     1. Pain/ with numbness/tingling in both legs and feet- ONESIMO Study on 8/13/2021 revealed normal rest and exercise ONESIMO bilaterally.  CTA Abd/Pelvis with Iliofemoral Runoff on 8/13/2021 revealed mild scattered atherosclerotic plaque.  Probable stenosis of the proximal left posterior tibial artery estimated 50%.  Otherwise arteries of the abdomen and lower extremities demonstrate no significant stenosis.  MRI Lumbar Spine 4/28/2021 revealed severe degenerative disc disease at all levels of the lumbar spine.  Severe canal stenosis at L4-5 and moderate to severe canal stenosis at L5-S1.  Hence, symptoms are likely due to severe lumbar disc disease, and not flow limiting PAD.  Continue management of lumbar disc disease per Neurosurgery.      2. PAD- Pt has no claudication or tissue loss.  CTA abd/pelvis as above.  Echo from 2/4/2022  revealed EF 65%; grade I left ventricular diastolic dysfunction; severe left atrial enlargement; moderate right atrial enlargement; mild mitral regurgitation; normal central venous pressure (3 mmHg); estimated PA systolic pressure is 43 mmHg.  Continue ASA 81 mg daily and atorvastatin 40 mg daily.      3. Obesity- Encourage diet, exercise and weight loss.     4. Carotid Artery Disease- Carotid Ultrasound on 10/8/2021 20-39% right internal carotid artery stenosis.  There is 40-49% left internal carotid artery stenosis.  Continue ASA 81 mg daily and atorvastatin 40 mg daily.     5. Left Foot Pain- Likely due to degenerative/structural abnormalities.  Check MRI left foot/ankle.  Refer to Podiatry for evaluation.    6. Gait Instability- Refer to physical therapy.      7. HLD- LDL at goal of <70.  Continue ASA 81 mg daily and atorvastatin 40 mg daily.    8. HTN- Stable.  Continue current medications.    9. Pulmonary HTN- Pt is asymptomatic.  Continue current medications.     Follow up in 4 months     Total duration of face to face visit time 30 minutes.  Total time spent counseling greater than fifty percent of total visit time.  Counseling included discussion regarding imaging findings, diagnosis, possibilities, treatment options, risks and benefits.  The patient had many questions regarding the options and long-term effects.    Alfie Simeon MD, PhD  Interventional Cardiology

## 2022-02-11 DIAGNOSIS — M05.79 RHEUMATOID ARTHRITIS INVOLVING MULTIPLE SITES WITH POSITIVE RHEUMATOID FACTOR: Primary | ICD-10-CM

## 2022-02-11 DIAGNOSIS — D84.9 IMMUNOSUPPRESSION: ICD-10-CM

## 2022-02-11 RX ORDER — METHOTREXATE 2.5 MG/1
TABLET ORAL
Qty: 120 TABLET | Refills: 0 | Status: SHIPPED | OUTPATIENT
Start: 2022-02-11 | End: 2022-05-18

## 2022-02-15 ENCOUNTER — LAB VISIT (OUTPATIENT)
Dept: LAB | Facility: HOSPITAL | Age: 77
End: 2022-02-15
Attending: INTERNAL MEDICINE
Payer: MEDICARE

## 2022-02-15 DIAGNOSIS — M79.672 LEFT FOOT PAIN: ICD-10-CM

## 2022-02-15 DIAGNOSIS — D84.9 IMMUNOSUPPRESSION: ICD-10-CM

## 2022-02-15 DIAGNOSIS — M05.79 RHEUMATOID ARTHRITIS INVOLVING MULTIPLE SITES WITH POSITIVE RHEUMATOID FACTOR: ICD-10-CM

## 2022-02-15 LAB
ALBUMIN SERPL BCP-MCNC: 4 G/DL (ref 3.5–5.2)
ALP SERPL-CCNC: 101 U/L (ref 55–135)
ALT SERPL W/O P-5'-P-CCNC: 22 U/L (ref 10–44)
ANION GAP SERPL CALC-SCNC: 10 MMOL/L (ref 8–16)
AST SERPL-CCNC: 20 U/L (ref 10–40)
BASOPHILS # BLD AUTO: 0.03 K/UL (ref 0–0.2)
BASOPHILS NFR BLD: 0.4 % (ref 0–1.9)
BILIRUB SERPL-MCNC: 0.5 MG/DL (ref 0.1–1)
BUN SERPL-MCNC: 12 MG/DL (ref 8–23)
C3 SERPL-MCNC: 126 MG/DL (ref 50–180)
CALCIUM SERPL-MCNC: 10.3 MG/DL (ref 8.7–10.5)
CHLORIDE SERPL-SCNC: 102 MMOL/L (ref 95–110)
CO2 SERPL-SCNC: 27 MMOL/L (ref 23–29)
CREAT SERPL-MCNC: 0.8 MG/DL (ref 0.5–1.4)
CRP SERPL-MCNC: 0.9 MG/L (ref 0–8.2)
DIFFERENTIAL METHOD: ABNORMAL
EOSINOPHIL # BLD AUTO: 0.2 K/UL (ref 0–0.5)
EOSINOPHIL NFR BLD: 3.2 % (ref 0–8)
ERYTHROCYTE [DISTWIDTH] IN BLOOD BY AUTOMATED COUNT: 13.4 % (ref 11.5–14.5)
ERYTHROCYTE [SEDIMENTATION RATE] IN BLOOD BY WESTERGREN METHOD: 29 MM/HR (ref 0–36)
EST. GFR  (AFRICAN AMERICAN): >60 ML/MIN/1.73 M^2
EST. GFR  (NON AFRICAN AMERICAN): >60 ML/MIN/1.73 M^2
GLUCOSE SERPL-MCNC: 99 MG/DL (ref 70–110)
HCT VFR BLD AUTO: 34.9 % (ref 37–48.5)
HGB BLD-MCNC: 11.1 G/DL (ref 12–16)
IMM GRANULOCYTES # BLD AUTO: 0.06 K/UL (ref 0–0.04)
IMM GRANULOCYTES NFR BLD AUTO: 0.8 % (ref 0–0.5)
LYMPHOCYTES # BLD AUTO: 1.6 K/UL (ref 1–4.8)
LYMPHOCYTES NFR BLD: 22 % (ref 18–48)
MCH RBC QN AUTO: 30.7 PG (ref 27–31)
MCHC RBC AUTO-ENTMCNC: 31.8 G/DL (ref 32–36)
MCV RBC AUTO: 96 FL (ref 82–98)
MONOCYTES # BLD AUTO: 1.1 K/UL (ref 0.3–1)
MONOCYTES NFR BLD: 15.8 % (ref 4–15)
NEUTROPHILS # BLD AUTO: 4.1 K/UL (ref 1.8–7.7)
NEUTROPHILS NFR BLD: 57.8 % (ref 38–73)
NRBC BLD-RTO: 0 /100 WBC
PLATELET # BLD AUTO: 152 K/UL (ref 150–450)
PMV BLD AUTO: 13.7 FL (ref 9.2–12.9)
POTASSIUM SERPL-SCNC: 4.3 MMOL/L (ref 3.5–5.1)
PROT SERPL-MCNC: 7.8 G/DL (ref 6–8.4)
RBC # BLD AUTO: 3.62 M/UL (ref 4–5.4)
SODIUM SERPL-SCNC: 139 MMOL/L (ref 136–145)
WBC # BLD AUTO: 7.09 K/UL (ref 3.9–12.7)

## 2022-02-15 PROCEDURE — 86160 COMPLEMENT ANTIGEN: CPT | Performed by: INTERNAL MEDICINE

## 2022-02-15 PROCEDURE — 80053 COMPREHEN METABOLIC PANEL: CPT | Performed by: INTERNAL MEDICINE

## 2022-02-15 PROCEDURE — 85652 RBC SED RATE AUTOMATED: CPT | Performed by: INTERNAL MEDICINE

## 2022-02-15 PROCEDURE — 36415 COLL VENOUS BLD VENIPUNCTURE: CPT | Mod: PO | Performed by: INTERNAL MEDICINE

## 2022-02-15 PROCEDURE — 86140 C-REACTIVE PROTEIN: CPT | Performed by: INTERNAL MEDICINE

## 2022-02-15 PROCEDURE — 85025 COMPLETE CBC W/AUTO DIFF WBC: CPT | Performed by: INTERNAL MEDICINE

## 2022-02-18 ENCOUNTER — HOSPITAL ENCOUNTER (OUTPATIENT)
Dept: RADIOLOGY | Facility: HOSPITAL | Age: 77
Discharge: HOME | End: 2022-02-18
Attending: INTERNAL MEDICINE
Payer: MEDICARE

## 2022-02-18 DIAGNOSIS — M79.672 LEFT FOOT PAIN: ICD-10-CM

## 2022-02-18 PROCEDURE — 73718 MRI LOWER EXTREMITY W/O DYE: CPT | Mod: 26,LT,, | Performed by: RADIOLOGY

## 2022-02-18 PROCEDURE — 73718 MRI LOWER EXTREMITY W/O DYE: CPT | Mod: TC,LT

## 2022-02-18 PROCEDURE — 73718 MRI FOOT (HINDFOOT) LEFT WITHOUT CONTRAST: ICD-10-PCS | Mod: 26,LT,, | Performed by: RADIOLOGY

## 2022-02-23 ENCOUNTER — IMMUNIZATION (OUTPATIENT)
Dept: PHARMACY | Facility: CLINIC | Age: 77
End: 2022-02-23
Payer: MEDICARE

## 2022-02-23 DIAGNOSIS — D84.9 IMMUNOSUPPRESSED STATUS: ICD-10-CM

## 2022-02-28 ENCOUNTER — EXTERNAL CHRONIC CARE MANAGEMENT (OUTPATIENT)
Dept: PRIMARY CARE CLINIC | Facility: CLINIC | Age: 77
End: 2022-02-28
Payer: MEDICARE

## 2022-02-28 PROCEDURE — 99439 CHRNC CARE MGMT STAF EA ADDL: CPT | Mod: S$PBB,,, | Performed by: FAMILY MEDICINE

## 2022-02-28 PROCEDURE — 99490 CHRNC CARE MGMT STAFF 1ST 20: CPT | Mod: S$PBB,,, | Performed by: FAMILY MEDICINE

## 2022-02-28 PROCEDURE — 99490 PR CHRONIC CARE MGMT, 1ST 20 MIN: ICD-10-PCS | Mod: S$PBB,,, | Performed by: FAMILY MEDICINE

## 2022-02-28 PROCEDURE — 99439 CHRNC CARE MGMT STAF EA ADDL: CPT | Mod: PBBFAC,27,PO | Performed by: FAMILY MEDICINE

## 2022-02-28 PROCEDURE — 99490 CHRNC CARE MGMT STAFF 1ST 20: CPT | Mod: PBBFAC,PO | Performed by: FAMILY MEDICINE

## 2022-02-28 PROCEDURE — 99439 PR CHRONIC CARE MGMT, EA ADDTL 20 MIN: ICD-10-PCS | Mod: S$PBB,,, | Performed by: FAMILY MEDICINE

## 2022-03-08 ENCOUNTER — OFFICE VISIT (OUTPATIENT)
Dept: PODIATRY | Facility: CLINIC | Age: 77
End: 2022-03-08
Payer: MEDICARE

## 2022-03-08 VITALS — HEIGHT: 62 IN | BODY MASS INDEX: 37 KG/M2 | WEIGHT: 201.06 LBS

## 2022-03-08 DIAGNOSIS — M21.42 PES PLANUS OF BOTH FEET: ICD-10-CM

## 2022-03-08 DIAGNOSIS — M20.41 HAMMER TOES OF BOTH FEET: ICD-10-CM

## 2022-03-08 DIAGNOSIS — M20.5X2 HALLUX LIMITUS, ACQUIRED, LEFT: ICD-10-CM

## 2022-03-08 DIAGNOSIS — M20.5X1 HALLUX LIMITUS, ACQUIRED, RIGHT: ICD-10-CM

## 2022-03-08 DIAGNOSIS — M19.079 ARTHROSIS OF MIDFOOT, UNSPECIFIED LATERALITY: Primary | ICD-10-CM

## 2022-03-08 DIAGNOSIS — M79.672 LEFT FOOT PAIN: ICD-10-CM

## 2022-03-08 DIAGNOSIS — M21.41 PES PLANUS OF BOTH FEET: ICD-10-CM

## 2022-03-08 DIAGNOSIS — M20.42 HAMMER TOES OF BOTH FEET: ICD-10-CM

## 2022-03-08 PROCEDURE — 99204 PR OFFICE/OUTPT VISIT, NEW, LEVL IV, 45-59 MIN: ICD-10-PCS | Mod: S$PBB,,, | Performed by: PODIATRIST

## 2022-03-08 PROCEDURE — 99214 OFFICE O/P EST MOD 30 MIN: CPT | Mod: PBBFAC,PO | Performed by: PODIATRIST

## 2022-03-08 PROCEDURE — 99204 OFFICE O/P NEW MOD 45 MIN: CPT | Mod: S$PBB,,, | Performed by: PODIATRIST

## 2022-03-08 PROCEDURE — 99999 PR PBB SHADOW E&M-EST. PATIENT-LVL IV: CPT | Mod: PBBFAC,,, | Performed by: PODIATRIST

## 2022-03-08 PROCEDURE — 99999 PR PBB SHADOW E&M-EST. PATIENT-LVL IV: ICD-10-PCS | Mod: PBBFAC,,, | Performed by: PODIATRIST

## 2022-03-08 RX ORDER — METHYLPREDNISOLONE 4 MG/1
TABLET ORAL
Qty: 21 TABLET | Refills: 0 | Status: SHIPPED | OUTPATIENT
Start: 2022-03-08 | End: 2022-10-14 | Stop reason: ALTCHOICE

## 2022-03-08 NOTE — PATIENT INSTRUCTIONS
Supplements for inflammation: Arnica Tabs, bromelain with tumeric, alpha lipoic acid, garlic     Over the counter pain creams: Biofreeze, Bengay, tiger balm, two old goat, lidocaine gel,  Absorbine Veterinary Liniment Gel Topical Analgesic Sore Muscle and Joint Pain Relief    Recommend lotions: eucerin, eucerin for diabetics, aquaphor, A&D ointment, gold bond for diabetics, sween, Jordin's Bees all purpose baby ointment,  urea 40 with aloe (found on amazon.com)    Shoe recommendations: (try 6pm.com, zappos.[email or web address] , nordstromrack.[email or web address], or shoes.Bigbasket.com for discounted prices) you can visit [employer] shoes in [place]  or [employer] in the [facility] (there are also several shoe brand outlets in the [facility])    Asics (GT 2000 or gel foundations), new balance stability type shoes (such as the 940 series), saucony (stabil c3),  Cadena (GTS or Beast or transcend), propet (tennis shoe)    Sofft Brand (women) Víctor&Berry (men), clarks, crocs, aerosoles, naturalizers, SAS, ecco, born, sherron amor, rockports (dress shoes)    Vionic, burkenstocks, fitflops, propet (sandals)  Nike comfort thong sandals, crocs, propet (house shoes)    Nail Home remedy:  Vicks Vapor rub or Emuaid to nails for easier manageability    Occasional soaks for 15-20 mins in luke warm water with 1/2 cup of listerine and 1 cup of apple cider vinegar are ok You may add several drops of oil of oregano or tea tree oil as well      What Is Arthritis in the Foot?  Degenerative arthritis is a condition that slowly wears away joints, the area where bones meet and move. In the beginning, you may notice that the affected joint seems stiff. It may even ache. As the joint lining (cartilage) breaks down, the bones rub against each other, causing pain and swelling. Over time, small pieces of rough or splintered bone (bone spurs) develop, and the joints range of motion becomes limited. But movement doesnt have to cause pain. The effects of arthritis can be reduced.    The  big-toe joint  When arthritis affects your big toe, your foot hurts when it pushes off the ground. Arthritis often appears in the big-toe joint along with a bunion (a bony bump at the side of the joint) or a bone spur on top of the joint.    Other joints  When arthritis affects the rear or midfoot joints, you feel pain when you put weight on your foot. Arthritis may affect the joint where the ankle and foot meet. It may also affect other joints nearby.  Date Last Reviewed: 7/1/2016 © 2000-2017 [employer]. [address] [place] [address]. All rights reserved. This information is not intended as a substitute for professional medical care. Always follow your healthcare professional's instructions.        Treating Arthritis in the Foot  If your symptoms are mild, medications may be enough to reduce pain and swelling. For more severe arthritis, surgery may be needed to improve the condition of the joint.    Medicine  Your doctor may prescribe medicine--pills or injections--to limit pain and swelling. Ice, aspirin, acetaminophen, or ibuprofen may help relieve mild symptoms that occur after activity.  Surgery and bone trimming  To ease movement and reduce pain, your doctor may trim damaged bone. If arthritis is severe, the joint may be fused or removed. If the bone is not damaged too badly, your doctor may simply shave away bone spurs. Any excess bone growth related to a bunion may also be trimmed.  Fusing joints  If damage is more severe, your doctor may fuse the joint to prevent the bones from rubbing. Afterward, staples, plates, or screws may hold the bones in place so they heal properly. In some cases, the joint may be removed and replaced with an implant.  After surgery  During the early stages of recovery, your foot is likely to be bandaged and immobilized for a while. For best results, follow up with your doctor as scheduled. These visits help ensure that your foot heals properly.  As you  heal  After surgery, youll be told how to care for your incision and how soon to begin walking on the foot. Until the foot can bear weight, you may need to walk with crutches or a cane.  For surgery on the big toe, your foot may be splinted to limit movement for several weeks. Despite this, you should be able to walk soon after surgery.  For surgery on rear or midfoot joints, you may need to wear a cast or surgical shoe. These joints are fairly large, so full recovery may take a few months. Once the bone has healed, any staples, plates, or screws may be removed.  Date Last Reviewed: 7/1/2016  © 8503-4836 [employer]. [address] [place] [address], [place] [address]. All rights reserved. This information is not intended as a substitute for professional medical care. Always follow your healthcare professional's instructions.        Foot Surgery: Degenerative Joint Disease    Degenerative joint disease (arthritis) often happens in the joint of a big toe. This bone growth may cause pain and stiffness in the joint. Left untreated, arthritis can break down the cartilage and destroy the joint. Your treatment choices depend on how damaged your joint is. There are many nonsurgical treatments, but if these are not helpful, surgery may be considered.    Cheilectomy  This is done when the arthritic joint and cartilage can be saved. A bone spur caused by arthritis may be symptomatic on the top of the big toe joint. The procedure involves removing this bone spur, usually with a small part of the top of the joint itself.  You will need to wear a surgical shoe for several weeks. Once the foot heals, joint movement is restored.    Fusion  In fusion, the cartilage and some bone on both sides of the joint are removed. Then, the big toe and metatarsal bones are held together with staples, screws, or a plate and screws. Your foot may be placed in a cast. While you heal, you will be asked not to bear weight on this foot. You may  also need crutches for several weeks. Because the joint has been removed, your toe will be less flexible.    Arthroplasty  During surgery, bone growth caused by the arthritis is trimmed, and part of the joint is removed. A pin can be used to align the bones and to keep them from touching. The pin is removed after several weeks. In some cases, the entire joint may be replaced with an implant. You may have to wear a splint or a surgical shoe for several weeks. When healed, the bones become connected with scar tissue.  Date Last Reviewed: 10/15/2015  © 5954-8315 [employer]. [address] [place] [address], [place] [address]. All rights reserved. This information is not intended as a substitute for professional medical care. Always follow your healthcare professional's instructions.

## 2022-03-08 NOTE — PROGRESS NOTES
Subjective:      Patient ID: Yulia Peralta is a 76 y.o. female.    Chief Complaint: Foot Pain (Left foot ) and Foot Problem    Yulia Peralta is a 76 y.o. female who presents to the podiatry clinic  with complaint of  left foot pain, especially with palpation and ambulation. Description: moderate and severe Nature: aching, burning, sharp and throbbing Location: midfoot Onset of the symptoms was several weeks ago. Precipitating event: none known.  History of injury: no Current symptoms include: ability to bear weight, but with some pain, stiffness, swelling and worsening symptoms after a period of activity. Alleviating factors: rest Symptoms have progressed to a point and plateaued. Patient has had prior foot problems. Patient was evaluated for this complaint by her interventional cardiologist. MRI ordered and PT pending.  She is also under care of rheumatology. Patients rates pain 8/10 on pain scale.  She has no claudication or tissue loss. She has known neuropathy secondary to low back pathology.    Patient Active Problem List   Diagnosis    Rheumatoid arthritis    Multiple thyroid nodules    Anemia of other chronic disease    Migraine headache    Acid reflux    HTN (hypertension)    GERD (gastroesophageal reflux disease)    Lumbar spondylosis    DDD (degenerative disc disease), lumbar    Spinal stenosis, lumbar region, with neurogenic claudication    Acquired spondylolisthesis    Genital herpes in women    Vitamin B 12 deficiency    Acquired scoliosis    Palpitations    Left shoulder pain    Immunosuppression    Anserine bursitis    Class 2 obesity due to excess calories in adult    Lumbosacral radiculopathy    Chest pain, atypical    Abnormal CT scan, chest    Bilateral ankle pain    Thymoma    Abnormal LFTs    Cerebral microvascular disease    Pain and swelling of left lower leg    Tortuous aorta    Nuclear sclerosis, bilateral    Refractive error    Severe obesity (BMI  35.0-39.9) with comorbidity    Cerebral aneurysm without rupture    Numbness and tingling of both legs below knees    Impacted cerumen of both ears    Light headedness    Nuclear sclerotic cataract of left eye    Nuclear sclerotic cataract of right eye    Idiopathic hypoparathyroidism    Decreased ROM of trunk and back    Decreased strength of trunk and back    Pain in both lower legs    Claudication of both lower extremities    Numbness and tingling of both lower extremities    Obesity (BMI 30-39.9)    PAD (peripheral artery disease)    Bilateral carotid artery stenosis    Mixed hyperlipidemia    Left foot pain    Gait instability       Current Outpatient Medications on File Prior to Visit   Medication Sig Dispense Refill    amLODIPine (NORVASC) 5 MG tablet Take 1 tablet (5 mg total) by mouth once daily. 30 tablet 1    atorvastatin (LIPITOR) 40 MG tablet Take 1 tablet (40 mg total) by mouth once daily. 90 tablet 3    fluticasone (FLONASE) 50 mcg/actuation nasal spray 1 spray (50 mcg total) by Each Nare route once daily. 16 g 5    folic acid (FOLVITE) 1 MG tablet TAKE TWO TABLETS BY MOUTH EVERY  tablet 3    furosemide (LASIX) 40 MG tablet Take 1 tablet (40 mg total) by mouth once daily. 90 tablet 1    gabapentin (NEURONTIN) 300 MG capsule Take 2 capsules (600 mg total) by mouth 3 (three) times daily. 180 capsule 11    hydrocortisone 2.5 % cream as needed.   0    irbesartan (AVAPRO) 300 MG tablet Take 1 tablet (300 mg total) by mouth every evening. 90 tablet 3    methotrexate 2.5 MG Tab TAKE FIVE TABLETS BY MOUTH ONCE A WEEK TWICE A DAY ON MONDAY 120 tablet 0    miSOPROStoL (CYTOTEC) 100 MCG Tab Take 1 tablet (100 mcg total) by mouth 2 (two) times daily. 120 tablet 5    nabumetone (RELAFEN) 500 MG tablet Take 1 tablet (500 mg total) by mouth 2 (two) times daily. 180 tablet 3    nystatin-triamcinolone (MYCOLOG II) cream Apply to affected area 2 times daily 90 g 3    pantoprazole  (PROTONIX) 40 MG tablet TAKE ONE TABLET BY MOUTH EVERY DAY 90 tablet 1    sars-cov-2, covid-19, (MODERNA COVID-19) 100 mcg/0.5 ml injection Inject into the muscle. 0.5 mL 0    triamcinolone acetonide 0.1% (KENALOG) 0.1 % cream Apply topically 3 (three) times daily. 80 g 1    valacyclovir (VALTREX) 500 MG tablet 1 Tablet DAILY for suppression, increase to BID for outbreak 180 tablet 2    varicella-zoster gE-AS01B, PF, (SHINGRIX) 50 mcg/0.5 mL injection Inject into the muscle. 1 each 1    azelastine (ASTELIN) 137 mcg (0.1 %) nasal spray 1 spray (137 mcg total) by Nasal route 2 (two) times daily. 30 mL 1    famotidine (PEPCID) 40 MG tablet Take 0.5 tablets (20 mg total) by mouth 2 (two) times daily as needed for Heartburn. 15 tablet 2     Current Facility-Administered Medications on File Prior to Visit   Medication Dose Route Frequency Provider Last Rate Last Admin    cyclopentolate 1% ophthalmic solution 1 drop  1 drop Left Eye On Call Procedure Elvis Pete MD   1 drop at 01/26/21 0914    ofloxacin 0.3 % ophthalmic solution 1 drop  1 drop Left Eye On Call Procedure Elvis Pete MD   1 drop at 01/26/21 0914    sodium chloride 0.9% flush 10 mL  10 mL Intravenous PRN Elvis Pete MD           Review of patient's allergies indicates:   Allergen Reactions    No known drug allergies        Past Surgical History:   Procedure Laterality Date    CATARACT EXTRACTION W/  INTRAOCULAR LENS IMPLANT Left 1/26/2021    Procedure: EXTRACTION, CATARACT, WITH IOL INSERTION;  Surgeon: Elvis Pete MD;  Location: [facility] OR;  Service: Ophthalmology;  Laterality: Left;    CATARACT EXTRACTION W/  INTRAOCULAR LENS IMPLANT Right 2/23/2021    Procedure: EXTRACTION, CATARACT, WITH IOL INSERTION;  Surgeon: Elvis Pete MD;  Location: [facility] OR;  Service: Ophthalmology;  Laterality: Right;    CEREBRAL ANGIOGRAM N/A 1/23/2020    Procedure: ANGIOGRAM-CEREBRAL;  Surgeon: Dosc Diagnostic  Provider;  Location: [facility] OR [address] [place] [address];  Service: Radiology;  Laterality: N/A;  /[place]    gallstones  4931-3196    HYSTERECTOMY      JOINT REPLACEMENT  [id number]     bilateral knee    OOPHORECTOMY      KS REMOVAL OF OVARY/TUBE(S)      TONSILLECTOMY         Family History   Problem Relation Age of Onset    Cataracts Mother     Hypertension Mother     Hypertension Sister     No Known Problems Father     Glaucoma Brother     Breast cancer Maternal Aunt     No Known Problems Maternal Uncle     No Known Problems Paternal Aunt     No Known Problems Paternal Uncle     No Known Problems Maternal Grandmother     No Known Problems Maternal Grandfather     No Known Problems Paternal Grandmother     No Known Problems Paternal Grandfather     Lupus Neg Hx     Rheum arthritis Neg Hx     Psoriasis Neg Hx     Amblyopia Neg Hx     Blindness Neg Hx     Cancer Neg Hx     Diabetes Neg Hx     Macular degeneration Neg Hx     Retinal detachment Neg Hx     Strabismus Neg Hx     Stroke Neg Hx     Thyroid disease Neg Hx        Social History     Socioeconomic History    Marital status:      Spouse name: Ric     Number of children: 2    Highest education level: Some college, no degree   Occupational History    Occupation: retired    Tobacco Use    Smoking status: Former Smoker    Smokeless tobacco: Never Used   Substance and Sexual Activity    Alcohol use: Yes     Comment: socially    Drug use: No    Sexual activity: Not Currently     Partners: Male   Social History Narrative    Adult Screenings updated and reviewed  6/12/14    Mammogram( for females) ordered for DIS    Pap ( for females) Dr Zepeda  Due for f/u   For  2014    Colonoscopy  Done once    Flu shot yearly done  2013    Td 2005    Pneumovax  Updated  12/3/13    Zostavax done 2012    Eye exam recommended yearly done 2013    Bone density  12/9/13    Thyroid ultrasound  11/6/2012 Normal sized thyroid containing several  "subcentimeter nodules, similar to the 5/12/11 exam.  No concerning nodules identified..          Social Determinants of Health     Financial Resource Strain: Low Risk     Difficulty of Paying Living Expenses: Not hard at all   Food Insecurity: No Food Insecurity    Worried About Running Out of Food in the Last Year: Never true    Ran Out of Food in the Last Year: Never true   Transportation Needs: No Transportation Needs    Lack of Transportation (Medical): No    Lack of Transportation (Non-Medical): No   Physical Activity: Inactive    Days of Exercise per Week: 0 days    Minutes of Exercise per Session: 0 min   Stress: No Stress Concern Present    Feeling of Stress : Only a little   Social Connections: Unknown    Frequency of Communication with Friends and Family: Three times a week    Frequency of Social Gatherings with Friends and Family: Three times a week    Active Member of Clubs or Organizations: No    Attends Club or Organization Meetings: Never    Marital Status:    Housing Stability: Unknown    Unable to Pay for Housing in the Last Year: No    Unstable Housing in the Last Year: No       Review of Systems   Constitutional: Negative for chills and fever.   Cardiovascular: Negative for claudication and leg swelling.   Respiratory: Negative for cough and shortness of breath.    Skin: Negative for itching and rash.   Musculoskeletal: Positive for arthritis, back pain, joint pain and myalgias. Negative for falls, joint swelling and muscle weakness.   Gastrointestinal: Negative for diarrhea, nausea and vomiting.   Neurological: Positive for paresthesias. Negative for numbness, tremors and weakness.   Psychiatric/Behavioral: Negative for altered mental status and hallucinations.           Objective:      Vitals:    03/08/22 0805   Weight: 91.2 kg (201 lb 1 oz)   Height: 5' 2" (1.575 m)   PainSc:   8   PainLoc: Foot       Physical Exam  Nursing note reviewed.   Constitutional:       General: " She is not in acute distress.     Appearance: She is not toxic-appearing or diaphoretic.   Cardiovascular:      Pulses:           Dorsalis pedis pulses are 2+ on the right side and 2+ on the left side.        Posterior tibial pulses are 2+ on the right side and 2+ on the left side.   Pulmonary:      Effort: No respiratory distress.   Musculoskeletal:      Right ankle: No swelling. No tenderness. No lateral malleolus, medial malleolus, AITF ligament, CF ligament or posterior TF ligament tenderness. Decreased range of motion.      Right Achilles Tendon: No defects. Rojo's test negative.      Left ankle: No swelling. No tenderness. No lateral malleolus, medial malleolus, AITF ligament, CF ligament or posterior TF ligament tenderness. Decreased range of motion.      Left Achilles Tendon: No defects. Rojo's test negative.      Right foot: Crepitus (midfoot) present. No bony tenderness.      Left foot: Swelling, tenderness and crepitus present. No bony tenderness.      Comments: Significant pain on palpation and manipulation of dorsomedial midfoot, left. + crepitus and edema.    There is equinus deformity bilateral with decreased dorsiflexion at the ankle joint bilateral.     Decreased arch height noted b/l. Negative too many toes sign.     Decreased first MPJ range of motion both weightbearing and nonweightbearing, no crepitus observed the first MP joint, + dorsal flag sign. Mild  bunion deformity is observed .    Patient has hammertoes of digits 2-5 bilateral partially reducible      Skin:     General: Skin is warm and dry.      Coloration: Skin is not pale.      Findings: No bruising, burn, laceration, lesion or rash.      Nails: There is no clubbing.   Neurological:      Sensory: No sensory deficit.      Motor: No tremor, atrophy or abnormal muscle tone.      Deep Tendon Reflexes: Reflexes are normal and symmetric.   Psychiatric:         Attention and Perception: She is attentive.         Mood and Affect: Mood  is not anxious. Affect is not inappropriate.         Speech: She is communicative. Speech is not slurred.         Behavior: Behavior is not combative.               Assessment:       Encounter Diagnoses   Name Primary?    Left foot pain     Arthrosis of midfoot, unspecified laterality Yes    Hallux limitus, acquired, right     Hallux limitus, acquired, left     Hammer toes of both feet     Pes planus of both feet          Plan:     Problem List Items Addressed This Visit     Left foot pain      Other Visit Diagnoses     Arthrosis of midfoot, unspecified laterality    -  Primary    Hallux limitus, acquired, right        Hallux limitus, acquired, left        Hammer toes of both feet        Pes planus of both feet             Orders Placed This Encounter    methylPREDNISolone (MEDROL DOSEPACK) 4 mg tablet       I counseled the patient on her conditions, their implications and medical management.    Personally reviewed previous imaging with patient. Significant midfoot arthritis noted to MRI of the left foot    Patient education on the chronic nature of arthralgias of the feet. Discussed non-surgical treatment options, including injection, supportive shoegear, inserts.     Patient instructed on adequate icing techniques. Patient should ice the affected area at least 10 minutes when inflammed. I advised the patient that extra icing would also be beneficial to ensure adequate anti inflammatory effect. I gave written and verbal instructions on stretching exercises. Patient expressed understanding. Discussed  wearing appropriate shoe gear and avoiding flats, slippers, sandals, and going barefoot. My recommendation for OTC supports is [employer].     We also discussed cortisone injections and NSAID therapy.     Rx Medrol    Physical therapy pending    RTC if no improvement, at this time she will receive cortisone injections. Will otherwise defer to rheumatology  Patient is amenable to plan.

## 2022-03-15 ENCOUNTER — CLINICAL SUPPORT (OUTPATIENT)
Dept: REHABILITATION | Facility: HOSPITAL | Age: 77
End: 2022-03-15
Attending: INTERNAL MEDICINE
Payer: MEDICARE

## 2022-03-15 DIAGNOSIS — R26.81 GAIT INSTABILITY: ICD-10-CM

## 2022-03-15 DIAGNOSIS — M79.672 LEFT FOOT PAIN: Primary | ICD-10-CM

## 2022-03-15 PROBLEM — R26.9 GAIT DIFFICULTY: Status: ACTIVE | Noted: 2022-02-10

## 2022-03-15 PROCEDURE — 97110 THERAPEUTIC EXERCISES: CPT | Mod: PN

## 2022-03-15 PROCEDURE — 97161 PT EVAL LOW COMPLEX 20 MIN: CPT | Mod: PN

## 2022-03-15 NOTE — PLAN OF CARE
[facility]   Physical Therapy Initial Evaluation     Date: 3/15/2022   Name: Yulia Peralta  Clinic Number: [id number]    Therapy Diagnosis:   Encounter Diagnoses   Name Primary?    Gait instability     Left foot pain Yes     Physician: Alfie Simeon MD*    Physician Orders: PT Eval and Treat   Medical Diagnosis from Referral: R26.81 (ICD-10-CM) - Gait instability  Evaluation Date: 3/15/2022  Authorization Period Expiration: 2/10/2023  Plan of Care Expiration: 6/13/2022  Progress Note Due: 4/15/2022  Visit # / Visits authorized: 1/ 1   FOTO: 1/3    Precautions: Standard     Time In: 1300  Time Out:1340   Total Appointment Time (timed & untimed codes): 40 minutes      SUBJECTIVE     Date of onset: 9/2021    History of current condition - Yulia reports: insidious onset of L foot pain which started 6 months ago and has limited patient's ability to walk and get around safely. Pt usually ambulates with cane, but did not bring it with her today. Pt reports that after any length of sitting, when she begins to walk to feels very unsteady in her R LE and after a few steps, she feels fine. The longer she sits the more sharp pain she experiences in her L foot. Hx of B TOTAL KNEE ARTHROPLASTY in 2000. Pt reports she infrequently feels like her L knee may buckle when she walks.     Falls:none reported    Imaging:  L Foot Xray  Impression:     1. Marked degenerative changes of the intertarsal joints, most pronounced at the talonavicular joint as above.  No evidence of talocalcaneal osseous coalition.  2. Achilles tendinosis.      Prior Therapy: healthy back program  Social History:  lives with their family  Occupation: retired  Prior Level of Function: Independent  Current Level of Function: limited by pain, balance deficits    Pain:  Current 5/10, worst 10/10, best 1/10   Location: left foot  Description: Aching, Grabbing and Sharp  Aggravating Factors: Sitting  Easing Factors: pain  medication and rest    Patients goals: reduce pain     Medical History:   Past Medical History:   Diagnosis Date    Abnormal colonoscopy 07/24/2020    colon polyps nad repeat in 3 years.     Acid reflux     Anemia     Anxiety     Arthritis     Blood transfusion     Cataract     Depression     Dry eyes     H/O colonoscopy 07/22/2020    dr. nicholas. repeat in 3 years.     Heart murmur     Hypertension     Left lumbar radiculitis 5/19/2015    Mixed hyperlipidemia 11/5/2021    Multiple thyroid nodules 12/18/2012    Osteoporosis     Rheumatoid arthritis     Thoracic or lumbosacral neuritis or radiculitis, unspecified 10/30/2013       Surgical History:   Yulia Peralta  has a past surgical history that includes Hysterectomy; Joint replacement (6739-7019); Tonsillectomy; gallstones (3648-6576); pr removal of ovary/tube(s); Cerebral angiogram (N/A, 1/23/2020); Oophorectomy; Cataract extraction w/  intraocular lens implant (Left, 1/26/2021); and Cataract extraction w/  intraocular lens implant (Right, 2/23/2021).    Medications:   Yulia has a current medication list which includes the following prescription(s): amlodipine, atorvastatin, azelastine, famotidine, fluticasone propionate, folic acid, furosemide, gabapentin, hydrocortisone, irbesartan, methotrexate, methylprednisolone, misoprostol, nabumetone, nystatin-triamcinolone, pantoprazole, sars-cov-2 (covid-19), triamcinolone acetonide 0.1%, valacyclovir, and varicella-zoster ge-[id number] (pf), and the following Facility-Administered Medications: cyclopentolate 1%, ofloxacin, and sodium chloride 0.9%.    Allergies:   Review of patient's allergies indicates:   Allergen Reactions    No known drug allergies           OBJECTIVE       Observation: pronation with gait, WBOS    Posture: forward head, rounded shoulders    Ankle Range of Motion:   Ankle Right Left   Dorsiflexion 0 0   Plantarflexion 45 40   Inversion 20 10   Eversion 10 5      Lower Extremity  Strength  Right LE  Left LE    Knee extension: 5/5 Knee extension: 5/5   Knee flexion: 5/5 Knee flexion: 4+/5   Hip flexion: 5/5 Hip flexion: 4+/5   Hip extension:  4/5 Hip extension: 4/5   Hip abduction: 4/5 Hip abduction: 4/5   Hip adduction: 5/5 Hip adduction 5/5   Ankle dorsiflexion: 5/5 Ankle dorsiflexion: 4/5   Ankle plantarflexion: 5/5 Ankle plantarflexion: 4/5   Ankle inversion: 5/5 Ankle inversion:  4-/5   Ankle eversion: 5/5 Ankle eversion: 4-/5     Special Tests:   Right Left   Posterior Drawer Test - -   Anterior Drawer Test - -   Squeeze test - -   Rojo test - -   Subtalar Neutral Pronation in standing Pronation in standing (L>R)   Heel walking NT NT   Toe walking NT NT     Gait: WBOS, pronated feet, antalgic gait    Joint Mobility: decreased subtalar joint play    Palpation: tenderness in L arch    Sensation: WNL    Single Leg Stance L LE: <5 seconds      Limitation/Restriction for FOTO MSK Survey    Therapist reviewed FOTO scores for Yulia Peralta on 3/15/2022.   FOTO documents entered into EPIC - see Media section.    Limitation Score: 52%         TREATMENT     Total Treatment time (time-based codes) separate from Evaluation: 10 minutes      Yulia received the treatments listed below:      therapeutic exercises to develop strength, ROM and flexibility for 10 minutes including:  Arch lifts, plantar fascia stretch with step and with towel, calf stretch        PATIENT EDUCATION AND HOME EXERCISES     Education provided:   -plan of care  - home exercise program    Written Home Exercises Provided: yes. Exercises were reviewed and Yulia was able to demonstrate them prior to the end of the session.  Yulia demonstrated good  understanding of the education provided. See EMR under Patient Instructions for exercises provided during therapy sessions.    ASSESSMENT     Yulia is a 76 y.o. female referred to outpatient Physical Therapy with a medical diagnosis of gait instability. Patient presents  with L foot pain, impaired static and dynamic balance, impaired subtalar RANGE OF MOTION, LE weakness which impairs ADL performance. Pt will benefit from skilled therapy to address these benefits and to improve quality of life. 2    Patient prognosis is Good.   Patient will benefit from skilled outpatient Physical Therapy to address the deficits stated above and in the chart below, provide patient /family education, and to maximize patientt's level of independence.     Plan of care discussed with patient: Yes  Patient's spiritual, cultural and educational needs considered and patient is agreeable to the plan of care and goals as stated below:     Anticipated Barriers for therapy: scheduling    Medical Necessity is demonstrated by the following  History  Co-morbidities and personal factors that may impact the plan of care Co-morbidities:   depression and HTN    Personal Factors:   age  lifestyle     moderate   Examination  Body Structures and Functions, activity limitations and participation restrictions that may impact the plan of care Body Regions:   lower extremities    Body Systems:    ROM  strength  gross coordinated movement  balance  gait    Participation Restrictions:   ADL performance2    Activity limitations:   Learning and applying knowledge  no deficits    General Tasks and Commands  no deficits    Communication  no deficits    Mobility  walking    Self care  dressing    Domestic Life  shopping  cooking  doing house work (cleaning house, washing dishes, laundry)    Interactions/Relationships  no deficits    Life Areas  no deficits    Community and Social Life  community life  recreation and leisure         low   Clinical Presentation stable and uncomplicated low   Decision Making/ Complexity Score: low     Medical necessity is demonstrated by the following IMPAIRMENTS/PROBLEM LIST:   1) Increase in pain level limiting function   2) Decreased subtalar ROM   3) Decreased LE strength   4) Impaired  balance   5) Lack of HEP    GOALS: Short Term Goals:  6 weeks in progress  1. Report decreased L ankle/foot pain  <  / =  5/10 at worst to increase tolerance for gait  2. Pt will perform 8 STS in 30 seconds without UEs and without offloading L LE to demonstrate improved functional strength for transfers  3. Pt will demonstrate 5 degree improvement in L ankle eversion to improve gait  4. Pt will exhibit 1/2 grade improvement in L ankle global strength  5. Pt to tolerate HEP to improve ROM and independence with ADL's.    Long Term Goals: 12 weeks in progress  1. Pt will perform 8 STS in 30 seconds without UEs and without offloading L LE to demonstrate improved functional strength for transfers  2. Pt will demonstrate 5 degree improvement in L ankle eversion to improve gait  3. Pt will exhibit 1/2 grade improvement in L ankle global strength  4. Pt will perform TUG without AD in <12 to demonstrate decreased fall risk   5. Pt to be Independent with HEP to improve ROM and independence with ADL's.        PLAN   Plan of care Certification: 3/15/2022 to 6/13/2022.    Outpatient Physical Therapy 2 times weekly for 2 weeks to include the following interventions: Gait Training, Manual Therapy, Moist Heat/ Ice, Neuromuscular Re-ed, Patient Education, Therapeutic Activities and Therapeutic Exercise.     Karen Sherman, PT      I CERTIFY THE NEED FOR THESE SERVICES FURNISHED UNDER THIS PLAN OF TREATMENT AND WHILE UNDER MY CARE   Physician's comments:     Physician's Signature: ___________________________________________________

## 2022-03-23 ENCOUNTER — TELEPHONE (OUTPATIENT)
Dept: FAMILY MEDICINE | Facility: CLINIC | Age: 77
End: 2022-03-23
Payer: MEDICARE

## 2022-03-23 ENCOUNTER — TELEPHONE (OUTPATIENT)
Dept: ADMINISTRATIVE | Facility: CLINIC | Age: 77
End: 2022-03-23
Payer: MEDICARE

## 2022-03-23 NOTE — TELEPHONE ENCOUNTER
----- Message from Dulce Gomez sent at 3/23/2022  7:43 AM CDT -----  Type: Patient Call Back       What is the request in detail:  pt calling to speak to a nurse regarding no show appt.     Can the clinic reply by [facility]? No       Would the patient rather a call back or a response via [facility]? Call back       Best call back number: [phone]        Thank you.

## 2022-03-25 ENCOUNTER — OFFICE VISIT (OUTPATIENT)
Dept: FAMILY MEDICINE | Facility: CLINIC | Age: 77
End: 2022-03-25
Payer: MEDICARE

## 2022-03-25 VITALS
WEIGHT: 196.63 LBS | OXYGEN SATURATION: 97 % | HEIGHT: 62 IN | BODY MASS INDEX: 36.18 KG/M2 | TEMPERATURE: 98 F | RESPIRATION RATE: 17 BRPM | DIASTOLIC BLOOD PRESSURE: 74 MMHG | HEART RATE: 74 BPM | SYSTOLIC BLOOD PRESSURE: 136 MMHG

## 2022-03-25 DIAGNOSIS — I73.9 PAD (PERIPHERAL ARTERY DISEASE): ICD-10-CM

## 2022-03-25 DIAGNOSIS — M05.79 RHEUMATOID ARTHRITIS INVOLVING MULTIPLE SITES WITH POSITIVE RHEUMATOID FACTOR: ICD-10-CM

## 2022-03-25 DIAGNOSIS — D84.9 IMMUNOSUPPRESSION: ICD-10-CM

## 2022-03-25 DIAGNOSIS — Z00.00 ENCOUNTER FOR PREVENTIVE HEALTH EXAMINATION: Primary | ICD-10-CM

## 2022-03-25 DIAGNOSIS — I77.1 TORTUOUS AORTA: ICD-10-CM

## 2022-03-25 PROBLEM — R20.0 NUMBNESS AND TINGLING OF BOTH LEGS BELOW KNEES: Status: RESOLVED | Noted: 2020-02-10 | Resolved: 2022-03-25

## 2022-03-25 PROBLEM — R20.2 NUMBNESS AND TINGLING OF BOTH LOWER EXTREMITIES: Status: RESOLVED | Noted: 2021-08-03 | Resolved: 2022-03-25

## 2022-03-25 PROBLEM — M79.89 PAIN AND SWELLING OF LEFT LOWER LEG: Status: RESOLVED | Noted: 2018-08-14 | Resolved: 2022-03-25

## 2022-03-25 PROBLEM — E20.0 IDIOPATHIC HYPOPARATHYROIDISM: Status: RESOLVED | Noted: 2021-03-23 | Resolved: 2022-03-25

## 2022-03-25 PROBLEM — R20.2 NUMBNESS AND TINGLING OF BOTH LEGS BELOW KNEES: Status: RESOLVED | Noted: 2020-02-10 | Resolved: 2022-03-25

## 2022-03-25 PROBLEM — M79.661 PAIN IN BOTH LOWER LEGS: Status: RESOLVED | Noted: 2021-08-03 | Resolved: 2022-03-25

## 2022-03-25 PROBLEM — M79.662 PAIN AND SWELLING OF LEFT LOWER LEG: Status: RESOLVED | Noted: 2018-08-14 | Resolved: 2022-03-25

## 2022-03-25 PROBLEM — M79.662 PAIN IN BOTH LOWER LEGS: Status: RESOLVED | Noted: 2021-08-03 | Resolved: 2022-03-25

## 2022-03-25 PROBLEM — R20.0 NUMBNESS AND TINGLING OF BOTH LOWER EXTREMITIES: Status: RESOLVED | Noted: 2021-08-03 | Resolved: 2022-03-25

## 2022-03-25 PROBLEM — M79.672 LEFT FOOT PAIN: Status: RESOLVED | Noted: 2022-02-10 | Resolved: 2022-03-25

## 2022-03-25 PROCEDURE — G0439 PPPS, SUBSEQ VISIT: HCPCS | Mod: ,,, | Performed by: PHYSICIAN ASSISTANT

## 2022-03-25 PROCEDURE — 99999 PR PBB SHADOW E&M-EST. PATIENT-LVL V: ICD-10-PCS | Mod: PBBFAC,,, | Performed by: PHYSICIAN ASSISTANT

## 2022-03-25 PROCEDURE — G0439 PR MEDICARE ANNUAL WELLNESS SUBSEQUENT VISIT: ICD-10-PCS | Mod: ,,, | Performed by: PHYSICIAN ASSISTANT

## 2022-03-25 PROCEDURE — 99215 OFFICE O/P EST HI 40 MIN: CPT | Mod: PBBFAC,PO | Performed by: PHYSICIAN ASSISTANT

## 2022-03-25 PROCEDURE — 99999 PR PBB SHADOW E&M-EST. PATIENT-LVL V: CPT | Mod: PBBFAC,,, | Performed by: PHYSICIAN ASSISTANT

## 2022-03-25 RX ORDER — ASPIRIN 81 MG/1
81 TABLET ORAL DAILY
COMMUNITY

## 2022-03-25 NOTE — PROGRESS NOTES
"  Yulia Peralta presented for a  Medicare AWV and comprehensive Health Risk Assessment today. The following components were reviewed and updated:    · Medical history  · Family History  · Social history  · Allergies and Current Medications  · Health Risk Assessment  · Health Maintenance  · Care Team         ** See Completed Assessments for Annual Wellness Visit within the encounter summary.**         The following assessments were completed:  · Living Situation  · CAGE  · Depression Screening  · Timed Get Up and Go  · Whisper Test  · Cognitive Function Screening  · Nutrition Screening  · ADL Screening  · PAQ Screening        Vitals:    03/25/22 0932   BP: 136/74   BP Location: Right arm   Patient Position: Sitting   BP Method: Small (Manual)   Pulse: 74   Resp: 17   Temp: 97.7 °F (36.5 °C)   SpO2: 97%   Weight: 89.2 kg (196 lb 10.4 oz)   Height: 5' 2" (1.575 m)     Body mass index is 35.97 kg/m².  Physical Exam          Diagnoses and health risks identified today and associated recommendations/orders:    1. Encounter for preventive health examination  Provided Yulia with a 5-10 year written screening schedule and personal prevention plan. Recommendations were developed using the USPSTF age appropriate recommendations. Education, counseling, and referrals were provided as needed. After Visit Summary printed and given to patient which includes a list of additional screenings\tests needed.    2. Tortuous aorta  Cont aspirin    3. PAD (peripheral artery disease)  Followed by vascular    4. Rheumatoid arthritis involving multiple sites with positive rheumatoid factor  *followed by rheum    5. Immunosuppression  stable      No follow-ups on file.    Maribel Cazares PA-C  I offered to discuss advanced care planning, including how to pick a person who would make decisions for you if you were unable to make them for yourself, called a health care power of , and what kind of decisions you might make such as use of " life sustaining treatments such as ventilators and tube feeding when faced with a life limiting illness recorded on a living will that they will need to know. (How you want to be cared for as you near the end of your natural life)     X Patient is interested in learning more about how to make advanced directives.  I provided them paperwork and offered to discuss this with them.

## 2022-03-25 NOTE — PATIENT INSTRUCTIONS
Counseling and Referral of Other Preventative  (Italic type indicates deductible and co-insurance are waived)    Patient Name: Yulia Peralta  Today's Date: 3/25/2022    Health Maintenance       Date Due Completion Date    Aspirin/Antiplatelet Therapy Never done ---    Shingles Vaccine (3 of 3) 02/17/2022 12/23/2021    COVID-19 Vaccine (4 - Booster for Moderna series) 03/21/2022 10/21/2021    DEXA Scan 01/02/2023 1/2/2020    TETANUS VACCINE 11/09/2031 11/9/2021        No orders of the defined types were placed in this encounter.    The following information is provided to all patients.  This information is to help you find resources for any of the problems found today that may be affecting your health:                Living healthy guide: www.[facility].[place].gov      Understanding Diabetes: www.diabetes.org      Eating healthy: www.cdc.gov/healthyweight      [facility] home safety checklist: www.cdc.gov/steadi/patient.html      Agency on Aging: www.goea.[place].[facility]      Alcoholics anonymous (AA): www.aa.org      Physical Activity: www.travis.nih.gov/ni0czvx      Tobacco use: www.quitwithusla.org

## 2022-03-28 ENCOUNTER — OFFICE VISIT (OUTPATIENT)
Dept: VASCULAR SURGERY | Facility: CLINIC | Age: 77
End: 2022-03-28
Payer: MEDICARE

## 2022-03-28 VITALS — SYSTOLIC BLOOD PRESSURE: 132 MMHG | DIASTOLIC BLOOD PRESSURE: 78 MMHG

## 2022-03-28 DIAGNOSIS — I70.203 BILATERAL ATHEROSCLEROSIS OF LEGS: Primary | ICD-10-CM

## 2022-03-28 DIAGNOSIS — I65.23 CAROTID STENOSIS, ASYMPTOMATIC, BILATERAL: ICD-10-CM

## 2022-03-28 DIAGNOSIS — M79.605 LEFT LEG PAIN: ICD-10-CM

## 2022-03-28 PROCEDURE — 99214 OFFICE O/P EST MOD 30 MIN: CPT | Mod: S$PBB,,, | Performed by: SURGERY

## 2022-03-28 PROCEDURE — 99214 PR OFFICE/OUTPT VISIT, EST, LEVL IV, 30-39 MIN: ICD-10-PCS | Mod: S$PBB,,, | Performed by: SURGERY

## 2022-03-28 PROCEDURE — 99213 OFFICE O/P EST LOW 20 MIN: CPT | Mod: PBBFAC | Performed by: SURGERY

## 2022-03-28 PROCEDURE — 99999 PR PBB SHADOW E&M-EST. PATIENT-LVL III: ICD-10-PCS | Mod: PBBFAC,,, | Performed by: SURGERY

## 2022-03-28 PROCEDURE — 99999 PR PBB SHADOW E&M-EST. PATIENT-LVL III: CPT | Mod: PBBFAC,,, | Performed by: SURGERY

## 2022-03-28 NOTE — PROGRESS NOTES
Pedro Luis Sandhu MD [facility]                         03/28/2022    HPI:  Yulia Peralta is a 76 y.o. female with   Patient Active Problem List   Diagnosis    Rheumatoid arthritis    Multiple thyroid nodules    Anemia of other chronic disease    Migraine headache    HTN (hypertension)    GERD (gastroesophageal reflux disease)    Lumbar spondylosis    DDD (degenerative disc disease), lumbar    Spinal stenosis, lumbar region, with neurogenic claudication    Acquired spondylolisthesis    Genital herpes in women    Vitamin B 12 deficiency    Acquired scoliosis    Palpitations    Immunosuppression    Anserine bursitis    Class 2 obesity due to excess calories in adult    Lumbosacral radiculopathy    Abnormal CT scan, chest    Thymoma    Cerebral microvascular disease    Tortuous aorta    Nuclear sclerosis, bilateral    Refractive error    Severe obesity (BMI 35.0-39.9) with comorbidity    Cerebral aneurysm without rupture    Impacted cerumen of both ears    Light headedness    Nuclear sclerotic cataract of left eye    Nuclear sclerotic cataract of right eye    Decreased ROM of trunk and back    Decreased strength of trunk and back    Claudication of both lower extremities    Obesity (BMI 30-39.9)    PAD (peripheral artery disease)    Bilateral carotid artery stenosis    Mixed hyperlipidemia    Gait difficulty    being managed by PCP and specialists who is here today for evaluation of PVD.  Patient has no complaints of claudication.  Patient states location is LLE occurring for months.  Associated signs and symptoms include chronic back pain.  Quality is sharp and severity is 7/10.  Symptoms began weeks to months ago.  Alleviating factors include walking.  Worsening factors include resting.  no rest pain.  no tissue loss.  Patient is not diabetic.  Is not on Pletal.  no previous lower extremity interventions.    Patient has no  cerebrovascular complaints today.  She has no history of stroke or mini stroke.  No abdominal pain.  She has chronic back pain that is being managed by Dr. Curry and she is in the healthy back program.  She has complaints of left distal lower extremity sharp pain at rest.  It improves with ambulation.  She denies claudication, rest pain or wounds.    no MI  no Stroke  Tobacco use: denies  Daily Aspirin: yes  Anticoagulation: no    3/2022:  Patient feels better without significant back pain.  She does have severe left foot pain that is induced with standing with unsteady gait and it radiates upwards to her knee.  She completed the healthy back program although has not followed up with pain or nerve surgery per Dr. Brar recommendations.  She denies stroke or mini stroke.  She is compliant with all her medications.  She saw Podiatry for the foot pain and they recommended steroid pack and support shoes.    Past Medical History:   Diagnosis Date    Abnormal colonoscopy 07/24/2020    colon polyps nad repeat in 3 years.     Acid reflux     Anemia     Anxiety     Arthritis     Blood transfusion     Cataract     Depression     Dry eyes     H/O colonoscopy 07/22/2020    dr. nicholas. repeat in 3 years.     Heart murmur     Hypertension     Left lumbar radiculitis 5/19/2015    Mixed hyperlipidemia 11/5/2021    Multiple thyroid nodules 12/18/2012    Osteoporosis     Rheumatoid arthritis     Thoracic or lumbosacral neuritis or radiculitis, unspecified 10/30/2013     Past Surgical History:   Procedure Laterality Date    CATARACT EXTRACTION W/  INTRAOCULAR LENS IMPLANT Left 1/26/2021    Procedure: EXTRACTION, CATARACT, WITH IOL INSERTION;  Surgeon: Elvis Pete MD;  Location: [facility] OR;  Service: Ophthalmology;  Laterality: Left;    CATARACT EXTRACTION W/  INTRAOCULAR LENS IMPLANT Right 2/23/2021    Procedure: EXTRACTION, CATARACT, WITH IOL INSERTION;  Surgeon: Elvis Pete MD;  Location: [facility]  OR;  Service: Ophthalmology;  Laterality: Right;    CEREBRAL ANGIOGRAM N/A 1/23/2020    Procedure: ANGIOGRAM-CEREBRAL;  Surgeon: Hira Diagnostic Provider;  Location: [facility] OR [address] [place] [address];  Service: Radiology;  Laterality: N/A;  /[place]    gallstones  [id number]    HYSTERECTOMY      JOINT REPLACEMENT  [id number]     bilateral knee    OOPHORECTOMY      MS REMOVAL OF OVARY/TUBE(S)      TONSILLECTOMY       Family History   Problem Relation Age of Onset    Cataracts Mother     Hypertension Mother     No Known Problems Father     Hypertension Sister     Glaucoma Brother     Breast cancer Maternal Aunt     No Known Problems Maternal Uncle     No Known Problems Paternal Aunt     No Known Problems Paternal Uncle     No Known Problems Maternal Grandmother     No Known Problems Maternal Grandfather     No Known Problems Paternal Grandmother     No Known Problems Paternal Grandfather     Lupus Neg Hx     Rheum arthritis Neg Hx     Psoriasis Neg Hx     Amblyopia Neg Hx     Blindness Neg Hx     Cancer Neg Hx     Diabetes Neg Hx     Macular degeneration Neg Hx     Retinal detachment Neg Hx     Strabismus Neg Hx     Stroke Neg Hx     Thyroid disease Neg Hx      Social History     Socioeconomic History    Marital status:      Spouse name: Ric     Number of children: 2    Highest education level: Some college, no degree   Occupational History    Occupation: retired    Tobacco Use    Smoking status: Former Smoker     Packs/day: 0.25     Years: 1.00     Pack years: 0.25    Smokeless tobacco: Never Used   Substance and Sexual Activity    Alcohol use: Yes     Alcohol/week: 2.0 standard drinks     Types: 1 Glasses of wine, 1 Cans of beer per week     Comment: very seldom    Drug use: No    Sexual activity: Not Currently     Partners: Male   Social History Narrative    Adult Screenings updated and reviewed  6/12/14    Mammogram( for females) ordered for DIS    Pap ( for females) Dr Zepeda   Due for f/u   For  2014    Colonoscopy  Done once    Flu shot yearly done  2013    Td 2005    Pneumovax  Updated  12/3/13    Zostavax done 2012    Eye exam recommended yearly done 2013    Bone density  12/9/13    Thyroid ultrasound  11/6/2012 Normal sized thyroid containing several subcentimeter nodules, similar to the 5/12/11 exam.  No concerning nodules identified..          Social Determinants of Health     Financial Resource Strain: Low Risk     Difficulty of Paying Living Expenses: Not hard at all   Food Insecurity: No Food Insecurity    Worried About Running Out of Food in the Last Year: Never true    Ran Out of Food in the Last Year: Never true   Transportation Needs: No Transportation Needs    Lack of Transportation (Medical): No    Lack of Transportation (Non-Medical): No   Physical Activity: Inactive    Days of Exercise per Week: 0 days    Minutes of Exercise per Session: 0 min   Stress: No Stress Concern Present    Feeling of Stress : Only a little   Social Connections: Moderately Integrated    Frequency of Communication with Friends and Family: More than three times a week    Frequency of Social Gatherings with Friends and Family: More than three times a week    Attends Pent[employer] Services: More than 4 times per year    Active Member of Clubs or Organizations: No    Marital Status:    Housing Stability: Unknown    Unable to Pay for Housing in the Last Year: No    Unstable Housing in the Last Year: No       Current Outpatient Medications:     amLODIPine (NORVASC) 5 MG tablet, Take 1 tablet (5 mg total) by mouth once daily., Disp: 30 tablet, Rfl: 1    aspirin (ECOTRIN) 81 MG EC tablet, Take 81 mg by mouth once daily., Disp: , Rfl:     atorvastatin (LIPITOR) 40 MG tablet, Take 1 tablet (40 mg total) by mouth once daily., Disp: 90 tablet, Rfl: 3    fluticasone (FLONASE) 50 mcg/actuation nasal spray, 1 spray (50 mcg total) by Each Nare route once daily., Disp: 16 g, Rfl: 5    folic  acid (FOLVITE) 1 MG tablet, TAKE TWO TABLETS BY MOUTH EVERY DAY, Disp: 180 tablet, Rfl: 3    furosemide (LASIX) 40 MG tablet, Take 1 tablet (40 mg total) by mouth once daily., Disp: 90 tablet, Rfl: 1    gabapentin (NEURONTIN) 300 MG capsule, Take 2 capsules (600 mg total) by mouth 3 (three) times daily., Disp: 180 capsule, Rfl: 11    hydrocortisone 2.5 % cream, as needed. , Disp: , Rfl: 0    irbesartan (AVAPRO) 300 MG tablet, Take 1 tablet (300 mg total) by mouth every evening., Disp: 90 tablet, Rfl: 3    methotrexate 2.5 MG Tab, TAKE FIVE TABLETS BY MOUTH ONCE A WEEK TWICE A DAY ON MONDAY, Disp: 120 tablet, Rfl: 0    methylPREDNISolone (MEDROL DOSEPACK) 4 mg tablet, follow package directions, Disp: 21 tablet, Rfl: 0    miSOPROStoL (CYTOTEC) 100 MCG Tab, Take 1 tablet (100 mcg total) by mouth 2 (two) times daily., Disp: 120 tablet, Rfl: 5    nabumetone (RELAFEN) 500 MG tablet, Take 1 tablet (500 mg total) by mouth 2 (two) times daily., Disp: 180 tablet, Rfl: 3    nystatin-triamcinolone (MYCOLOG II) cream, Apply to affected area 2 times daily, Disp: 90 g, Rfl: 3    pantoprazole (PROTONIX) 40 MG tablet, TAKE ONE TABLET BY MOUTH EVERY DAY, Disp: 90 tablet, Rfl: 1    sars-cov-2, covid-19, (MODERNA COVID-19) 100 mcg/0.5 ml injection, Inject into the muscle., Disp: 0.5 mL, Rfl: 0    triamcinolone acetonide 0.1% (KENALOG) 0.1 % cream, Apply topically 3 (three) times daily., Disp: 80 g, Rfl: 1    valacyclovir (VALTREX) 500 MG tablet, 1 Tablet DAILY for suppression, increase to BID for outbreak, Disp: 180 tablet, Rfl: 2    varicella-zoster gE-AS01B, PF, (SHINGRIX) 50 mcg/0.5 mL injection, Inject into the muscle., Disp: 1 each, Rfl: 1    azelastine (ASTELIN) 137 mcg (0.1 %) nasal spray, 1 spray (137 mcg total) by Nasal route 2 (two) times daily., Disp: 30 mL, Rfl: 1    famotidine (PEPCID) 40 MG tablet, Take 0.5 tablets (20 mg total) by mouth 2 (two) times daily as needed for Heartburn., Disp: 15 tablet, Rfl:  2  No current facility-administered medications for this visit.    Facility-Administered Medications Ordered in Other Visits:     cyclopentolate 1% ophthalmic solution 1 drop, 1 drop, Left Eye, On Call Procedure, Elvis Pete MD, 1 drop at 01/26/21 0914    ofloxacin 0.3 % ophthalmic solution 1 drop, 1 drop, Left Eye, On Call Procedure, Elvis Pete MD, 1 drop at 01/26/21 0914    sodium chloride 0.9% flush 10 mL, 10 mL, Intravenous, PRN, Elvis Pete MD    REVIEW OF SYSTEMS:  General: No fevers or chills; ENT: No sore throat; Allergy and Immunology: no persistent infections; Hematological and Lymphatic: No history of bleeding or easy bruising; Endocrine: negative; Respiratory: no cough, shortness of breath, or wheezing; Cardiovascular: no chest pain or dyspnea on exertion; no claudication, no rest pain; Gastrointestinal: no abdominal pain/back, change in bowel habits, or bloody stools; Genito-Urinary: no dysuria, trouble voiding, or hematuria; Musculoskeletal: negative, no wound; Neurological: no TIA or stroke symptoms; Psychiatric: no nervousness, anxiety or depression.    PHYSICAL EXAM:                General appearance:  Alert, well-appearing, and in no distress.  Oriented to person, place, and time                    Neurological: Normal speech, no focal findings noted; CN II - XII grossly intact. RLE with sensation to light touch, LLE with sensation to light touch.            Musculoskeletal: Digits/nail without cyanosis/clubbing.  Strength 5/5 BLE.                    Neck: Supple, no significant adenopathy, no carotid bruit can be auscultated                  Chest:  Clear to auscultation, no wheezes, rales or rhonchi, symmetric air entry. No use of accessory muscles               Cardiac: Normal rate and regular rhythm, S1 and S2 normal            Abdomen: Soft, nontender, nondistended, no masses or organomegaly, no hernia     No rebound tenderness noted; bowel sounds  normal     Pulsatile aortic mass is non palpable.     No groin adenopathy      Extremities:2+ R femoral pulse, 2+ L femoral pulse     2+ R popliteal pulse, 2+ L popliteal pulse     2+ R PT pulse, 2+ L PT pulse     2+ R DP pulse, 2+ L DP pulse     1+ RLE edema, 1+ LLE edema    Skin: RLE no tissue loss; LLE no tissue loss    LAB RESULTS:  No results found for: CBC  Lab Results   Component Value Date    LABPROT 10.2 01/23/2020    INR 1.0 01/23/2020     Lab Results   Component Value Date     02/15/2022    K 4.3 02/15/2022     02/15/2022    CO2 27 02/15/2022    GLU 99 02/15/2022    BUN 12 02/15/2022    CREATININE 0.8 02/15/2022    CALCIUM 10.3 02/15/2022    ANIONGAP 10 02/15/2022    EGFRNONAA >60.0 02/15/2022     Lab Results   Component Value Date    WBC 7.09 02/15/2022    RBC 3.62 (L) 02/15/2022    HGB 11.1 (L) 02/15/2022    HCT 34.9 (L) 02/15/2022    MCV 96 02/15/2022    MCH 30.7 02/15/2022    MCHC 31.8 (L) 02/15/2022    RDW 13.4 02/15/2022     02/15/2022    MPV 13.7 (H) 02/15/2022    GRAN 4.1 02/15/2022    GRAN 57.8 02/15/2022    LYMPH 1.6 02/15/2022    LYMPH 22.0 02/15/2022    MONO 1.1 (H) 02/15/2022    MONO 15.8 (H) 02/15/2022    EOS 0.2 02/15/2022    BASO 0.03 02/15/2022    EOSINOPHIL 3.2 02/15/2022    BASOPHIL 0.4 02/15/2022    DIFFMETHOD Automated 02/15/2022     .  Lab Results   Component Value Date    HGBA1C 5.3 11/16/2020       IMAGING:  All pertinent imaging has been reviewed and interpreted independently.    ONESIMO 8/13/21:  Normal rest and exercise ONESIMO bilaterally.  Normal PVR waveforms bilaterally.    IMP/PLAN:  76 y.o. female with   Patient Active Problem List   Diagnosis    Rheumatoid arthritis    Multiple thyroid nodules    Anemia of other chronic disease    Migraine headache    HTN (hypertension)    GERD (gastroesophageal reflux disease)    Lumbar spondylosis    DDD (degenerative disc disease), lumbar    Spinal stenosis, lumbar region, with neurogenic claudication    Acquired  spondylolisthesis    Genital herpes in women    Vitamin B 12 deficiency    Acquired scoliosis    Palpitations    Immunosuppression    Anserine bursitis    Class 2 obesity due to excess calories in adult    Lumbosacral radiculopathy    Abnormal CT scan, chest    Thymoma    Cerebral microvascular disease    Tortuous aorta    Nuclear sclerosis, bilateral    Refractive error    Severe obesity (BMI 35.0-39.9) with comorbidity    Cerebral aneurysm without rupture    Impacted cerumen of both ears    Light headedness    Nuclear sclerotic cataract of left eye    Nuclear sclerotic cataract of right eye    Decreased ROM of trunk and back    Decreased strength of trunk and back    Claudication of both lower extremities    Obesity (BMI 30-39.9)    PAD (peripheral artery disease)    Bilateral carotid artery stenosis    Mixed hyperlipidemia    Gait difficulty    being managed by PCP and specialists who is here today for evaluation of PVD.    -imaging reviewed without hemodynamically significant stenosis bilateral lower extremity or evidence of significant stenosis on CT angiogram. - no vascular surgical intervention indicated at this time  -Cont back pain mgmt - will be referred to Pain and have her follow back up with Dr. Curry.  -bilateral lower extremity pitting edema, asymptomatic-recommend compression with stockings, elevation, dietary changes associated with water and sodium intake discussed at length with patient  -continue daily ASA  -Exercise  -Heart healthy lifestyle  -RTC 5 mo with ABIs    I spent 15 minutes evaluating this patient and greater than 50% of the time was spent counseling, coordinator care and discussing the plan of care.  All questions were answered and patient stated understanding with agreement with the above treatment plan.    Pedro Luis Sandhu MD [facility]  Vascular and Endovascular Surgery

## 2022-03-29 ENCOUNTER — OFFICE VISIT (OUTPATIENT)
Dept: FAMILY MEDICINE | Facility: CLINIC | Age: 77
End: 2022-03-29
Payer: MEDICARE

## 2022-03-29 VITALS
BODY MASS INDEX: 35.49 KG/M2 | OXYGEN SATURATION: 98 % | TEMPERATURE: 98 F | WEIGHT: 180.75 LBS | DIASTOLIC BLOOD PRESSURE: 64 MMHG | HEART RATE: 88 BPM | HEIGHT: 60 IN | SYSTOLIC BLOOD PRESSURE: 134 MMHG

## 2022-03-29 DIAGNOSIS — K21.9 GASTROESOPHAGEAL REFLUX DISEASE WITHOUT ESOPHAGITIS: ICD-10-CM

## 2022-03-29 DIAGNOSIS — E66.01 SEVERE OBESITY (BMI 35.0-39.9) WITH COMORBIDITY: Primary | ICD-10-CM

## 2022-03-29 DIAGNOSIS — I10 ESSENTIAL HYPERTENSION: ICD-10-CM

## 2022-03-29 PROCEDURE — 99999 PR PBB SHADOW E&M-EST. PATIENT-LVL IV: CPT | Mod: PBBFAC,,, | Performed by: FAMILY MEDICINE

## 2022-03-29 PROCEDURE — 99999 PR PBB SHADOW E&M-EST. PATIENT-LVL IV: ICD-10-PCS | Mod: PBBFAC,,, | Performed by: FAMILY MEDICINE

## 2022-03-29 PROCEDURE — 99214 PR OFFICE/OUTPT VISIT, EST, LEVL IV, 30-39 MIN: ICD-10-PCS | Mod: S$PBB,,, | Performed by: FAMILY MEDICINE

## 2022-03-29 PROCEDURE — 99214 OFFICE O/P EST MOD 30 MIN: CPT | Mod: PBBFAC,PO | Performed by: FAMILY MEDICINE

## 2022-03-29 PROCEDURE — 99214 OFFICE O/P EST MOD 30 MIN: CPT | Mod: S$PBB,,, | Performed by: FAMILY MEDICINE

## 2022-03-29 RX ORDER — PANTOPRAZOLE SODIUM 40 MG/1
40 TABLET, DELAYED RELEASE ORAL DAILY
Qty: 90 TABLET | Refills: 1 | Status: SHIPPED | OUTPATIENT
Start: 2022-03-29 | End: 2022-10-10 | Stop reason: SDUPTHER

## 2022-03-29 RX ORDER — AMLODIPINE BESYLATE 5 MG/1
5 TABLET ORAL DAILY
Qty: 30 TABLET | Refills: 1 | Status: SHIPPED | OUTPATIENT
Start: 2022-03-29 | End: 2022-05-18

## 2022-03-29 RX ORDER — IRBESARTAN 300 MG/1
300 TABLET ORAL NIGHTLY
Qty: 90 TABLET | Refills: 3 | Status: SHIPPED | OUTPATIENT
Start: 2022-03-29 | End: 2023-05-19

## 2022-03-29 NOTE — PROGRESS NOTES
Subjective:       Patient ID: Yulia Peralta is a 76 y.o. female.    Chief Complaint: Neck Pain    76 year old female presents for follow up. She is now seeing Dr. Salguero for cardiology.      She also had 3 weeks of right sided neck pain. It aches and has shooting pains. She states the pain has started to improve.     She has bilateral lower back and hip pain. She states she did the healthy back program, which has helped with her back pain. She states the back pain has improved, but now has left foot pain, which was determined to be arthritis.   '  She would also like to try a medication for weight loss.     Past Medical History:   Diagnosis Date    Abnormal colonoscopy 07/24/2020    colon polyps nad repeat in 3 years.     Acid reflux     Anemia     Anxiety     Arthritis     Blood transfusion     Cataract     Depression     Dry eyes     H/O colonoscopy 07/22/2020    dr. nicholas. repeat in 3 years.     Heart murmur     Hypertension     Left lumbar radiculitis 5/19/2015    Mixed hyperlipidemia 11/5/2021    Multiple thyroid nodules 12/18/2012    Osteoporosis     Rheumatoid arthritis     Thoracic or lumbosacral neuritis or radiculitis, unspecified 10/30/2013      Past Surgical History:   Procedure Laterality Date    CATARACT EXTRACTION W/  INTRAOCULAR LENS IMPLANT Left 1/26/2021    Procedure: EXTRACTION, CATARACT, WITH IOL INSERTION;  Surgeon: Elvis Pete MD;  Location: [facility];  Service: Ophthalmology;  Laterality: Left;    CATARACT EXTRACTION W/  INTRAOCULAR LENS IMPLANT Right 2/23/2021    Procedure: EXTRACTION, CATARACT, WITH IOL INSERTION;  Surgeon: Elvis Pete MD;  Location: [facility] OR;  Service: Ophthalmology;  Laterality: Right;    CEREBRAL ANGIOGRAM N/A 1/23/2020    Procedure: ANGIOGRAM-CEREBRAL;  Surgeon: [facility] Diagnostic Provider;  Location: [address];  Service: Radiology;  Laterality: N/A;  /[place]    gallstones  [id number]    HYSTERECTOMY      JOINT  REPLACEMENT  [id number]     bilateral knee    OOPHORECTOMY      [place] REMOVAL OF OVARY/TUBE(S)      TONSILLECTOMY       Family History   Problem Relation Age of Onset    Cataracts Mother     Hypertension Mother     No Known Problems Father     Hypertension Sister     Glaucoma Brother     Breast cancer Maternal Aunt     No Known Problems Maternal Uncle     No Known Problems Paternal Aunt     No Known Problems Paternal Uncle     No Known Problems Maternal Grandmother     No Known Problems Maternal Grandfather     No Known Problems Paternal Grandmother     No Known Problems Paternal Grandfather     Lupus Neg Hx     Rheum arthritis Neg Hx     Psoriasis Neg Hx     Amblyopia Neg Hx     Blindness Neg Hx     Cancer Neg Hx     Diabetes Neg Hx     Macular degeneration Neg Hx     Retinal detachment Neg Hx     Strabismus Neg Hx     Stroke Neg Hx     Thyroid disease Neg Hx      Social History     Socioeconomic History    Marital status:      Spouse name: Ric     Number of children: 2    Highest education level: Some college, no degree   Occupational History    Occupation: retired    Tobacco Use    Smoking status: Former Smoker     Packs/day: 0.25     Years: 1.00     Pack years: 0.25    Smokeless tobacco: Never Used   Substance and Sexual Activity    Alcohol use: Yes     Alcohol/week: 2.0 standard drinks     Types: 1 Glasses of wine, 1 Cans of beer per week     Comment: very seldom    Drug use: No    Sexual activity: Not Currently     Partners: Male   Social History Narrative    Adult Screenings updated and reviewed  6/12/14    Mammogram( for females) ordered for DIS    Pap ( for females) Dr Zepeda  Due for f/u   For  2014    Colonoscopy  Done once    Flu shot yearly done  2013    Td 2005    Pneumovax  Updated  12/3/13    Zostavax done 2012    Eye exam recommended yearly done 2013    Bone density  12/9/13    Thyroid ultrasound  11/6/2012 Normal sized thyroid containing several  subcentimeter nodules, similar to the 5/12/11 exam.  No concerning nodules identified..          Social Determinants of Health     Financial Resource Strain: Low Risk     Difficulty of Paying Living Expenses: Not hard at all   Food Insecurity: No Food Insecurity    Worried About Running Out of Food in the Last Year: Never true    Ran Out of Food in the Last Year: Never true   Transportation Needs: No Transportation Needs    Lack of Transportation (Medical): No    Lack of Transportation (Non-Medical): No   Physical Activity: Inactive    Days of Exercise per Week: 0 days    Minutes of Exercise per Session: 0 min   Stress: No Stress Concern Present    Feeling of Stress : Only a little   Social Connections: Moderately Integrated    Frequency of Communication with Friends and Family: More than three times a week    Frequency of Social Gatherings with Friends and Family: More than three times a week    Attends Muslim Services: More than 4 times per year    Active Member of Clubs or Organizations: No    Marital Status:    Housing Stability: Unknown    Unable to Pay for Housing in the Last Year: No    Unstable Housing in the Last Year: No       Review of Systems      Objective:       Vitals:    03/29/22 1307   BP: 134/64   Pulse: 88   Temp: 97.9 °F (36.6 °C)   TempSrc: Oral   SpO2: 98%   Weight: 82 kg (180 lb 12.4 oz)   Height: 5' (1.524 m)         Physical Exam  Constitutional:       General: She is not in acute distress.     Appearance: Normal appearance. She is obese. She is not ill-appearing, toxic-appearing or diaphoretic.   HENT:      Head: Atraumatic.   Cardiovascular:      Rate and Rhythm: Normal rate.      Heart sounds: Normal heart sounds. No murmur heard.    No friction rub. No gallop.   Pulmonary:      Effort: Pulmonary effort is normal. No respiratory distress.      Breath sounds: Normal breath sounds. No wheezing or rhonchi.   Musculoskeletal:      Cervical back: Tenderness present. No  deformity, signs of trauma, rigidity, spasms, torticollis or bony tenderness. Normal range of motion.      Lumbar back: Tenderness present. No swelling, deformity, lacerations, spasms or bony tenderness. Normal range of motion. Negative right straight leg raise test and negative left straight leg raise test. No scoliosis.        Back:    Neurological:      General: No focal deficit present.      Mental Status: She is alert and oriented to person, place, and time.   Psychiatric:         Mood and Affect: Mood normal.         Behavior: Behavior normal.         Assessment:       Problem List Items Addressed This Visit     Severe obesity (BMI 35.0-39.9) with comorbidity - Primary    Relevant Medications    semaglutide (OZEMPIC) 0.25 mg or 0.5 mg(2 mg/1.5 mL) pen injector    GERD (gastroesophageal reflux disease)    Relevant Medications    pantoprazole (PROTONIX) 40 MG tablet      Other Visit Diagnoses     Essential hypertension        Relevant Medications    irbesartan (AVAPRO) 300 MG tablet    amLODIPine (NORVASC) 5 MG tablet          Plan:       Yulia was seen today for neck pain.    Diagnoses and all orders for this visit:    Severe obesity (BMI 35.0-39.9) with comorbidity  -     semaglutide (OZEMPIC) 0.25 mg or 0.5 mg(2 mg/1.5 mL) pen injector; Inject 0.25 mg into the skin every 7 days.  ozempic 0.25 mg weekly for a month then 0.5 mg weekly  For month 2    Essential hypertension  -     irbesartan (AVAPRO) 300 MG tablet; Take 1 tablet (300 mg total) by mouth every evening.  -     amLODIPine (NORVASC) 5 MG tablet; Take 1 tablet (5 mg total) by mouth once daily.  Stable. Refilled meds.     Gastroesophageal reflux disease without esophagitis  -     pantoprazole (PROTONIX) 40 MG tablet; Take 1 tablet (40 mg total) by mouth once daily.  Stable. Refilled meds.

## 2022-03-31 ENCOUNTER — EXTERNAL CHRONIC CARE MANAGEMENT (OUTPATIENT)
Dept: PRIMARY CARE CLINIC | Facility: CLINIC | Age: 77
End: 2022-03-31
Payer: MEDICARE

## 2022-03-31 PROCEDURE — 99489 CPLX CHRNC CARE EA ADDL 30: CPT | Mod: S$PBB,,, | Performed by: FAMILY MEDICINE

## 2022-03-31 PROCEDURE — 99487 CPLX CHRNC CARE 1ST 60 MIN: CPT | Mod: PBBFAC,PO,25 | Performed by: FAMILY MEDICINE

## 2022-03-31 PROCEDURE — 99487 PR COMPLX CHRON CARE MGMT, 1ST HR, PER MONTH: ICD-10-PCS | Mod: S$PBB,,, | Performed by: FAMILY MEDICINE

## 2022-03-31 PROCEDURE — 99487 CPLX CHRNC CARE 1ST 60 MIN: CPT | Mod: S$PBB,,, | Performed by: FAMILY MEDICINE

## 2022-03-31 PROCEDURE — 99489 CPLX CHRNC CARE EA ADDL 30: CPT | Mod: PBBFAC,PO | Performed by: FAMILY MEDICINE

## 2022-03-31 PROCEDURE — 99489 PR COMPLX CHRON CARE MGMT, EA ADDTL 30 MIN, PER MONTH: ICD-10-PCS | Mod: S$PBB,,, | Performed by: FAMILY MEDICINE

## 2022-04-01 ENCOUNTER — CLINICAL SUPPORT (OUTPATIENT)
Dept: REHABILITATION | Facility: HOSPITAL | Age: 77
End: 2022-04-01
Attending: INTERNAL MEDICINE
Payer: MEDICARE

## 2022-04-01 DIAGNOSIS — R26.81 GAIT INSTABILITY: Primary | ICD-10-CM

## 2022-04-01 PROCEDURE — 97140 MANUAL THERAPY 1/> REGIONS: CPT | Mod: PN

## 2022-04-01 PROCEDURE — 97110 THERAPEUTIC EXERCISES: CPT | Mod: PN

## 2022-04-01 NOTE — PROGRESS NOTES
[facility]   Physical Therapy Treatment Note     Name: Yulia Peralta  Clinic Number: [id number]    Therapy Diagnosis:    Gait instability      Left foot pain        Physician: Alfie Simeon MD*    Visit Date: 4/1/2022       Physician Orders: PT Eval and Treat   Medical Diagnosis from Referral: R26.81 (ICD-10-CM) - Gait instability  Evaluation Date: 3/15/2022  Authorization Period Expiration: 2/10/2023  Plan of Care Expiration: 6/13/2022  Progress Note Due: 4/15/2022  Visit # / Visits authorized: 2/ 1   FOTO: 1/3     Precautions: Standard     PTA Visit #: 0/5     Time In: (9:00A  Time Out: 9:45A  Total Billable Time: 45 minutes    SUBJECTIVE     Pt reports: I am having L ankle / foot pain especially when I first start walking..  She was compliant with home exercise program.  Response to previous treatment: about the same  Functional change: None to date    Pain: 5/10  Location: left foott      OBJECTIVE     Objective Measures updated at progress report unless specified.     Treatment     Yulia received the treatments listed below:      therapeutic exercises to develop ROM and flexibility for 30 minutes including:  Ankle pumps x 20.   Ankle Inv/Evr x 20  +Standing Achilles stretch 20 sec hold x 5.  +Standing Plantar Fascia stretch 20 sec hold x 5.  + Toe Curls w towel 2 x 10.    manual therapy techniques: Joint mobilizations and Soft tissue Mobilization were applied to the: L foot/ankle for 15 minutes, including:  Sub-talor mobs for inversion - eversion and STM of plantar fascia.      Patient Education and Home Exercises     Home Exercises Provided and Patient Education Provided     Education provided:   - Reviewed need for proper arch supports and foot wear.    Written Home Exercises Provided: yes. Exercises were reviewed and Yulia was able to demonstrate them prior to the end of the session.  Yulia demonstrated fair  understanding of the education provided. See EMR under  Patient Instructions for exercises provided during therapy sessions    ASSESSMENT     Pt demo decreased foot /akle pain after manaula therapy and ROM - stretching therex. Will benefit from definitive footwear and arch supports as instructed.    Yulia Is progressing well towards her goals.   Pt prognosis is Good.     Pt will continue to benefit from skilled outpatient physical therapy to address the deficits listed in the problem list box on initial evaluation, provide pt/family education and to maximize pt's level of independence in the home and community environment.     Pt's spiritual, cultural and educational needs considered and pt agreeable to plan of care and goals.     Anticipated barriers to physical therapy: None    Goals:    GOALS: Short Term Goals:  6 weeks in progress  1. Report decreased L ankle/foot pain  <  / =  5/10 at worst to increase tolerance for gait  2. Pt will perform 8 STS in 30 seconds without UEs and without offloading L LE to demonstrate improved functional strength for transfers  3. Pt will demonstrate 5 degree improvement in L ankle eversion to improve gait  4. Pt will exhibit 1/2 grade improvement in L ankle global strength  5. Pt to tolerate HEP to improve ROM and independence with ADL's.     Long Term Goals: 12 weeks in progress  1. Pt will perform 8 STS in 30 seconds without UEs and without offloading L LE to demonstrate improved functional strength for transfers  2. Pt will demonstrate 5 degree improvement in L ankle eversion to improve gait  3. Pt will exhibit 1/2 grade improvement in L ankle global strength  4. Pt will perform TUG without AD in <12 to demonstrate decreased fall risk   5. Pt to be Independent with HEP to improve ROM and independence with ADL's.       PLAN     Con't with progressive ROM and strengthening therex per POC.    Ángel Huertas, PT

## 2022-04-04 ENCOUNTER — TELEPHONE (OUTPATIENT)
Dept: NEUROSURGERY | Facility: CLINIC | Age: 77
End: 2022-04-04
Payer: MEDICARE

## 2022-04-04 NOTE — TELEPHONE ENCOUNTER
"Spoke with pt in regards to scheduling her SARAH and f/u with Dr Zina Curry .   She states she has been feeling " pretty good " and feels right now SARAH INJECTION may not be necessary.   I gave pt my name and number and stated for her should she change her mind please give me a call and will be glad to schedule her.   Pt verbalizes her understanding.   "

## 2022-04-05 ENCOUNTER — PATIENT OUTREACH (OUTPATIENT)
Dept: ADMINISTRATIVE | Facility: HOSPITAL | Age: 77
End: 2022-04-05
Payer: MEDICARE

## 2022-04-05 ENCOUNTER — CLINICAL SUPPORT (OUTPATIENT)
Dept: REHABILITATION | Facility: HOSPITAL | Age: 77
End: 2022-04-05
Attending: INTERNAL MEDICINE
Payer: MEDICARE

## 2022-04-05 DIAGNOSIS — R26.81 GAIT INSTABILITY: Primary | ICD-10-CM

## 2022-04-05 DIAGNOSIS — M79.672 LEFT FOOT PAIN: ICD-10-CM

## 2022-04-05 PROCEDURE — 97140 MANUAL THERAPY 1/> REGIONS: CPT | Mod: PN

## 2022-04-05 PROCEDURE — 97110 THERAPEUTIC EXERCISES: CPT | Mod: PN

## 2022-04-12 ENCOUNTER — CLINICAL SUPPORT (OUTPATIENT)
Dept: REHABILITATION | Facility: HOSPITAL | Age: 77
End: 2022-04-12
Attending: INTERNAL MEDICINE
Payer: MEDICARE

## 2022-04-12 DIAGNOSIS — M79.672 LEFT FOOT PAIN: Primary | ICD-10-CM

## 2022-04-12 DIAGNOSIS — R26.81 GAIT INSTABILITY: ICD-10-CM

## 2022-04-12 PROCEDURE — 97110 THERAPEUTIC EXERCISES: CPT | Mod: PN

## 2022-04-12 PROCEDURE — 97140 MANUAL THERAPY 1/> REGIONS: CPT | Mod: PN

## 2022-04-12 NOTE — PROGRESS NOTES
[facility]   Physical Therapy Treatment Note     Name: Yulia Peralta  Clinic Number: [id number]    Therapy Diagnosis:    Gait instability      Left foot pain        Physician: Alfie Simeon MD*    Visit Date: 4/12/2022       Physician Orders: PT Eval and Treat   Medical Diagnosis from Referral: R26.81 (ICD-10-CM) - Gait instability  Evaluation Date: 3/15/2022  Authorization Period Expiration: 2/10/2023  Plan of Care Expiration: 6/13/2022  Progress Note Due: 4/15/2022  Visit # / Visits authorized: 2/ 1   FOTO: 1/3     Precautions: Standard     PTA Visit #: 0/5     Time In: 1535  Time Out: 1630  Total Billable Time: 55 minutes          SUBJECTIVE     Pt reports: she has been doing more standing and walking since her last visit. Started using shoe inserts that she bought a long time ago. Overall has been doing well since last session, but today she woke up with pain and swelling in L foot. She took two Tylenol today to manage pain. States she is feeling better walking into treatment.   She was compliant with home exercise program.  Response to previous treatment: good  Functional change: up on her feet more    Pain: 3/10  Location: left foot      OBJECTIVE     Objective Measures updated at progress report unless specified.     Treatment     Yulia received the treatments listed below:      therapeutic exercises to develop ROM and flexibility for 40 minutes including:  + NuStep 8' L2  Ankle pumps x 20.   Ankle Inv/Evr x 30  +Standing Achilles stretch 20 sec hold x 5.   +Standing Plantar Fascia stretch 20 sec hold x 5.  Toe Curls w towel 4 x 10  Ankle 4 way RTB 2x10 ea   BAPS board L2 1' x4 dir  Patient education    manual therapy techniques: Joint mobilizations and Soft tissue Mobilization were applied to the: L foot/ankle for 15 minutes, including:  Sub-talor mobs for inversion - eversion and STM of plantar fascia.      Patient Education and Home Exercises     Home Exercises  Provided and Patient Education Provided     Education provided:   - Reviewed need for proper arch supports and foot wear.  - shoe specialty store to inquire about supportive footwear    Written Home Exercises Provided: yes. Exercises were reviewed and Yulia was able to demonstrate them prior to the end of the session.  Yulia demonstrated good  understanding of the education provided. See EMR under Patient Instructions for exercises provided during therapy sessions    ASSESSMENT     Noted limitation in inversion and eversion, good tolerance of STJ mobs. Continued to educate on importance of adherence to HEP. Pt will benefit from continued mobs to restore ankle eversion/ inversion, and global ankle TherEx for strength/stabilization.     Yulia Is progressing well towards her goals.   Pt prognosis is Good.     Pt will continue to benefit from skilled outpatient physical therapy to address the deficits listed in the problem list box on initial evaluation, provide pt/family education and to maximize pt's level of independence in the home and community environment.     Pt's spiritual, cultural and educational needs considered and pt agreeable to plan of care and goals.     Anticipated barriers to physical therapy: None    Goals:    GOALS: Short Term Goals:  6 weeks in progress  1. Report decreased L ankle/foot pain  <  / =  5/10 at worst to increase tolerance for gait  2. Pt will perform 8 STS in 30 seconds without UEs and without offloading L LE to demonstrate improved functional strength for transfers  3. Pt will demonstrate 5 degree improvement in L ankle eversion to improve gait  4. Pt will exhibit 1/2 grade improvement in L ankle global strength  5. Pt to tolerate HEP to improve ROM and independence with ADL's.     Long Term Goals: 12 weeks in progress  1. Pt will perform 8 STS in 30 seconds without UEs and without offloading L LE to demonstrate improved functional strength for transfers  2. Pt will demonstrate  5 degree improvement in L ankle eversion to improve gait  3. Pt will exhibit 1/2 grade improvement in L ankle global strength  4. Pt will perform TUG without AD in <12 to demonstrate decreased fall risk   5. Pt to be Independent with HEP to improve ROM and independence with ADL's.       PLAN     Con't with progressive ROM and strengthening therex per POC.    Karen Sherman, PT

## 2022-04-14 ENCOUNTER — CLINICAL SUPPORT (OUTPATIENT)
Dept: REHABILITATION | Facility: HOSPITAL | Age: 77
End: 2022-04-14
Attending: INTERNAL MEDICINE
Payer: MEDICARE

## 2022-04-14 DIAGNOSIS — R26.81 GAIT INSTABILITY: ICD-10-CM

## 2022-04-14 DIAGNOSIS — M79.672 LEFT FOOT PAIN: Primary | ICD-10-CM

## 2022-04-14 PROCEDURE — 97140 MANUAL THERAPY 1/> REGIONS: CPT | Mod: PN

## 2022-04-14 PROCEDURE — 97110 THERAPEUTIC EXERCISES: CPT | Mod: PN

## 2022-04-14 NOTE — PROGRESS NOTES
[facility]   Physical Therapy Treatment Note     Name: Yulia Peralta  Clinic Number: [id number]    Therapy Diagnosis:    Gait instability      Left foot pain        Physician: Alfie Simeon MD*    Visit Date: 4/14/2022       Physician Orders: PT Eval and Treat   Medical Diagnosis from Referral: R26.81 (ICD-10-CM) - Gait instability  Evaluation Date: 3/15/2022  Authorization Period Expiration: 2/10/2023  Plan of Care Expiration: 6/13/2022  Progress Note Due: 5/15/2022  Visit # / Visits authorized: 5/ 1   FOTO: 1/3     Precautions: Standard     PTA Visit #: 0/5     Time In: 4:00 P  Time Out: 4:45 P  Total Billable Time: 45 minutes          SUBJECTIVE     Pt reports: L foot pain with initial walking but improves once has walked for a while.   She was compliant with home exercise program.  Response to previous treatment: good  Functional change: up on her feet more    Pain: 3/10  Location: left foot      OBJECTIVE     Objective Measures updated at progress report unless specified.     Treatment     Yulia received the treatments listed below:      therapeutic exercises to develop ROM and flexibility for 35 minutes including:   NuStep 8' L2  Ankle pumps x 20.   Ankle Inv/Evr in PF position x 30  B Heel raises 2 x 10.  Standing Achilles stretch 20 sec hold x 5.   Standing Plantar Fascia stretch 20 sec hold x 5.  Toe Curls w towel 4 x 10  BAPS board L2 1' x4 dir  + Heel Walk x 15 ft x 4    manual therapy techniques: Joint mobilizations and Soft tissue Mobilization were applied to the: L foot/ankle for 10 minutes, including:  Sub-talor mobs for inversion - eversion and STM of plantar fascia.      Patient Education and Home Exercises     Home Exercises Provided and Patient Education Provided     Education provided:   - Reviewed need for proper arch supports and foot wear.    Written Home Exercises Provided: yes. Exercises were reviewed and Yulia was able to demonstrate them prior to  the end of the session.  Yulia demonstrated good  understanding of the education provided. See EMR under Patient Instructions for exercises provided during therapy sessions    ASSESSMENT     Inserts helping foot alignment but would benefit from footwear with deeper and wider toe box to accommodate insets. Able to progression closed chain foot ankle strengthening with fatigue being limiting factor. Sub-talor jt motion remains limited but improving w manual therapy.     Yulia Is progressing well towards her goals.   Pt prognosis is Good.     Pt will continue to benefit from skilled outpatient physical therapy to address the deficits listed in the problem list box on initial evaluation, provide pt/family education and to maximize pt's level of independence in the home and community environment.     Pt's spiritual, cultural and educational needs considered and pt agreeable to plan of care and goals.     Anticipated barriers to physical therapy: None    Goals:    GOALS: Short Term Goals:  6 weeks in progress  1. Report decreased L ankle/foot pain  <  / =  5/10 at worst to increase tolerance for gait. (MET)  2. Pt will perform 8 STS in 30 seconds without UEs and without offloading L LE to demonstrate improved functional strength for transfers. (Ongoing)  3. Pt will demonstrate 5 degree improvement in L ankle eversion to improve gait. (MET)  4. Pt will exhibit 1/2 grade improvement in L ankle global strength. (ONGOING)  5. Pt to tolerate HEP to improve ROM and independence with ADL's. (MET)     Long Term Goals: 12 weeks in progress  1. Pt will perform 8 STS in 30 seconds without UEs and without offloading L LE to demonstrate improved functional strength for transfers  2. Pt will demonstrate 5 degree improvement in L ankle eversion to improve gait  3. Pt will exhibit 1/2 grade improvement in L ankle global strength  4. Pt will perform TUG without AD in <12 to demonstrate decreased fall risk   5. Pt to be Independent  with HEP to improve ROM and independence with ADL's.       PLAN     Con't with manual therapy, progressive ROM and strengthening therex per POC.    Ángel Huertas, PT

## 2022-04-18 ENCOUNTER — CLINICAL SUPPORT (OUTPATIENT)
Dept: REHABILITATION | Facility: HOSPITAL | Age: 77
End: 2022-04-18
Attending: INTERNAL MEDICINE
Payer: MEDICARE

## 2022-04-18 DIAGNOSIS — R26.81 GAIT INSTABILITY: ICD-10-CM

## 2022-04-18 DIAGNOSIS — M79.672 LEFT FOOT PAIN: Primary | ICD-10-CM

## 2022-04-18 PROCEDURE — 97110 THERAPEUTIC EXERCISES: CPT | Mod: PN

## 2022-04-18 NOTE — PROGRESS NOTES
[facility]   Physical Therapy Treatment Note     Name: Yulia Peralta  Clinic Number: [id number]    Therapy Diagnosis:    Gait instability      Left foot pain        Physician: Alfie Simeon MD*    Visit Date: 4/18/2022       Physician Orders: PT Eval and Treat   Medical Diagnosis from Referral: R26.81 (ICD-10-CM) - Gait instability  Evaluation Date: 3/15/2022  Authorization Period Expiration: 2/10/2023  Plan of Care Expiration: 6/13/2022  Progress Note Due: 5/15/2022  Visit # / Visits authorized: 5/25  FOTO: 1/3     Precautions: Standard     PTA Visit #: 0/5     Time In: 13:13 (late)  Time Out: 1345  Total Billable Time: 32 minutes          SUBJECTIVE     Pt reports: L foot pain with initial walking but improves once has walked for a while.   She was compliant with home exercise program.  Response to previous treatment: good  Functional change: up on her feet more    Pain: 3/10  Location: left foot      OBJECTIVE     Objective Measures updated at progress report unless specified.     Treatment     Yulia received the treatments listed below:      therapeutic exercises to develop ROM and flexibility for 30 minutes including:   NuStep 8' L2  Ankle pumps x 20.   Ankle Inv/Evr in PF position x 30  B Heel raises 2 x 10.  Standing Achilles stretch 20 sec hold x 5.   Standing Plantar Fascia stretch 20 sec hold x 5.  Toe Curls w towel 4 x 10  BAPS board L2 1' x4 dir  + Heel Walk x 15 ft x 4  Calf stretch on incline board 3x30''  Tandem stance airex 3x30''    BLE shuttle press 2x10 1 black cord        manual therapy techniques: Joint mobilizations and Soft tissue Mobilization were applied to the: L foot/ankle for 00 minutes, including:  Sub-talor mobs for inversion - eversion and STM of plantar fascia.      Patient Education and Home Exercises     Home Exercises Provided and Patient Education Provided     Education provided:   - Reviewed need for proper arch supports and foot  wear.    Written Home Exercises Provided: yes. Exercises were reviewed and Yulia was able to demonstrate them prior to the end of the session.  Yulia demonstrated good  understanding of the education provided. See EMR under Patient Instructions for exercises provided during therapy sessions    ASSESSMENT     Improved step length and gait speed noted at the end of session today. Pt reports decreased frequency and intensity of pain following positional changes. Progress static/dynamic balance as tolerated.   Yulia Is progressing well towards her goals.   Pt prognosis is Good.     Pt will continue to benefit from skilled outpatient physical therapy to address the deficits listed in the problem list box on initial evaluation, provide pt/family education and to maximize pt's level of independence in the home and community environment.     Pt's spiritual, cultural and educational needs considered and pt agreeable to plan of care and goals.     Anticipated barriers to physical therapy: None    Goals:    GOALS: Short Term Goals:  6 weeks in progress  1. Report decreased L ankle/foot pain  <  / =  5/10 at worst to increase tolerance for gait. (MET)  2. Pt will perform 8 STS in 30 seconds without UEs and without offloading L LE to demonstrate improved functional strength for transfers. (Ongoing)  3. Pt will demonstrate 5 degree improvement in L ankle eversion to improve gait. (MET)  4. Pt will exhibit 1/2 grade improvement in L ankle global strength. (ONGOING)  5. Pt to tolerate HEP to improve ROM and independence with ADL's. (MET)     Long Term Goals: 12 weeks in progress  1. Pt will perform 8 STS in 30 seconds without UEs and without offloading L LE to demonstrate improved functional strength for transfers  2. Pt will demonstrate 5 degree improvement in L ankle eversion to improve gait  3. Pt will exhibit 1/2 grade improvement in L ankle global strength  4. Pt will perform TUG without AD in <12 to demonstrate  decreased fall risk   5. Pt to be Independent with HEP to improve ROM and independence with ADL's.       PLAN     Con't with manual therapy, progressive ROM and strengthening therex per POC.    Karen Sherman, PT

## 2022-04-20 ENCOUNTER — CLINICAL SUPPORT (OUTPATIENT)
Dept: REHABILITATION | Facility: HOSPITAL | Age: 77
End: 2022-04-20
Attending: INTERNAL MEDICINE
Payer: MEDICARE

## 2022-04-20 DIAGNOSIS — M79.672 LEFT FOOT PAIN: Primary | ICD-10-CM

## 2022-04-20 DIAGNOSIS — R26.81 GAIT INSTABILITY: ICD-10-CM

## 2022-04-20 PROCEDURE — 97110 THERAPEUTIC EXERCISES: CPT | Mod: PN

## 2022-04-20 NOTE — PROGRESS NOTES
[facility]   Physical Therapy Treatment Note     Name: Yulia Peralta  Clinic Number: [id number]    Therapy Diagnosis:    Gait instability      Left foot pain        Physician: Alfie Simeon MD*    Visit Date: 4/20/2022       Physician Orders: PT Eval and Treat   Medical Diagnosis from Referral: R26.81 (ICD-10-CM) - Gait instability  Evaluation Date: 3/15/2022  Authorization Period Expiration: 2/10/2023  Plan of Care Expiration: 6/13/2022  Progress Note Due: 5/15/2022  Visit # / Visits authorized: 5/25  FOTO: 1/3     Precautions: Standard     PTA Visit #: 0/5     Time In: 1347  Time Out: 1430  Total Billable Time: 43 minutes          SUBJECTIVE     Pt reports: she is not feeling her best today, but it is not related to her foot; states it may be related to stress.   She was compliant with home exercise program.  Response to previous treatment: good  Functional change: up on her feet more    Pain: 2/10  Location: left foot      OBJECTIVE     Objective Measures updated at progress report unless specified.     Treatment     Yulia received the treatments listed below:      therapeutic exercises to develop ROM and flexibility for 40 minutes including:   NuStep 10' L2  Ankle pumps x 20.   Ankle Inv/Evr in PF position x 30  B Heel raises 2 x 10.  Standing Achilles stretch 20 sec hold x 5.   Standing Plantar Fascia stretch 20 sec hold x 5.  Toe Curls w towel 4 x 10  BAPS board L2 1' x4 dir  + Heel Walk x 15 ft x 4  Calf stretch on incline board 3x30''  Hamstring stretch on step 3x30''   Tandem stance airex 3x30''    SLE stance 3x30''   BLE shuttle press 3x10 1 black cord  +SLE shuttle press 3x10 red cord   + gabrielle step over forward 4 laps 3 hurdles        manual therapy techniques: Joint mobilizations and Soft tissue Mobilization were applied to the: L foot/ankle for 00 minutes, including:  Sub-talor mobs for inversion - eversion and STM of plantar fascia.      Patient Education and  Home Exercises     Home Exercises Provided and Patient Education Provided     Education provided:   - Reviewed need for proper arch supports and foot wear.    Written Home Exercises Provided: yes. Exercises were reviewed and Yulia was able to demonstrate them prior to the end of the session.  Yulia demonstrated good  understanding of the education provided. See EMR under Patient Instructions for exercises provided during therapy sessions    ASSESSMENT   Limited knee flexion impaired foot clearance with gabrielle step overs. Discussed knee mobility exercises for home.  Overall good tolerance of session, no increase in pain.   Yulia Is progressing well towards her goals.   Pt prognosis is Good.     Pt will continue to benefit from skilled outpatient physical therapy to address the deficits listed in the problem list box on initial evaluation, provide pt/family education and to maximize pt's level of independence in the home and community environment.     Pt's spiritual, cultural and educational needs considered and pt agreeable to plan of care and goals.     Anticipated barriers to physical therapy: None    Goals:    GOALS: Short Term Goals:  6 weeks in progress  1. Report decreased L ankle/foot pain  <  / =  5/10 at worst to increase tolerance for gait. (MET)  2. Pt will perform 8 STS in 30 seconds without UEs and without offloading L LE to demonstrate improved functional strength for transfers. (Ongoing)  3. Pt will demonstrate 5 degree improvement in L ankle eversion to improve gait. (MET)  4. Pt will exhibit 1/2 grade improvement in L ankle global strength. (ONGOING)  5. Pt to tolerate HEP to improve ROM and independence with ADL's. (MET)     Long Term Goals: 12 weeks in progress  1. Pt will perform 8 STS in 30 seconds without UEs and without offloading L LE to demonstrate improved functional strength for transfers  2. Pt will demonstrate 5 degree improvement in L ankle eversion to improve gait  3. Pt will  exhibit 1/2 grade improvement in L ankle global strength  4. Pt will perform TUG without AD in <12 to demonstrate decreased fall risk   5. Pt to be Independent with HEP to improve ROM and independence with ADL's.       PLAN     Con't with manual therapy, progressive ROM and strengthening therex per POC.    Karen Sherman, PT

## 2022-04-25 ENCOUNTER — CLINICAL SUPPORT (OUTPATIENT)
Dept: REHABILITATION | Facility: HOSPITAL | Age: 77
End: 2022-04-25
Attending: INTERNAL MEDICINE
Payer: MEDICARE

## 2022-04-25 DIAGNOSIS — M79.672 LEFT FOOT PAIN: Primary | ICD-10-CM

## 2022-04-25 DIAGNOSIS — R26.81 GAIT INSTABILITY: ICD-10-CM

## 2022-04-25 PROCEDURE — 97110 THERAPEUTIC EXERCISES: CPT | Mod: PN

## 2022-04-25 NOTE — PROGRESS NOTES
[facility]   Physical Therapy Treatment Note     Name: Yulia Peralta  Clinic Number: [id number]    Therapy Diagnosis:    Gait instability      Left foot pain        Physician: Alfie Simeon MD*    Visit Date: 4/25/2022       Physician Orders: PT Eval and Treat   Medical Diagnosis from Referral: R26.81 (ICD-10-CM) - Gait instability  Evaluation Date: 3/15/2022  Authorization Period Expiration: 2/10/2023  Plan of Care Expiration: 6/13/2022  Progress Note Due: 5/15/2022  Visit # / Visits authorized: 8/25  FOTO: 1/3     Precautions: Standard     PTA Visit #: 0/5     Time In: 1346  Time Out: 1430  Total Billable Time: 44 minutes             SUBJECTIVE     Pt reports: she has experienced less bouts of pain since last session. Still having trouble with knee bending stretches at home.   She was compliant with home exercise program.  Response to previous treatment: good  Functional change: up on her feet more    Pain: 2/10  Location: left foot      OBJECTIVE     Objective Measures updated at progress report unless specified.     Treatment     Yulia received the treatments listed below:      therapeutic exercises to develop ROM and flexibility for 40 minutes including:   NuStep 8' L2  +Forward step up 6in step 2x10   +Lateral step up 4in step 12x ea   Ankle pumps x 20.   Ankle Inv/Evr in PF position x 30  B Heel raises 2 x 10.  Standing Achilles stretch 20 sec hold x 5.   Standing Plantar Fascia stretch 20 sec hold x 5.  Toe Curls w towel 4 x 10  BAPS board L2 1' x4 dir  + Heel Walk x 15 ft x 4  Calf stretch on incline board 3x30''  Hamstring stretch on step 3x30''   Tandem stance airex 3x30''    SLE stance 3x30''   BLE shuttle press 3x10 2 black cord  +SLE shuttle press 3x10 1 black cord   + gabrielle step over forward  And lateral 4 laps 3 hurdles        manual therapy techniques: Joint mobilizations and Soft tissue Mobilization were applied to the: L foot/ankle for 00 minutes,  including:  Sub-talor mobs for inversion - eversion and STM of plantar fascia.      Patient Education and Home Exercises     Home Exercises Provided and Patient Education Provided     Education provided:   - Reviewed need for proper arch supports and foot wear.    Written Home Exercises Provided: yes. Exercises were reviewed and Yulia was able to demonstrate them prior to the end of the session.  Yulia demonstrated good  understanding of the education provided. See EMR under Patient Instructions for exercises provided during therapy sessions    ASSESSMENT   Limited knee flexion impaired foot clearance with gabrielle step overs. No flare ups of pain with today's progression. Fewer LOB with balance activities.       Yulia Is progressing well towards her goals.   Pt prognosis is Good.     Pt will continue to benefit from skilled outpatient physical therapy to address the deficits listed in the problem list box on initial evaluation, provide pt/family education and to maximize pt's level of independence in the home and community environment.     Pt's spiritual, cultural and educational needs considered and pt agreeable to plan of care and goals.     Anticipated barriers to physical therapy: None    Goals:    GOALS: Short Term Goals:  6 weeks in progress  1. Report decreased L ankle/foot pain  <  / =  5/10 at worst to increase tolerance for gait. (MET)  2. Pt will perform 8 STS in 30 seconds without UEs and without offloading L LE to demonstrate improved functional strength for transfers. (Ongoing)  3. Pt will demonstrate 5 degree improvement in L ankle eversion to improve gait. (MET)  4. Pt will exhibit 1/2 grade improvement in L ankle global strength. (ONGOING)  5. Pt to tolerate HEP to improve ROM and independence with ADL's. (MET)     Long Term Goals: 12 weeks in progress  1. Pt will perform 8 STS in 30 seconds without UEs and without offloading L LE to demonstrate improved functional strength for transfers  2. Pt  will demonstrate 5 degree improvement in L ankle eversion to improve gait  3. Pt will exhibit 1/2 grade improvement in L ankle global strength  4. Pt will perform TUG without AD in <12 to demonstrate decreased fall risk   5. Pt to be Independent with HEP to improve ROM and independence with ADL's.       PLAN     Con't with manual therapy, progressive ROM and strengthening therex per POC.    Karen Sherman, PT

## 2022-04-30 ENCOUNTER — EXTERNAL CHRONIC CARE MANAGEMENT (OUTPATIENT)
Dept: PRIMARY CARE CLINIC | Facility: CLINIC | Age: 77
End: 2022-04-30
Payer: MEDICARE

## 2022-04-30 PROCEDURE — 99490 PR CHRONIC CARE MGMT, 1ST 20 MIN: ICD-10-PCS | Mod: S$PBB,,, | Performed by: FAMILY MEDICINE

## 2022-04-30 PROCEDURE — 99490 CHRNC CARE MGMT STAFF 1ST 20: CPT | Mod: S$PBB,,, | Performed by: FAMILY MEDICINE

## 2022-04-30 PROCEDURE — 99490 CHRNC CARE MGMT STAFF 1ST 20: CPT | Mod: PBBFAC,PO | Performed by: FAMILY MEDICINE

## 2022-05-02 ENCOUNTER — CLINICAL SUPPORT (OUTPATIENT)
Dept: REHABILITATION | Facility: HOSPITAL | Age: 77
End: 2022-05-02
Attending: INTERNAL MEDICINE
Payer: MEDICARE

## 2022-05-02 DIAGNOSIS — M79.672 LEFT FOOT PAIN: Primary | ICD-10-CM

## 2022-05-02 DIAGNOSIS — R26.81 GAIT INSTABILITY: ICD-10-CM

## 2022-05-02 PROCEDURE — 97110 THERAPEUTIC EXERCISES: CPT | Mod: PN

## 2022-05-02 NOTE — PROGRESS NOTES
[facility]   Physical Therapy Treatment Note     Name: Yulia Peralta  Clinic Number: [id number]    Therapy Diagnosis:    Gait instability      Left foot pain        Physician: Alfie Simeon MD*    Visit Date: 5/2/2022       Physician Orders: PT Eval and Treat   Medical Diagnosis from Referral: R26.81 (ICD-10-CM) - Gait instability  Evaluation Date: 3/15/2022  Authorization Period Expiration: 2/10/2023  Plan of Care Expiration: 6/13/2022  Progress Note Due: 5/15/2022  Visit # / Visits authorized: 8/25  FOTO: 1/3     Precautions: Standard     PTA Visit #: 0/5     Time In: 1407 (late)  Time Out: 1433  Total Billable Time: 26 MIN (2 TE)            SUBJECTIVE     Pt reports: she is having less pain when she gets up from a seated position  She was compliant with home exercise program.  Response to previous treatment: good  Functional change: up on her feet more    Pain: 2/10  Location: left foot      OBJECTIVE     Objective Measures updated at progress report unless specified.     Treatment     Yulia received the treatments listed below:      therapeutic exercises to develop ROM and flexibility for 40 minutes including:   NuStep 8' L2  +Forward step up 6in step 2x10   +Lateral step up 4in step 12x ea   +lateral walk RTB 3 laps   Ankle pumps x 20.   Ankle Inv/Evr in PF position x 30  B Heel raises 3 x 10.  Standing Achilles stretch 20 sec hold x 5.   Standing Plantar Fascia stretch 20 sec hold x 5.  Toe Curls w towel 4 x 10  BAPS board L2 1' x4 dir  + Heel Walk x 15 ft x 4  Calf stretch on incline board 3x30''  Hamstring stretch on step 3x30''   Tandem stance airex 3x30''    +heel raises 3x10   SLE stance 3x30''   BLE shuttle press 3x10 2 black cord + red  +SLE shuttle press 3x10 1 black cord + red   + gabrielle step over forward  And lateral 4 laps 3 hurdles        manual therapy techniques: Joint mobilizations and Soft tissue Mobilization were applied to the: L foot/ankle for 00  minutes, including:  Sub-talor mobs for inversion - eversion and STM of plantar fascia.      Patient Education and Home Exercises     Home Exercises Provided and Patient Education Provided     Education provided:   - Reviewed need for proper arch supports and foot wear.    Written Home Exercises Provided: yes. Exercises were reviewed and Yulia was able to demonstrate them prior to the end of the session.  Yulia demonstrated good  understanding of the education provided. See EMR under Patient Instructions for exercises provided during therapy sessions    ASSESSMENT   Add prone quad stretch next visit. Overall good tolerance of today's session.       Yulia Is progressing well towards her goals.   Pt prognosis is Good.     Pt will continue to benefit from skilled outpatient physical therapy to address the deficits listed in the problem list box on initial evaluation, provide pt/family education and to maximize pt's level of independence in the home and community environment.     Pt's spiritual, cultural and educational needs considered and pt agreeable to plan of care and goals.     Anticipated barriers to physical therapy: None    Goals:    GOALS: Short Term Goals:  6 weeks in progress  1. Report decreased L ankle/foot pain  <  / =  5/10 at worst to increase tolerance for gait. (MET)  2. Pt will perform 8 STS in 30 seconds without UEs and without offloading L LE to demonstrate improved functional strength for transfers. (Ongoing)  3. Pt will demonstrate 5 degree improvement in L ankle eversion to improve gait. (MET)  4. Pt will exhibit 1/2 grade improvement in L ankle global strength. (ONGOING)  5. Pt to tolerate HEP to improve ROM and independence with ADL's. (MET)     Long Term Goals: 12 weeks in progress  1. Pt will perform 8 STS in 30 seconds without UEs and without offloading L LE to demonstrate improved functional strength for transfers  2. Pt will demonstrate 5 degree improvement in L ankle eversion to  improve gait  3. Pt will exhibit 1/2 grade improvement in L ankle global strength  4. Pt will perform TUG without AD in <12 to demonstrate decreased fall risk   5. Pt to be Independent with HEP to improve ROM and independence with ADL's.       PLAN     Con't with manual therapy, progressive ROM and strengthening therex per POC.    Karen Sherman, PT

## 2022-05-04 ENCOUNTER — CLINICAL SUPPORT (OUTPATIENT)
Dept: REHABILITATION | Facility: HOSPITAL | Age: 77
End: 2022-05-04
Attending: INTERNAL MEDICINE
Payer: MEDICARE

## 2022-05-04 DIAGNOSIS — M79.672 LEFT FOOT PAIN: Primary | ICD-10-CM

## 2022-05-04 DIAGNOSIS — R26.81 GAIT INSTABILITY: ICD-10-CM

## 2022-05-04 PROCEDURE — 97110 THERAPEUTIC EXERCISES: CPT | Mod: PN

## 2022-05-04 NOTE — PROGRESS NOTES
[facility]   Physical Therapy Treatment Note     Name: Yulia Peralta  Clinic Number: [id number]    Therapy Diagnosis:    Gait instability      Left foot pain        Physician: Alfie Simeon MD*    Visit Date: 5/4/2022       Physician Orders: PT Eval and Treat   Medical Diagnosis from Referral: R26.81 (ICD-10-CM) - Gait instability  Evaluation Date: 3/15/2022  Authorization Period Expiration: 2/10/2023  Plan of Care Expiration: 6/13/2022  Progress Note Due: 5/15/2022  Visit # / Visits authorized: 8/25  FOTO: 1/3     Precautions: Standard     PTA Visit #: 0/5     Time In: 1335   Time Out: 1420  Total Billable Time: 40 minutes             SUBJECTIVE     Pt reports: she is having more low back pain today, but minimal foot pain.   She was compliant with home exercise program.  Response to previous treatment: good  Functional change: up on her feet more    Pain: 2/10  Location: left foot      OBJECTIVE     Objective Measures updated at progress report unless specified.     Treatment     Yulia received the treatments listed below:      therapeutic exercises to develop ROM and flexibility for 45 minutes including:   NuStep 8' L3  +Forward step up 6in step 2x10   +Lateral step up 4in step 12x ea   +lateral walk RTB 3 laps   Ankle pumps x 20.   Ankle Inv/Evr in PF position x 30  B Heel raises 3 x 10.  Rockerboard fwd/lateral 1'x2 ea   Standing Achilles stretch 20 sec hold x 5.   Standing Plantar Fascia stretch 20 sec hold x 5.  Toe Curls w towel 4 x 10  BAPS board L2 1' x4 dir  + Heel Walk x 15 ft x 4  Calf stretch on incline board 3x30''  Hamstring stretch on step 3x30''   Tandem stance airex 3x30''  EC  +heel raises 3x10   SLE stance 3x30'' on Airex   BLE shuttle press 3x10 2 black cord + red  +SLE shuttle press 3x10 1 black cord + red   + gabrielle step over forward  And lateral 4 laps 3 hurdles  +prone quad stretch B 2' ea 15'' hold         manual therapy techniques: Joint  mobilizations and Soft tissue Mobilization were applied to the: L foot/ankle for 00 minutes, including:  Sub-talor mobs for inversion - eversion and STM of plantar fascia.      Patient Education and Home Exercises     Home Exercises Provided and Patient Education Provided     Education provided:   - Reviewed need for proper arch supports and foot wear.    Written Home Exercises Provided: yes. Exercises were reviewed and Yulia was able to demonstrate them prior to the end of the session.  Yulia demonstrated good  understanding of the education provided. See EMR under Patient Instructions for exercises provided during therapy sessions    ASSESSMENT   HEP updated this day. Good tolerance of session. Anticipating discharge at next visit.       Yulia Is progressing well towards her goals.   Pt prognosis is Good.     Pt will continue to benefit from skilled outpatient physical therapy to address the deficits listed in the problem list box on initial evaluation, provide pt/family education and to maximize pt's level of independence in the home and community environment.     Pt's spiritual, cultural and educational needs considered and pt agreeable to plan of care and goals.     Anticipated barriers to physical therapy: None    Goals:    GOALS: Short Term Goals:  6 weeks in progress  1. Report decreased L ankle/foot pain  <  / =  5/10 at worst to increase tolerance for gait. (MET)  2. Pt will perform 8 STS in 30 seconds without UEs and without offloading L LE to demonstrate improved functional strength for transfers. (Ongoing)  3. Pt will demonstrate 5 degree improvement in L ankle eversion to improve gait. (MET)  4. Pt will exhibit 1/2 grade improvement in L ankle global strength. (ONGOING)  5. Pt to tolerate HEP to improve ROM and independence with ADL's. (MET)     Long Term Goals: 12 weeks in progress  1. Pt will perform 8 STS in 30 seconds without UEs and without offloading L LE to demonstrate improved functional  strength for transfers  2. Pt will demonstrate 5 degree improvement in L ankle eversion to improve gait  3. Pt will exhibit 1/2 grade improvement in L ankle global strength  4. Pt will perform TUG without AD in <12 to demonstrate decreased fall risk   5. Pt to be Independent with HEP to improve ROM and independence with ADL's.       PLAN     Con't with manual therapy, progressive ROM and strengthening therex per POC.    Karen Sherman, PT

## 2022-05-06 ENCOUNTER — LAB VISIT (OUTPATIENT)
Dept: LAB | Facility: HOSPITAL | Age: 77
End: 2022-05-06
Attending: INTERNAL MEDICINE
Payer: MEDICARE

## 2022-05-06 DIAGNOSIS — Z79.1 NSAID LONG-TERM USE: ICD-10-CM

## 2022-05-06 DIAGNOSIS — K21.9 GASTROESOPHAGEAL REFLUX DISEASE, UNSPECIFIED WHETHER ESOPHAGITIS PRESENT: ICD-10-CM

## 2022-05-06 DIAGNOSIS — M10.9 GOUT, ARTHRITIS: ICD-10-CM

## 2022-05-06 DIAGNOSIS — M05.79 RHEUMATOID ARTHRITIS INVOLVING MULTIPLE SITES WITH POSITIVE RHEUMATOID FACTOR: ICD-10-CM

## 2022-05-06 DIAGNOSIS — D84.9 IMMUNOSUPPRESSION: ICD-10-CM

## 2022-05-06 LAB
ALBUMIN SERPL BCP-MCNC: 4.1 G/DL (ref 3.5–5.2)
ALP SERPL-CCNC: 95 U/L (ref 55–135)
ALT SERPL W/O P-5'-P-CCNC: 22 U/L (ref 10–44)
ANION GAP SERPL CALC-SCNC: 4 MMOL/L (ref 8–16)
ANISOCYTOSIS BLD QL SMEAR: SLIGHT
AST SERPL-CCNC: 18 U/L (ref 10–40)
BASOPHILS # BLD AUTO: 0.02 K/UL (ref 0–0.2)
BASOPHILS NFR BLD: 0.4 % (ref 0–1.9)
BILIRUB SERPL-MCNC: 0.4 MG/DL (ref 0.1–1)
BUN SERPL-MCNC: 8 MG/DL (ref 8–23)
BURR CELLS BLD QL SMEAR: ABNORMAL
CALCIUM SERPL-MCNC: 10 MG/DL (ref 8.7–10.5)
CHLORIDE SERPL-SCNC: 104 MMOL/L (ref 95–110)
CO2 SERPL-SCNC: 30 MMOL/L (ref 23–29)
CREAT SERPL-MCNC: 0.7 MG/DL (ref 0.5–1.4)
CRP SERPL-MCNC: <0.3 MG/L (ref 0–8.2)
DIFFERENTIAL METHOD: ABNORMAL
EOSINOPHIL # BLD AUTO: 0.2 K/UL (ref 0–0.5)
EOSINOPHIL NFR BLD: 3.7 % (ref 0–8)
ERYTHROCYTE [DISTWIDTH] IN BLOOD BY AUTOMATED COUNT: 14.1 % (ref 11.5–14.5)
ERYTHROCYTE [SEDIMENTATION RATE] IN BLOOD BY WESTERGREN METHOD: 25 MM/HR (ref 0–36)
EST. GFR  (AFRICAN AMERICAN): >60 ML/MIN/1.73 M^2
EST. GFR  (NON AFRICAN AMERICAN): >60 ML/MIN/1.73 M^2
GIANT PLATELETS BLD QL SMEAR: PRESENT
GLUCOSE SERPL-MCNC: 96 MG/DL (ref 70–110)
HCT VFR BLD AUTO: 33.5 % (ref 37–48.5)
HGB BLD-MCNC: 10.7 G/DL (ref 12–16)
HYPOCHROMIA BLD QL SMEAR: ABNORMAL
IMM GRANULOCYTES # BLD AUTO: 0.01 K/UL (ref 0–0.04)
IMM GRANULOCYTES NFR BLD AUTO: 0.2 % (ref 0–0.5)
LYMPHOCYTES # BLD AUTO: 1.2 K/UL (ref 1–4.8)
LYMPHOCYTES NFR BLD: 22.1 % (ref 18–48)
MCH RBC QN AUTO: 31.4 PG (ref 27–31)
MCHC RBC AUTO-ENTMCNC: 31.9 G/DL (ref 32–36)
MCV RBC AUTO: 98 FL (ref 82–98)
MONOCYTES # BLD AUTO: 0.7 K/UL (ref 0.3–1)
MONOCYTES NFR BLD: 13.5 % (ref 4–15)
NEUTROPHILS # BLD AUTO: 3.3 K/UL (ref 1.8–7.7)
NEUTROPHILS NFR BLD: 60.1 % (ref 38–73)
NRBC BLD-RTO: 0 /100 WBC
OVALOCYTES BLD QL SMEAR: ABNORMAL
PAPPENHEIMER BOD BLD QL SMEAR: ABNORMAL
PLATELET # BLD AUTO: 168 K/UL (ref 150–450)
PLATELET BLD QL SMEAR: ABNORMAL
PMV BLD AUTO: 14 FL (ref 9.2–12.9)
POIKILOCYTOSIS BLD QL SMEAR: SLIGHT
POLYCHROMASIA BLD QL SMEAR: ABNORMAL
POTASSIUM SERPL-SCNC: 4.4 MMOL/L (ref 3.5–5.1)
PROT SERPL-MCNC: 7.5 G/DL (ref 6–8.4)
RBC # BLD AUTO: 3.41 M/UL (ref 4–5.4)
SCHISTOCYTES BLD QL SMEAR: ABNORMAL
SODIUM SERPL-SCNC: 138 MMOL/L (ref 136–145)
TARGETS BLD QL SMEAR: ABNORMAL
WBC # BLD AUTO: 5.42 K/UL (ref 3.9–12.7)

## 2022-05-06 PROCEDURE — 86140 C-REACTIVE PROTEIN: CPT | Performed by: INTERNAL MEDICINE

## 2022-05-06 PROCEDURE — 80053 COMPREHEN METABOLIC PANEL: CPT | Performed by: INTERNAL MEDICINE

## 2022-05-06 PROCEDURE — 85652 RBC SED RATE AUTOMATED: CPT | Performed by: INTERNAL MEDICINE

## 2022-05-06 PROCEDURE — 36415 COLL VENOUS BLD VENIPUNCTURE: CPT | Mod: PO | Performed by: INTERNAL MEDICINE

## 2022-05-06 PROCEDURE — 85025 COMPLETE CBC W/AUTO DIFF WBC: CPT | Performed by: INTERNAL MEDICINE

## 2022-05-13 ENCOUNTER — CLINICAL SUPPORT (OUTPATIENT)
Dept: REHABILITATION | Facility: HOSPITAL | Age: 77
End: 2022-05-13
Attending: INTERNAL MEDICINE
Payer: MEDICARE

## 2022-05-13 DIAGNOSIS — R26.81 GAIT INSTABILITY: ICD-10-CM

## 2022-05-13 DIAGNOSIS — M79.672 LEFT FOOT PAIN: Primary | ICD-10-CM

## 2022-05-13 PROCEDURE — 97110 THERAPEUTIC EXERCISES: CPT | Mod: PN

## 2022-05-13 NOTE — PROGRESS NOTES
[facility]   Physical Therapy Treatment Note     Name: Yulia Peralta  Clinic Number: [id number]    Therapy Diagnosis:    Gait instability      Left foot pain        Physician: Alfie Simeon MD*    Visit Date: 5/13/2022       Physician Orders: PT Eval and Treat   Medical Diagnosis from Referral: R26.81 (ICD-10-CM) - Gait instability  Evaluation Date: 3/15/2022  Authorization Period Expiration: 2/10/2023  Plan of Care Expiration: 6/13/2022  Progress Note Due: 5/15/2022  Visit # / Visits authorized: 8/25  FOTO: 1/3     Precautions: Standard     PTA Visit #: 0/5     Time In: 1335   Time Out: 1420  Total Billable Time: 40 minutes             SUBJECTIVE     Pt reports: she is doing well today. Anticipating discharge this day.   She was compliant with home exercise program.  Response to previous treatment: good  Functional change: up on her feet more    Pain: 2/10  Location: left foot      OBJECTIVE     Objective Measures updated at progress report unless specified.     Treatment     Yulia received the treatments listed below:      therapeutic exercises to develop ROM and flexibility for 45 minutes including:   NuStep 8' L3  STS 2x10   +Forward step up 6in step 2x10   +Lateral step up 4in step 12x ea   +lateral walk RTB 3 laps   Ankle pumps x 20.   Ankle Inv/Evr in PF position x 30  B Heel raises 3 x 10.  Rockerboard fwd/lateral 1'x2 ea   Standing Achilles stretch 20 sec hold x 5.   Standing Plantar Fascia stretch 20 sec hold x 5.  Toe Curls w towel 4 x 10  BAPS board L2 1' x4 dir  + Heel Walk x 15 ft x 4  Calf stretch on incline board 3x30''  Hamstring stretch on step 3x30''   Tandem stance airex 3x30''  EC  +heel raises 3x10   SLE stance 3x30'' on Airex   BLE shuttle press 3x12 2 black cord + red  +SLE shuttle press 3x10 1 black cord + red   + gabrielle step over forward  And lateral 4 laps 3 hurdles  +prone quad stretch B 2' ea 15'' hold         manual therapy techniques: Joint  mobilizations and Soft tissue Mobilization were applied to the: L foot/ankle for 00 minutes, including:  Sub-talor mobs for inversion - eversion and STM of plantar fascia.      Patient Education and Home Exercises     Home Exercises Provided and Patient Education Provided     Education provided:   - Reviewed need for proper arch supports and foot wear.    Written Home Exercises Provided: yes. Exercises were reviewed and Yulia was able to demonstrate them prior to the end of the session.  Yulia demonstrated good  understanding of the education provided. See EMR under Patient Instructions for exercises provided during therapy sessions    ASSESSMENT   All goals met. DISCHARGE to [facility] for maintenance.     Yulia Is progressing well towards her goals.   Pt prognosis is Good.     Pt will continue to benefit from skilled outpatient physical therapy to address the deficits listed in the problem list box on initial evaluation, provide pt/family education and to maximize pt's level of independence in the home and community environment.     Pt's spiritual, cultural and educational needs considered and pt agreeable to plan of care and goals.     Anticipated barriers to physical therapy: None    Goals:    GOALS: Short Term Goals:  6 weeks in progress  1. Report decreased L ankle/foot pain  <  / =  5/10 at worst to increase tolerance for gait. (MET)  2. Pt will perform 8 STS in 30 seconds without UEs and without offloading L LE to demonstrate improved functional strength for transfers. (Met)  3. Pt will demonstrate 5 degree improvement in L ankle eversion to improve gait. (MET)  4. Pt will exhibit 1/2 grade improvement in L ankle global strength. (met)   5. Pt to tolerate HEP to improve ROM and independence with ADL's. (MET)     Long Term Goals: 12 weeks in progress  1. Pt will perform 10 STS in 30 seconds without UEs and without offloading L LE to demonstrate improved functional strength for transfers (met)  2. Pt will  demonstrate 10 degree improvement in L ankle eversion to improve gait (met)  3. Pt will exhibit 1 grade improvement in L ankle global strength (met)   4. Pt will perform TUG without AD in <12 to demonstrate decreased fall risk  (met)   5. Pt to be Independent with HEP to improve ROM and independence with ADL's. (met)        PLAN     Con't with manual therapy, progressive ROM and strengthening therex per POC.    Karen Sherman, PT

## 2022-05-23 ENCOUNTER — OFFICE VISIT (OUTPATIENT)
Dept: RHEUMATOLOGY | Facility: CLINIC | Age: 77
End: 2022-05-23
Payer: MEDICARE

## 2022-05-23 VITALS
BODY MASS INDEX: 35.41 KG/M2 | WEIGHT: 192.44 LBS | DIASTOLIC BLOOD PRESSURE: 80 MMHG | HEART RATE: 67 BPM | SYSTOLIC BLOOD PRESSURE: 143 MMHG | HEIGHT: 62 IN

## 2022-05-23 DIAGNOSIS — Z79.1 NSAID LONG-TERM USE: ICD-10-CM

## 2022-05-23 DIAGNOSIS — M10.9 GOUT, ARTHRITIS: ICD-10-CM

## 2022-05-23 DIAGNOSIS — D84.9 IMMUNOSUPPRESSION: ICD-10-CM

## 2022-05-23 DIAGNOSIS — K21.9 GASTROESOPHAGEAL REFLUX DISEASE: ICD-10-CM

## 2022-05-23 DIAGNOSIS — M05.79 RHEUMATOID ARTHRITIS INVOLVING MULTIPLE SITES WITH POSITIVE RHEUMATOID FACTOR: ICD-10-CM

## 2022-05-23 PROCEDURE — 99214 OFFICE O/P EST MOD 30 MIN: CPT | Mod: S$PBB,,, | Performed by: INTERNAL MEDICINE

## 2022-05-23 PROCEDURE — 99214 PR OFFICE/OUTPT VISIT, EST, LEVL IV, 30-39 MIN: ICD-10-PCS | Mod: S$PBB,,, | Performed by: INTERNAL MEDICINE

## 2022-05-23 PROCEDURE — 99999 PR PBB SHADOW E&M-EST. PATIENT-LVL IV: ICD-10-PCS | Mod: PBBFAC,,, | Performed by: INTERNAL MEDICINE

## 2022-05-23 PROCEDURE — 99999 PR PBB SHADOW E&M-EST. PATIENT-LVL IV: CPT | Mod: PBBFAC,,, | Performed by: INTERNAL MEDICINE

## 2022-05-23 PROCEDURE — 99214 OFFICE O/P EST MOD 30 MIN: CPT | Mod: PBBFAC | Performed by: INTERNAL MEDICINE

## 2022-05-23 RX ORDER — MISOPROSTOL 100 UG/1
100 TABLET ORAL 2 TIMES DAILY
Qty: 180 TABLET | Refills: 3 | Status: SHIPPED | OUTPATIENT
Start: 2022-05-23 | End: 2023-06-21

## 2022-05-23 RX ORDER — METHOTREXATE 2.5 MG/1
TABLET ORAL
Qty: 120 TABLET | Refills: 0 | Status: SHIPPED | OUTPATIENT
Start: 2022-05-23 | End: 2022-10-10 | Stop reason: SDUPTHER

## 2022-05-23 ASSESSMENT — ROUTINE ASSESSMENT OF PATIENT INDEX DATA (RAPID3)
PAIN SCORE: 4.5
PSYCHOLOGICAL DISTRESS SCORE: 2.2
PATIENT GLOBAL ASSESSMENT SCORE: 0.5
TOTAL RAPID3 SCORE: 2.22
FATIGUE SCORE: 3.5
AM STIFFNESS SCORE: 0, NO
MDHAQ FUNCTION SCORE: 0.5

## 2022-05-23 NOTE — PROGRESS NOTES
"Subjective:       Patient ID: Yulia Peralta is a 77 y.o. female.    Chief Complaint: Rheumatoid arthritis    HPI:  Yulia Peralta is a 77 y.o. female with a history of rheumatoid arthritis for 25 years.  She had been on methotrexate for the past 5 years and her current dose is methotrexate 10 tabs qweek (5 on Monday and 5 on Tuesday).  Off prednisone    History of gout some years ago.        Interval History:    Doing well. Saw foot doctor regarding pain and was told to change shoes and gave steroid pack which helped a little.  She was told to contact office if no improvement for possible injection.    She saw neurosurgery who referred her to healthy back program.  She feels it did not help.     Feels joints are doing well except 2/10 ache bilateral foot.  Worsened with being on feet all day or staying.   No morning stiffness.  Improves with resting feet.  Worse with walking.    Seeing neurosurgery regarding aneurysm anterior communicating artery on CT.       Review of Systems   Constitutional: Positive for fatigue.   HENT: Negative.    Eyes: Negative.    Cardiovascular: Negative.    Gastrointestinal: Negative.    Endocrine: Negative.    Genitourinary: Negative.    Musculoskeletal: Positive for arthralgias and joint swelling.   Skin: Negative for rash.   Allergic/Immunologic: Negative.    Neurological: Positive for headaches.   Hematological: Negative.  Does not bruise/bleed easily.   Psychiatric/Behavioral: Negative.          Objective:   BP (!) 143/80   Pulse 67   Ht 5' 2" (1.575 m)   Wt 87.3 kg (192 lb 7.4 oz)   BMI 35.20 kg/m²   Virtual visit       Physical Exam   Constitutional: She is oriented to person, place, and time.   HENT:   Head: Normocephalic and atraumatic.   Eyes: Conjunctivae are normal.   Cardiovascular: Normal rate, regular rhythm and normal heart sounds.   Pulmonary/Chest: Effort normal and breath sounds normal.   Abdominal: Soft. Bowel sounds are normal.   Musculoskeletal:      " Cervical back: Neck supple.      Comments: 28 joint count: 0 swollen and 0 tender  No swelling of hands  Mild swelling dorsum of left foot  No pain on compression MTPs bilaterally  Contractures elbows   Neurological: She is alert and oriented to person, place, and time. Gait normal.   Skin: Skin is warm and dry.   Psychiatric: Mood and affect normal.            LABS    Component      Latest Ref Rng & Units 5/6/2022   WBC      3.90 - 12.70 K/uL 5.42   RBC      4.00 - 5.40 M/uL 3.41 (L)   Hemoglobin      12.0 - 16.0 g/dL 10.7 (L)   Hematocrit      37.0 - 48.5 % 33.5 (L)   MCV      82 - 98 fL 98   MCH      27.0 - 31.0 pg 31.4 (H)   MCHC      32.0 - 36.0 g/dL 31.9 (L)   RDW      11.5 - 14.5 % 14.1   Platelets      150 - 450 K/uL 168   MPV      9.2 - 12.9 fL 14.0 (H)   Immature Granulocytes      0.0 - 0.5 % 0.2   Gran # (ANC)      1.8 - 7.7 K/uL 3.3   Immature Grans (Abs)      0.00 - 0.04 K/uL 0.01   Lymph #      1.0 - 4.8 K/uL 1.2   Mono #      0.3 - 1.0 K/uL 0.7   Eos #      0.0 - 0.5 K/uL 0.2   Baso #      0.00 - 0.20 K/uL 0.02   nRBC      0 /100 WBC 0   Gran %      38.0 - 73.0 % 60.1   Lymph %      18.0 - 48.0 % 22.1   Mono %      4.0 - 15.0 % 13.5   Eosinophil %      0.0 - 8.0 % 3.7   Basophil %      0.0 - 1.9 % 0.4   Platelet Estimate       Appears normal   Aniso       Slight   Poikilocytosis       Slight   Poly       Occasional   Hypo       Occasional   Ovalocytes       Occasional   Target Cells       Occasional   [place]/Echinocytes       Occasional   Large/Giant Platelets       Present   Fragmented Cells       Occasional   Pappenheimer Bodies       CANCELED   Differential Method       Automated   Sodium      136 - 145 mmol/L 138   Potassium      3.5 - 5.1 mmol/L 4.4   Chloride      95 - 110 mmol/L 104   CO2      23 - 29 mmol/L 30 (H)   Glucose      70 - 110 mg/dL 96   BUN      8 - 23 mg/dL 8   Creatinine      0.5 - 1.4 mg/dL 0.7   Calcium      8.7 - 10.5 mg/dL 10.0   PROTEIN TOTAL      6.0 - 8.4 g/dL 7.5    Albumin      3.5 - 5.2 g/dL 4.1   BILIRUBIN TOTAL      0.1 - 1.0 mg/dL 0.4   Alkaline Phosphatase      55 - 135 U/L 95   AST      10 - 40 U/L 18   ALT      10 - 44 U/L 22   Anion Gap      8 - 16 mmol/L 4 (L)   eGFR if African American      >60 mL/min/1.73 m:2 >60.0   eGFR if non African American      >60 mL/min/1.73 m:2 >60.0   CRP      0.0 - 8.2 mg/L <0.3   Sed Rate      0 - 36 mm/Hr 25       Assessment:       1. Rheumatoid arthritis manifested by rheumatoid nodule, polyarthritis in the past, and contractures of the elbows.    Treated in the past with gold. Plaquenil did not work in the past and she never took SSZ.   Doing well. Continue MTX (Monday 5 in morning and 5 in evening) and folic acid   2. Encounter for long-term (current) use of other medications    3. Fatigue.   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  She decided to go for second opinion at [facility]. Biopsy was negative. MRI repeat stable and most recent 10/2017 stable  5. 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 Felt not to have hepatitis C by hepatology.  6. Right anserine bursitis.   7. Obesity  8. HTN.   9. Chronic back pain.  Bulging disc.  May be cause of paresthesias in legs with standing.  10.  Paresthesia of in left leg.  May be due to #9.    11.  Right clavicle mass.  Evaluated by x-ray   12.  Trigger fingers.  Left 4th and right 3rd and right 5th.  Therapy helped in past.    13.  Left lower leg pain and swelling.  History of trauma  14.  Anterior communicating artery aneurysm    Plan:       1. Continue methotrexate 10 tabs qweek.  2. Check CRP, ESR, CBC, and CMP.   3. Continue Nabumetone.  Patient says GI ok with her continuing despite healing ulcer on EGD.   Patient on misoprostol  4. Continue folic acid 2 tabs.    5. Patient to discuss with neurosurgery management of back  6. Consider OT in future for arms if pain returns and no  improvement with home exercises from previous PT  7. Topical arthritis OTC ointment to feet and ankles.  8.  Encourage walking in the house         Return to the office in 6 months/prn;

## 2022-05-24 ENCOUNTER — TELEPHONE (OUTPATIENT)
Dept: FAMILY MEDICINE | Facility: CLINIC | Age: 77
End: 2022-05-24
Payer: MEDICARE

## 2022-05-24 NOTE — TELEPHONE ENCOUNTER
----- Message from Beatriz Fletcher sent at 5/24/2022  8:59 AM CDT -----  Type: Patient Call Back    Who called: Self     What is the request in detail: patient says she [facility] and would like to speak with them again. But she does not know how  to reach them. Please call    Can the clinic reply by [facility]? No     Would the patient rather a call back or a response via [facility]? Call     Best call back number:.[phone]

## 2022-05-24 NOTE — TELEPHONE ENCOUNTER
Return call to patient, and she states that she is taking the Ozempic but she is experiencing some GI upset. She states that she knows that can be a side effect, but wants to know if she is to continue the medication. Please advise.

## 2022-05-31 ENCOUNTER — EXTERNAL CHRONIC CARE MANAGEMENT (OUTPATIENT)
Dept: PRIMARY CARE CLINIC | Facility: CLINIC | Age: 77
End: 2022-05-31
Payer: MEDICARE

## 2022-05-31 PROCEDURE — 99439 CHRNC CARE MGMT STAF EA ADDL: CPT | Mod: S$PBB,,, | Performed by: FAMILY MEDICINE

## 2022-05-31 PROCEDURE — 99439 PR CHRONIC CARE MGMT, EA ADDTL 20 MIN: ICD-10-PCS | Mod: S$PBB,,, | Performed by: FAMILY MEDICINE

## 2022-05-31 PROCEDURE — 99490 CHRNC CARE MGMT STAFF 1ST 20: CPT | Mod: S$PBB,,, | Performed by: FAMILY MEDICINE

## 2022-05-31 PROCEDURE — 99490 PR CHRONIC CARE MGMT, 1ST 20 MIN: ICD-10-PCS | Mod: S$PBB,,, | Performed by: FAMILY MEDICINE

## 2022-05-31 PROCEDURE — 99439 CHRNC CARE MGMT STAF EA ADDL: CPT | Mod: PBBFAC,PO | Performed by: FAMILY MEDICINE

## 2022-05-31 PROCEDURE — 99490 CHRNC CARE MGMT STAFF 1ST 20: CPT | Mod: PBBFAC,PO,27 | Performed by: FAMILY MEDICINE

## 2022-06-07 ENCOUNTER — OFFICE VISIT (OUTPATIENT)
Dept: FAMILY MEDICINE | Facility: CLINIC | Age: 77
End: 2022-06-07
Payer: MEDICARE

## 2022-06-07 DIAGNOSIS — K59.04 CHRONIC IDIOPATHIC CONSTIPATION: Primary | ICD-10-CM

## 2022-06-07 PROCEDURE — 99441 PR PHYSICIAN TELEPHONE EVALUATION 5-10 MIN: ICD-10-PCS | Mod: 95,,, | Performed by: FAMILY MEDICINE

## 2022-06-07 PROCEDURE — 99441 PR PHYSICIAN TELEPHONE EVALUATION 5-10 MIN: CPT | Mod: 95,,, | Performed by: FAMILY MEDICINE

## 2022-06-07 NOTE — PROGRESS NOTES
Established Patient - Audio Only Telehealth Visit     The patient location is: [place]  The chief complaint leading to consultation is: constipation  Visit type: Virtual visit with audio only (telephone)  Total time spent with patient: 8 minutes 8:07-8:15       The reason for the audio only service rather than synchronous audio and video virtual visit was related to technical difficulties or patient preference/necessity.     Each patient to whom I provide medical services by telemedicine is:  (1) informed of the relationship between the physician and patient and the respective role of any other health care provider with respect to management of the patient; and (2) notified that they may decline to receive medical services by telemedicine and may withdraw from such care at any time. Patient verbally consented to receive this service via voice-only telephone call.       HPI: 77 year old female presents with concerns about her ozempic. She states she started taking the ozempic and has some stomach issues with this. She suffers with constipation. She is taking metamucil and prune juice. She has no nausea or vomiting. She has had slight abdominal pain, but it resolves on its own. She has lost a few pounds on this.s he is down to 187 pounds  She feels that she also has hemorrhoids. She has some pain due to straining. She sees Dr. Figueroa, GI.      Assessment and plan:  77 year old female presents for constipation       Diagnoses and all orders for this visit:    Chronic idiopathic constipation  -     linaCLOtide (LINZESS) 72 mcg Cap capsule; Take 1 capsule (72 mcg total) by mouth before breakfast.                       This service was not originating from a related E/M service provided within the previous 7 days nor will  to an E/M service or procedure within the next 24 hours or my soonest available appointment.  Prevailing standard of care was able to be met in this audio-only visit.

## 2022-06-30 ENCOUNTER — EXTERNAL CHRONIC CARE MANAGEMENT (OUTPATIENT)
Dept: PRIMARY CARE CLINIC | Facility: CLINIC | Age: 77
End: 2022-06-30
Payer: MEDICARE

## 2022-06-30 PROCEDURE — 99439 PR CHRONIC CARE MGMT, EA ADDTL 20 MIN: ICD-10-PCS | Mod: S$PBB,,, | Performed by: FAMILY MEDICINE

## 2022-06-30 PROCEDURE — 99439 CHRNC CARE MGMT STAF EA ADDL: CPT | Mod: PBBFAC,PO | Performed by: FAMILY MEDICINE

## 2022-06-30 PROCEDURE — 99490 PR CHRONIC CARE MGMT, 1ST 20 MIN: ICD-10-PCS | Mod: S$PBB,,, | Performed by: FAMILY MEDICINE

## 2022-06-30 PROCEDURE — 99490 CHRNC CARE MGMT STAFF 1ST 20: CPT | Mod: PBBFAC,PO | Performed by: FAMILY MEDICINE

## 2022-06-30 PROCEDURE — 99490 CHRNC CARE MGMT STAFF 1ST 20: CPT | Mod: S$PBB,,, | Performed by: FAMILY MEDICINE

## 2022-06-30 PROCEDURE — 99439 CHRNC CARE MGMT STAF EA ADDL: CPT | Mod: S$PBB,,, | Performed by: FAMILY MEDICINE

## 2022-07-31 ENCOUNTER — EXTERNAL CHRONIC CARE MANAGEMENT (OUTPATIENT)
Dept: PRIMARY CARE CLINIC | Facility: CLINIC | Age: 77
End: 2022-07-31
Payer: MEDICARE

## 2022-07-31 PROCEDURE — 99490 PR CHRONIC CARE MGMT, 1ST 20 MIN: ICD-10-PCS | Mod: S$PBB,,, | Performed by: FAMILY MEDICINE

## 2022-07-31 PROCEDURE — 99490 CHRNC CARE MGMT STAFF 1ST 20: CPT | Mod: S$PBB,,, | Performed by: FAMILY MEDICINE

## 2022-07-31 PROCEDURE — 99490 CHRNC CARE MGMT STAFF 1ST 20: CPT | Mod: PBBFAC,PO | Performed by: FAMILY MEDICINE

## 2022-08-01 ENCOUNTER — LAB VISIT (OUTPATIENT)
Dept: LAB | Facility: HOSPITAL | Age: 77
End: 2022-08-01
Attending: INTERNAL MEDICINE
Payer: MEDICARE

## 2022-08-01 DIAGNOSIS — Z79.1 NSAID LONG-TERM USE: ICD-10-CM

## 2022-08-01 DIAGNOSIS — K21.9 GASTROESOPHAGEAL REFLUX DISEASE, UNSPECIFIED WHETHER ESOPHAGITIS PRESENT: ICD-10-CM

## 2022-08-01 DIAGNOSIS — D84.9 IMMUNOSUPPRESSION: ICD-10-CM

## 2022-08-01 DIAGNOSIS — M05.79 RHEUMATOID ARTHRITIS INVOLVING MULTIPLE SITES WITH POSITIVE RHEUMATOID FACTOR: ICD-10-CM

## 2022-08-01 DIAGNOSIS — M10.9 GOUT, ARTHRITIS: ICD-10-CM

## 2022-08-01 LAB
ALBUMIN SERPL BCP-MCNC: 3.8 G/DL (ref 3.5–5.2)
ALP SERPL-CCNC: 93 U/L (ref 55–135)
ALT SERPL W/O P-5'-P-CCNC: 13 U/L (ref 10–44)
ANION GAP SERPL CALC-SCNC: 9 MMOL/L (ref 8–16)
AST SERPL-CCNC: 15 U/L (ref 10–40)
BASOPHILS # BLD AUTO: 0.03 K/UL (ref 0–0.2)
BASOPHILS NFR BLD: 0.4 % (ref 0–1.9)
BILIRUB SERPL-MCNC: 0.2 MG/DL (ref 0.1–1)
BUN SERPL-MCNC: 12 MG/DL (ref 8–23)
CALCIUM SERPL-MCNC: 9.9 MG/DL (ref 8.7–10.5)
CHLORIDE SERPL-SCNC: 106 MMOL/L (ref 95–110)
CO2 SERPL-SCNC: 24 MMOL/L (ref 23–29)
CREAT SERPL-MCNC: 0.8 MG/DL (ref 0.5–1.4)
CRP SERPL-MCNC: 0.6 MG/L (ref 0–8.2)
DIFFERENTIAL METHOD: ABNORMAL
EOSINOPHIL # BLD AUTO: 0.3 K/UL (ref 0–0.5)
EOSINOPHIL NFR BLD: 4 % (ref 0–8)
ERYTHROCYTE [DISTWIDTH] IN BLOOD BY AUTOMATED COUNT: 14.1 % (ref 11.5–14.5)
ERYTHROCYTE [SEDIMENTATION RATE] IN BLOOD BY PHOTOMETRIC METHOD: 17 MM/HR (ref 0–36)
EST. GFR  (NO RACE VARIABLE): >60 ML/MIN/1.73 M^2
GLUCOSE SERPL-MCNC: 94 MG/DL (ref 70–110)
HCT VFR BLD AUTO: 32.2 % (ref 37–48.5)
HGB BLD-MCNC: 10.5 G/DL (ref 12–16)
IMM GRANULOCYTES # BLD AUTO: 0.09 K/UL (ref 0–0.04)
IMM GRANULOCYTES NFR BLD AUTO: 1.2 % (ref 0–0.5)
LYMPHOCYTES # BLD AUTO: 1.7 K/UL (ref 1–4.8)
LYMPHOCYTES NFR BLD: 22.4 % (ref 18–48)
MCH RBC QN AUTO: 32.7 PG (ref 27–31)
MCHC RBC AUTO-ENTMCNC: 32.6 G/DL (ref 32–36)
MCV RBC AUTO: 100 FL (ref 82–98)
MONOCYTES # BLD AUTO: 1.2 K/UL (ref 0.3–1)
MONOCYTES NFR BLD: 15.5 % (ref 4–15)
NEUTROPHILS # BLD AUTO: 4.4 K/UL (ref 1.8–7.7)
NEUTROPHILS NFR BLD: 56.5 % (ref 38–73)
NRBC BLD-RTO: 0 /100 WBC
PLATELET # BLD AUTO: 161 K/UL (ref 150–450)
PMV BLD AUTO: 13.7 FL (ref 9.2–12.9)
POTASSIUM SERPL-SCNC: 4.2 MMOL/L (ref 3.5–5.1)
PROT SERPL-MCNC: 7.3 G/DL (ref 6–8.4)
RBC # BLD AUTO: 3.21 M/UL (ref 4–5.4)
SODIUM SERPL-SCNC: 139 MMOL/L (ref 136–145)
WBC # BLD AUTO: 7.69 K/UL (ref 3.9–12.7)

## 2022-08-01 PROCEDURE — 85652 RBC SED RATE AUTOMATED: CPT | Performed by: INTERNAL MEDICINE

## 2022-08-01 PROCEDURE — 86140 C-REACTIVE PROTEIN: CPT | Performed by: INTERNAL MEDICINE

## 2022-08-01 PROCEDURE — 80053 COMPREHEN METABOLIC PANEL: CPT | Performed by: INTERNAL MEDICINE

## 2022-08-01 PROCEDURE — 85025 COMPLETE CBC W/AUTO DIFF WBC: CPT | Performed by: INTERNAL MEDICINE

## 2022-08-01 PROCEDURE — 36415 COLL VENOUS BLD VENIPUNCTURE: CPT | Mod: PO | Performed by: INTERNAL MEDICINE

## 2022-08-02 ENCOUNTER — TELEPHONE (OUTPATIENT)
Dept: RHEUMATOLOGY | Facility: CLINIC | Age: 77
End: 2022-08-02
Payer: MEDICARE

## 2022-08-02 NOTE — TELEPHONE ENCOUNTER
Please  inform the patient that labs are stable.  She should continue with the current treatment plan.    Routing comment      Usha Soares MD 12 minutes ago (3:27 PM)        pt was called and left  message

## 2022-08-02 NOTE — TELEPHONE ENCOUNTER
Staff to inform the patient that labs are stable.  She should continue with the current treatment plan.

## 2022-08-09 ENCOUNTER — LAB VISIT (OUTPATIENT)
Dept: LAB | Facility: HOSPITAL | Age: 77
End: 2022-08-09
Attending: INTERNAL MEDICINE
Payer: MEDICARE

## 2022-08-09 DIAGNOSIS — R10.31 ABDOMINAL PAIN, RIGHT LOWER QUADRANT: ICD-10-CM

## 2022-08-09 DIAGNOSIS — R10.32 ABDOMINAL PAIN, LEFT LOWER QUADRANT: Primary | ICD-10-CM

## 2022-08-09 DIAGNOSIS — I10 HYPERTENSION: ICD-10-CM

## 2022-08-09 DIAGNOSIS — D50.9 IRON DEFICIENCY ANEMIA: ICD-10-CM

## 2022-08-09 DIAGNOSIS — R12 HEARTBURN: ICD-10-CM

## 2022-08-09 DIAGNOSIS — Z80.0 FAMILY HISTORY OF COLON CANCER: ICD-10-CM

## 2022-08-09 DIAGNOSIS — K59.00 CN (CONSTIPATION): ICD-10-CM

## 2022-08-09 LAB
ANION GAP SERPL CALC-SCNC: 10 MMOL/L (ref 8–16)
BUN SERPL-MCNC: 10 MG/DL (ref 8–23)
CALCIUM SERPL-MCNC: 9.9 MG/DL (ref 8.7–10.5)
CHLORIDE SERPL-SCNC: 102 MMOL/L (ref 95–110)
CO2 SERPL-SCNC: 23 MMOL/L (ref 23–29)
CREAT SERPL-MCNC: 0.8 MG/DL (ref 0.5–1.4)
EST. GFR  (NO RACE VARIABLE): >60 ML/MIN/1.73 M^2
GLUCOSE SERPL-MCNC: 72 MG/DL (ref 70–110)
POTASSIUM SERPL-SCNC: 4.2 MMOL/L (ref 3.5–5.1)
SODIUM SERPL-SCNC: 135 MMOL/L (ref 136–145)

## 2022-08-09 PROCEDURE — 80048 BASIC METABOLIC PNL TOTAL CA: CPT | Performed by: INTERNAL MEDICINE

## 2022-08-09 PROCEDURE — 36415 COLL VENOUS BLD VENIPUNCTURE: CPT | Mod: PO | Performed by: INTERNAL MEDICINE

## 2022-08-29 ENCOUNTER — OFFICE VISIT (OUTPATIENT)
Dept: VASCULAR SURGERY | Facility: CLINIC | Age: 77
End: 2022-08-29
Payer: MEDICARE

## 2022-08-29 ENCOUNTER — HOSPITAL ENCOUNTER (OUTPATIENT)
Dept: CARDIOLOGY | Facility: HOSPITAL | Age: 77
Discharge: HOME | End: 2022-08-29
Attending: SURGERY
Payer: MEDICARE

## 2022-08-29 VITALS
WEIGHT: 178.25 LBS | HEART RATE: 74 BPM | SYSTOLIC BLOOD PRESSURE: 131 MMHG | DIASTOLIC BLOOD PRESSURE: 63 MMHG | OXYGEN SATURATION: 98 % | BODY MASS INDEX: 32.6 KG/M2

## 2022-08-29 DIAGNOSIS — M79.661 PAIN IN BOTH LOWER LEGS: Primary | ICD-10-CM

## 2022-08-29 DIAGNOSIS — I65.23 CAROTID STENOSIS, ASYMPTOMATIC, BILATERAL: ICD-10-CM

## 2022-08-29 DIAGNOSIS — I65.23 CAROTID STENOSIS, ASYMPTOMATIC, BILATERAL: Primary | ICD-10-CM

## 2022-08-29 DIAGNOSIS — M79.605 LEFT LEG PAIN: ICD-10-CM

## 2022-08-29 DIAGNOSIS — M79.662 PAIN IN BOTH LOWER LEGS: Primary | ICD-10-CM

## 2022-08-29 DIAGNOSIS — I70.203 BILATERAL ATHEROSCLEROSIS OF LEGS: ICD-10-CM

## 2022-08-29 PROCEDURE — 99999 PR PBB SHADOW E&M-EST. PATIENT-LVL III: ICD-10-PCS | Mod: PBBFAC,,, | Performed by: SURGERY

## 2022-08-29 PROCEDURE — 99214 PR OFFICE/OUTPT VISIT, EST, LEVL IV, 30-39 MIN: ICD-10-PCS | Mod: S$PBB,,, | Performed by: SURGERY

## 2022-08-29 PROCEDURE — 93922 UPR/L XTREMITY ART 2 LEVELS: CPT | Mod: 26,,, | Performed by: SURGERY

## 2022-08-29 PROCEDURE — 93922 UPR/L XTREMITY ART 2 LEVELS: CPT

## 2022-08-29 PROCEDURE — 99214 OFFICE O/P EST MOD 30 MIN: CPT | Mod: S$PBB,,, | Performed by: SURGERY

## 2022-08-29 PROCEDURE — 99999 PR PBB SHADOW E&M-EST. PATIENT-LVL III: CPT | Mod: PBBFAC,,, | Performed by: SURGERY

## 2022-08-29 PROCEDURE — 93922 ANKLE BRACHIAL INDICES (ABI): ICD-10-PCS | Mod: 26,,, | Performed by: SURGERY

## 2022-08-29 PROCEDURE — 99213 OFFICE O/P EST LOW 20 MIN: CPT | Mod: PBBFAC | Performed by: SURGERY

## 2022-08-29 NOTE — PROGRESS NOTES
Pedro Luis Sandhu MD [facility]                         08/29/2022    HPI:  Yulia Peralta is a 77 y.o. female with   Patient Active Problem List   Diagnosis    Rheumatoid arthritis    Multiple thyroid nodules    Anemia of other chronic disease    Migraine headache    HTN (hypertension)    GERD (gastroesophageal reflux disease)    Lumbar spondylosis    DDD (degenerative disc disease), lumbar    Spinal stenosis, lumbar region, with neurogenic claudication    Acquired spondylolisthesis    Genital herpes in women    Vitamin B 12 deficiency    Acquired scoliosis    Palpitations    Immunosuppression    Anserine bursitis    Class 2 obesity due to excess calories in adult    Lumbosacral radiculopathy    Abnormal CT scan, chest    Thymoma    Cerebral microvascular disease    Tortuous aorta    Nuclear sclerosis, bilateral    Refractive error    Severe obesity (BMI 35.0-39.9) with comorbidity    Cerebral aneurysm without rupture    Impacted cerumen of both ears    Light headedness    Nuclear sclerotic cataract of left eye    Nuclear sclerotic cataract of right eye    Decreased ROM of trunk and back    Decreased strength of trunk and back    Claudication of both lower extremities    Obesity (BMI 30-39.9)    PAD (peripheral artery disease)    Bilateral carotid artery stenosis    Mixed hyperlipidemia    Left foot pain    Gait instability    being managed by PCP and specialists who is here today for evaluation of PVD.  Patient has no complaints of claudication.  Patient states location is LLE occurring for months.  Associated signs and symptoms include chronic back pain.  Quality is sharp and severity is 7/10.  Symptoms began weeks to months ago.  Alleviating factors include walking.  Worsening factors include resting.  no rest pain.  no tissue loss.  Patient is not diabetic.  Is not on Pletal.  no previous lower extremity interventions.    Patient has no cerebrovascular  complaints today.  She has no history of stroke or mini stroke.  No abdominal pain.  She has chronic back pain that is being managed by Dr. Curry and she is in the healthy back program.  She has complaints of left distal lower extremity sharp pain at rest.  It improves with ambulation.  She denies claudication, rest pain or wounds.    no MI  no Stroke  Tobacco use: denies  Daily Aspirin: yes  Anticoagulation: no    3/2022:  Patient feels better without significant back pain.  She does have severe left foot pain that is induced with standing with unsteady gait and it radiates upwards to her knee.  She completed the healthy back program although has not followed up with pain or nerve surgery per Dr. Brar recommendations.  She denies stroke or mini stroke.  She is compliant with all her medications.  She saw Podiatry for the foot pain and they recommended steroid pack and support shoes.    8/2022:  c/o LLE and L foot pain.  No claudication.      Past Medical History:   Diagnosis Date    Abnormal colonoscopy 07/24/2020    colon polyps nad repeat in 3 years.     Acid reflux     Anemia     Anxiety     Arthritis     Blood transfusion     Cataract     Depression     Dry eyes     H/O colonoscopy 07/22/2020    dr. nicholas. repeat in 3 years.     Heart murmur     Hypertension     Left lumbar radiculitis 5/19/2015    Mixed hyperlipidemia 11/5/2021    Multiple thyroid nodules 12/18/2012    Osteoporosis     Rheumatoid arthritis     Thoracic or lumbosacral neuritis or radiculitis, unspecified 10/30/2013     Past Surgical History:   Procedure Laterality Date    CATARACT EXTRACTION W/  INTRAOCULAR LENS IMPLANT Left 1/26/2021    Procedure: EXTRACTION, CATARACT, WITH IOL INSERTION;  Surgeon: Elvis Pete MD;  Location: [facility];  Service: Ophthalmology;  Laterality: Left;    CATARACT EXTRACTION W/  INTRAOCULAR LENS IMPLANT Right 2/23/2021    Procedure: EXTRACTION, CATARACT, WITH IOL INSERTION;  Surgeon: Elvis PARKER  MD Yanci;  Location: [facility] OR;  Service: Ophthalmology;  Laterality: Right;    CEREBRAL ANGIOGRAM N/A 1/23/2020    Procedure: ANGIOGRAM-CEREBRAL;  Surgeon: Hira Diagnostic Provider;  [facility];  Service: Radiology;  Laterality: N/A;  /[place]    gallstones  [id number]    HYSTERECTOMY      JOINT REPLACEMENT  [id number]     bilateral knee    OOPHORECTOMY      VT REMOVAL OF OVARY/TUBE(S)      TONSILLECTOMY       Family History   Problem Relation Age of Onset    Cataracts Mother     Hypertension Mother     No Known Problems Father     Hypertension Sister     Glaucoma Brother     Breast cancer Maternal Aunt     No Known Problems Maternal Uncle     No Known Problems Paternal Aunt     No Known Problems Paternal Uncle     No Known Problems Maternal Grandmother     No Known Problems Maternal Grandfather     No Known Problems Paternal Grandmother     No Known Problems Paternal Grandfather     Lupus Neg Hx     Rheum arthritis Neg Hx     Psoriasis Neg Hx     Amblyopia Neg Hx     Blindness Neg Hx     Cancer Neg Hx     Diabetes Neg Hx     Macular degeneration Neg Hx     Retinal detachment Neg Hx     Strabismus Neg Hx     Stroke Neg Hx     Thyroid disease Neg Hx      Social History     Socioeconomic History    Marital status:      Spouse name: Ric     Number of children: 2    Highest education level: Some college, no degree   Occupational History    Occupation: retired    Tobacco Use    Smoking status: Former     Packs/day: 0.25     Years: 1.00     Pack years: 0.25     Types: Cigarettes    Smokeless tobacco: Never   Substance and Sexual Activity    Alcohol use: Yes     Alcohol/week: 2.0 standard drinks     Types: 1 Glasses of wine, 1 Cans of beer per week     Comment: very seldom    Drug use: No    Sexual activity: Not Currently     Partners: Male   Social History Narrative    Adult Screenings updated and reviewed  6/12/14    Mammogram( for females) ordered for DIS    Pap ( for females) Dr Zepeda   Due for f/u   For  2014    Colonoscopy  Done once    Flu shot yearly done  2013    Td 2005    Pneumovax  Updated  12/3/13    Zostavax done 2012    Eye exam recommended yearly done 2013    Bone density  12/9/13    Thyroid ultrasound  11/6/2012 Normal sized thyroid containing several subcentimeter nodules, similar to the 5/12/11 exam.  No concerning nodules identified..          Social Determinants of Health     Financial Resource Strain: Low Risk     Difficulty of Paying Living Expenses: Not hard at all   Food Insecurity: No Food Insecurity    Worried About Running Out of Food in the Last Year: Never true    Ran Out of Food in the Last Year: Never true   Transportation Needs: No Transportation Needs    Lack of Transportation (Medical): No    Lack of Transportation (Non-Medical): No   Physical Activity: Inactive    Days of Exercise per Week: 0 days    Minutes of Exercise per Session: 0 min   Stress: No Stress Concern Present    Feeling of Stress : Only a little   Social Connections: Moderately Integrated    Frequency of Communication with Friends and Family: More than three times a week    Frequency of Social Gatherings with Friends and Family: More than three times a week    Attends Alevism Services: More than 4 times per year    Active Member of Clubs or Organizations: No    Marital Status:    Housing Stability: Unknown    Unable to Pay for Housing in the Last Year: No    Unstable Housing in the Last Year: No       Current Outpatient Medications:     aspirin (ECOTRIN) 81 MG EC tablet, Take 81 mg by mouth once daily., Disp: , Rfl:     atorvastatin (LIPITOR) 40 MG tablet, Take 1 tablet (40 mg total) by mouth once daily., Disp: 90 tablet, Rfl: 3    fluticasone (FLONASE) 50 mcg/actuation nasal spray, 1 spray (50 mcg total) by Each Nare route once daily., Disp: 16 g, Rfl: 5    folic acid (FOLVITE) 1 MG tablet, TAKE TWO TABLETS BY MOUTH EVERY DAY, Disp: 180 tablet, Rfl: 3    furosemide (LASIX) 40 MG tablet, Take  1 tablet (40 mg total) by mouth once daily., Disp: 90 tablet, Rfl: 1    hydrocortisone 2.5 % cream, as needed. , Disp: , Rfl: 0    irbesartan (AVAPRO) 300 MG tablet, Take 1 tablet (300 mg total) by mouth every evening., Disp: 90 tablet, Rfl: 3    linaCLOtide (LINZESS) 72 mcg Cap capsule, Take 1 capsule (72 mcg total) by mouth before breakfast., Disp: 90 capsule, Rfl: 1    methotrexate 2.5 MG Tab, Take by mouth every Monday 5 tabs in AM and 5 tabs in PM, Disp: 120 tablet, Rfl: 0    methylPREDNISolone (MEDROL DOSEPACK) 4 mg tablet, follow package directions, Disp: 21 tablet, Rfl: 0    miSOPROStoL (CYTOTEC) 100 MCG Tab, Take 1 tablet (100 mcg total) by mouth 2 (two) times daily., Disp: 180 tablet, Rfl: 3    nabumetone (RELAFEN) 500 MG tablet, Take 1 tablet (500 mg total) by mouth 2 (two) times daily., Disp: 180 tablet, Rfl: 3    NORVASC 5 mg tablet, TAKE ONE TABLET BY MOUTH EVERY DAY, Disp: 90 tablet, Rfl: 3    nystatin-triamcinolone (MYCOLOG II) cream, Apply to affected area 2 times daily, Disp: 90 g, Rfl: 3    pantoprazole (PROTONIX) 40 MG tablet, Take 1 tablet (40 mg total) by mouth once daily., Disp: 90 tablet, Rfl: 1    sars-cov-2, covid-19, (MODERNA COVID-19) 100 mcg/0.5 ml injection, Inject into the muscle., Disp: 0.5 mL, Rfl: 0    semaglutide (OZEMPIC) 0.25 mg or 0.5 mg(2 mg/1.5 mL) pen injector, Inject 0.25 mg into the skin every 7 days., Disp: 4 pen, Rfl: 0    triamcinolone acetonide 0.1% (KENALOG) 0.1 % cream, Apply topically 3 (three) times daily., Disp: 80 g, Rfl: 1    UNABLE TO FIND, Viviscal for hair loss, Disp: , Rfl:     valacyclovir (VALTREX) 500 MG tablet, 1 Tablet DAILY for suppression, increase to BID for outbreak, Disp: 180 tablet, Rfl: 2    varicella-zoster gE-AS01B, PF, (SHINGRIX) 50 mcg/0.5 mL injection, Inject into the muscle., Disp: 1 each, Rfl: 1    azelastine (ASTELIN) 137 mcg (0.1 %) nasal spray, 1 spray (137 mcg total) by Nasal route 2 (two) times daily., Disp: 30 mL, Rfl: 1    famotidine  (PEPCID) 40 MG tablet, Take 0.5 tablets (20 mg total) by mouth 2 (two) times daily as needed for Heartburn., Disp: 15 tablet, Rfl: 2    gabapentin (NEURONTIN) 300 MG capsule, Take 2 capsules (600 mg total) by mouth 3 (three) times daily., Disp: 180 capsule, Rfl: 11  No current facility-administered medications for this visit.    Facility-Administered Medications Ordered in Other Visits:     cyclopentolate 1% ophthalmic solution 1 drop, 1 drop, Left Eye, On Call Procedure, Elvis Pete MD, 1 drop at 01/26/21 0914    ofloxacin 0.3 % ophthalmic solution 1 drop, 1 drop, Left Eye, On Call Procedure, Elvis Pete MD, 1 drop at 01/26/21 0914    sodium chloride 0.9% flush 10 mL, 10 mL, Intravenous, PRN, Elvis Pete MD    REVIEW OF SYSTEMS:  General: No fevers or chills; ENT: No sore throat; Allergy and Immunology: no persistent infections; Hematological and Lymphatic: No history of bleeding or easy bruising; Endocrine: negative; Respiratory: no cough, shortness of breath, or wheezing; Cardiovascular: no chest pain or dyspnea on exertion; no claudication, no rest pain; Gastrointestinal: no abdominal pain/back, change in bowel habits, or bloody stools; Genito-Urinary: no dysuria, trouble voiding, or hematuria; Musculoskeletal: negative, no wound; Neurological: no TIA or stroke symptoms; Psychiatric: no nervousness, anxiety or depression.    PHYSICAL EXAM:      Pulse: 74         General appearance:  Alert, well-appearing, and in no distress.  Oriented to person, place, and time                    Neurological: Normal speech, no focal findings noted; CN II - XII grossly intact. RLE with sensation to light touch, LLE with sensation to light touch.            Musculoskeletal: Digits/nail without cyanosis/clubbing.  Strength 5/5 BLE.                    Neck: Supple, no significant adenopathy, no carotid bruit can be auscultated                  Chest:  Clear to auscultation, no wheezes, rales or  rhonchi, symmetric air entry. No use of accessory muscles               Cardiac: Normal rate and regular rhythm, S1 and S2 normal            Abdomen: Soft, nontender, nondistended, no masses or organomegaly, no hernia     No rebound tenderness noted; bowel sounds normal     Pulsatile aortic mass is non palpable.     No groin adenopathy      Extremities:2+ R femoral pulse, 2+ L femoral pulse     2+ R popliteal pulse, 2+ L popliteal pulse     2+ R PT pulse, 2+ L PT pulse     2+ R DP pulse, 2+ L DP pulse     1+ RLE edema, 1+ LLE edema    Skin: RLE no tissue loss; LLE no tissue loss    LAB RESULTS:  No results found for: CBC  Lab Results   Component Value Date    LABPROT 10.2 01/23/2020    INR 1.0 01/23/2020     Lab Results   Component Value Date     (L) 08/09/2022    K 4.2 08/09/2022     08/09/2022    CO2 23 08/09/2022    GLU 72 08/09/2022    BUN 10 08/09/2022    CREATININE 0.8 08/09/2022    CALCIUM 9.9 08/09/2022    ANIONGAP 10 08/09/2022    EGFRNONAA >60.0 05/06/2022     Lab Results   Component Value Date    WBC 7.69 08/01/2022    RBC 3.21 (L) 08/01/2022    HGB 10.5 (L) 08/01/2022    HCT 32.2 (L) 08/01/2022     (H) 08/01/2022    MCH 32.7 (H) 08/01/2022    MCHC 32.6 08/01/2022    RDW 14.1 08/01/2022     08/01/2022    MPV 13.7 (H) 08/01/2022    GRAN 4.4 08/01/2022    GRAN 56.5 08/01/2022    LYMPH 1.7 08/01/2022    LYMPH 22.4 08/01/2022    MONO 1.2 (H) 08/01/2022    MONO 15.5 (H) 08/01/2022    EOS 0.3 08/01/2022    BASO 0.03 08/01/2022    EOSINOPHIL 4.0 08/01/2022    BASOPHIL 0.4 08/01/2022    DIFFMETHOD Automated 08/01/2022     .  Lab Results   Component Value Date    HGBA1C 5.3 11/16/2020       IMAGING:  All pertinent imaging has been reviewed and interpreted independently.    ONESIMO 8/13/21:  Normal rest and exercise ONESIMO bilaterally.  Normal PVR waveforms bilaterally.      8/2022  Right lower extremity pressures and waveforms indicate no hemodynamically significant arterial occlusive  disease.  Left lower extremity pressures and waveforms indicate no hemodynamically significant arterial occlusive disease.      IMP/PLAN:  77 y.o. female with   Patient Active Problem List   Diagnosis    Rheumatoid arthritis    Multiple thyroid nodules    Anemia of other chronic disease    Migraine headache    HTN (hypertension)    GERD (gastroesophageal reflux disease)    Lumbar spondylosis    DDD (degenerative disc disease), lumbar    Spinal stenosis, lumbar region, with neurogenic claudication    Acquired spondylolisthesis    Genital herpes in women    Vitamin B 12 deficiency    Acquired scoliosis    Palpitations    Immunosuppression    Anserine bursitis    Class 2 obesity due to excess calories in adult    Lumbosacral radiculopathy    Abnormal CT scan, chest    Thymoma    Cerebral microvascular disease    Tortuous aorta    Nuclear sclerosis, bilateral    Refractive error    Severe obesity (BMI 35.0-39.9) with comorbidity    Cerebral aneurysm without rupture    Impacted cerumen of both ears    Light headedness    Nuclear sclerotic cataract of left eye    Nuclear sclerotic cataract of right eye    Decreased ROM of trunk and back    Decreased strength of trunk and back    Claudication of both lower extremities    Obesity (BMI 30-39.9)    PAD (peripheral artery disease)    Bilateral carotid artery stenosis    Mixed hyperlipidemia    Left foot pain    Gait instability    being managed by PCP and specialists who is here today for evaluation of PVD.    -imaging reviewed without hemodynamically significant stenosis bilateral lower extremity or evidence of significant stenosis on CT angiogram. - no vascular surgical intervention indicated at this time  -Cont back pain mgmt.  LLE pain and L foot pain - will be referred to Pain   -bilateral lower extremity pitting edema, asymptomatic-recommend compression with stockings, elevation, dietary changes associated with water and sodium intake discussed at length with  patient  -continue daily ASA  -Exercise  -Heart healthy lifestyle  -RTC 6 mo with carotid US    I spent 11 minutes evaluating this patient and greater than 50% of the time was spent counseling, coordinator care and discussing the plan of care.  All questions were answered and patient stated understanding with agreement with the above treatment plan.    Pedro Luis Sandhu MD [facility]  Vascular and Endovascular Surgery

## 2022-08-30 LAB
LEFT ABI: 1.19
LEFT ARM BP: 151 MMHG
LEFT DORSALIS PEDIS: 174 MMHG
LEFT POSTERIOR TIBIAL: 180 MMHG
LEFT TBI: 1.15
LEFT TOE PRESSURE: 173 MMHG
RIGHT ABI: 1.17
RIGHT ARM BP: 148 MMHG
RIGHT DORSALIS PEDIS: 177 MMHG
RIGHT POSTERIOR TIBIAL: 175 MMHG
RIGHT TBI: 1.17
RIGHT TOE PRESSURE: 176 MMHG

## 2022-08-31 ENCOUNTER — EXTERNAL CHRONIC CARE MANAGEMENT (OUTPATIENT)
Dept: PRIMARY CARE CLINIC | Facility: CLINIC | Age: 77
End: 2022-08-31
Payer: MEDICARE

## 2022-08-31 PROCEDURE — 99490 CHRNC CARE MGMT STAFF 1ST 20: CPT | Mod: S$PBB,,, | Performed by: FAMILY MEDICINE

## 2022-08-31 PROCEDURE — 99490 PR CHRONIC CARE MGMT, 1ST 20 MIN: ICD-10-PCS | Mod: S$PBB,,, | Performed by: FAMILY MEDICINE

## 2022-08-31 PROCEDURE — 99490 CHRNC CARE MGMT STAFF 1ST 20: CPT | Mod: PBBFAC,PO | Performed by: FAMILY MEDICINE

## 2022-09-04 NOTE — PROGRESS NOTES
HPI     Concerns About Ocular Health    Additional comments: possible aneurysm            Comments   Referred by Dr.Mong CADY Dan  Patient here for Evaluation  Patient experiencing headaches off and on since early part of the year.  Patient states OU seeing white spots that comes and goes, but notice a   flash on right side of vision once.  Occasional sharp pain OU.  Notice a pain on the left of temple x 1 day ago.    I have personally interviewed the patient, reviewed the history and   examined the patient and agree with the technician's exam.       Last edited by Mg Zamora MD on 3/13/2018  1:25 PM. (History)            Assessment /Plan     For exam results, see Encounter Report.    Anterior communicating artery aneurysm    Dry eyes, bilateral      Artificial tears as desired.  Continued eye care with personal eye care provider.  Return to me as needed.                 
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 Patient reports she is having head imaging for headaches and neck pain. She denies any changes in vision. She reports intermittent floating white spots associated with blurry vision that improves with closing the eyes. Otherwise, denies flashes of light, floaters, or curtains in the vision. No photophobia. No double vision. Reports headaches are temporal, no scalp tenderness or jaw claudication.     No POHx. FOHx of glaucoma in brother. No gtts.     See encounter for exam.     ASSESSMENT AND PLAN     Dry Eyes  - Intermittent blurry vision that resolves with closing the eyes, consistent with Dry Eyes  - Start artificial tears OU prn.     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 No signs of fistula on exam. No dilated tortuous veins, ONH is pink and sharp OU.   - MICHAELA Aneurism seen on same CT. No signs of aneurism affecting the eyes on today's exam.     Family History of Glaucoma  - Instructed to tell her primary ophthalmologist of family history of glaucoma in order to receive proper work up.     Patient can continue to follow with her primary ophthalmologist.       Seen with staff, Dr. Zamora.   NISHI Braden MD MPH   [facility], PGY-2    
+ L elbow pain

## 2022-09-06 ENCOUNTER — TELEPHONE (OUTPATIENT)
Dept: FAMILY MEDICINE | Facility: CLINIC | Age: 77
End: 2022-09-06
Payer: MEDICARE

## 2022-09-06 NOTE — TELEPHONE ENCOUNTER
----- Message from Christy Kaufman sent at 9/6/2022  2:35 PM CDT -----  .Type: Patient Call Back    Who called: self     What is the request in detail: states she would like a recommendation for a neurologist and would like to get additional info from the office. Please call     Can the clinic reply by [facility]?    Would the patient rather a call back or a response via [facility]? Call     Best call back number: .[phone] cell

## 2022-09-12 ENCOUNTER — TELEPHONE (OUTPATIENT)
Dept: FAMILY MEDICINE | Facility: CLINIC | Age: 77
End: 2022-09-12
Payer: MEDICARE

## 2022-09-12 NOTE — TELEPHONE ENCOUNTER
Return call to Phil[facility]a [facility]. He asked to update patient information. They cannot locate her in system. Patient insurance info updated, and new reference number given #[id number]. He states that he initiated the proper form and with this key we mosesll be able to follow the PA. He confirmed office fax number and will fax over paperwork.

## 2022-09-30 ENCOUNTER — EXTERNAL CHRONIC CARE MANAGEMENT (OUTPATIENT)
Dept: PRIMARY CARE CLINIC | Facility: CLINIC | Age: 77
End: 2022-09-30
Payer: MEDICARE

## 2022-09-30 PROCEDURE — 99490 CHRNC CARE MGMT STAFF 1ST 20: CPT | Mod: PBBFAC,PO | Performed by: FAMILY MEDICINE

## 2022-09-30 PROCEDURE — 99490 CHRNC CARE MGMT STAFF 1ST 20: CPT | Mod: S$PBB,,, | Performed by: FAMILY MEDICINE

## 2022-09-30 PROCEDURE — 99490 PR CHRONIC CARE MGMT, 1ST 20 MIN: ICD-10-PCS | Mod: S$PBB,,, | Performed by: FAMILY MEDICINE

## 2022-10-03 ENCOUNTER — TELEPHONE (OUTPATIENT)
Dept: NEUROSURGERY | Facility: CLINIC | Age: 77
End: 2022-10-03
Payer: MEDICARE

## 2022-10-03 DIAGNOSIS — I67.1 CEREBRAL ANEURYSM, NONRUPTURED: Primary | ICD-10-CM

## 2022-10-03 NOTE — TELEPHONE ENCOUNTER
----- Message from Chinyere Adkins MA sent at 10/3/2022 12:42 PM CDT -----  Regarding: f/u appt  Contact: Yulia Valverde is requesting a call back in regards to getting scheduled for a follow up appt. Pt stated she has a issue going on and needs to be scheduled as soon as possible.          Confirmed Contact below:  Contact Name:Yulia Peralta  Phone Number: [phone]

## 2022-10-03 NOTE — TELEPHONE ENCOUNTER
Called and pts sister answered the phone. She stated pt nor her  was there now and will call back.

## 2022-10-04 ENCOUNTER — OFFICE VISIT (OUTPATIENT)
Dept: OPTOMETRY | Facility: CLINIC | Age: 77
End: 2022-10-04
Payer: MEDICARE

## 2022-10-04 DIAGNOSIS — I10 PRIMARY HYPERTENSION: Primary | ICD-10-CM

## 2022-10-04 DIAGNOSIS — Z96.1 PSEUDOPHAKIA: ICD-10-CM

## 2022-10-04 DIAGNOSIS — R51.9 NONINTRACTABLE HEADACHE, UNSPECIFIED CHRONICITY PATTERN, UNSPECIFIED HEADACHE TYPE: ICD-10-CM

## 2022-10-04 PROBLEM — H25.13 NUCLEAR SCLEROSIS, BILATERAL: Status: RESOLVED | Noted: 2019-08-20 | Resolved: 2022-10-04

## 2022-10-04 PROBLEM — H25.12 NUCLEAR SCLEROTIC CATARACT OF LEFT EYE: Status: RESOLVED | Noted: 2021-01-26 | Resolved: 2022-10-04

## 2022-10-04 PROBLEM — H25.11 NUCLEAR SCLEROTIC CATARACT OF RIGHT EYE: Status: RESOLVED | Noted: 2021-02-23 | Resolved: 2022-10-04

## 2022-10-04 PROCEDURE — 99999 PR PBB SHADOW E&M-EST. PATIENT-LVL II: CPT | Mod: PBBFAC,,, | Performed by: OPTOMETRIST

## 2022-10-04 PROCEDURE — 99999 PR PBB SHADOW E&M-EST. PATIENT-LVL II: ICD-10-PCS | Mod: PBBFAC,,, | Performed by: OPTOMETRIST

## 2022-10-04 PROCEDURE — 92004 PR EYE EXAM, NEW PATIENT,COMPREHESV: ICD-10-PCS | Mod: S$PBB,,, | Performed by: OPTOMETRIST

## 2022-10-04 PROCEDURE — 99212 OFFICE O/P EST SF 10 MIN: CPT | Mod: PBBFAC,PO | Performed by: OPTOMETRIST

## 2022-10-04 PROCEDURE — 92004 COMPRE OPH EXAM NEW PT 1/>: CPT | Mod: S$PBB,,, | Performed by: OPTOMETRIST

## 2022-10-04 NOTE — PROGRESS NOTES
Subjective:       Patient ID: Yulia Peralta is a 77 y.o. female      Chief Complaint   Patient presents with    Concerns About Ocular Health    Hypertensive Eye Exam     History of Present Illness  Dls: 5/27/21 Dr. Pete    78 y/o female presents today for hypertensive eye exam.   Pt c/o blurry vision at distance ou. Pt wears bifocal glasses.   Pt c/o ha's and pain behind eyes ou x 1 month off/on. Sometimes wakes up with headaches. Takes tylenol for relief. Per pt dx with aneurysm x 2   years ago.     No tearing  + ou off/on itching  No burning  + ou off/on  pain  + ha's  No floaters  No flashes    Eye meds  Otc gtts ou prn        Assessment/Plan:     1. Primary hypertension  No hypertensive retinopathy. Continue BP control. RTC 1 year for DFE.    2. Pseudophakia  Well-centered. Clear.   Declined MRx. Continue with current spectacles    3. Nonintractable headache, unspecified chronicity pattern, unspecified headache type  Pt with h/o migraines and per pt had an aneurysm x 2 years ago. Advised to follow up with PCP/neuro for eval.      Follow up in about 1 year (around 10/4/2023).

## 2022-10-05 ENCOUNTER — TELEPHONE (OUTPATIENT)
Dept: NEUROSURGERY | Facility: CLINIC | Age: 77
End: 2022-10-05
Payer: MEDICARE

## 2022-10-05 NOTE — TELEPHONE ENCOUNTER
----- Message from Denny Bernal sent at 10/5/2022 12:32 PM CDT -----  Contact: [phone]  Pt is katherine for Nov 1st but states she needs to be seen sooner -- pt said something is going on with her head --- pain/headache she said her hair is also falling out --- please give pt a callback [phone] or [phone]

## 2022-10-05 NOTE — TELEPHONE ENCOUNTER
Return call to pt. Informed she has earliest appts. If need to emergency room. Pt verbalized understanding. Opted to keep appts as is.

## 2022-10-10 DIAGNOSIS — E66.01 SEVERE OBESITY (BMI 35.0-39.9) WITH COMORBIDITY: ICD-10-CM

## 2022-10-10 DIAGNOSIS — Z79.1 NSAID LONG-TERM USE: ICD-10-CM

## 2022-10-10 DIAGNOSIS — D84.9 IMMUNOSUPPRESSION: ICD-10-CM

## 2022-10-10 DIAGNOSIS — M10.9 GOUT, ARTHRITIS: ICD-10-CM

## 2022-10-10 DIAGNOSIS — M05.79 RHEUMATOID ARTHRITIS INVOLVING MULTIPLE SITES WITH POSITIVE RHEUMATOID FACTOR: ICD-10-CM

## 2022-10-10 DIAGNOSIS — K21.9 GASTROESOPHAGEAL REFLUX DISEASE: ICD-10-CM

## 2022-10-10 DIAGNOSIS — K21.9 GASTROESOPHAGEAL REFLUX DISEASE WITHOUT ESOPHAGITIS: ICD-10-CM

## 2022-10-10 DIAGNOSIS — M51.36 DDD (DEGENERATIVE DISC DISEASE), LUMBAR: ICD-10-CM

## 2022-10-10 DIAGNOSIS — I10 ESSENTIAL HYPERTENSION: ICD-10-CM

## 2022-10-10 NOTE — TELEPHONE ENCOUNTER
----- Message from Teri Churchill sent at 10/10/2022  9:37 AM CDT -----  Contact: pt  Refill request.    nabumetone (RELAFEN) 500 MG tablet        [facility] [address]  [address] [address]  Phone: [phone] Fax: [phone]

## 2022-10-10 NOTE — TELEPHONE ENCOUNTER
No new care gaps identified.  [facility] Embedded Care Gaps. Reference number: [id number]. 10/10/2022   9:40:57 AM JEDT

## 2022-10-10 NOTE — TELEPHONE ENCOUNTER
----- Message from Sabrina Torres sent at 10/10/2022  9:16 AM CDT -----  Type: RX Refill Request    Who Called: Self     Have you contacted your pharmacy:No     Refill or New Rx:Refill     RX Name and Strength:semaglutide (OZEMPIC) 0.25 mg or 0.5 mg(2 mg/1.5 mL) pen injector 4 pen, furosemide (LASIX) 40 MG tablet 90 tablet, pantoprazole (PROTONIX) 40 MG tablet 90 tablet     Preferred Pharmacy with phone number:  [facility] [place] - [address]   Phone:  [phone]  Fax:  [phone]    Local or Mail Order: Mail     Would the patient rather a call back or a response via [facility]? Call     Best Call Back Number: [phone] (home)

## 2022-10-11 RX ORDER — FOLIC ACID 1 MG/1
2000 TABLET ORAL DAILY
Qty: 180 TABLET | Refills: 3 | Status: SHIPPED | OUTPATIENT
Start: 2022-10-11 | End: 2023-07-11

## 2022-10-11 RX ORDER — METHOTREXATE 2.5 MG/1
TABLET ORAL
Qty: 120 TABLET | Refills: 0 | Status: SHIPPED | OUTPATIENT
Start: 2022-10-11 | End: 2022-11-17 | Stop reason: SDUPTHER

## 2022-10-12 RX ORDER — FUROSEMIDE 40 MG/1
40 TABLET ORAL DAILY
Qty: 90 TABLET | Refills: 1 | Status: SHIPPED | OUTPATIENT
Start: 2022-10-12 | End: 2023-02-09

## 2022-10-12 RX ORDER — NABUMETONE 500 MG/1
500 TABLET, FILM COATED ORAL 2 TIMES DAILY
Qty: 180 TABLET | Refills: 0 | Status: SHIPPED | OUTPATIENT
Start: 2022-10-12 | End: 2022-11-17 | Stop reason: SDUPTHER

## 2022-10-12 RX ORDER — PANTOPRAZOLE SODIUM 40 MG/1
40 TABLET, DELAYED RELEASE ORAL DAILY
Qty: 90 TABLET | Refills: 1 | Status: SHIPPED | OUTPATIENT
Start: 2022-10-12 | End: 2023-03-15 | Stop reason: SDUPTHER

## 2022-10-14 ENCOUNTER — OFFICE VISIT (OUTPATIENT)
Dept: FAMILY MEDICINE | Facility: CLINIC | Age: 77
End: 2022-10-14
Payer: MEDICARE

## 2022-10-14 VITALS
WEIGHT: 175.69 LBS | TEMPERATURE: 98 F | HEIGHT: 62 IN | OXYGEN SATURATION: 99 % | HEART RATE: 66 BPM | SYSTOLIC BLOOD PRESSURE: 122 MMHG | DIASTOLIC BLOOD PRESSURE: 70 MMHG | BODY MASS INDEX: 32.33 KG/M2

## 2022-10-14 DIAGNOSIS — I67.1 CEREBRAL ANEURYSM WITHOUT RUPTURE: ICD-10-CM

## 2022-10-14 DIAGNOSIS — I10 ESSENTIAL HYPERTENSION: ICD-10-CM

## 2022-10-14 DIAGNOSIS — K59.04 CHRONIC IDIOPATHIC CONSTIPATION: Primary | ICD-10-CM

## 2022-10-14 PROCEDURE — 99999 PR PBB SHADOW E&M-EST. PATIENT-LVL IV: ICD-10-PCS | Mod: PBBFAC,,, | Performed by: FAMILY MEDICINE

## 2022-10-14 PROCEDURE — 99999 PR PBB SHADOW E&M-EST. PATIENT-LVL IV: CPT | Mod: PBBFAC,,, | Performed by: FAMILY MEDICINE

## 2022-10-14 PROCEDURE — 99214 OFFICE O/P EST MOD 30 MIN: CPT | Mod: PBBFAC,PO | Performed by: FAMILY MEDICINE

## 2022-10-14 PROCEDURE — 99214 OFFICE O/P EST MOD 30 MIN: CPT | Mod: S$PBB,,, | Performed by: FAMILY MEDICINE

## 2022-10-14 PROCEDURE — 99214 PR OFFICE/OUTPT VISIT, EST, LEVL IV, 30-39 MIN: ICD-10-PCS | Mod: S$PBB,,, | Performed by: FAMILY MEDICINE

## 2022-10-14 NOTE — PROGRESS NOTES
Subjective:       Patient ID: Yulia Peralta is a 77 y.o. female.    Chief Complaint: Follow Up/ Left Side Pain    77 year old female presents for left sided abdominal pain. She states she saw GI and was placed on ,linzess. She states linzess made her have diarrhea. She is having a bowel movement, but states she doesn't feel empty. She is on lubipristone/ amitiza 24 mcg.  She had a CT scan done and it was normal. She has been on the amitiza for about a month. She is having bowel movements once  day. She states she can feel the discomfort  She is having headaches behind her eyes and temporal and at the top of her head. She states her hair is falling out. She had an MRA of her brain done last year, which showed a 2 mm aneurysm. She     Past Medical History:   Diagnosis Date    Abnormal colonoscopy 07/24/2020    colon polyps nad repeat in 3 years.     Acid reflux     Anemia     Anxiety     Arthritis     Blood transfusion     Cataract     Depression     Dry eyes     H/O colonoscopy 07/22/2020    dr. nicholas. repeat in 3 years.     Heart murmur     Hypertension     Left lumbar radiculitis 5/19/2015    Mixed hyperlipidemia 11/5/2021    Multiple thyroid nodules 12/18/2012    Osteoporosis     Rheumatoid arthritis     Thoracic or lumbosacral neuritis or radiculitis, unspecified 10/30/2013      Past Surgical History:   Procedure Laterality Date    CATARACT EXTRACTION W/  INTRAOCULAR LENS IMPLANT Left 1/26/2021    Procedure: EXTRACTION, CATARACT, WITH IOL INSERTION;  Surgeon: Elvis Pete MD;  Location: [facility];  Service: Ophthalmology;  Laterality: Left;    CATARACT EXTRACTION W/  INTRAOCULAR LENS IMPLANT Right 2/23/2021    Procedure: EXTRACTION, CATARACT, WITH IOL INSERTION;  Surgeon: Elvis Pete MD;  Location: [facility] OR;  Service: Ophthalmology;  Laterality: Right;    CEREBRAL ANGIOGRAM N/A 1/23/2020    Procedure: ANGIOGRAM-CEREBRAL;  Surgeon: [facility] Diagnostic Provider;  Location: [facility] OR [address] [place] [address];   Service: Radiology;  Laterality: N/A;  /[place]    gallstones  [id number]    HYSTERECTOMY      JOINT REPLACEMENT  [id number]     bilateral knee    OOPHORECTOMY      OH REMOVAL OF OVARY/TUBE(S)      TONSILLECTOMY       Family History   Problem Relation Age of Onset    Cataracts Mother     Hypertension Mother     No Known Problems Father     Hypertension Sister     Glaucoma Brother     Breast cancer Maternal Aunt     No Known Problems Maternal Uncle     No Known Problems Paternal Aunt     No Known Problems Paternal Uncle     No Known Problems Maternal Grandmother     No Known Problems Maternal Grandfather     No Known Problems Paternal Grandmother     No Known Problems Paternal Grandfather     No Known Problems Daughter     No Known Problems Son     No Known Problems Other     Lupus Neg Hx     Rheum arthritis Neg Hx     Psoriasis Neg Hx     Amblyopia Neg Hx     Blindness Neg Hx     Cancer Neg Hx     Diabetes Neg Hx     Macular degeneration Neg Hx     Retinal detachment Neg Hx     Strabismus Neg Hx     Stroke Neg Hx     Thyroid disease Neg Hx      Social History     Socioeconomic History    Marital status:      Spouse name: Ric     Number of children: 2    Highest education level: Some college, no degree   Occupational History    Occupation: retired    Tobacco Use    Smoking status: Former     Packs/day: 0.25     Years: 1.00     Pack years: 0.25     Types: Cigarettes    Smokeless tobacco: Never   Substance and Sexual Activity    Alcohol use: Yes     Alcohol/week: 2.0 standard drinks     Types: 1 Glasses of wine, 1 Cans of beer per week     Comment: very seldom    Drug use: No    Sexual activity: Not Currently     Partners: Male   Social History Narrative    Adult Screenings updated and reviewed  6/12/14    Mammogram( for females) ordered for DIS    Pap ( for females) Dr Zepeda  Due for f/u   For  2014    Colonoscopy  Done once    Flu shot yearly done  2013 Td 2005    Pneumovax  Updated  12/3/13     "Zostavax done 2012    Eye exam recommended yearly done 2013    Bone density  12/9/13    Thyroid ultrasound  11/6/2012 Normal sized thyroid containing several subcentimeter nodules, similar to the 5/12/11 exam.  No concerning nodules identified..          Social Determinants of Health     Financial Resource Strain: Low Risk     Difficulty of Paying Living Expenses: Not hard at all   Food Insecurity: No Food Insecurity    Worried About Running Out of Food in the Last Year: Never true    Ran Out of Food in the Last Year: Never true   Transportation Needs: No Transportation Needs    Lack of Transportation (Medical): No    Lack of Transportation (Non-Medical): No   Physical Activity: Inactive    Days of Exercise per Week: 0 days    Minutes of Exercise per Session: 0 min   Stress: No Stress Concern Present    Feeling of Stress : Only a little   Social Connections: Moderately Integrated    Frequency of Communication with Friends and Family: More than three times a week    Frequency of Social Gatherings with Friends and Family: More than three times a week    Attends Oriental orthodox Services: More than 4 times per year    Active Member of Clubs or Organizations: No    Marital Status:    Housing Stability: Unknown    Unable to Pay for Housing in the Last Year: No    Unstable Housing in the Last Year: No       Review of Systems      Objective:      Vitals:    10/14/22 1424   BP: 122/70   Pulse: 66   Temp: 97.9 °F (36.6 °C)   TempSrc: Oral   SpO2: 99%   Weight: 79.7 kg (175 lb 11.3 oz)   Height: 5' 2" (1.575 m)       Physical Exam  Constitutional:       General: She is not in acute distress.     Appearance: Normal appearance. She is well-developed. She is not ill-appearing, toxic-appearing or diaphoretic.   HENT:      Head: Normocephalic and atraumatic.      Right Ear: External ear normal.      Left Ear: External ear normal.      Mouth/Throat:      Pharynx: No oropharyngeal exudate.   Eyes:      Conjunctiva/sclera: " Conjunctivae normal.   Neck:      Thyroid: No thyromegaly.   Cardiovascular:      Rate and Rhythm: Normal rate and regular rhythm.      Heart sounds: Normal heart sounds. No murmur heard.    No friction rub. No gallop.   Pulmonary:      Effort: Pulmonary effort is normal. No respiratory distress.      Breath sounds: Normal breath sounds. No stridor. No wheezing, rhonchi or rales.   Abdominal:      General: Bowel sounds are normal. There is no distension.      Palpations: Abdomen is soft. There is no mass.      Tenderness: There is no abdominal tenderness. There is no guarding or rebound.      Hernia: No hernia is present.   Musculoskeletal:      Cervical back: Normal range of motion and neck supple.   Lymphadenopathy:      Cervical: No cervical adenopathy.   Skin:     Findings: No rash.   Neurological:      General: No focal deficit present.      Mental Status: She is alert and oriented to person, place, and time.   Psychiatric:         Mood and Affect: Mood normal.         Behavior: Behavior normal.       Assessment:       Problem List Items Addressed This Visit       Cerebral aneurysm without rupture     Other Visit Diagnoses       Chronic idiopathic constipation    -  Primary    Essential hypertension                Plan:       Yulia was seen today for follow up/ left side pain.    Diagnoses and all orders for this visit:    Chronic idiopathic constipation  Continue amitiza    Essential hypertension  Blood pressure is controlled    Cerebral aneurysm without rupture  Stable. Due for repeat MRI PER NEUROSURGERY

## 2022-10-26 ENCOUNTER — HOSPITAL ENCOUNTER (OUTPATIENT)
Dept: RADIOLOGY | Facility: HOSPITAL | Age: 77
Discharge: HOME | End: 2022-10-26
Attending: NEUROLOGICAL SURGERY
Payer: MEDICARE

## 2022-10-26 DIAGNOSIS — I67.1 CEREBRAL ANEURYSM, NONRUPTURED: ICD-10-CM

## 2022-10-26 PROCEDURE — 70546 MR ANGIOGRAPH HEAD W/O&W/DYE: CPT | Mod: 26,,, | Performed by: RADIOLOGY

## 2022-10-26 PROCEDURE — A9585 GADOBUTROL INJECTION: HCPCS | Performed by: NEUROLOGICAL SURGERY

## 2022-10-26 PROCEDURE — 70546 MR ANGIOGRAPH HEAD W/O&W/DYE: CPT | Mod: TC

## 2022-10-26 PROCEDURE — 25500020 PHARM REV CODE 255: Performed by: NEUROLOGICAL SURGERY

## 2022-10-26 PROCEDURE — 70546 MRA BRAIN WITH AND WITHOUT: ICD-10-PCS | Mod: 26,,, | Performed by: RADIOLOGY

## 2022-10-26 RX ORDER — GADOBUTROL 604.72 MG/ML
8 INJECTION INTRAVENOUS
Status: COMPLETED | OUTPATIENT
Start: 2022-10-26 | End: 2022-10-26

## 2022-10-26 RX ADMIN — GADOBUTROL 8 ML: 604.72 INJECTION INTRAVENOUS at 08:10

## 2022-10-28 DIAGNOSIS — Z12.31 BREAST CANCER SCREENING BY MAMMOGRAM: Primary | ICD-10-CM

## 2022-10-31 ENCOUNTER — LAB VISIT (OUTPATIENT)
Dept: LAB | Facility: HOSPITAL | Age: 77
End: 2022-10-31
Attending: INTERNAL MEDICINE
Payer: MEDICARE

## 2022-10-31 ENCOUNTER — EXTERNAL CHRONIC CARE MANAGEMENT (OUTPATIENT)
Dept: PRIMARY CARE CLINIC | Facility: CLINIC | Age: 77
End: 2022-10-31
Payer: MEDICARE

## 2022-10-31 DIAGNOSIS — Z79.1 NSAID LONG-TERM USE: ICD-10-CM

## 2022-10-31 DIAGNOSIS — M10.9 GOUT, ARTHRITIS: ICD-10-CM

## 2022-10-31 DIAGNOSIS — D84.9 IMMUNOSUPPRESSION: ICD-10-CM

## 2022-10-31 DIAGNOSIS — M05.79 RHEUMATOID ARTHRITIS INVOLVING MULTIPLE SITES WITH POSITIVE RHEUMATOID FACTOR: ICD-10-CM

## 2022-10-31 DIAGNOSIS — K21.9 GASTROESOPHAGEAL REFLUX DISEASE, UNSPECIFIED WHETHER ESOPHAGITIS PRESENT: ICD-10-CM

## 2022-10-31 LAB
ALBUMIN SERPL BCP-MCNC: 3.9 G/DL (ref 3.5–5.2)
ALP SERPL-CCNC: 94 U/L (ref 55–135)
ALT SERPL W/O P-5'-P-CCNC: 16 U/L (ref 10–44)
ANION GAP SERPL CALC-SCNC: 9 MMOL/L (ref 8–16)
AST SERPL-CCNC: 16 U/L (ref 10–40)
BASOPHILS # BLD AUTO: 0.02 K/UL (ref 0–0.2)
BASOPHILS NFR BLD: 0.3 % (ref 0–1.9)
BILIRUB SERPL-MCNC: 0.4 MG/DL (ref 0.1–1)
BUN SERPL-MCNC: 12 MG/DL (ref 8–23)
CALCIUM SERPL-MCNC: 10 MG/DL (ref 8.7–10.5)
CHLORIDE SERPL-SCNC: 105 MMOL/L (ref 95–110)
CO2 SERPL-SCNC: 26 MMOL/L (ref 23–29)
CREAT SERPL-MCNC: 0.7 MG/DL (ref 0.5–1.4)
CRP SERPL-MCNC: 0.6 MG/L (ref 0–8.2)
DIFFERENTIAL METHOD: ABNORMAL
EOSINOPHIL # BLD AUTO: 0.3 K/UL (ref 0–0.5)
EOSINOPHIL NFR BLD: 4.5 % (ref 0–8)
ERYTHROCYTE [DISTWIDTH] IN BLOOD BY AUTOMATED COUNT: 14.3 % (ref 11.5–14.5)
ERYTHROCYTE [SEDIMENTATION RATE] IN BLOOD BY PHOTOMETRIC METHOD: 32 MM/HR (ref 0–36)
EST. GFR  (NO RACE VARIABLE): >60 ML/MIN/1.73 M^2
GLUCOSE SERPL-MCNC: 84 MG/DL (ref 70–110)
HCT VFR BLD AUTO: 34.4 % (ref 37–48.5)
HGB BLD-MCNC: 10.9 G/DL (ref 12–16)
IMM GRANULOCYTES # BLD AUTO: 0.03 K/UL (ref 0–0.04)
IMM GRANULOCYTES NFR BLD AUTO: 0.5 % (ref 0–0.5)
LYMPHOCYTES # BLD AUTO: 1.3 K/UL (ref 1–4.8)
LYMPHOCYTES NFR BLD: 21.2 % (ref 18–48)
MCH RBC QN AUTO: 31.8 PG (ref 27–31)
MCHC RBC AUTO-ENTMCNC: 31.7 G/DL (ref 32–36)
MCV RBC AUTO: 100 FL (ref 82–98)
MONOCYTES # BLD AUTO: 1 K/UL (ref 0.3–1)
MONOCYTES NFR BLD: 15.7 % (ref 4–15)
NEUTROPHILS # BLD AUTO: 3.6 K/UL (ref 1.8–7.7)
NEUTROPHILS NFR BLD: 57.8 % (ref 38–73)
NRBC BLD-RTO: 0 /100 WBC
PLATELET # BLD AUTO: 171 K/UL (ref 150–450)
PMV BLD AUTO: 13.6 FL (ref 9.2–12.9)
POTASSIUM SERPL-SCNC: 4.9 MMOL/L (ref 3.5–5.1)
PROT SERPL-MCNC: 7.4 G/DL (ref 6–8.4)
RBC # BLD AUTO: 3.43 M/UL (ref 4–5.4)
SODIUM SERPL-SCNC: 140 MMOL/L (ref 136–145)
WBC # BLD AUTO: 6.17 K/UL (ref 3.9–12.7)

## 2022-10-31 PROCEDURE — 99439 CHRNC CARE MGMT STAF EA ADDL: CPT | Mod: PBBFAC,PO | Performed by: FAMILY MEDICINE

## 2022-10-31 PROCEDURE — 99490 PR CHRONIC CARE MGMT, 1ST 20 MIN: ICD-10-PCS | Mod: S$PBB,,, | Performed by: FAMILY MEDICINE

## 2022-10-31 PROCEDURE — 85025 COMPLETE CBC W/AUTO DIFF WBC: CPT | Performed by: INTERNAL MEDICINE

## 2022-10-31 PROCEDURE — 99439 CHRNC CARE MGMT STAF EA ADDL: CPT | Mod: S$PBB,,, | Performed by: FAMILY MEDICINE

## 2022-10-31 PROCEDURE — 86140 C-REACTIVE PROTEIN: CPT | Performed by: INTERNAL MEDICINE

## 2022-10-31 PROCEDURE — 85652 RBC SED RATE AUTOMATED: CPT | Performed by: INTERNAL MEDICINE

## 2022-10-31 PROCEDURE — 99490 CHRNC CARE MGMT STAFF 1ST 20: CPT | Mod: PBBFAC,PO | Performed by: FAMILY MEDICINE

## 2022-10-31 PROCEDURE — 99490 CHRNC CARE MGMT STAFF 1ST 20: CPT | Mod: S$PBB,,, | Performed by: FAMILY MEDICINE

## 2022-10-31 PROCEDURE — 80053 COMPREHEN METABOLIC PANEL: CPT | Performed by: INTERNAL MEDICINE

## 2022-10-31 PROCEDURE — 36415 COLL VENOUS BLD VENIPUNCTURE: CPT | Mod: PO | Performed by: INTERNAL MEDICINE

## 2022-10-31 PROCEDURE — 99439 PR CHRONIC CARE MGMT, EA ADDTL 20 MIN: ICD-10-PCS | Mod: S$PBB,,, | Performed by: FAMILY MEDICINE

## 2022-11-01 ENCOUNTER — TELEPHONE (OUTPATIENT)
Dept: NEUROSURGERY | Facility: CLINIC | Age: 77
End: 2022-11-01

## 2022-11-01 ENCOUNTER — OFFICE VISIT (OUTPATIENT)
Dept: NEUROSURGERY | Facility: CLINIC | Age: 77
End: 2022-11-01
Payer: MEDICARE

## 2022-11-01 ENCOUNTER — TELEPHONE (OUTPATIENT)
Dept: RHEUMATOLOGY | Facility: CLINIC | Age: 77
End: 2022-11-01
Payer: MEDICARE

## 2022-11-01 VITALS
HEART RATE: 74 BPM | SYSTOLIC BLOOD PRESSURE: 139 MMHG | BODY MASS INDEX: 32.2 KG/M2 | WEIGHT: 175 LBS | DIASTOLIC BLOOD PRESSURE: 77 MMHG | HEIGHT: 62 IN

## 2022-11-01 DIAGNOSIS — I67.1 CEREBRAL ANEURYSM WITHOUT RUPTURE: Primary | ICD-10-CM

## 2022-11-01 DIAGNOSIS — R26.81 GAIT INSTABILITY: ICD-10-CM

## 2022-11-01 PROCEDURE — 99999 PR PBB SHADOW E&M-EST. PATIENT-LVL III: ICD-10-PCS | Mod: PBBFAC,,, | Performed by: NEUROLOGICAL SURGERY

## 2022-11-01 PROCEDURE — 99215 PR OFFICE/OUTPT VISIT, EST, LEVL V, 40-54 MIN: ICD-10-PCS | Mod: S$PBB,,, | Performed by: NEUROLOGICAL SURGERY

## 2022-11-01 PROCEDURE — 99999 PR PBB SHADOW E&M-EST. PATIENT-LVL III: CPT | Mod: PBBFAC,,, | Performed by: NEUROLOGICAL SURGERY

## 2022-11-01 PROCEDURE — 99215 OFFICE O/P EST HI 40 MIN: CPT | Mod: S$PBB,,, | Performed by: NEUROLOGICAL SURGERY

## 2022-11-01 PROCEDURE — 99213 OFFICE O/P EST LOW 20 MIN: CPT | Mod: PBBFAC | Performed by: NEUROLOGICAL SURGERY

## 2022-11-01 NOTE — PROGRESS NOTES
Neurosurgery  Established Patient    SUBJECTIVE:     History of Present Illness:     Ms. Peralta, is a 76-year-old woman who is referred to me by Dr. Alfred Curry.  He had been treating her for her lumbar stenosis.  She also had associated left lower extremity pain without any on antecedent trauma.  She has a known anterior circulation aneurysm for which she had been followed conservatively.  She had been followed by another physician for which she no longer sees.     She was noted to have left lower extremity pain approximately 2 years ago, and again did not endorse any antecedent trauma or incident prior to the pain.  She had a history of bilateral foot pain left more pronounced on the right which started approximately 6 months prior to seeing Dr. Curry.  She has had difficulty walking after standing up, difficulty with bearing weight, and history of rheumatoid arthritis and is previously undergone bilateral knee replacement.  She had previously gone to healthy back clinic for conservative treatment approximately 8-9 times without any significant relief.  Had 1 previous injection which did give significant relief.  Her lumbar pathology is being followed by Dr. Curry     She currently denies any headache, nausea, vomiting.  She does complain of feeling off balance.  She denies any jayce falls.  She denies any difficulty with buttoning buttons, tying shoes.  She denies any jayce weakness in the upper lower extremities.  She denies any sensory deficits in the upper lower extremities.  She also notes that she has some electric current feeling in the right frontal parietal region of the skull and scalp.  She also complains of new hair thinning, which has been more notable over the past couple of months.  She comes now with a repeat 1 year follow-up of an MRA with vessel wall imaging.  No new or significant change can be noted on MRA with vessel wall imaging to date.        Review of patient's allergies indicates:    Allergen Reactions    No known drug allergies        Current Outpatient Medications   Medication Sig Dispense Refill    aspirin (ECOTRIN) 81 MG EC tablet Take 81 mg by mouth once daily.      atorvastatin (LIPITOR) 40 MG tablet Take 1 tablet (40 mg total) by mouth once daily. 90 tablet 3    azelastine (ASTELIN) 137 mcg (0.1 %) nasal spray 1 spray (137 mcg total) by Nasal route 2 (two) times daily. 30 mL 1    famotidine (PEPCID) 40 MG tablet Take 0.5 tablets (20 mg total) by mouth 2 (two) times daily as needed for Heartburn. 15 tablet 2    fluticasone (FLONASE) 50 mcg/actuation nasal spray 1 spray (50 mcg total) by Each Nare route once daily. 16 g 5    folic acid (FOLVITE) 1 MG tablet Take 2 tablets (2,000 mcg total) by mouth once daily. 180 tablet 3    furosemide (LASIX) 40 MG tablet Take 1 tablet (40 mg total) by mouth once daily. 90 tablet 1    gabapentin (NEURONTIN) 300 MG capsule Take 2 capsules (600 mg total) by mouth 3 (three) times daily. 180 capsule 11    hydrocortisone 2.5 % cream as needed.   0    irbesartan (AVAPRO) 300 MG tablet Take 1 tablet (300 mg total) by mouth every evening. 90 tablet 3    linaCLOtide (LINZESS) 72 mcg Cap capsule Take 1 capsule (72 mcg total) by mouth before breakfast. 90 capsule 1    methotrexate 2.5 MG Tab Take by mouth every Monday 5 tabs in AM and 5 tabs in  tablet 0    miSOPROStoL (CYTOTEC) 100 MCG Tab Take 1 tablet (100 mcg total) by mouth 2 (two) times daily. 180 tablet 3    nabumetone (RELAFEN) 500 MG tablet Take 1 tablet (500 mg total) by mouth 2 (two) times daily. 180 tablet 0    NORVASC 5 mg tablet TAKE ONE TABLET BY MOUTH EVERY DAY 90 tablet 3    nystatin-triamcinolone (MYCOLOG II) cream Apply to affected area 2 times daily 90 g 3    pantoprazole (PROTONIX) 40 MG tablet Take 1 tablet (40 mg total) by mouth once daily. 90 tablet 1    semaglutide (OZEMPIC) 0.25 mg or 0.5 mg(2 mg/1.5 mL) pen injector Inject 0.5 mg into the skin every 7 days. 4 pen 0    triamcinolone  acetonide 0.1% (KENALOG) 0.1 % cream Apply topically 3 (three) times daily. 80 g 1    UNABLE TO FIND Viviscal for hair loss      valACYclovir (VALTREX) 500 MG tablet 1 Tablet DAILY for suppression, increase to BID for outbreak 180 tablet 2     No current facility-administered medications for this visit.     Facility-Administered Medications Ordered in Other Visits   Medication Dose Route Frequency Provider Last Rate Last Admin    cyclopentolate 1% ophthalmic solution 1 drop  1 drop Left Eye On Call Procedure Elvis Pete MD   1 drop at 01/26/21 0914    ofloxacin 0.3 % ophthalmic solution 1 drop  1 drop Left Eye On Call Procedure Elvis Pete MD   1 drop at 01/26/21 0914    sodium chloride 0.9% flush 10 mL  10 mL Intravenous PRN Elvis Pete MD           Past Medical History:   Diagnosis Date    Abnormal colonoscopy 07/24/2020    colon polyps nad repeat in 3 years.     Acid reflux     Anemia     Anxiety     Arthritis     Blood transfusion     Cataract     Depression     Dry eyes     H/O colonoscopy 07/22/2020    dr. nicholas. repeat in 3 years.     Heart murmur     Hypertension     Left lumbar radiculitis 5/19/2015    Mixed hyperlipidemia 11/5/2021    Multiple thyroid nodules 12/18/2012    Osteoporosis     Rheumatoid arthritis     Thoracic or lumbosacral neuritis or radiculitis, unspecified 10/30/2013     Past Surgical History:   Procedure Laterality Date    CATARACT EXTRACTION W/  INTRAOCULAR LENS IMPLANT Left 1/26/2021    Procedure: EXTRACTION, CATARACT, WITH IOL INSERTION;  Surgeon: Elvis Pete MD;  Location: [facility] OR;  Service: Ophthalmology;  Laterality: Left;    CATARACT EXTRACTION W/  INTRAOCULAR LENS IMPLANT Right 2/23/2021    Procedure: EXTRACTION, CATARACT, WITH IOL INSERTION;  Surgeon: Elvis Pete MD;  Location: [facility] OR;  Service: Ophthalmology;  Laterality: Right;    CEREBRAL ANGIOGRAM N/A 1/23/2020    Procedure: ANGIOGRAM-CEREBRAL;  Surgeon: Hira  Diagnostic Provider;  Location: [facility] OR [address] [place] [address];  Service: Radiology;  Laterality: N/A;  /[place]    gallstones  [id number]    HYSTERECTOMY      JOINT REPLACEMENT  [id number]     bilateral knee    OOPHORECTOMY      ID REMOVAL OF OVARY/TUBE(S)      TONSILLECTOMY       Family History       Problem Relation (Age of Onset)    Breast cancer Maternal Aunt    Cataracts Mother    Glaucoma Brother    Hypertension Mother, Sister    No Known Problems Father, Maternal Uncle, Paternal Aunt, Paternal Uncle, Maternal Grandmother, Maternal Grandfather, Paternal Grandmother, Paternal Grandfather, Daughter, Son, Other          Social History     Socioeconomic History    Marital status:      Spouse name: Ric     Number of children: 2    Highest education level: Some college, no degree   Occupational History    Occupation: retired    Tobacco Use    Smoking status: Former     Packs/day: 0.25     Years: 1.00     Pack years: 0.25     Types: Cigarettes    Smokeless tobacco: Never   Substance and Sexual Activity    Alcohol use: Yes     Alcohol/week: 2.0 standard drinks     Types: 1 Glasses of wine, 1 Cans of beer per week     Comment: very seldom    Drug use: No    Sexual activity: Not Currently     Partners: Male   Social History Narrative    Adult Screenings updated and reviewed  6/12/14    Mammogram( for females) ordered for DIS    Pap ( for females) Dr Zepeda  Due for f/u   For  2014    Colonoscopy  Done once    Flu shot yearly done  2013    Td 2005    Pneumovax  Updated  12/3/13    Zostavax done 2012    Eye exam recommended yearly done 2013    Bone density  12/9/13    Thyroid ultrasound  11/6/2012 Normal sized thyroid containing several subcentimeter nodules, similar to the 5/12/11 exam.  No concerning nodules identified..          Social Determinants of Health     Financial Resource Strain: Low Risk     Difficulty of Paying Living Expenses: Not hard at all   Food Insecurity: No Food Insecurity    Worried About Running  Out of Food in the Last Year: Never true    Ran Out of Food in the Last Year: Never true   Transportation Needs: No Transportation Needs    Lack of Transportation (Medical): No    Lack of Transportation (Non-Medical): No   Physical Activity: Inactive    Days of Exercise per Week: 0 days    Minutes of Exercise per Session: 0 min   Stress: No Stress Concern Present    Feeling of Stress : Only a little   Social Connections: Moderately Integrated    Frequency of Communication with Friends and Family: More than three times a week    Frequency of Social Gatherings with Friends and Family: More than three times a week    Attends Amish Services: More than 4 times per year    Active Member of Clubs or Organizations: No    Marital Status:    Housing Stability: Unknown    Unable to Pay for Housing in the Last Year: No    Unstable Housing in the Last Year: No       Review of Systems    OBJECTIVE:     Vital Signs     There is no height or weight on file to calculate BMI.    Neurosurgery Physical Exam  General: no acute distress  Head: Non-traumatic, normocephalic  Eyes: Pupils equal, EOMI  Neck: Supple, normal ROM, no tenderness to palpation  CVS: Normal rate and rhythm, distal pulses present  Pulm: Symmetric expansion, no respiratory distress  GI: Abdomen nondistended, nontender    MSK: Moves all extremities without restriction, atraumatic  Skin: Dry, intact  Psych: Normal thought content and cognition    Neuro:  Alert, awake, oriented, to self, place, time  Language:  Speech is fluent, goal directed without any noted dysarthria or aphasia    Cranial nerves:    CNII-XII: Intact on fine exam,   Pupils equal round react to light,   Extraocular muscles are intact  V1 to V3 is intact to light touch,   no facial asymmetry,   Hearing is intact to finger rub and voice  tongue/uvula/palate midline,   shoulder shrug equal,     No pronator drift    Extremities:  Motor:  Upper Extremity    Deltoid Triceps Biceps  Wrist  Extension Wrist  Flexion Interosseous   R 5/5 5/5 5/5 5/5 5/5 5/5   L 5/5 5/5 5/5 5/5 5/5 5/5       Thumb   Abduction Thumb  ADDuction Finger  Flexion Finger  Extension     R 5/5 5/5 5/5 5/5     L 5/5 5/5 5/5 5/5        Lower Extremity     Iliopsoas Quadriceps Hamstring Plantarflexion Dorsiflexion EHL   R 5/5 5/5 5/5 5/5 5/5 5/5   L 5/5 5/5 5/5 5/5 5/5 5/5       Reflexes:     DTR: 2+ biceps    2+ triceps   2+ brachioradialis   2+ patellar  2+ Achilles     Simmons's: Negative     Babinski's: Negative     Clonus: Negative     Sensory:      Sensation intact to light touch,      Coordination:      Coordination intact throughout,   Cerebellar:  Normal finger-to-nose,      Diagnostic Results:  I personally reviewed patient's Diagnostic Imaging.  Stable appearing left A1/A2 junctional aneurysm without any significant change in size.  No appreciable abnormal enhancement visualized on vessel wall imaging.    ASSESSMENT/PLAN:     76-year-old female with a 2 mm anterior communicating artery aneurysm with no enhancement on vessel wall imaging.    1. No significant change in clinical exam  2. MRA vessel wall imaging without any evidence of enhancement in the vessel wall  3. Had long discussion with patient all spouse.  Would continue to recommend conservative follow-up with imaging.  No additional intervention at this particular time.  Would recommend repeat MRA with vessel wall imaging in 2-3 year and would continue to follow conservatively moving forward.  Recommend consulting Neurology, for the other neurologic symptoms.  She may also need follow-up with ENT as well.  Answered all patient's questions the patient's satisfaction.     Thank you for allowing me to participate in the care of this patient.  Please feel free to call any questions, comments, or concerns.     Amparo Linares MD,MSc  Department of Neurosurgery   Department of Radiology  Department of Neurology  [facility]    [facility]   [employer] [facility]    Total time spent in counseling and discussion about further management options including relevant lab work, treatment,  prognosis, medications and intended side effects was more than 60 minutes. More than 50 % of the time was spent in counseling and coordination of care.  I spent a total of 60 minutes on the day of the visit.This includes face to face time and non-face to face time preparing to see the patient (eg, review of tests), Obtaining and/or reviewing separately obtained history, Documenting clinical information in the electronic or other health record, Independently interpreting resultsand communicating results to the patient/family/caregiver, or Care coordination     Note dictated with voice recognition software, please excuse any grammatical errors.

## 2022-11-03 ENCOUNTER — TELEPHONE (OUTPATIENT)
Dept: RHEUMATOLOGY | Facility: CLINIC | Age: 77
End: 2022-11-03
Payer: MEDICARE

## 2022-11-03 NOTE — TELEPHONE ENCOUNTER
----- Message from Colette Ellis MA sent at 11/1/2022  2:47 PM CDT -----  Contact: [phone]    ----- Message -----  From: Yelena Cuadra  Sent: 11/1/2022  12:28 PM CDT  To: Johny MUR[facility] Staff    Pt is calling to let the dr know she has a lot of pain in her left legs and foot all the way up to her hip.  Pt would like a call back.  [phone]     And pt also needs a refill on her medication of   nabumetone (RELAFEN) 500 MG tablet       [facility] [address]  [address] [address]  Phone: [phone] Fax: [phone]

## 2022-11-03 NOTE — TELEPHONE ENCOUNTER
Unable to reach patient on either number.  Left message on cell.  Relafen filled by primary 10/12/22 3 month supply.

## 2022-11-08 ENCOUNTER — TELEPHONE (OUTPATIENT)
Dept: RHEUMATOLOGY | Facility: CLINIC | Age: 77
End: 2022-11-08
Payer: MEDICARE

## 2022-11-08 NOTE — TELEPHONE ENCOUNTER
----- Message from Tamara Perez MA sent at 11/3/2022  2:24 PM CDT -----  Contact: @[phone]    ----- Message -----  From: Gilma Bhatti  Sent: 11/3/2022   2:14 PM CDT  To: [facility]y [facility]UR[facility] Staff    Pt is requesting a call back from a missed call, please call to discuss further.

## 2022-11-08 NOTE — TELEPHONE ENCOUNTER
Returned the patient's call.  No answer.  Left a message for the patient to contact the office and let us know the best times to reach her.

## 2022-11-17 ENCOUNTER — TELEPHONE (OUTPATIENT)
Dept: RHEUMATOLOGY | Facility: CLINIC | Age: 77
End: 2022-11-17
Payer: MEDICARE

## 2022-11-17 DIAGNOSIS — D84.9 IMMUNOSUPPRESSION: ICD-10-CM

## 2022-11-17 DIAGNOSIS — M10.9 GOUT, ARTHRITIS: ICD-10-CM

## 2022-11-17 DIAGNOSIS — Z79.1 NSAID LONG-TERM USE: ICD-10-CM

## 2022-11-17 DIAGNOSIS — M05.79 RHEUMATOID ARTHRITIS INVOLVING MULTIPLE SITES WITH POSITIVE RHEUMATOID FACTOR: ICD-10-CM

## 2022-11-17 DIAGNOSIS — K21.9 GASTROESOPHAGEAL REFLUX DISEASE: ICD-10-CM

## 2022-11-17 DIAGNOSIS — M51.36 DDD (DEGENERATIVE DISC DISEASE), LUMBAR: ICD-10-CM

## 2022-11-17 RX ORDER — NABUMETONE 500 MG/1
500 TABLET, FILM COATED ORAL 2 TIMES DAILY
Qty: 180 TABLET | Refills: 0 | Status: SHIPPED | OUTPATIENT
Start: 2022-11-17 | End: 2023-01-12

## 2022-11-17 RX ORDER — METHOTREXATE 2.5 MG/1
TABLET ORAL
Qty: 120 TABLET | Refills: 0 | Status: SHIPPED | OUTPATIENT
Start: 2022-11-17 | End: 2023-04-10

## 2022-11-17 NOTE — TELEPHONE ENCOUNTER
----- Message from Virgie Simental sent at 11/17/2022  2:44 PM CST -----  Regarding: Prescriptions  Contact: [phone]  Calling to get prescriptions for Rx #Rx nabumetone (RELAFEN) 500 MG tablet 90 day supply, and Rx methotrexate 2.5 MG Tab 90day supply. Please call to confirm prescriptions have been sent to [facility].      [facility] [place] - [address]  [address] [address]  Phone: [phone] Fax: [phone]

## 2022-11-25 ENCOUNTER — HOSPITAL ENCOUNTER (OUTPATIENT)
Dept: RADIOLOGY | Facility: HOSPITAL | Age: 77
Discharge: HOME | End: 2022-11-25
Attending: OBSTETRICS & GYNECOLOGY
Payer: MEDICARE

## 2022-11-25 DIAGNOSIS — Z12.31 BREAST CANCER SCREENING BY MAMMOGRAM: ICD-10-CM

## 2022-11-25 PROCEDURE — 77067 SCR MAMMO BI INCL CAD: CPT | Mod: TC

## 2022-11-25 PROCEDURE — 77063 MAMMO DIGITAL SCREENING BILAT WITH TOMO: ICD-10-PCS | Mod: 26,,, | Performed by: RADIOLOGY

## 2022-11-25 PROCEDURE — 77067 SCR MAMMO BI INCL CAD: CPT | Mod: 26,,, | Performed by: RADIOLOGY

## 2022-11-25 PROCEDURE — 77067 MAMMO DIGITAL SCREENING BILAT WITH TOMO: ICD-10-PCS | Mod: 26,,, | Performed by: RADIOLOGY

## 2022-11-25 PROCEDURE — 77063 BREAST TOMOSYNTHESIS BI: CPT | Mod: TC

## 2022-11-25 PROCEDURE — 77063 BREAST TOMOSYNTHESIS BI: CPT | Mod: 26,,, | Performed by: RADIOLOGY

## 2022-12-01 ENCOUNTER — OFFICE VISIT (OUTPATIENT)
Dept: RHEUMATOLOGY | Facility: CLINIC | Age: 77
End: 2022-12-01
Payer: MEDICARE

## 2022-12-01 ENCOUNTER — HOSPITAL ENCOUNTER (OUTPATIENT)
Dept: RADIOLOGY | Facility: HOSPITAL | Age: 77
Discharge: HOME | End: 2022-12-01
Attending: INTERNAL MEDICINE
Payer: MEDICARE

## 2022-12-01 VITALS
WEIGHT: 177.5 LBS | DIASTOLIC BLOOD PRESSURE: 74 MMHG | HEIGHT: 62 IN | BODY MASS INDEX: 32.66 KG/M2 | HEART RATE: 66 BPM | SYSTOLIC BLOOD PRESSURE: 144 MMHG

## 2022-12-01 DIAGNOSIS — L65.9 HAIR LOSS: ICD-10-CM

## 2022-12-01 DIAGNOSIS — D84.9 IMMUNOSUPPRESSION: ICD-10-CM

## 2022-12-01 DIAGNOSIS — M05.79 RHEUMATOID ARTHRITIS INVOLVING MULTIPLE SITES WITH POSITIVE RHEUMATOID FACTOR: ICD-10-CM

## 2022-12-01 DIAGNOSIS — M05.79 RHEUMATOID ARTHRITIS INVOLVING MULTIPLE SITES WITH POSITIVE RHEUMATOID FACTOR: Primary | ICD-10-CM

## 2022-12-01 PROCEDURE — 77077 XR ARTHRITIS SURVEY: ICD-10-PCS | Mod: 26,,, | Performed by: RADIOLOGY

## 2022-12-01 PROCEDURE — 99999 PR PBB SHADOW E&M-EST. PATIENT-LVL IV: CPT | Mod: PBBFAC,,, | Performed by: INTERNAL MEDICINE

## 2022-12-01 PROCEDURE — 77077 JOINT SURVEY SINGLE VIEW: CPT | Mod: 26,,, | Performed by: RADIOLOGY

## 2022-12-01 PROCEDURE — 99214 OFFICE O/P EST MOD 30 MIN: CPT | Mod: S$PBB,,, | Performed by: INTERNAL MEDICINE

## 2022-12-01 PROCEDURE — 99999 PR PBB SHADOW E&M-EST. PATIENT-LVL IV: ICD-10-PCS | Mod: PBBFAC,,, | Performed by: INTERNAL MEDICINE

## 2022-12-01 PROCEDURE — 99214 PR OFFICE/OUTPT VISIT, EST, LEVL IV, 30-39 MIN: ICD-10-PCS | Mod: S$PBB,,, | Performed by: INTERNAL MEDICINE

## 2022-12-01 PROCEDURE — 77077 JOINT SURVEY SINGLE VIEW: CPT | Mod: TC

## 2022-12-01 PROCEDURE — 99214 OFFICE O/P EST MOD 30 MIN: CPT | Mod: PBBFAC | Performed by: INTERNAL MEDICINE

## 2022-12-01 RX ORDER — PRUCALOPRIDE 2 MG/1
1 TABLET, FILM COATED ORAL
COMMUNITY
Start: 2022-11-11

## 2022-12-01 RX ORDER — POLYETHYLENE GLYCOL 3350, SODIUM SULFATE, SODIUM CHLORIDE, POTASSIUM CHLORIDE, ASCORBIC ACID, SODIUM ASCORBATE 140-9-5.2G
KIT ORAL
COMMUNITY
Start: 2022-11-11 | End: 2023-03-15

## 2022-12-01 ASSESSMENT — ROUTINE ASSESSMENT OF PATIENT INDEX DATA (RAPID3)
AM STIFFNESS SCORE: 0, NO
PAIN SCORE: 3
PSYCHOLOGICAL DISTRESS SCORE: 2.2
PATIENT GLOBAL ASSESSMENT SCORE: 1.5
FATIGUE SCORE: 3
MDHAQ FUNCTION SCORE: 0.5
TOTAL RAPID3 SCORE: 2.06

## 2022-12-01 NOTE — PROGRESS NOTES
"Subjective:       Patient ID: Yulia Peralta is a 77 y.o. female.    Chief Complaint: Rheumatoid arthritis    HPI:  Yulia Peralta is a 77 y.o. female with a history of rheumatoid arthritis for 25 years.  She had been on methotrexate for the past 5 years and her current dose is methotrexate 10 tabs qweek (5 on Monday and 5 on Tuesday).  Off prednisone    History of gout some years ago.        Interval History:    Doing well. She continues to have pain.  Previously saw foot doctor regarding pain and was told to change shoes and gave steroid pack which helped a little.  She was told to contact office if no improvement for possible injection.    She saw neurosurgery who referred her to healthy back program.  She feels it did not help.     10/10 ache in left foot at times.  Today is 3/10 ache.   Worsened with being on feet all day or staying.   No morning stiffness.  Improves with resting feet.  Worse with walking.    Seeing neurosurgery regarding aneurysm anterior communicating artery on CT.       Review of Systems   Constitutional:  Positive for fatigue.   HENT: Negative.     Eyes: Negative.    Cardiovascular: Negative.    Gastrointestinal: Negative.    Endocrine: Negative.    Genitourinary: Negative.    Musculoskeletal:  Positive for arthralgias and joint swelling.   Skin:  Negative for rash.        Hair loss   Allergic/Immunologic: Negative.    Neurological:  Negative for headaches.   Hematological: Negative.  Does not bruise/bleed easily.   Psychiatric/Behavioral: Negative.         Objective:   BP (!) 144/74   Pulse 66   Ht 5' 2" (1.575 m)   Wt 80.5 kg (177 lb 7.5 oz)   BMI 32.46 kg/m²   Virtual visit       Physical Exam   Constitutional: She is oriented to person, place, and time.   HENT:   Head: Normocephalic and atraumatic.   Eyes: Conjunctivae are normal.   Cardiovascular: Normal rate, regular rhythm and normal heart sounds.   Pulmonary/Chest: Effort normal and breath sounds normal.   Abdominal: Soft. " Bowel sounds are normal.   Musculoskeletal:      Cervical back: Neck supple.      Comments: 28 joint count: 0 swollen and 0 tender  No swelling of hands  Mild swelling dorsum of left foot  No pain on compression MTPs bilaterally  Contractures elbows   Neurological: She is alert and oriented to person, place, and time. Gait normal.   Skin: Skin is warm and dry.   Psychiatric: Mood and affect normal.          LABS    Component      Latest Ref Rng & Units 10/31/2022   WBC      3.90 - 12.70 K/uL 6.17   RBC      4.00 - 5.40 M/uL 3.43 (L)   Hemoglobin      12.0 - 16.0 g/dL 10.9 (L)   Hematocrit      37.0 - 48.5 % 34.4 (L)   MCV      82 - 98 fL 100 (H)   MCH      27.0 - 31.0 pg 31.8 (H)   MCHC      32.0 - 36.0 g/dL 31.7 (L)   RDW      11.5 - 14.5 % 14.3   Platelets      150 - 450 K/uL 171   MPV      9.2 - 12.9 fL 13.6 (H)   Immature Granulocytes      0.0 - 0.5 % 0.5   Gran # (ANC)      1.8 - 7.7 K/uL 3.6   Immature Grans (Abs)      0.00 - 0.04 K/uL 0.03   Lymph #      1.0 - 4.8 K/uL 1.3   Mono #      0.3 - 1.0 K/uL 1.0   Eos #      0.0 - 0.5 K/uL 0.3   Baso #      0.00 - 0.20 K/uL 0.02   nRBC      0 /100 WBC 0   Gran %      38.0 - 73.0 % 57.8   Lymph %      18.0 - 48.0 % 21.2   Mono %      4.0 - 15.0 % 15.7 (H)   Eosinophil %      0.0 - 8.0 % 4.5   Basophil %      0.0 - 1.9 % 0.3   Differential Method       Automated   Sodium      136 - 145 mmol/L 140   Potassium      3.5 - 5.1 mmol/L 4.9   Chloride      95 - 110 mmol/L 105   CO2      23 - 29 mmol/L 26   Glucose      70 - 110 mg/dL 84   BUN      8 - 23 mg/dL 12   Creatinine      0.5 - 1.4 mg/dL 0.7   Calcium      8.7 - 10.5 mg/dL 10.0   PROTEIN TOTAL      6.0 - 8.4 g/dL 7.4   Albumin      3.5 - 5.2 g/dL 3.9   BILIRUBIN TOTAL      0.1 - 1.0 mg/dL 0.4   Alkaline Phosphatase      55 - 135 U/L 94   AST      10 - 40 U/L 16   ALT      10 - 44 U/L 16   Anion Gap      8 - 16 mmol/L 9   eGFR      >60 mL/min/1.73 m:2 >60.0   CRP      0.0 - 8.2 mg/L 0.6   Sed Rate      0 - 36 mm/Hr  32     Assessment:       1. Rheumatoid arthritis manifested by rheumatoid nodule, polyarthritis in the past, and contractures of the elbows.    Treated in the past with gold. Plaquenil did not work in the past and she never took SSZ.   Doing well. Continue MTX (Monday 5 in morning and 5 in evening) and folic acid   2. Encounter for long-term (current) use of other medications    3. Fatigue.   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  She decided to go for second opinion at [facility]. Biopsy was negative. MRI repeat stable and most recent 10/2017 stable  5. 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 Felt not to have hepatitis C by hepatology.  6. Right anserine bursitis.   7. Obesity  8. HTN.   9. Chronic back pain.  Bulging disc.  May be cause of paresthesias in legs with standing.  10.  Paresthesia of in left leg.  May be due to #9.    11.  Right clavicle mass.  Evaluated by x-ray   12.  Trigger fingers.  Left 4th and right 3rd and right 5th.  Therapy helped in past.    13.  Left lower leg pain and swelling.  History of trauma  14.  Anterior communicating artery aneurysm  15.  Hair loss.  Not sure if from MTX.  Will have dermatology evaluate    Plan:       1. Continue methotrexate 10 tabs qweek.  2. Check CRP, ESR, CBC, and CMP.   3. Continue Nabumetone.  Patient says GI ok with her continuing despite healing ulcer on EGD.   Patient on misoprostol  4. Continue folic acid 2 tabs.    5. Patient to discuss with neurosurgery management of back  6. Consider OT in future for arms if pain returns and no improvement with home exercises from previous PT  7. Topical arthritis OTC ointment to feet and ankles.  8.  Encourage walking in the house  9.  Ice feet at end of day and apply heat in morning.          Return to the office in 6 months/prn;

## 2022-12-02 ENCOUNTER — TELEPHONE (OUTPATIENT)
Dept: RHEUMATOLOGY | Facility: CLINIC | Age: 77
End: 2022-12-02
Payer: MEDICARE

## 2022-12-02 NOTE — TELEPHONE ENCOUNTER
Please inform the patient of the following: the x-ray show no changes since the prior study.  Her foot symptoms are from osteoarthritis and she should proceed with the plan we discussed and talk with her foot doctor.

## 2022-12-02 NOTE — TELEPHONE ENCOUNTER
Staff to inform patient that the x-ray show no changes since the prior study.  Her foot symptoms are from osteoarthritis and she should proceed with the plan we discussed and talk with her foot doctor.

## 2022-12-30 ENCOUNTER — HOSPITAL ENCOUNTER (OUTPATIENT)
Dept: CARDIOLOGY | Facility: HOSPITAL | Age: 77
Discharge: HOME | End: 2022-12-30
Attending: SURGERY
Payer: MEDICARE

## 2022-12-30 DIAGNOSIS — I65.23 CAROTID STENOSIS, ASYMPTOMATIC, BILATERAL: ICD-10-CM

## 2022-12-30 LAB
LEFT CBA DIAS: 12 CM/S
LEFT CBA SYS: 57 CM/S
LEFT CCA DIST DIAS: 18 CM/S
LEFT CCA DIST SYS: 71 CM/S
LEFT CCA MID DIAS: 14 CM/S
LEFT CCA MID SYS: 64 CM/S
LEFT CCA PROX DIAS: 18 CM/S
LEFT CCA PROX SYS: 80 CM/S
LEFT ECA DIAS: 15 CM/S
LEFT ECA SYS: 84 CM/S
LEFT ICA DIST DIAS: 21 CM/S
LEFT ICA DIST SYS: 74 CM/S
LEFT ICA MID DIAS: 26 CM/S
LEFT ICA MID SYS: 115 CM/S
LEFT ICA PROX DIAS: 13 CM/S
LEFT ICA PROX SYS: 56 CM/S
LEFT VERTEBRAL DIAS: 28 CM/S
LEFT VERTEBRAL SYS: 91 CM/S
OHS CV CAROTID RIGHT ICA EDV HIGHEST: 22
OHS CV CAROTID ULTRASOUND LEFT ICA/CCA RATIO: 1.62
OHS CV CAROTID ULTRASOUND RIGHT ICA/CCA RATIO: 0.97
OHS CV PV CAROTID LEFT HIGHEST CCA: 80
OHS CV PV CAROTID LEFT HIGHEST ICA: 115
OHS CV PV CAROTID RIGHT HIGHEST CCA: 83
OHS CV PV CAROTID RIGHT HIGHEST ICA: 59
OHS CV US CAROTID LEFT HIGHEST EDV: 26
RIGHT CBA DIAS: 12 CM/S
RIGHT CBA SYS: 49 CM/S
RIGHT CCA DIST DIAS: 10 CM/S
RIGHT CCA DIST SYS: 61 CM/S
RIGHT CCA MID DIAS: 16 CM/S
RIGHT CCA MID SYS: 83 CM/S
RIGHT CCA PROX DIAS: 13 CM/S
RIGHT CCA PROX SYS: 74 CM/S
RIGHT ECA DIAS: 10 CM/S
RIGHT ECA SYS: 68 CM/S
RIGHT ICA DIST DIAS: 22 CM/S
RIGHT ICA DIST SYS: 59 CM/S
RIGHT ICA MID DIAS: 15 CM/S
RIGHT ICA MID SYS: 49 CM/S
RIGHT ICA PROX DIAS: 8 CM/S
RIGHT ICA PROX SYS: 45 CM/S
RIGHT VERTEBRAL DIAS: 11 CM/S
RIGHT VERTEBRAL SYS: 42 CM/S

## 2022-12-30 PROCEDURE — 93880 EXTRACRANIAL BILAT STUDY: CPT

## 2022-12-30 PROCEDURE — 93880 EXTRACRANIAL BILAT STUDY: CPT | Mod: 26,,, | Performed by: SURGERY

## 2022-12-30 PROCEDURE — 93880 CV US DOPPLER CAROTID (CUPID ONLY): ICD-10-PCS | Mod: 26,,, | Performed by: SURGERY

## 2023-01-03 ENCOUNTER — OFFICE VISIT (OUTPATIENT)
Dept: PODIATRY | Facility: CLINIC | Age: 78
End: 2023-01-03
Payer: MEDICARE

## 2023-01-03 VITALS — HEIGHT: 62 IN | WEIGHT: 177.5 LBS | BODY MASS INDEX: 32.66 KG/M2

## 2023-01-03 DIAGNOSIS — M20.5X1 HALLUX LIMITUS, ACQUIRED, RIGHT: ICD-10-CM

## 2023-01-03 DIAGNOSIS — M20.5X2 HALLUX LIMITUS, ACQUIRED, LEFT: ICD-10-CM

## 2023-01-03 DIAGNOSIS — M20.42 HAMMER TOES OF BOTH FEET: ICD-10-CM

## 2023-01-03 DIAGNOSIS — M79.672 LEFT FOOT PAIN: Primary | ICD-10-CM

## 2023-01-03 DIAGNOSIS — M21.42 PES PLANUS OF BOTH FEET: ICD-10-CM

## 2023-01-03 DIAGNOSIS — M19.079 ARTHROSIS OF MIDFOOT, UNSPECIFIED LATERALITY: ICD-10-CM

## 2023-01-03 DIAGNOSIS — M21.41 PES PLANUS OF BOTH FEET: ICD-10-CM

## 2023-01-03 DIAGNOSIS — M20.41 HAMMER TOES OF BOTH FEET: ICD-10-CM

## 2023-01-03 PROCEDURE — 99213 PR OFFICE/OUTPT VISIT, EST, LEVL III, 20-29 MIN: ICD-10-PCS | Mod: S$PBB,,, | Performed by: PODIATRIST

## 2023-01-03 PROCEDURE — 99999 PR PBB SHADOW E&M-EST. PATIENT-LVL IV: ICD-10-PCS | Mod: PBBFAC,,, | Performed by: PODIATRIST

## 2023-01-03 PROCEDURE — 99214 OFFICE O/P EST MOD 30 MIN: CPT | Mod: PBBFAC,PO | Performed by: PODIATRIST

## 2023-01-03 PROCEDURE — 99213 OFFICE O/P EST LOW 20 MIN: CPT | Mod: S$PBB,,, | Performed by: PODIATRIST

## 2023-01-03 PROCEDURE — 99999 PR PBB SHADOW E&M-EST. PATIENT-LVL IV: CPT | Mod: PBBFAC,,, | Performed by: PODIATRIST

## 2023-01-03 NOTE — PATIENT INSTRUCTIONS
Supplements for inflammation: Arnica Tabs, bromelain with tumeric, alpha lipoic acid, garlic     Over the counter pain creams: Voltaren Gel, Biofreeze, Bengay, tiger balm, two old goat, lidocaine gel,  Absorbine Veterinary Liniment Gel Topical Analgesic Sore Muscle and Joint Pain Relief    Recommend lotions: eucerin, eucerin for diabetics, aquaphor, A&D ointment, gold bond for diabetics, sween, Hamblen's Bees all purpose baby ointment,  urea 40 with aloe (found on amazon.com)    Shoe recommendations: (try 6pm.com, zappos.Lakoo , [email or web address].Lakoo, or shoes.Lakoo for discounted prices) you can visit [employer] shoes in [place]  or [employer] [facility] in the [facility] (there are also several shoe brand outlets in the [facility])    Look for shoes for pronation that offer stability    Asics (GT 2000 or gel foundations), new balance stability type shoes (such as the 940 series), les (stabil c3),  Cadena (GTS or Beast or transcend), propet, Can, HokaOne Bondi 7 (tennis shoe)    Sofft Brand, baretraps (women) Víctor&Berry (men), clarks, crocs, aerosoles, naturalizers, SAS, ecco, born, sherron amor, rockports (dress shoes)    Vionic, burkenstocks, fitflops, propet, baretraps (sandals)    HokaOne sandals, rubber birkenstock sandals crocs, propet (house shoes)    Nail Home remedy:  Vicks Vapor rub or Emuaid to nails for easier manageability    What Is Arthritis in the Foot?  Degenerative arthritis is a condition that slowly wears away joints, the area where bones meet and move. In the beginning, you may notice that the affected joint seems stiff. It may even ache. As the joint lining (cartilage) breaks down, the bones rub against each other, causing pain and swelling. Over time, small pieces of rough or splintered bone (bone spurs) develop, and the joints range of motion becomes limited. But movement doesnt have to cause pain. The effects of arthritis can be reduced.    The big-toe joint  When arthritis affects your big toe, your foot  hurts when it pushes off the ground. Arthritis often appears in the big-toe joint along with a bunion (a bony bump at the side of the joint) or a bone spur on top of the joint.    Other joints  When arthritis affects the rear or midfoot joints, you feel pain when you put weight on your foot. Arthritis may affect the joint where the ankle and foot meet. It may also affect other joints nearby.  Date Last Reviewed: 7/1/2016 © 2000-2017 [employer]. [address] [place] [address]. All rights reserved. This information is not intended as a substitute for professional medical care. Always follow your healthcare professional's instructions.        Treating Arthritis in the Foot  If your symptoms are mild, medications may be enough to reduce pain and swelling. For more severe arthritis, surgery may be needed to improve the condition of the joint.    Medicine  Your doctor may prescribe medicine--pills or injections--to limit pain and swelling. Ice, aspirin, acetaminophen, or ibuprofen may help relieve mild symptoms that occur after activity.  Surgery and bone trimming  To ease movement and reduce pain, your doctor may trim damaged bone. If arthritis is severe, the joint may be fused or removed. If the bone is not damaged too badly, your doctor may simply shave away bone spurs. Any excess bone growth related to a bunion may also be trimmed.  Fusing joints  If damage is more severe, your doctor may fuse the joint to prevent the bones from rubbing. Afterward, staples, plates, or screws may hold the bones in place so they heal properly. In some cases, the joint may be removed and replaced with an implant.  After surgery  During the early stages of recovery, your foot is likely to be bandaged and immobilized for a while. For best results, follow up with your doctor as scheduled. These visits help ensure that your foot heals properly.  As you heal  After surgery, youll be told how to care for your  incision and how soon to begin walking on the foot. Until the foot can bear weight, you may need to walk with crutches or a cane.  For surgery on the big toe, your foot may be splinted to limit movement for several weeks. Despite this, you should be able to walk soon after surgery.  For surgery on rear or midfoot joints, you may need to wear a cast or surgical shoe. These joints are fairly large, so full recovery may take a few months. Once the bone has healed, any staples, plates, or screws may be removed.  Date Last Reviewed: 7/1/2016  © 7405-1265 [employer]. [address] [place] [address]. All rights reserved. This information is not intended as a substitute for professional medical care. Always follow your healthcare professional's instructions.        Foot Surgery: Degenerative Joint Disease    Degenerative joint disease (arthritis) often happens in the joint of a big toe. This bone growth may cause pain and stiffness in the joint. Left untreated, arthritis can break down the cartilage and destroy the joint. Your treatment choices depend on how damaged your joint is. There are many nonsurgical treatments, but if these are not helpful, surgery may be considered.    Cheilectomy  This is done when the arthritic joint and cartilage can be saved. A bone spur caused by arthritis may be symptomatic on the top of the big toe joint. The procedure involves removing this bone spur, usually with a small part of the top of the joint itself.  You will need to wear a surgical shoe for several weeks. Once the foot heals, joint movement is restored.    Fusion  In fusion, the cartilage and some bone on both sides of the joint are removed. Then, the big toe and metatarsal bones are held together with staples, screws, or a plate and screws. Your foot may be placed in a cast. While you heal, you will be asked not to bear weight on this foot. You may also need crutches for several weeks. Because the joint  has been removed, your toe will be less flexible.    Arthroplasty  During surgery, bone growth caused by the arthritis is trimmed, and part of the joint is removed. A pin can be used to align the bones and to keep them from touching. The pin is removed after several weeks. In some cases, the entire joint may be replaced with an implant. You may have to wear a splint or a surgical shoe for several weeks. When healed, the bones become connected with scar tissue.  Date Last Reviewed: 10/15/2015  © 5164-7496 [employer]. [address] [place] [address]. All rights reserved. This information is not intended as a substitute for professional medical care. Always follow your healthcare professional's instructions.

## 2023-01-03 NOTE — PROGRESS NOTES
Subjective:      Patient ID: Yulia Peralta is a 77 y.o. female.    Chief Complaint: Foot Pain    Yulia Peralta is a 77 y.o. female who presents to the podiatry clinic  with complaint of  left foot pain, especially with palpation and ambulation. Description: moderate and severe Nature: aching, burning, sharp and throbbing Location: midfoot Onset of the symptoms was several weeks ago. Precipitating event: none known.  History of injury: no Current symptoms include: ability to bear weight, but with some pain, stiffness, swelling and worsening symptoms after a period of activity. Alleviating factors: rest Symptoms have progressed to a point and plateaued. Patient has had prior foot problems. Patient was evaluated for this complaint by her interventional cardiologist. MRI ordered and PT pending.  She is also under care of rheumatology. Patients rates pain 8/10 on pain scale.  She has no claudication or tissue loss. She has known neuropathy secondary to low back pathology.      1/3/2023 patient returns to clinic complaining of left midfoot pan, beginning to have similar pain to the right midfoot. She admits that she has not continued to stretch as instructed following PT.  She also relates she was unable to acquire recommended shoes and presents in flexible tennis shoes.       Patient Active Problem List   Diagnosis    Rheumatoid arthritis    Multiple thyroid nodules    Anemia of other chronic disease    Migraine headache    HTN (hypertension)    GERD (gastroesophageal reflux disease)    Lumbar spondylosis    DDD (degenerative disc disease), lumbar    Spinal stenosis, lumbar region, with neurogenic claudication    Acquired spondylolisthesis    Genital herpes in women    Vitamin B 12 deficiency    Acquired scoliosis    Palpitations    Immunosuppression    Anserine bursitis    Class 2 obesity due to excess calories in adult    Lumbosacral radiculopathy    Abnormal CT scan, chest    Thymoma    Cerebral microvascular  disease    Tortuous aorta    Refractive error    Severe obesity (BMI 35.0-39.9) with comorbidity    Cerebral aneurysm without rupture    Impacted cerumen of both ears    Light headedness    Decreased ROM of trunk and back    Decreased strength of trunk and back    Claudication of both lower extremities    Obesity (BMI 30-39.9)    PAD (peripheral artery disease)    Bilateral carotid artery stenosis    Mixed hyperlipidemia    Left foot pain    Gait instability       Current Outpatient Medications on File Prior to Visit   Medication Sig Dispense Refill    aspirin (ECOTRIN) 81 MG EC tablet Take 81 mg by mouth once daily.      fluticasone (FLONASE) 50 mcg/actuation nasal spray 1 spray (50 mcg total) by Each Nare route once daily. 16 g 5    folic acid (FOLVITE) 1 MG tablet Take 2 tablets (2,000 mcg total) by mouth once daily. 180 tablet 3    furosemide (LASIX) 40 MG tablet Take 1 tablet (40 mg total) by mouth once daily. 90 tablet 1    hydrocortisone 2.5 % cream as needed.   0    irbesartan (AVAPRO) 300 MG tablet Take 1 tablet (300 mg total) by mouth every evening. 90 tablet 3    linaCLOtide (LINZESS) 72 mcg Cap capsule Take 1 capsule (72 mcg total) by mouth before breakfast. 90 capsule 1    methotrexate 2.5 MG Tab Take by mouth every Monday 5 tabs in AM and 5 tabs in  tablet 0    miSOPROStoL (CYTOTEC) 100 MCG Tab Take 1 tablet (100 mcg total) by mouth 2 (two) times daily. 180 tablet 3    MOTEGRITY 2 mg Tab Take 1 tablet by mouth.      nabumetone (RELAFEN) 500 MG tablet Take 1 tablet (500 mg total) by mouth 2 (two) times daily. 180 tablet 0    NORVASC 5 mg tablet TAKE ONE TABLET BY MOUTH EVERY DAY 90 tablet 3    nystatin-triamcinolone (MYCOLOG II) cream Apply to affected area 2 times daily 90 g 3    pantoprazole (PROTONIX) 40 MG tablet Take 1 tablet (40 mg total) by mouth once daily. 90 tablet 1    PLENVU 140-9-5.2 gram PPkS use as directed      semaglutide (OZEMPIC) 0.25 mg or 0.5 mg(2 mg/1.5 mL) pen injector  Inject 0.5 mg into the skin every 7 days. 4 pen 0    triamcinolone acetonide 0.1% (KENALOG) 0.1 % cream Apply topically 3 (three) times daily. 80 g 1    UNABLE TO FIND Viviscal for hair loss      valACYclovir (VALTREX) 500 MG tablet 1 Tablet DAILY for suppression, increase to BID for outbreak 180 tablet 2    atorvastatin (LIPITOR) 40 MG tablet Take 1 tablet (40 mg total) by mouth once daily. 90 tablet 3    azelastine (ASTELIN) 137 mcg (0.1 %) nasal spray 1 spray (137 mcg total) by Nasal route 2 (two) times daily. 30 mL 1    famotidine (PEPCID) 40 MG tablet Take 0.5 tablets (20 mg total) by mouth 2 (two) times daily as needed for Heartburn. 15 tablet 2    gabapentin (NEURONTIN) 300 MG capsule Take 2 capsules (600 mg total) by mouth 3 (three) times daily. 180 capsule 11     Current Facility-Administered Medications on File Prior to Visit   Medication Dose Route Frequency Provider Last Rate Last Admin    cyclopentolate 1% ophthalmic solution 1 drop  1 drop Left Eye On Call Procedure Elvis Pete MD   1 drop at 01/26/21 0914    ofloxacin 0.3 % ophthalmic solution 1 drop  1 drop Left Eye On Call Procedure Elvis Pete MD   1 drop at 01/26/21 0914    sodium chloride 0.9% flush 10 mL  10 mL Intravenous PRN Elvis Pete MD           Review of patient's allergies indicates:   Allergen Reactions    No known drug allergies        Past Surgical History:   Procedure Laterality Date    CATARACT EXTRACTION W/  INTRAOCULAR LENS IMPLANT Left 1/26/2021    Procedure: EXTRACTION, CATARACT, WITH IOL INSERTION;  Surgeon: Elvis Pete MD;  Location: [facility] OR;  Service: Ophthalmology;  Laterality: Left;    CATARACT EXTRACTION W/  INTRAOCULAR LENS IMPLANT Right 2/23/2021    Procedure: EXTRACTION, CATARACT, WITH IOL INSERTION;  Surgeon: Elvis Pete MD;  Location: [facility] OR;  Service: Ophthalmology;  Laterality: Right;    CEREBRAL ANGIOGRAM N/A 1/23/2020    Procedure: ANGIOGRAM-CEREBRAL;   Surgeon: [place] [employer] Provider;  Location: [facility] OR [address] [place] [address];  Service: Radiology;  Laterality: N/A;  /Fuentes    gallstones  [id number]    HYSTERECTOMY      JOINT REPLACEMENT  [id number]     bilateral knee    OOPHORECTOMY      WA REMOVAL OF OVARY/TUBE(S)      TONSILLECTOMY         Family History   Problem Relation Age of Onset    Cataracts Mother     Hypertension Mother     No Known Problems Father     Hypertension Sister     Glaucoma Brother     Breast cancer Maternal Aunt     No Known Problems Maternal Uncle     No Known Problems Paternal Aunt     No Known Problems Paternal Uncle     No Known Problems Maternal Grandmother     No Known Problems Maternal Grandfather     No Known Problems Paternal Grandmother     No Known Problems Paternal Grandfather     No Known Problems Daughter     No Known Problems Son     No Known Problems Other     Lupus Neg Hx     Rheum arthritis Neg Hx     Psoriasis Neg Hx     Amblyopia Neg Hx     Blindness Neg Hx     Cancer Neg Hx     Diabetes Neg Hx     Macular degeneration Neg Hx     Retinal detachment Neg Hx     Strabismus Neg Hx     Stroke Neg Hx     Thyroid disease Neg Hx        Social History     Socioeconomic History    Marital status:      Spouse name: Ric     Number of children: 2    Highest education level: Some college, no degree   Occupational History    Occupation: retired    Tobacco Use    Smoking status: Former     Packs/day: 0.25     Years: 1.00     Pack years: 0.25     Types: Cigarettes    Smokeless tobacco: Never   Substance and Sexual Activity    Alcohol use: Yes     Alcohol/week: 2.0 standard drinks     Types: 1 Glasses of wine, 1 Cans of beer per week     Comment: very seldom    Drug use: No    Sexual activity: Not Currently     Partners: Male   Social History Narrative    Adult Screenings updated and reviewed  6/12/14    Mammogram( for females) ordered for DIS    Pap ( for females) Dr Zepeda  Due for f/u   For  2014    Colonoscopy  Done once    Flu shot  yearly done  2013    Td 2005    Pneumovax  Updated  12/3/13    Zostavax done 2012    Eye exam recommended yearly done 2013    Bone density  12/9/13    Thyroid ultrasound  11/6/2012 Normal sized thyroid containing several subcentimeter nodules, similar to the 5/12/11 exam.  No concerning nodules identified..          Social Determinants of Health     Financial Resource Strain: Low Risk     Difficulty of Paying Living Expenses: Not hard at all   Food Insecurity: No Food Insecurity    Worried About Running Out of Food in the Last Year: Never true    Ran Out of Food in the Last Year: Never true   Transportation Needs: No Transportation Needs    Lack of Transportation (Medical): No    Lack of Transportation (Non-Medical): No   Physical Activity: Inactive    Days of Exercise per Week: 0 days    Minutes of Exercise per Session: 0 min   Stress: No Stress Concern Present    Feeling of Stress : Only a little   Social Connections: Moderately Integrated    Frequency of Communication with Friends and Family: More than three times a week    Frequency of Social Gatherings with Friends and Family: More than three times a week    Attends [employer] Services: More than 4 times per year    Active Member of Clubs or Organizations: No    Marital Status:    Housing Stability: Unknown    Unable to Pay for Housing in the Last Year: No    Unstable Housing in the Last Year: No       Review of Systems   Constitutional: Negative for chills and fever.   Cardiovascular:  Negative for claudication and leg swelling.   Respiratory:  Negative for cough and shortness of breath.    Skin:  Negative for itching and rash.   Musculoskeletal:  Positive for arthritis, back pain, joint pain and myalgias. Negative for falls, joint swelling and muscle weakness.   Gastrointestinal:  Negative for diarrhea, nausea and vomiting.   Neurological:  Positive for paresthesias. Negative for numbness, tremors and weakness.   Psychiatric/Behavioral:  Negative for  "altered mental status and hallucinations.          Objective:      Vitals:    01/03/23 0728   Weight: 80.5 kg (177 lb 7.5 oz)   Height: 5' 2" (1.575 m)   PainSc:   4       Physical Exam  Nursing note reviewed.   Constitutional:       General: She is not in acute distress.     Appearance: She is not toxic-appearing or diaphoretic.   Cardiovascular:      Pulses:           Dorsalis pedis pulses are 2+ on the right side and 2+ on the left side.        Posterior tibial pulses are 2+ on the right side and 2+ on the left side.   Pulmonary:      Effort: No respiratory distress.   Musculoskeletal:      Right ankle: No swelling. No tenderness. No lateral malleolus, medial malleolus, AITF ligament, CF ligament or posterior TF ligament tenderness. Decreased range of motion.      Right Achilles Tendon: No defects. Rojo's test negative.      Left ankle: No swelling. No tenderness. No lateral malleolus, medial malleolus, AITF ligament, CF ligament or posterior TF ligament tenderness. Decreased range of motion.      Left Achilles Tendon: No defects. Rojo's test negative.      Right foot: Crepitus (midfoot) present. No bony tenderness.      Left foot: Swelling, tenderness and crepitus present. No bony tenderness.      Comments: Significant pain on palpation and manipulation of dorsomedial midfoot, left. + crepitus and edema.    There is equinus deformity bilateral with decreased dorsiflexion at the ankle joint bilateral.     Decreased arch height noted b/l. Negative too many toes sign.     Decreased first MPJ range of motion both weightbearing and nonweightbearing, no crepitus observed the first MP joint, + dorsal flag sign. Mild  bunion deformity is observed .    Patient has hammertoes of digits 2-5 bilateral partially reducible      Skin:     General: Skin is warm and dry.      Coloration: Skin is not pale.      Findings: No bruising, burn, laceration, lesion or rash.      Nails: There is no clubbing.   Neurological:      " Sensory: No sensory deficit.      Motor: No tremor, atrophy or abnormal muscle tone.      Deep Tendon Reflexes: Reflexes are normal and symmetric.   Psychiatric:         Attention and Perception: She is attentive.         Mood and Affect: Mood is not anxious. Affect is not inappropriate.         Speech: She is communicative. Speech is not slurred.         Behavior: Behavior is not combative.             Assessment:       Encounter Diagnoses   Name Primary?    Left foot pain Yes    Arthrosis of midfoot, unspecified laterality     Hallux limitus, acquired, right     Hallux limitus, acquired, left     Hammer toes of both feet     Pes planus of both feet            Plan:     Problem List Items Addressed This Visit       Left foot pain - Primary     Other Visit Diagnoses       Arthrosis of midfoot, unspecified laterality        Hallux limitus, acquired, right        Hallux limitus, acquired, left        Hammer toes of both feet        Pes planus of both feet                        I counseled the patient on her conditions, their implications and medical management.    Again, reviewed previous imaging with patient. Significant midfoot arthritis noted to MRI of the left foot    Patient education on the chronic nature of arthralgias of the feet. Discussed non-surgical treatment options, including injection, supportive shoegear, inserts.     Patient instructed on adequate icing techniques. Patient should ice the affected area at least 10 minutes when inflammed. I advised the patient that extra icing would also be beneficial to ensure adequate anti inflammatory effect. I gave written and verbal instructions on stretching exercises. Patient expressed understanding. Discussed  wearing appropriate shoe gear and avoiding flats, slippers, sandals, and going barefoot. My recommendation for OTC supports is [employer].     Stretching handout dispensed to patient. Instructions on adequate stretching reviewed in clinic     We  also discussed cortisone injections and NSAID therapy.     RTC if no improvement, at this time she will receive cortisone injections. Will otherwise defer to rheumatology  Patient is amenable to plan.

## 2023-01-06 ENCOUNTER — OFFICE VISIT (OUTPATIENT)
Dept: OPTOMETRY | Facility: CLINIC | Age: 78
End: 2023-01-06
Payer: MEDICARE

## 2023-01-06 DIAGNOSIS — H52.7 REFRACTIVE ERROR: Primary | ICD-10-CM

## 2023-01-06 PROCEDURE — 99999 PR PBB SHADOW E&M-EST. PATIENT-LVL II: CPT | Mod: PBBFAC,,, | Performed by: OPTOMETRIST

## 2023-01-06 PROCEDURE — 92015 PR REFRACTION: ICD-10-PCS | Mod: ,,, | Performed by: OPTOMETRIST

## 2023-01-06 PROCEDURE — 99999 PR PBB SHADOW E&M-EST. PATIENT-LVL II: ICD-10-PCS | Mod: PBBFAC,,, | Performed by: OPTOMETRIST

## 2023-01-06 PROCEDURE — 99212 OFFICE O/P EST SF 10 MIN: CPT | Mod: PBBFAC,PO | Performed by: OPTOMETRIST

## 2023-01-06 PROCEDURE — 92015 DETERMINE REFRACTIVE STATE: CPT | Mod: ,,, | Performed by: OPTOMETRIST

## 2023-01-06 NOTE — PROGRESS NOTES
Subjective:       Patient ID: Yulia Peralta is a 77 y.o. female      Chief Complaint   Patient presents with    Concerns About Ocular Health     History of Present Illness  Dls 10/4/22 Dr. Luu     76 y/o female presents today for refraction  Pt broke bifocal glasses.   Pt c/o blurry vision at distance and near ou.        Assessment/Plan:     1. Refractive error  Educated patient on refractive error and discussed lens options. Dispensed updated spectacle Rx. Educated about adaptation period to new specs.    Eyeglass Final Rx       Eyeglass Final Rx         Sphere Cylinder Axis Add    Right [place] +0.75 160 +2.50    Left +0.25 +0.50 015 +2.50      Type: Bifocal    Expiration Date: 1/6/2024                      Follow up in about 7 months (around 8/6/2023).

## 2023-01-13 ENCOUNTER — OFFICE VISIT (OUTPATIENT)
Dept: DERMATOLOGY | Facility: CLINIC | Age: 78
End: 2023-01-13
Payer: MEDICARE

## 2023-01-13 DIAGNOSIS — D23.9 DERMATOFIBROMA: Primary | ICD-10-CM

## 2023-01-13 DIAGNOSIS — L65.9 HAIR LOSS: ICD-10-CM

## 2023-01-13 PROCEDURE — 99214 OFFICE O/P EST MOD 30 MIN: CPT | Mod: S$PBB,,, | Performed by: STUDENT IN AN ORGANIZED HEALTH CARE EDUCATION/TRAINING PROGRAM

## 2023-01-13 PROCEDURE — 99999 PR PBB SHADOW E&M-EST. PATIENT-LVL V: CPT | Mod: PBBFAC,,, | Performed by: STUDENT IN AN ORGANIZED HEALTH CARE EDUCATION/TRAINING PROGRAM

## 2023-01-13 PROCEDURE — 99214 PR OFFICE/OUTPT VISIT, EST, LEVL IV, 30-39 MIN: ICD-10-PCS | Mod: S$PBB,,, | Performed by: STUDENT IN AN ORGANIZED HEALTH CARE EDUCATION/TRAINING PROGRAM

## 2023-01-13 PROCEDURE — 99215 OFFICE O/P EST HI 40 MIN: CPT | Mod: PBBFAC | Performed by: STUDENT IN AN ORGANIZED HEALTH CARE EDUCATION/TRAINING PROGRAM

## 2023-01-13 PROCEDURE — 99999 PR PBB SHADOW E&M-EST. PATIENT-LVL V: ICD-10-PCS | Mod: PBBFAC,,, | Performed by: STUDENT IN AN ORGANIZED HEALTH CARE EDUCATION/TRAINING PROGRAM

## 2023-01-13 RX ORDER — CLOBETASOL PROPIONATE 0.46 MG/ML
SOLUTION TOPICAL 2 TIMES DAILY
Qty: 100 ML | Refills: 4 | Status: SHIPPED | OUTPATIENT
Start: 2023-01-13

## 2023-01-13 NOTE — PROGRESS NOTES
Subjective:       Patient ID:  Yulia Peralta is a 77 y.o. female who presents for   Chief Complaint   Patient presents with    Hair Loss     History of Present Illness: The patient presents with chief complaint of hair loss and thinning.  Location: throughout the entire scalp.   Duration: progressing over years  Signs/Symptoms: gradual thinning of the hair throughout the scalp. No definitive bald patches  Prior treatments: none  Patient is on MTX for rheumatologic condition.       Hair Loss      Review of Systems   Constitutional:  Negative for fever and chills.   Skin:  Negative for itching, rash and dry skin.      Objective:    Physical Exam   Constitutional: She appears well-developed and well-nourished. No distress.   Neurological: She is alert and oriented to person, place, and time. She is not disoriented.   Psychiatric: She has a normal mood and affect.   Skin:   Areas Examined (abnormalities noted in diagram):   Scalp / Hair Palpated and Inspected  Head / Face Inspection Performed  Neck Inspection Performed  Abdomen Inspection Performed  RUE Inspected  LUE Inspection Performed  Nails and Digits Inspection Performed                 Diagram Legend     Erythematous scaling macule/papule c/w actinic keratosis       Vascular papule c/w angioma      Pigmented verrucoid papule/plaque c/w seborrheic keratosis      Yellow umbilicated papule c/w sebaceous hyperplasia      Irregularly shaped tan macule c/w lentigo     1-2 mm smooth white papules consistent with Milia      Movable subcutaneous cyst with punctum c/w epidermal inclusion cyst      Subcutaneous movable cyst c/w pilar cyst      Firm pink to brown papule c/w dermatofibroma      Pedunculated fleshy papule(s) c/w skin tag(s)      Evenly pigmented macule c/w junctional nevus     Mildly variegated pigmented, slightly irregular-bordered macule c/w mildly atypical nevus      Flesh colored to evenly pigmented papule c/w intradermal nevus       Pink pearly  papule/plaque c/w basal cell carcinoma      Erythematous hyperkeratotic cursted plaque c/w SCC      Surgical scar with no sign of skin cancer recurrence      Open and closed comedones      Inflammatory papules and pustules      Verrucoid papule consistent consistent with wart     Erythematous eczematous patches and plaques     Dystrophic onycholytic nail with subungual debris c/w onychomycosis     Umbilicated papule    Erythematous-base heme-crusted tan verrucoid plaque consistent with inflamed seborrheic keratosis     Erythematous Silvery Scaling Plaque c/w Psoriasis     See annotation      Assessment / Plan:      Hair loss - areas of thinning of the scalp, no overt alopecia plaques noted. Patient on MTX which is likely not helping with symptoms. After discussion with patient, will try a course of topical steroids. Consider oral minoxidil or spirolactone at follow-up visit. TSH to check thyroid levels.   -     TSH; Future; Expected date: 01/13/2023  -     clobetasoL (TEMOVATE) 0.05 % external solution; Apply topically 2 (two) times daily.  Dispense: 100 mL; Refill: 4  -     recommend biotin supplement to help     Dermatofibroma  This is a benign scar-like lesion secondary to minor trauma. No treatment required.              Follow up in about 3 months (around 4/13/2023).

## 2023-01-17 ENCOUNTER — PATIENT OUTREACH (OUTPATIENT)
Dept: ADMINISTRATIVE | Facility: HOSPITAL | Age: 78
End: 2023-01-17
Payer: MEDICARE

## 2023-01-17 NOTE — PROGRESS NOTES
Health Maintenance Due   Topic Date Due    DEXA Scan  01/02/2023     Chart review done. HM updated. Immunizations reviewed & updated. Care Everywhere updated.

## 2023-01-30 ENCOUNTER — LAB VISIT (OUTPATIENT)
Dept: LAB | Facility: HOSPITAL | Age: 78
End: 2023-01-30
Attending: INTERNAL MEDICINE
Payer: MEDICARE

## 2023-01-30 DIAGNOSIS — M05.79 RHEUMATOID ARTHRITIS INVOLVING MULTIPLE SITES WITH POSITIVE RHEUMATOID FACTOR: ICD-10-CM

## 2023-01-30 DIAGNOSIS — M10.9 GOUT, ARTHRITIS: ICD-10-CM

## 2023-01-30 DIAGNOSIS — K21.9 GASTROESOPHAGEAL REFLUX DISEASE, UNSPECIFIED WHETHER ESOPHAGITIS PRESENT: ICD-10-CM

## 2023-01-30 DIAGNOSIS — D84.9 IMMUNOSUPPRESSION: ICD-10-CM

## 2023-01-30 DIAGNOSIS — Z79.1 NSAID LONG-TERM USE: ICD-10-CM

## 2023-01-30 LAB
ALBUMIN SERPL BCP-MCNC: 4.1 G/DL (ref 3.5–5.2)
ALP SERPL-CCNC: 97 U/L (ref 55–135)
ALT SERPL W/O P-5'-P-CCNC: 14 U/L (ref 10–44)
ANION GAP SERPL CALC-SCNC: 11 MMOL/L (ref 8–16)
AST SERPL-CCNC: 13 U/L (ref 10–40)
BASOPHILS # BLD AUTO: 0.03 K/UL (ref 0–0.2)
BASOPHILS NFR BLD: 0.5 % (ref 0–1.9)
BILIRUB SERPL-MCNC: 0.4 MG/DL (ref 0.1–1)
BUN SERPL-MCNC: 12 MG/DL (ref 8–23)
CALCIUM SERPL-MCNC: 10.2 MG/DL (ref 8.7–10.5)
CHLORIDE SERPL-SCNC: 105 MMOL/L (ref 95–110)
CO2 SERPL-SCNC: 23 MMOL/L (ref 23–29)
CREAT SERPL-MCNC: 0.8 MG/DL (ref 0.5–1.4)
CRP SERPL-MCNC: 0.7 MG/L (ref 0–8.2)
DIFFERENTIAL METHOD: ABNORMAL
EOSINOPHIL # BLD AUTO: 0.3 K/UL (ref 0–0.5)
EOSINOPHIL NFR BLD: 5.1 % (ref 0–8)
ERYTHROCYTE [DISTWIDTH] IN BLOOD BY AUTOMATED COUNT: 14.3 % (ref 11.5–14.5)
ERYTHROCYTE [SEDIMENTATION RATE] IN BLOOD BY PHOTOMETRIC METHOD: 28 MM/HR (ref 0–36)
EST. GFR  (NO RACE VARIABLE): >60 ML/MIN/1.73 M^2
GLUCOSE SERPL-MCNC: 95 MG/DL (ref 70–110)
HCT VFR BLD AUTO: 34.2 % (ref 37–48.5)
HGB BLD-MCNC: 11 G/DL (ref 12–16)
IMM GRANULOCYTES # BLD AUTO: 0.04 K/UL (ref 0–0.04)
IMM GRANULOCYTES NFR BLD AUTO: 0.6 % (ref 0–0.5)
LYMPHOCYTES # BLD AUTO: 1.3 K/UL (ref 1–4.8)
LYMPHOCYTES NFR BLD: 20.4 % (ref 18–48)
MCH RBC QN AUTO: 31.9 PG (ref 27–31)
MCHC RBC AUTO-ENTMCNC: 32.2 G/DL (ref 32–36)
MCV RBC AUTO: 99 FL (ref 82–98)
MONOCYTES # BLD AUTO: 1 K/UL (ref 0.3–1)
MONOCYTES NFR BLD: 16 % (ref 4–15)
NEUTROPHILS # BLD AUTO: 3.7 K/UL (ref 1.8–7.7)
NEUTROPHILS NFR BLD: 57.4 % (ref 38–73)
NRBC BLD-RTO: 0 /100 WBC
PLATELET # BLD AUTO: 182 K/UL (ref 150–450)
PMV BLD AUTO: 13.3 FL (ref 9.2–12.9)
POTASSIUM SERPL-SCNC: 4.6 MMOL/L (ref 3.5–5.1)
PROT SERPL-MCNC: 7.7 G/DL (ref 6–8.4)
RBC # BLD AUTO: 3.45 M/UL (ref 4–5.4)
SODIUM SERPL-SCNC: 139 MMOL/L (ref 136–145)
WBC # BLD AUTO: 6.43 K/UL (ref 3.9–12.7)

## 2023-01-30 PROCEDURE — 85025 COMPLETE CBC W/AUTO DIFF WBC: CPT | Performed by: INTERNAL MEDICINE

## 2023-01-30 PROCEDURE — 86140 C-REACTIVE PROTEIN: CPT | Performed by: INTERNAL MEDICINE

## 2023-01-30 PROCEDURE — 80053 COMPREHEN METABOLIC PANEL: CPT | Performed by: INTERNAL MEDICINE

## 2023-01-30 PROCEDURE — 36415 COLL VENOUS BLD VENIPUNCTURE: CPT | Mod: PO | Performed by: INTERNAL MEDICINE

## 2023-01-30 PROCEDURE — 85652 RBC SED RATE AUTOMATED: CPT | Performed by: INTERNAL MEDICINE

## 2023-02-02 ENCOUNTER — TELEPHONE (OUTPATIENT)
Dept: FAMILY MEDICINE | Facility: CLINIC | Age: 78
End: 2023-02-02
Payer: MEDICARE

## 2023-02-02 NOTE — TELEPHONE ENCOUNTER
----- Message from Christy Kaufman sent at 2/2/2023 12:18 PM CST -----  Regarding: Sooner Appt Request  .Type:  Sooner Appointment Request    Patient is requesting a sooner appointment.  Patient declined first available appointment listed as well as another facility and provider .  Patient will not accept being placed on the waitlist and is requesting a message be sent to doctor.    Name of Caller:  self     When is the first available appointment?  March for Martin, 2/22 for NP     Symptoms:  side pain -- ongoing     Would the patient rather a call back or a response via [facility]? Call     Best Call Back Number:  [phone] or [phone]       Additional Information:  .  [facility] [address] [place] [address] - [address] [place] [address]  Phone: [phone] Fax: [phone]

## 2023-02-06 ENCOUNTER — TELEPHONE (OUTPATIENT)
Dept: RHEUMATOLOGY | Facility: CLINIC | Age: 78
End: 2023-02-06
Payer: MEDICARE

## 2023-02-06 DIAGNOSIS — D84.9 IMMUNOSUPPRESSION: ICD-10-CM

## 2023-02-06 DIAGNOSIS — M05.79 RHEUMATOID ARTHRITIS INVOLVING MULTIPLE SITES WITH POSITIVE RHEUMATOID FACTOR: Primary | ICD-10-CM

## 2023-02-06 NOTE — TELEPHONE ENCOUNTER
Staff to inform the patient that her labs are stable and to also schedule repeat labs in 3 months.

## 2023-02-24 ENCOUNTER — PES CALL (OUTPATIENT)
Dept: ADMINISTRATIVE | Facility: CLINIC | Age: 78
End: 2023-02-24
Payer: MEDICARE

## 2023-02-27 ENCOUNTER — TELEPHONE (OUTPATIENT)
Dept: FAMILY MEDICINE | Facility: CLINIC | Age: 78
End: 2023-02-27

## 2023-02-27 NOTE — TELEPHONE ENCOUNTER
----- Message from Akiko Navarro LPN sent at 2/24/2023  4:28 PM CST -----    ----- Message -----  From: Cassie Antonio  Sent: 2/24/2023  10:35 AM CST  To: Mario [facility] Staff    .Type: Patient Call Back    Who called: Self    What is the request in detail:Tested Positive for Covid on 2/23/2023, can you call in something for sinus infection     Can the clinic reply by [facility]? No    Would the patient rather a call back or a response via [facility]? Call    Best call back [phone] (home)       Additional Information:    .  [facility] [address] [place] [address] - [address]  [address] [place] [address]  Phone: [phone] Fax: [phone]

## 2023-03-01 ENCOUNTER — OFFICE VISIT (OUTPATIENT)
Dept: VASCULAR SURGERY | Facility: CLINIC | Age: 78
End: 2023-03-01
Payer: MEDICARE

## 2023-03-01 VITALS
SYSTOLIC BLOOD PRESSURE: 124 MMHG | HEART RATE: 85 BPM | DIASTOLIC BLOOD PRESSURE: 66 MMHG | WEIGHT: 171.5 LBS | BODY MASS INDEX: 31.37 KG/M2

## 2023-03-01 DIAGNOSIS — I65.23 CAROTID STENOSIS, ASYMPTOMATIC, BILATERAL: Primary | ICD-10-CM

## 2023-03-01 PROCEDURE — 99999 PR PBB SHADOW E&M-EST. PATIENT-LVL III: CPT | Mod: PBBFAC,,, | Performed by: SURGERY

## 2023-03-01 PROCEDURE — 99214 PR OFFICE/OUTPT VISIT, EST, LEVL IV, 30-39 MIN: ICD-10-PCS | Mod: S$PBB,,, | Performed by: SURGERY

## 2023-03-01 PROCEDURE — 99213 OFFICE O/P EST LOW 20 MIN: CPT | Mod: PBBFAC | Performed by: SURGERY

## 2023-03-01 PROCEDURE — 99999 PR PBB SHADOW E&M-EST. PATIENT-LVL III: ICD-10-PCS | Mod: PBBFAC,,, | Performed by: SURGERY

## 2023-03-01 PROCEDURE — 99214 OFFICE O/P EST MOD 30 MIN: CPT | Mod: S$PBB,,, | Performed by: SURGERY

## 2023-03-01 NOTE — PROGRESS NOTES
Pedro Luis Sandhu MD [facility]                         03/01/2023    HPI:  Yulia Peralta is a 77 y.o. female with   Patient Active Problem List   Diagnosis    Rheumatoid arthritis    Multiple thyroid nodules    Anemia of other chronic disease    Migraine headache    HTN (hypertension)    GERD (gastroesophageal reflux disease)    Lumbar spondylosis    DDD (degenerative disc disease), lumbar    Spinal stenosis, lumbar region, with neurogenic claudication    Acquired spondylolisthesis    Genital herpes in women    Vitamin B 12 deficiency    Acquired scoliosis    Palpitations    Immunosuppression    Anserine bursitis    Class 2 obesity due to excess calories in adult    Lumbosacral radiculopathy    Abnormal CT scan, chest    Thymoma    Cerebral microvascular disease    Tortuous aorta    Refractive error    Severe obesity (BMI 35.0-39.9) with comorbidity    Cerebral aneurysm without rupture    Impacted cerumen of both ears    Light headedness    Decreased ROM of trunk and back    Decreased strength of trunk and back    Claudication of both lower extremities    Obesity (BMI 30-39.9)    PAD (peripheral artery disease)    Bilateral carotid artery stenosis    Mixed hyperlipidemia    Left foot pain    Gait instability    being managed by PCP and specialists who is here today for evaluation of PVD.  Patient has no complaints of claudication.  Patient states location is LLE occurring for months.  Associated signs and symptoms include chronic back pain.  Quality is sharp and severity is 7/10.  Symptoms began weeks to months ago.  Alleviating factors include walking.  Worsening factors include resting.  no rest pain.  no tissue loss.  Patient is not diabetic.  Is not on Pletal.  no previous lower extremity interventions.    Patient has no cerebrovascular complaints today.  She has no history of stroke or mini stroke.  No abdominal pain.  She has chronic back pain that is  being managed by Dr. Curry and she is in the healthy back program.  She has complaints of left distal lower extremity sharp pain at rest.  It improves with ambulation.  She denies claudication, rest pain or wounds.    no MI  no Stroke  Tobacco use: denies  Daily Aspirin: yes  Anticoagulation: no    3/2022:  Patient feels better without significant back pain.  She does have severe left foot pain that is induced with standing with unsteady gait and it radiates upwards to her knee.  She completed the healthy back program although has not followed up with pain or nerve surgery per Dr. Brar recommendations.  She denies stroke or mini stroke.  She is compliant with all her medications.  She saw Podiatry for the foot pain and they recommended steroid pack and support shoes.    8/2022:  c/o LLE and L foot pain.  No claudication.      3/2023:  No new complaints or issues.  Minimal BLE edema.    Past Medical History:   Diagnosis Date    Abnormal colonoscopy 07/24/2020    colon polyps nad repeat in 3 years.     Acid reflux     Anemia     Anxiety     Arthritis     Blood transfusion     Cataract     Depression     Dry eyes     H/O colonoscopy 07/22/2020    dr. nicholas. repeat in 3 years.     Heart murmur     Hypertension     Left lumbar radiculitis 5/19/2015    Mixed hyperlipidemia 11/5/2021    Multiple thyroid nodules 12/18/2012    Osteoporosis     Rheumatoid arthritis     Thoracic or lumbosacral neuritis or radiculitis, unspecified 10/30/2013     Past Surgical History:   Procedure Laterality Date    CATARACT EXTRACTION W/  INTRAOCULAR LENS IMPLANT Left 1/26/2021    Procedure: EXTRACTION, CATARACT, WITH IOL INSERTION;  Surgeon: Elvis Pete MD;  Location: [facility] OR;  Service: Ophthalmology;  Laterality: Left;    CATARACT EXTRACTION W/  INTRAOCULAR LENS IMPLANT Right 2/23/2021    Procedure: EXTRACTION, CATARACT, WITH IOL INSERTION;  Surgeon: Elvis Pete MD;  Location: [facility] OR;  Service: Ophthalmology;   Laterality: Right;    CEREBRAL ANGIOGRAM N/A 1/23/2020    Procedure: ANGIOGRAM-CEREBRAL;  Surgeon: Hira Diagnostic Provider;  Location: [facility] OR [address] [place] [address];  Service: Radiology;  Laterality: N/A;  /Marathon    gallstones  [id number]    HYSTERECTOMY      JOINT REPLACEMENT  [id number]     bilateral knee    OOPHORECTOMY      VA REMOVAL OF OVARY/TUBE(S)      TONSILLECTOMY       Family History   Problem Relation Age of Onset    Cataracts Mother     Hypertension Mother     No Known Problems Father     Hypertension Sister     Glaucoma Brother     Breast cancer Maternal Aunt     No Known Problems Maternal Uncle     No Known Problems Paternal Aunt     No Known Problems Paternal Uncle     No Known Problems Maternal Grandmother     No Known Problems Maternal Grandfather     No Known Problems Paternal Grandmother     No Known Problems Paternal Grandfather     No Known Problems Daughter     No Known Problems Son     No Known Problems Other     Lupus Neg Hx     Rheum arthritis Neg Hx     Psoriasis Neg Hx     Amblyopia Neg Hx     Blindness Neg Hx     Cancer Neg Hx     Diabetes Neg Hx     Macular degeneration Neg Hx     Retinal detachment Neg Hx     Strabismus Neg Hx     Stroke Neg Hx     Thyroid disease Neg Hx      Social History     Socioeconomic History    Marital status:      Spouse name: Ric     Number of children: 2    Highest education level: Some college, no degree   Occupational History    Occupation: retired    Tobacco Use    Smoking status: Former     Packs/day: 0.25     Years: 1.00     Pack years: 0.25     Types: Cigarettes    Smokeless tobacco: Never   Substance and Sexual Activity    Alcohol use: Yes     Alcohol/week: 2.0 standard drinks     Types: 1 Glasses of wine, 1 Cans of beer per week     Comment: very seldom    Drug use: No    Sexual activity: Not Currently     Partners: Male   Social History Narrative    Adult Screenings updated and reviewed  6/12/14    Mammogram( for females) ordered for DIS    Pap (  for females) Dr Zepeda  Due for f/u   For  2014    Colonoscopy  Done once    Flu shot yearly done  2013    Td 2005    Pneumovax  Updated  12/3/13    Zostavax done 2012    Eye exam recommended yearly done 2013    Bone density  12/9/13    Thyroid ultrasound  11/6/2012 Normal sized thyroid containing several subcentimeter nodules, similar to the 5/12/11 exam.  No concerning nodules identified..          Social Determinants of Health     Financial Resource Strain: Low Risk     Difficulty of Paying Living Expenses: Not hard at all   Food Insecurity: No Food Insecurity    Worried About Running Out of Food in the Last Year: Never true    Ran Out of Food in the Last Year: Never true   Transportation Needs: No Transportation Needs    Lack of Transportation (Medical): No    Lack of Transportation (Non-Medical): No   Physical Activity: Inactive    Days of Exercise per Week: 0 days    Minutes of Exercise per Session: 0 min   Stress: No Stress Concern Present    Feeling of Stress : Only a little   Social Connections: Moderately Integrated    Frequency of Communication with Friends and Family: More than three times a week    Frequency of Social Gatherings with Friends and Family: More than three times a week    Attends Druze Services: More than 4 times per year    Active Member of Clubs or Organizations: No    Marital Status:    Housing Stability: Unknown    Unable to Pay for Housing in the Last Year: No    Unstable Housing in the Last Year: No       Current Outpatient Medications:     aspirin (ECOTRIN) 81 MG EC tablet, Take 81 mg by mouth once daily., Disp: , Rfl:     clobetasoL (TEMOVATE) 0.05 % external solution, Apply topically 2 (two) times daily., Disp: 100 mL, Rfl: 4    folic acid (FOLVITE) 1 MG tablet, Take 2 tablets (2,000 mcg total) by mouth once daily., Disp: 180 tablet, Rfl: 3    furosemide (LASIX) 40 MG tablet, TAKE ONE TABLET BY MOUTH EVERY DAY, Disp: 90 tablet, Rfl: 1    hydrocortisone 2.5 % cream, as  needed. , Disp: , Rfl: 0    irbesartan (AVAPRO) 300 MG tablet, Take 1 tablet (300 mg total) by mouth every evening., Disp: 90 tablet, Rfl: 3    methotrexate 2.5 MG Tab, Take by mouth every Monday 5 tabs in AM and 5 tabs in PM, Disp: 120 tablet, Rfl: 0    miSOPROStoL (CYTOTEC) 100 MCG Tab, Take 1 tablet (100 mcg total) by mouth 2 (two) times daily., Disp: 180 tablet, Rfl: 3    MOTEGRITY 2 mg Tab, Take 1 tablet by mouth., Disp: , Rfl:     nabumetone (RELAFEN) 500 MG tablet, TAKE ONE TABLET BY MOUTH TWICE A DAY WITH FOOD, Disp: 180 tablet, Rfl: 0    NORVASC 5 mg tablet, TAKE ONE TABLET BY MOUTH EVERY DAY, Disp: 90 tablet, Rfl: 3    nystatin-triamcinolone (MYCOLOG II) cream, Apply to affected area 2 times daily, Disp: 90 g, Rfl: 3    pantoprazole (PROTONIX) 40 MG tablet, Take 1 tablet (40 mg total) by mouth once daily., Disp: 90 tablet, Rfl: 1    PLENVU 140-9-5.2 gram PPkS, use as directed, Disp: , Rfl:     semaglutide (OZEMPIC) 0.25 mg or 0.5 mg(2 mg/1.5 mL) pen injector, Inject 0.5 mg into the skin every 7 days., Disp: 4 pen, Rfl: 0    triamcinolone acetonide 0.1% (KENALOG) 0.1 % cream, Apply topically 3 (three) times daily., Disp: 80 g, Rfl: 1    valACYclovir (VALTREX) 500 MG tablet, 1 Tablet DAILY for suppression, increase to BID for outbreak, Disp: 180 tablet, Rfl: 2    atorvastatin (LIPITOR) 40 MG tablet, Take 1 tablet (40 mg total) by mouth once daily., Disp: 90 tablet, Rfl: 3    azelastine (ASTELIN) 137 mcg (0.1 %) nasal spray, 1 spray (137 mcg total) by Nasal route 2 (two) times daily., Disp: 30 mL, Rfl: 1    famotidine (PEPCID) 40 MG tablet, Take 0.5 tablets (20 mg total) by mouth 2 (two) times daily as needed for Heartburn., Disp: 15 tablet, Rfl: 2    fluticasone (FLONASE) 50 mcg/actuation nasal spray, 1 spray (50 mcg total) by Each Nare route once daily. (Patient not taking: Reported on 3/1/2023), Disp: 16 g, Rfl: 5    gabapentin (NEURONTIN) 300 MG capsule, Take 2 capsules (600 mg total) by mouth 3 (three)  times daily., Disp: 180 capsule, Rfl: 11    linaCLOtide (LINZESS) 72 mcg Cap capsule, Take 1 capsule (72 mcg total) by mouth before breakfast. (Patient not taking: Reported on 3/1/2023), Disp: 90 capsule, Rfl: 1    UNABLE TO FIND, Viviscal for hair loss, Disp: , Rfl:   No current facility-administered medications for this visit.    Facility-Administered Medications Ordered in Other Visits:     cyclopentolate 1% ophthalmic solution 1 drop, 1 drop, Left Eye, On Call Procedure, Elvis Pete MD, 1 drop at 01/26/21 0914    ofloxacin 0.3 % ophthalmic solution 1 drop, 1 drop, Left Eye, On Call Procedure, Elvis Pete MD, 1 drop at 01/26/21 0914    sodium chloride 0.9% flush 10 mL, 10 mL, Intravenous, PRN, Elvis Pete MD    REVIEW OF SYSTEMS:  General: No fevers or chills; ENT: No sore throat; Allergy and Immunology: no persistent infections; Hematological and Lymphatic: No history of bleeding or easy bruising; Endocrine: negative; Respiratory: no cough, shortness of breath, or wheezing; Cardiovascular: no chest pain or dyspnea on exertion; no claudication, no rest pain; Gastrointestinal: no abdominal pain/back, change in bowel habits, or bloody stools; Genito-Urinary: no dysuria, trouble voiding, or hematuria; Musculoskeletal: negative, no wound; Neurological: no TIA or stroke symptoms; Psychiatric: no nervousness, anxiety or depression.    PHYSICAL EXAM:      Pulse: 85         General appearance:  Alert, well-appearing, and in no distress.  Oriented to person, place, and time                    Neurological: Normal speech, no focal findings noted; CN II - XII grossly intact. RLE with sensation to light touch, LLE with sensation to light touch.            Musculoskeletal: Digits/nail without cyanosis/clubbing.  Strength 5/5 BLE.                    Neck: Supple, no significant adenopathy, no carotid bruit can be auscultated                  Chest:  Clear to auscultation, no wheezes, rales or  rhonchi, symmetric air entry. No use of accessory muscles               Cardiac: Normal rate and regular rhythm, S1 and S2 normal            Abdomen: Soft, nontender, nondistended, no masses or organomegaly, no hernia     No rebound tenderness noted; bowel sounds normal     Pulsatile aortic mass is non palpable.     No groin adenopathy      Extremities:2+ R femoral pulse, 2+ L femoral pulse     2+ R popliteal pulse, 2+ L popliteal pulse     2+ R PT pulse, 2+ L PT pulse     2+ R DP pulse, 2+ L DP pulse     1+ RLE edema, 1+ LLE edema    Skin: RLE no tissue loss; LLE no tissue loss    LAB RESULTS:  No results found for: CBC  Lab Results   Component Value Date    LABPROT 10.2 01/23/2020    INR 1.0 01/23/2020     Lab Results   Component Value Date     01/30/2023    K 4.6 01/30/2023     01/30/2023    CO2 23 01/30/2023    GLU 95 01/30/2023    BUN 12 01/30/2023    CREATININE 0.8 01/30/2023    CALCIUM 10.2 01/30/2023    ANIONGAP 11 01/30/2023    EGFRNONAA >60.0 05/06/2022     Lab Results   Component Value Date    WBC 6.43 01/30/2023    RBC 3.45 (L) 01/30/2023    HGB 11.0 (L) 01/30/2023    HCT 34.2 (L) 01/30/2023    MCV 99 (H) 01/30/2023    MCH 31.9 (H) 01/30/2023    MCHC 32.2 01/30/2023    RDW 14.3 01/30/2023     01/30/2023    MPV 13.3 (H) 01/30/2023    GRAN 3.7 01/30/2023    GRAN 57.4 01/30/2023    LYMPH 1.3 01/30/2023    LYMPH 20.4 01/30/2023    MONO 1.0 01/30/2023    MONO 16.0 (H) 01/30/2023    EOS 0.3 01/30/2023    BASO 0.03 01/30/2023    EOSINOPHIL 5.1 01/30/2023    BASOPHIL 0.5 01/30/2023    DIFFMETHOD Automated 01/30/2023     .  Lab Results   Component Value Date    HGBA1C 5.3 11/16/2020       IMAGING:  All pertinent imaging has been reviewed and interpreted independently.    ONESIMO 8/13/21:  Normal rest and exercise ONESIMO bilaterally.  Normal PVR waveforms bilaterally.    8/2022  Right lower extremity pressures and waveforms indicate no hemodynamically significant arterial occlusive disease.  Left lower  extremity pressures and waveforms indicate no hemodynamically significant arterial occlusive disease.    12/2022  Right carotid ultrasound shows 0-19% internal carotid artery stenosis.  Antegrade vertebral artery flow.  Left carotid ultrasound shows 20-39% internal carotid artery stenosis.  Antegrade vertebral artery flow.    IMP/PLAN:  77 y.o. female with   Patient Active Problem List   Diagnosis    Rheumatoid arthritis    Multiple thyroid nodules    Anemia of other chronic disease    Migraine headache    HTN (hypertension)    GERD (gastroesophageal reflux disease)    Lumbar spondylosis    DDD (degenerative disc disease), lumbar    Spinal stenosis, lumbar region, with neurogenic claudication    Acquired spondylolisthesis    Genital herpes in women    Vitamin B 12 deficiency    Acquired scoliosis    Palpitations    Immunosuppression    Anserine bursitis    Class 2 obesity due to excess calories in adult    Lumbosacral radiculopathy    Abnormal CT scan, chest    Thymoma    Cerebral microvascular disease    Tortuous aorta    Refractive error    Severe obesity (BMI 35.0-39.9) with comorbidity    Cerebral aneurysm without rupture    Impacted cerumen of both ears    Light headedness    Decreased ROM of trunk and back    Decreased strength of trunk and back    Claudication of both lower extremities    Obesity (BMI 30-39.9)    PAD (peripheral artery disease)    Bilateral carotid artery stenosis    Mixed hyperlipidemia    Left foot pain    Gait instability    being managed by PCP and specialists who is here today for evaluation of PVD.    -imaging reviewed without hemodynamically significant stenosis bilateral lower extremity or evidence of significant stenosis on CT angiogram. - no vascular surgical intervention indicated at this time  -Cont back pain mgmt.  LLE pain and L foot pain - previously referred to Pain   -bilateral lower extremity pitting edema, asymptomatic-recommend compression with stockings, elevation,  dietary changes associated with water and sodium intake discussed at length with patient  -continue daily ASA  -Exercise  -Heart healthy lifestyle  -RTC 9 mo with carotid US - 12/2023    I spent 11 minutes evaluating this patient and greater than 50% of the time was spent counseling, coordinator care and discussing the plan of care.  All questions were answered and patient stated understanding with agreement with the above treatment plan.    Pedro Luis Sandhu MD [facility]  Vascular and Endovascular Surgery

## 2023-03-15 ENCOUNTER — HOSPITAL ENCOUNTER (OUTPATIENT)
Dept: RADIOLOGY | Facility: HOSPITAL | Age: 78
Discharge: HOME | End: 2023-03-15
Attending: NURSE PRACTITIONER
Payer: MEDICARE

## 2023-03-15 ENCOUNTER — OFFICE VISIT (OUTPATIENT)
Dept: FAMILY MEDICINE | Facility: CLINIC | Age: 78
End: 2023-03-15
Payer: MEDICARE

## 2023-03-15 VITALS
OXYGEN SATURATION: 99 % | DIASTOLIC BLOOD PRESSURE: 80 MMHG | HEIGHT: 62 IN | SYSTOLIC BLOOD PRESSURE: 128 MMHG | BODY MASS INDEX: 31.8 KG/M2 | HEART RATE: 69 BPM | TEMPERATURE: 98 F | WEIGHT: 172.81 LBS | RESPIRATION RATE: 16 BRPM

## 2023-03-15 DIAGNOSIS — I10 ESSENTIAL HYPERTENSION: ICD-10-CM

## 2023-03-15 DIAGNOSIS — M25.552 LEFT HIP PAIN: ICD-10-CM

## 2023-03-15 DIAGNOSIS — M05.79 RHEUMATOID ARTHRITIS INVOLVING MULTIPLE SITES WITH POSITIVE RHEUMATOID FACTOR: Primary | ICD-10-CM

## 2023-03-15 DIAGNOSIS — K21.9 GASTROESOPHAGEAL REFLUX DISEASE WITHOUT ESOPHAGITIS: ICD-10-CM

## 2023-03-15 PROBLEM — E66.01 SEVERE OBESITY (BMI 35.0-39.9) WITH COMORBIDITY: Status: RESOLVED | Noted: 2019-10-08 | Resolved: 2023-03-15

## 2023-03-15 PROBLEM — M25.69 DECREASED ROM OF TRUNK AND BACK: Status: RESOLVED | Noted: 2021-06-17 | Resolved: 2023-03-15

## 2023-03-15 PROBLEM — R29.898 DECREASED STRENGTH OF TRUNK AND BACK: Status: RESOLVED | Noted: 2021-06-17 | Resolved: 2023-03-15

## 2023-03-15 PROBLEM — R42 LIGHT HEADEDNESS: Status: RESOLVED | Noted: 2020-11-17 | Resolved: 2023-03-15

## 2023-03-15 PROCEDURE — 99999 PR PBB SHADOW E&M-EST. PATIENT-LVL IV: CPT | Mod: PBBFAC,,, | Performed by: NURSE PRACTITIONER

## 2023-03-15 PROCEDURE — 73501 X-RAY EXAM HIP UNI 1 VIEW: CPT | Mod: 26,LT,, | Performed by: RADIOLOGY

## 2023-03-15 PROCEDURE — 73501 X-RAY EXAM HIP UNI 1 VIEW: CPT | Mod: TC,FY,LT

## 2023-03-15 PROCEDURE — 99214 OFFICE O/P EST MOD 30 MIN: CPT | Mod: S$PBB,,, | Performed by: NURSE PRACTITIONER

## 2023-03-15 PROCEDURE — 73501 XR HIP 1 VIEW LEFT (WITH PELVIS WHEN PERFORMED): ICD-10-PCS | Mod: 26,LT,, | Performed by: RADIOLOGY

## 2023-03-15 PROCEDURE — 99214 OFFICE O/P EST MOD 30 MIN: CPT | Mod: PBBFAC,PO | Performed by: NURSE PRACTITIONER

## 2023-03-15 PROCEDURE — 99214 PR OFFICE/OUTPT VISIT, EST, LEVL IV, 30-39 MIN: ICD-10-PCS | Mod: S$PBB,,, | Performed by: NURSE PRACTITIONER

## 2023-03-15 PROCEDURE — 99999 PR PBB SHADOW E&M-EST. PATIENT-LVL IV: ICD-10-PCS | Mod: PBBFAC,,, | Performed by: NURSE PRACTITIONER

## 2023-03-15 RX ORDER — FUROSEMIDE 40 MG/1
40 TABLET ORAL DAILY
Qty: 90 TABLET | Refills: 3 | Status: SHIPPED | OUTPATIENT
Start: 2023-03-15

## 2023-03-15 RX ORDER — PANTOPRAZOLE SODIUM 40 MG/1
40 TABLET, DELAYED RELEASE ORAL DAILY
Qty: 90 TABLET | Refills: 3 | Status: SHIPPED | OUTPATIENT
Start: 2023-03-15

## 2023-03-15 RX ORDER — AMLODIPINE BESYLATE 5 MG/1
5 TABLET ORAL DAILY
Qty: 90 TABLET | Refills: 3 | Status: SHIPPED | OUTPATIENT
Start: 2023-03-15

## 2023-03-17 ENCOUNTER — TELEPHONE (OUTPATIENT)
Dept: FAMILY MEDICINE | Facility: CLINIC | Age: 78
End: 2023-03-17
Payer: MEDICARE

## 2023-03-17 NOTE — TELEPHONE ENCOUNTER
----- Message from Gloria Temple NP sent at 3/15/2023  1:37 PM CDT -----  Your xray shows arthritis, you can follow up with your rheumatologist or orthopedics if the pain worsens

## 2023-03-24 ENCOUNTER — TELEPHONE (OUTPATIENT)
Dept: ADMINISTRATIVE | Facility: CLINIC | Age: 78
End: 2023-03-24
Payer: MEDICARE

## 2023-03-24 NOTE — TELEPHONE ENCOUNTER
Called pt, pt's sister answered the phone and stated pt was not home; left message with pt's sister I was calling to remind pt of her upcoming in office appt

## 2023-03-27 ENCOUNTER — OFFICE VISIT (OUTPATIENT)
Dept: FAMILY MEDICINE | Facility: CLINIC | Age: 78
End: 2023-03-27
Payer: MEDICARE

## 2023-03-27 VITALS
RESPIRATION RATE: 17 BRPM | HEART RATE: 70 BPM | HEIGHT: 62 IN | WEIGHT: 172.38 LBS | SYSTOLIC BLOOD PRESSURE: 122 MMHG | OXYGEN SATURATION: 98 % | TEMPERATURE: 99 F | DIASTOLIC BLOOD PRESSURE: 72 MMHG | BODY MASS INDEX: 31.72 KG/M2

## 2023-03-27 DIAGNOSIS — Z00.00 ENCOUNTER FOR PREVENTIVE HEALTH EXAMINATION: Primary | ICD-10-CM

## 2023-03-27 DIAGNOSIS — D49.89 THYMOMA: ICD-10-CM

## 2023-03-27 DIAGNOSIS — R35.0 URINARY FREQUENCY: ICD-10-CM

## 2023-03-27 DIAGNOSIS — I77.1 TORTUOUS AORTA: ICD-10-CM

## 2023-03-27 DIAGNOSIS — M05.79 RHEUMATOID ARTHRITIS INVOLVING MULTIPLE SITES WITH POSITIVE RHEUMATOID FACTOR: ICD-10-CM

## 2023-03-27 DIAGNOSIS — D84.9 IMMUNOSUPPRESSION: ICD-10-CM

## 2023-03-27 DIAGNOSIS — Z78.0 POSTMENOPAUSAL: ICD-10-CM

## 2023-03-27 LAB
BILIRUB SERPL-MCNC: NORMAL MG/DL
BILIRUB UR QL STRIP: NEGATIVE
BLOOD URINE, POC: NORMAL
CLARITY UR REFRACT.AUTO: CLEAR
CLARITY, POC UA: NORMAL
COLOR UR AUTO: YELLOW
COLOR, POC UA: YELLOW
GLUCOSE UR QL STRIP: NEGATIVE
GLUCOSE UR QL STRIP: NORMAL
HGB UR QL STRIP: NEGATIVE
KETONES UR QL STRIP: NEGATIVE
KETONES UR QL STRIP: NORMAL
LEUKOCYTE ESTERASE UR QL STRIP: ABNORMAL
LEUKOCYTE ESTERASE URINE, POC: NORMAL
MICROSCOPIC COMMENT: NORMAL
NITRITE UR QL STRIP: NEGATIVE
NITRITE, POC UA: NORMAL
PH UR STRIP: 6 [PH] (ref 5–8)
PH, POC UA: 5
PROT UR QL STRIP: NEGATIVE
PROTEIN, POC: NORMAL
RBC #/AREA URNS AUTO: 2 /HPF (ref 0–4)
SP GR UR STRIP: 1.02 (ref 1–1.03)
SPECIFIC GRAVITY, POC UA: 1.02
URN SPEC COLLECT METH UR: ABNORMAL
UROBILINOGEN, POC UA: 4
WBC #/AREA URNS AUTO: 4 /HPF (ref 0–5)

## 2023-03-27 PROCEDURE — G0439 PR MEDICARE ANNUAL WELLNESS SUBSEQUENT VISIT: ICD-10-PCS | Mod: ,,, | Performed by: PHYSICIAN ASSISTANT

## 2023-03-27 PROCEDURE — 99214 OFFICE O/P EST MOD 30 MIN: CPT | Mod: PBBFAC,PO | Performed by: PHYSICIAN ASSISTANT

## 2023-03-27 PROCEDURE — G0439 PPPS, SUBSEQ VISIT: HCPCS | Mod: ,,, | Performed by: PHYSICIAN ASSISTANT

## 2023-03-27 PROCEDURE — 99999 PR PBB SHADOW E&M-EST. PATIENT-LVL IV: CPT | Mod: PBBFAC,,, | Performed by: PHYSICIAN ASSISTANT

## 2023-03-27 PROCEDURE — 81001 URINALYSIS AUTO W/SCOPE: CPT | Performed by: PHYSICIAN ASSISTANT

## 2023-03-27 PROCEDURE — 99999 PR PBB SHADOW E&M-EST. PATIENT-LVL IV: ICD-10-PCS | Mod: PBBFAC,,, | Performed by: PHYSICIAN ASSISTANT

## 2023-03-27 PROCEDURE — 81002 URINALYSIS NONAUTO W/O SCOPE: CPT | Mod: PBBFAC,PO | Performed by: PHYSICIAN ASSISTANT

## 2023-03-27 NOTE — PATIENT INSTRUCTIONS
Counseling and Referral of Other Preventative  (Italic type indicates deductible and co-insurance are waived)    Patient Name: Yulia Peralta  Today's Date: 3/27/2023    Health Maintenance       Date Due Completion Date    DEXA Scan 01/02/2023 1/2/2020    Aspirin/Antiplatelet Therapy 03/27/2024 3/27/2023    TETANUS VACCINE 11/09/2031 11/9/2021        Orders Placed This Encounter   Procedures    Ambulatory referral/consult to Cardiothoracic Surgery     The following information is provided to all patients.  This information is to help you find resources for any of the problems found today that may be affecting your health:                Living healthy guide: www.[facility].[place].[facility]      Understanding Diabetes: www.diabetes.org      Eating healthy: www.cdc.gov/healthyweight      [facility] home safety checklist: www.cdc.gov/steadi/patient.html      Agency on Aging: www.goea.[place].[facility]      Alcoholics anonymous (AA): www.aa.org      Physical Activity: www.travis.nih.gov/kp2atqp      Tobacco use: www.quitwithusla.org

## 2023-03-27 NOTE — PROGRESS NOTES
"  Yulia Peralta presented for a  Medicare AWV and comprehensive Health Risk Assessment today. The following components were reviewed and updated:    Medical history  Family History  Social history  Allergies and Current Medications  Health Risk Assessment  Health Maintenance  Care Team         ** See Completed Assessments for Annual Wellness Visit within the encounter summary.**         The following assessments were completed:  Living Situation  CAGE  Depression Screening  Timed Get Up and Go  Whisper Test  Cognitive Function Screening  Nutrition Screening  ADL Screening  PAQ Screening        Vitals:    03/27/23 0908   BP: 122/72   BP Location: Left arm   Patient Position: Sitting   BP Method: Medium (Manual)   Pulse: 70   Resp: 17   Temp: 98.8 °F (37.1 °C)   TempSrc: Oral   SpO2: 98%   Weight: 78.2 kg (172 lb 6.4 oz)   Height: 5' 2" (1.575 m)     Body mass index is 31.53 kg/m².  Physical Exam          Diagnoses and health risks identified today and associated recommendations/orders:    1. Encounter for preventive health examination  Provided Yulia with a 5-10 year written screening schedule and personal prevention plan. Recommendations were developed using the USPSTF age appropriate recommendations. Education, counseling, and referrals were provided as needed. After Visit Summary printed and given to patient which includes a list of additional screenings\tests needed.    2. Thymoma  - Ambulatory referral/consult to Cardiothoracic Surgery; Future    3. Postmenopausal  - DXA Bone Density Axial Skeleton 1 or more sites; Future    4. Urinary frequency  - Urinalysis, Reflex to Urine Culture Urine, Clean Catch  - POCT URINE DIPSTICK WITHOUT MICROSCOPE    5. Rheumatoid arthritis involving multiple sites with positive rheumatoid factor  Followed by rheumatology    6. Tortuous aorta  Continue meds    7. Immunosuppression  Stable followed by rheumatology    Review for Opioid Screening: Patient does not have rx for " Opioids.    Review for Substance Use Disorders: Patient does not use substance.        No follow-ups on file.    Maribel Cazares PA-C  I offered to discuss advanced care planning, including how to pick a person who would make decisions for you if you were unable to make them for yourself, called a health care power of , and what kind of decisions you might make such as use of life sustaining treatments such as ventilators and tube feeding when faced with a life limiting illness recorded on a living will that they will need to know. (How you want to be cared for as you near the end of your natural life)     X Patient is interested in learning more about how to make advanced directives.  I provided them paperwork and offered to discuss this with them.

## 2023-03-28 ENCOUNTER — TELEPHONE (OUTPATIENT)
Dept: HEMATOLOGY/ONCOLOGY | Facility: CLINIC | Age: 78
End: 2023-03-28
Payer: MEDICARE

## 2023-03-28 DIAGNOSIS — D49.89 THYMOMA: Primary | ICD-10-CM

## 2023-03-28 NOTE — NURSING
Patient notified of the scheduled CT chest prior to her appointment with Dr. Mayorga.  NPO instructions given.  Reminder mailed.

## 2023-04-04 NOTE — PROGRESS NOTES
History & Physical    SUBJECTIVE:     History of Present Illness:  Patient is a 77 y.o. female former smoker with DDD, lumbar spinal stenosis, acquired scoliosis, cerebral aneurysm, HTN, PAD, HLD, RA, GERD, and obesity who presents to clinic for evaluation of mediastinal mass. [place] [facility] [place] transitioned care to [facility]. Underwent percutaneous biopsy. 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 More recently it has become difficult for then to travel back and forth thus she is requesting follow-up here. Subsequently followed in surveillance clinic until October 2019 according to available imaging. MRI Chest w/ and w/o contrast 10/15/2019 revealed prominent lobes of the thymus remain stable in size since 2016. They are homogeneous in signal on T1 and T2 sequences. Spicules of fat are seen within it, an expected finding. No mediastinal, hilar or axillary adenopathy by CT size criteria. Today patient reports no complaints.     PSH: b/l knee replacement ([id number]), hysterectomy and oopherectomy, cerebral angiogram 01/20/20, cataract surgery b/l, tonsillectomy  Meds: aspirin 81 mg, lasix, gabapentin 600mg TID, KGCL - ketamine 10%, gabapentin 6%, cyclobenzabrine 2%,  lidocaine 4% cream, methotrexate, misoprostol, nabumetone, montegrity, ozempic, valacyclovir    Chief Complaint   Patient presents with    Consult       Review of patient's allergies indicates:   Allergen Reactions    No known drug allergies        Current Outpatient Medications   Medication Sig Dispense Refill    amLODIPine (NORVASC) 5 MG tablet Take 1 tablet (5 mg total) by mouth once daily. 90 tablet 3    aspirin (ECOTRIN) 81 MG EC tablet Take 81 mg by mouth once daily.      clobetasoL (TEMOVATE) 0.05 % external solution Apply topically 2 (two) times daily. 100 mL 4    famotidine (PEPCID) 40 MG tablet Take 0.5 tablets (20 mg total) by mouth 2 (two) times daily as needed for Heartburn. 15 tablet  2    fluticasone (FLONASE) 50 mcg/actuation nasal spray 1 spray (50 mcg total) by Each Nare route once daily. 16 g 5    folic acid (FOLVITE) 1 MG tablet Take 2 tablets (2,000 mcg total) by mouth once daily. 180 tablet 3    furosemide (LASIX) 40 MG tablet Take 1 tablet (40 mg total) by mouth once daily. 90 tablet 3    hydrocortisone 2.5 % cream as needed.   0    KGCL ketamine 10%- gabapentin 6%- cyclobenzaprine 2%- LIDOcaine 4% in transdermal cream APPLY 2 GRAMS 3-4 TIMES DAILY FOR TREATMENT OF PAIN      methotrexate 2.5 MG Tab Take by mouth every Monday 5 tabs in AM and 5 tabs in  tablet 0    miSOPROStoL (CYTOTEC) 100 MCG Tab Take 1 tablet (100 mcg total) by mouth 2 (two) times daily. 180 tablet 3    nabumetone (RELAFEN) 500 MG tablet TAKE ONE TABLET BY MOUTH TWICE A DAY WITH FOOD 180 tablet 0    nystatin-triamcinolone (MYCOLOG II) cream Apply to affected area 2 times daily 90 g 3    pantoprazole (PROTONIX) 40 MG tablet Take 1 tablet (40 mg total) by mouth once daily. 90 tablet 3    semaglutide (OZEMPIC) 0.25 mg or 0.5 mg(2 mg/1.5 mL) pen injector Inject 0.5 mg into the skin every 7 days. 4 pen 0    triamcinolone acetonide 0.1% (KENALOG) 0.1 % cream Apply topically 3 (three) times daily. 80 g 1    valACYclovir (VALTREX) 500 MG tablet 1 Tablet DAILY for suppression, increase to BID for outbreak 180 tablet 2    atorvastatin (LIPITOR) 40 MG tablet Take 1 tablet (40 mg total) by mouth once daily. 90 tablet 3    gabapentin (NEURONTIN) 300 MG capsule Take 2 capsules (600 mg total) by mouth 3 (three) times daily. 180 capsule 11    irbesartan (AVAPRO) 300 MG tablet Take 1 tablet (300 mg total) by mouth every evening. 90 tablet 3    MOTEGRITY 2 mg Tab Take 1 tablet by mouth.       No current facility-administered medications for this visit.     Facility-Administered Medications Ordered in Other Visits   Medication Dose Route Frequency Provider Last Rate Last Admin    cyclopentolate 1% ophthalmic solution 1 drop  1 drop  Left Eye On Call Procedure Elvis Pete MD   1 drop at 01/26/21 0914    ofloxacin 0.3 % ophthalmic solution 1 drop  1 drop Left Eye On Call Procedure Elvis Pete MD   1 drop at 01/26/21 0914    sodium chloride 0.9% flush 10 mL  10 mL Intravenous PRN Elvis Pete MD           Past Medical History:   Diagnosis Date    Abnormal colonoscopy 07/24/2020    colon polyps nad repeat in 3 years.     Acid reflux     Anemia     Anxiety     Arthritis     Blood transfusion     Cataract     Depression     Dry eyes     H/O colonoscopy 07/22/2020    dr. nicholas. repeat in 3 years.     Heart murmur     Hypertension     Left lumbar radiculitis 5/19/2015    Mixed hyperlipidemia 11/5/2021    Multiple thyroid nodules 12/18/2012    Osteoporosis     Rheumatoid arthritis     Thoracic or lumbosacral neuritis or radiculitis, unspecified 10/30/2013     Past Surgical History:   Procedure Laterality Date    CATARACT EXTRACTION W/  INTRAOCULAR LENS IMPLANT Left 1/26/2021    Procedure: EXTRACTION, CATARACT, WITH IOL INSERTION;  Surgeon: Elvis Pete MD;  Location: [facility];  Service: Ophthalmology;  Laterality: Left;    CATARACT EXTRACTION W/  INTRAOCULAR LENS IMPLANT Right 2/23/2021    Procedure: EXTRACTION, CATARACT, WITH IOL INSERTION;  Surgeon: Elvis Pete MD;  Location: [facility];  Service: Ophthalmology;  Laterality: Right;    CEREBRAL ANGIOGRAM N/A 1/23/2020    Procedure: ANGIOGRAM-CEREBRAL;  Surgeon: Hira Diagnostic Provider;  Location: [address];  Service: Radiology;  Laterality: N/A;  /[place]    gallstones  [id number]    HYSTERECTOMY      JOINT REPLACEMENT  [id number]     bilateral knee    OOPHORECTOMY      NH REMOVAL OF OVARY/TUBE(S)      TONSILLECTOMY       Family History   Problem Relation Age of Onset    Heart failure Mother     Cataracts Mother     Hypertension Mother     Colon cancer Mother     Colon cancer Father     Hypertension Sister     Liver cancer Sister      "Glaucoma Brother     Lung cancer Brother     Colon cancer Brother     No Known Problems Maternal Grandmother     No Known Problems Maternal Grandfather     No Known Problems Paternal Grandmother     No Known Problems Paternal Grandfather     No Known Problems Daughter     No Known Problems Son     Breast cancer Maternal Aunt     No Known Problems Maternal Uncle     No Known Problems Paternal Aunt     No Known Problems Paternal Uncle     No Known Problems Other     Lupus Neg Hx     Rheum arthritis Neg Hx     Psoriasis Neg Hx     Amblyopia Neg Hx     Blindness Neg Hx     Cancer Neg Hx     Diabetes Neg Hx     Macular degeneration Neg Hx     Retinal detachment Neg Hx     Strabismus Neg Hx     Stroke Neg Hx     Thyroid disease Neg Hx      Social History     Tobacco Use    Smoking status: Former     Packs/day: 0.25     Years: 1.00     Pack years: 0.25     Types: Cigarettes    Smokeless tobacco: Never   Substance Use Topics    Alcohol use: Yes     Alcohol/week: 2.0 standard drinks     Types: 1 Glasses of wine, 1 Cans of beer per week     Comment: very seldom    Drug use: No        Review of Systems:  Review of Systems   Constitutional: Negative.    HENT: Negative.     Eyes: Negative.         No visual disturbances such as diplopia   Respiratory: Negative.     All other systems reviewed and are negative.    OBJECTIVE:     Vital Signs (Most Recent)  Pulse: 67 (04/05/23 0936)  BP: (!) 153/83 (04/05/23 0936)  SpO2: 96 % (04/05/23 0936)  5' 2" (1.575 m)  78.2 kg (172 lb 6.4 oz)     Physical Exam:  Physical Exam  Constitutional:       Appearance: Normal appearance.   Eyes:      Extraocular Movements: Extraocular movements intact.      Conjunctiva/sclera: Conjunctivae normal.      Pupils: Pupils are equal, round, and reactive to light.   Cardiovascular:      Rate and Rhythm: Normal rate and regular rhythm.      Pulses: Normal pulses.      Heart sounds: Normal heart sounds.   Pulmonary:      Effort: Pulmonary effort is normal.     "  Breath sounds: Normal breath sounds.   Abdominal:      Palpations: Abdomen is soft.   Musculoskeletal:         General: Normal range of motion.   Skin:     Capillary Refill: Capillary refill takes less than 2 seconds.   Neurological:      General: No focal deficit present.      Mental Status: She is alert.      Gait: Gait abnormal.      Comments: Slow gait with slight limp       Diagnostic Results:    MRI CHEST W WO CONTRAST - 10/15/2019:  Clinical History: Thymoma. Mediastinal biopsy in November 2015 revealed lymphoepithelial cellular proliferation with the differential including thymic hyperplasia and thymoma. 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   Indication: Thyroid nodule status post biopsy   Comparison: MR chest 10/16/2018 and 10/18/2016; PET CT 10/29/2015   Findings: Prominent lobes of the thymus remain stable in size since 2016. They are homogeneous in signal on T1 and T2 sequences. Spicules of fat are seen within it, an expected finding. No mediastinal, hilar or axillary adenopathy by CT size criteria. No pleural effusion.     Chest CT 4/5/2023:  I reviewed the images  Prominent thymic tissue unchanged from 2015 chest CT exam      ASSESSMENT/PLAN:     Patient is a 77 y.o. female former smoker with DDD, lumbar spinal stenosis, acquired scoliosis, cerebral aneurysm w/o rupture, HTN, tortuous aorta, PAD, b/l carotid artery stenosis, HLD, RA, GERD, and obesity who presents to clinic for evaluation of mediastinal mass  Stable anterior mediastinal mass and prior biopsy c/w thymic hyperplasia    PLAN:Plan     RTC in 1 year with chest CT without contrast.

## 2023-04-05 ENCOUNTER — OFFICE VISIT (OUTPATIENT)
Dept: CARDIOTHORACIC SURGERY | Facility: CLINIC | Age: 78
End: 2023-04-05
Payer: MEDICARE

## 2023-04-05 ENCOUNTER — HOSPITAL ENCOUNTER (OUTPATIENT)
Dept: RADIOLOGY | Facility: HOSPITAL | Age: 78
Discharge: HOME | End: 2023-04-05
Attending: PHYSICIAN ASSISTANT
Payer: MEDICARE

## 2023-04-05 VITALS
HEIGHT: 62 IN | DIASTOLIC BLOOD PRESSURE: 83 MMHG | HEART RATE: 67 BPM | SYSTOLIC BLOOD PRESSURE: 153 MMHG | WEIGHT: 172.38 LBS | OXYGEN SATURATION: 96 % | BODY MASS INDEX: 31.72 KG/M2

## 2023-04-05 DIAGNOSIS — D49.89 THYMOMA: ICD-10-CM

## 2023-04-05 DIAGNOSIS — J98.59 MEDIASTINAL MASS: ICD-10-CM

## 2023-04-05 DIAGNOSIS — D49.89 THYMOMA: Primary | ICD-10-CM

## 2023-04-05 PROCEDURE — 99213 PR OFFICE/OUTPT VISIT, EST, LEVL III, 20-29 MIN: ICD-10-PCS | Mod: S$PBB,,, | Performed by: THORACIC SURGERY (CARDIOTHORACIC VASCULAR SURGERY)

## 2023-04-05 PROCEDURE — 71260 CT THORAX DX C+: CPT | Mod: TC

## 2023-04-05 PROCEDURE — 71260 CT THORAX DX C+: CPT | Mod: 26,,, | Performed by: RADIOLOGY

## 2023-04-05 PROCEDURE — 71260 CT CHEST WITH CONTRAST: ICD-10-PCS | Mod: 26,,, | Performed by: RADIOLOGY

## 2023-04-05 PROCEDURE — 25500020 PHARM REV CODE 255: Performed by: PHYSICIAN ASSISTANT

## 2023-04-05 PROCEDURE — 99214 OFFICE O/P EST MOD 30 MIN: CPT | Mod: PBBFAC,25 | Performed by: THORACIC SURGERY (CARDIOTHORACIC VASCULAR SURGERY)

## 2023-04-05 PROCEDURE — 99999 PR PBB SHADOW E&M-EST. PATIENT-LVL IV: CPT | Mod: PBBFAC,,, | Performed by: THORACIC SURGERY (CARDIOTHORACIC VASCULAR SURGERY)

## 2023-04-05 PROCEDURE — 99213 OFFICE O/P EST LOW 20 MIN: CPT | Mod: S$PBB,,, | Performed by: THORACIC SURGERY (CARDIOTHORACIC VASCULAR SURGERY)

## 2023-04-05 PROCEDURE — 99999 PR PBB SHADOW E&M-EST. PATIENT-LVL IV: ICD-10-PCS | Mod: PBBFAC,,, | Performed by: THORACIC SURGERY (CARDIOTHORACIC VASCULAR SURGERY)

## 2023-04-05 RX ADMIN — IOHEXOL 75 ML: 350 INJECTION, SOLUTION INTRAVENOUS at 09:04

## 2023-04-11 ENCOUNTER — TELEPHONE (OUTPATIENT)
Dept: DERMATOLOGY | Facility: CLINIC | Age: 78
End: 2023-04-11
Payer: MEDICARE

## 2023-04-12 ENCOUNTER — TELEPHONE (OUTPATIENT)
Dept: FAMILY MEDICINE | Facility: CLINIC | Age: 78
End: 2023-04-12
Payer: MEDICARE

## 2023-04-12 NOTE — PROGRESS NOTES
"  Subjective:      Patient ID: Yulia Peralta is a 77 y.o. female.  New to me but seen previously in clinic by a fellow provider. Pt with hx of RA presents to clinic with worsening left groin pain without acute RA flare. Pain worse with movement and improved with rest. Denies new trauma, injury or fall. Denies worsening chronic back pain, paresis nor paresthesia and no new bowel or bladder complaints.     Review of Systems   Constitutional:  Positive for fatigue. Negative for activity change, appetite change, fever and unexpected weight change.   Respiratory:  Negative for chest tightness and shortness of breath.    Cardiovascular:  Negative for chest pain, palpitations and leg swelling.   Gastrointestinal:  Positive for reflux. Negative for abdominal pain, change in bowel habit, constipation, diarrhea, nausea, vomiting and change in bowel habit.   Genitourinary:  Negative for difficulty urinating and dysuria.   Musculoskeletal:  Positive for arthralgias, back pain, gait problem, leg pain and myalgias. Negative for joint swelling, neck pain, neck stiffness and joint deformity.   Integumentary:  Negative for color change and rash.   Neurological:  Negative for headaches.   Psychiatric/Behavioral: Negative.     All other systems reviewed and are negative.      Objective:     Vitals:    03/15/23 1007   BP: 128/80   Pulse: 69   Resp: 16   Temp: 98.2 °F (36.8 °C)   TempSrc: Oral   SpO2: 99%   Weight: 78.4 kg (172 lb 13.5 oz)   Height: 5' 2" (1.575 m)     Physical Exam  Vitals and nursing note reviewed.   Constitutional:       General: She is not in acute distress.     Appearance: Normal appearance. She is well-developed, well-groomed and overweight. She is not ill-appearing.   HENT:      Head: Normocephalic and atraumatic.      Right Ear: External ear normal.      Left Ear: External ear normal.      Nose: Nose normal.      Mouth/Throat:      Lips: Pink.      Mouth: Mucous membranes are moist.   Eyes:      General: " Lids are normal. Vision grossly intact. Gaze aligned appropriately.      Conjunctiva/sclera: Conjunctivae normal.      Pupils: Pupils are equal, round, and reactive to light.   Neck:      Trachea: Phonation normal.   Cardiovascular:      Rate and Rhythm: Normal rate and regular rhythm.      Heart sounds: Normal heart sounds.   Pulmonary:      Effort: Pulmonary effort is normal. No accessory muscle usage or respiratory distress.      Breath sounds: Normal breath sounds and air entry.   Abdominal:      General: Abdomen is flat. Bowel sounds are normal. There is no distension.      Palpations: Abdomen is soft.      Tenderness: There is no abdominal tenderness.   Musculoskeletal:      Cervical back: Neck supple.      Right hip: Crepitus present. No tenderness. Normal range of motion. Normal strength.      Left hip: Tenderness and crepitus present. Decreased range of motion. Normal strength.      Right upper leg: Normal.      Left upper leg: Normal.      Right lower leg: Normal. No edema.      Left lower leg: Normal. No edema.        Legs:    Skin:     General: Skin is warm and dry.      Findings: No rash.   Neurological:      General: No focal deficit present.      Mental Status: She is alert and oriented to person, place, and time. Mental status is at baseline.      Sensory: Sensation is intact.      Motor: Motor function is intact.      Coordination: Coordination is intact.      Gait: Gait is intact.   Psychiatric:         Attention and Perception: Attention and perception normal.         Mood and Affect: Mood and affect normal.         Speech: Speech normal.         Behavior: Behavior normal. Behavior is cooperative.         Thought Content: Thought content normal.         Cognition and Memory: Cognition and memory normal.         Judgment: Judgment normal.     Assessment and Plan:     1. Rheumatoid arthritis involving multiple sites with positive rheumatoid factor  Discussion and guidance: Discussed importance of  taking medication as directed, Discussed pros and cons and risks of joint injections, Discussed pros and cons and risks of methotrexate,  Recommended rest for painful and swollen joints, Recommended regular exercise program once pain and swelling is decreased  Follow-up prn.  Call if worsening or not improving.  The patient does not have a documented glucocorticoid management plan within the past 12 months.    2. Left hip pain  Natural history and expected course discussed. Questions answered.  Home exercises discussed.  OTC analgesics as needed.  X-rays per orders.  F/u with rheum and ortho as scheduled  - X-Ray Hip 2 or 3 views Left (with Pelvis when performed); Future    3. Gastroesophageal reflux disease without esophagitis  The pathophysiology of reflux is discussed.  Anti-reflux measures such as raising the head of the bed, avoiding tight clothing or belts, avoiding eating late at night and not lying down shortly after mealtime and achieving weight loss are discussed. Avoid ASA, NSAID's, caffeine, peppermints, alcohol and tobacco. OTC H2 blockers and/or antacids are often very helpful for PRN use. However, for persisting chronic or daily symptoms, prescription strength H2 blockers or a trial of PPI's are often used. Further recommendations to her: continue PPI daily. She should alert me if there are persistent symptoms, dysphagia, weight loss or GI bleeding.  - pantoprazole (PROTONIX) 40 MG tablet; Take 1 tablet (40 mg total) by mouth once daily.  Dispense: 90 tablet; Refill: 3    4. Essential hypertension  Continue current treatment regimen.  Dietary sodium restriction.  Regular aerobic exercise.  Weight loss.  Follow up in a few weeks.  Patient Education: Reviewed risks of hypertension and principles of treatment.  - amLODIPine (NORVASC) 5 MG tablet; Take 1 tablet (5 mg total) by mouth once daily.  Dispense: 90 tablet; Refill: 3  - furosemide (LASIX) 40 MG tablet; Take 1 tablet (40 mg total) by mouth once  daily.  Dispense: 90 tablet; Refill: 3           KAYLEN Chirinos, FNP-C  Family/Internal Medicine  [facility]

## 2023-04-17 ENCOUNTER — CLINICAL SUPPORT (OUTPATIENT)
Dept: FAMILY MEDICINE | Facility: CLINIC | Age: 78
End: 2023-04-17
Payer: MEDICARE

## 2023-04-17 VITALS — DIASTOLIC BLOOD PRESSURE: 78 MMHG | SYSTOLIC BLOOD PRESSURE: 164 MMHG

## 2023-04-17 DIAGNOSIS — I10 BENIGN ESSENTIAL HTN: Primary | ICD-10-CM

## 2023-04-17 NOTE — PROGRESS NOTES
Yulia Peralta 77 y.o. female is here today for Blood Pressure check.   History of HTN yes.    Review of patient's allergies indicates:   Allergen Reactions    No known drug allergies      Creatinine   Date Value Ref Range Status   01/30/2023 0.8 0.5 - 1.4 mg/dL Final     Sodium   Date Value Ref Range Status   01/30/2023 139 136 - 145 mmol/L Final     Potassium   Date Value Ref Range Status   01/30/2023 4.6 3.5 - 5.1 mmol/L Final   ]  Patient denies taking blood pressure medications on a regular basis at the same time of the day.     Current Outpatient Medications:     amLODIPine (NORVASC) 5 MG tablet, Take 1 tablet (5 mg total) by mouth once daily., Disp: 90 tablet, Rfl: 3    aspirin (ECOTRIN) 81 MG EC tablet, Take 81 mg by mouth once daily., Disp: , Rfl:     atorvastatin (LIPITOR) 40 MG tablet, Take 1 tablet (40 mg total) by mouth once daily., Disp: 90 tablet, Rfl: 3    clobetasoL (TEMOVATE) 0.05 % external solution, Apply topically 2 (two) times daily., Disp: 100 mL, Rfl: 4    famotidine (PEPCID) 40 MG tablet, Take 0.5 tablets (20 mg total) by mouth 2 (two) times daily as needed for Heartburn., Disp: 15 tablet, Rfl: 2    fluticasone (FLONASE) 50 mcg/actuation nasal spray, 1 spray (50 mcg total) by Each Nare route once daily., Disp: 16 g, Rfl: 5    folic acid (FOLVITE) 1 MG tablet, Take 2 tablets (2,000 mcg total) by mouth once daily., Disp: 180 tablet, Rfl: 3    furosemide (LASIX) 40 MG tablet, Take 1 tablet (40 mg total) by mouth once daily., Disp: 90 tablet, Rfl: 3    gabapentin (NEURONTIN) 300 MG capsule, Take 2 capsules (600 mg total) by mouth 3 (three) times daily., Disp: 180 capsule, Rfl: 11    hydrocortisone 2.5 % cream, as needed. , Disp: , Rfl: 0    irbesartan (AVAPRO) 300 MG tablet, Take 1 tablet (300 mg total) by mouth every evening., Disp: 90 tablet, Rfl: 3    KGCL ketamine 10%- gabapentin 6%- cyclobenzaprine 2%- LIDOcaine 4% in transdermal cream, APPLY 2 GRAMS 3-4 TIMES DAILY FOR TREATMENT OF  PAIN, Disp: , Rfl:     methotrexate 2.5 MG Tab, TAKE FIVE TABLETS BY MOUTH TWICE A DAY ON EVERY MONDAY IN THE MORNING AND IN THE EVENING, Disp: 120 tablet, Rfl: 0    miSOPROStoL (CYTOTEC) 100 MCG Tab, Take 1 tablet (100 mcg total) by mouth 2 (two) times daily., Disp: 180 tablet, Rfl: 3    MOTEGRITY 2 mg Tab, Take 1 tablet by mouth., Disp: , Rfl:     nabumetone (RELAFEN) 500 MG tablet, TAKE ONE TABLET BY MOUTH TWICE A DAY WITH FOOD, Disp: 180 tablet, Rfl: 0    nystatin-triamcinolone (MYCOLOG II) cream, Apply to affected area 2 times daily, Disp: 90 g, Rfl: 3    pantoprazole (PROTONIX) 40 MG tablet, Take 1 tablet (40 mg total) by mouth once daily., Disp: 90 tablet, Rfl: 3    semaglutide (OZEMPIC) 0.25 mg or 0.5 mg(2 mg/1.5 mL) pen injector, Inject 0.5 mg into the skin every 7 days., Disp: 4 pen, Rfl: 0    triamcinolone acetonide 0.1% (KENALOG) 0.1 % cream, Apply topically 3 (three) times daily., Disp: 80 g, Rfl: 1    valACYclovir (VALTREX) 500 MG tablet, 1 Tablet DAILY for suppression, increase to BID for outbreak, Disp: 180 tablet, Rfl: 2  No current facility-administered medications for this visit.    Facility-Administered Medications Ordered in Other Visits:     cyclopentolate 1% ophthalmic solution 1 drop, 1 drop, Left Eye, On Call Procedure, Elvis Pete MD, 1 drop at 01/26/21 0914    ofloxacin 0.3 % ophthalmic solution 1 drop, 1 drop, Left Eye, On Call Procedure, Elvis Pete MD, 1 drop at 01/26/21 0914    sodium chloride 0.9% flush 10 mL, 10 mL, Intravenous, PRN, Elvis Pete MD  Does patient have record of home blood pressure readings no.   Last dose of blood pressure medication was taken at 10pm on 4-.   Patient is asymptomatic.   BP- 170/80      ,   .    Blood pressure reading after 15 minutes was 164/78, Pulse 70.  Dr. Martin notified.

## 2023-04-18 ENCOUNTER — OFFICE VISIT (OUTPATIENT)
Dept: DERMATOLOGY | Facility: CLINIC | Age: 78
End: 2023-04-18
Payer: MEDICARE

## 2023-04-18 ENCOUNTER — LAB VISIT (OUTPATIENT)
Dept: LAB | Facility: HOSPITAL | Age: 78
End: 2023-04-18
Attending: STUDENT IN AN ORGANIZED HEALTH CARE EDUCATION/TRAINING PROGRAM
Payer: MEDICARE

## 2023-04-18 DIAGNOSIS — L65.9 HAIR LOSS: ICD-10-CM

## 2023-04-18 DIAGNOSIS — L65.9 HAIR LOSS: Primary | ICD-10-CM

## 2023-04-18 LAB — TSH SERPL DL<=0.005 MIU/L-ACNC: 1.09 UIU/ML (ref 0.4–4)

## 2023-04-18 PROCEDURE — 99213 PR OFFICE/OUTPT VISIT, EST, LEVL III, 20-29 MIN: ICD-10-PCS | Mod: S$PBB,,, | Performed by: STUDENT IN AN ORGANIZED HEALTH CARE EDUCATION/TRAINING PROGRAM

## 2023-04-18 PROCEDURE — 36415 COLL VENOUS BLD VENIPUNCTURE: CPT | Performed by: STUDENT IN AN ORGANIZED HEALTH CARE EDUCATION/TRAINING PROGRAM

## 2023-04-18 PROCEDURE — 99213 OFFICE O/P EST LOW 20 MIN: CPT | Mod: PBBFAC | Performed by: STUDENT IN AN ORGANIZED HEALTH CARE EDUCATION/TRAINING PROGRAM

## 2023-04-18 PROCEDURE — 99213 OFFICE O/P EST LOW 20 MIN: CPT | Mod: S$PBB,,, | Performed by: STUDENT IN AN ORGANIZED HEALTH CARE EDUCATION/TRAINING PROGRAM

## 2023-04-18 PROCEDURE — 99999 PR PBB SHADOW E&M-EST. PATIENT-LVL III: CPT | Mod: PBBFAC,,, | Performed by: STUDENT IN AN ORGANIZED HEALTH CARE EDUCATION/TRAINING PROGRAM

## 2023-04-18 PROCEDURE — 99999 PR PBB SHADOW E&M-EST. PATIENT-LVL III: ICD-10-PCS | Mod: PBBFAC,,, | Performed by: STUDENT IN AN ORGANIZED HEALTH CARE EDUCATION/TRAINING PROGRAM

## 2023-04-18 PROCEDURE — 84443 ASSAY THYROID STIM HORMONE: CPT | Performed by: STUDENT IN AN ORGANIZED HEALTH CARE EDUCATION/TRAINING PROGRAM

## 2023-04-18 NOTE — PROGRESS NOTES
Subjective:       Patient ID:  Yulia Peralta is a 77 y.o. female who presents for   Chief Complaint   Patient presents with    Follow-up     History of Present Illness: The patient presents for follow up of hair loss/thinning, last seen on 1/13/23 where she was started on clobetasol solution. Patient is also still on MTX for rheumatologic condition. Patient reports that symptoms are stable. Has noticed less hair loss/falling out, but has not yet noticed any new additional hair regrowth. Would like to discuss any other treatment options.       Follow-up      Review of Systems   Constitutional:  Negative for fever and chills.   Skin:  Negative for itching, rash and dry skin.      Objective:    Physical Exam   Constitutional: She appears well-developed and well-nourished. No distress.   Neurological: She is alert and oriented to person, place, and time. She is not disoriented.   Psychiatric: She has a normal mood and affect.   Skin:   Areas Examined (abnormalities noted in diagram):   Scalp / Hair Palpated and Inspected  Head / Face Inspection Performed  Neck Inspection Performed  RUE Inspected  LUE Inspection Performed  Nails and Digits Inspection Performed            Diagram Legend     Erythematous scaling macule/papule c/w actinic keratosis       Vascular papule c/w angioma      Pigmented verrucoid papule/plaque c/w seborrheic keratosis      Yellow umbilicated papule c/w sebaceous hyperplasia      Irregularly shaped tan macule c/w lentigo     1-2 mm smooth white papules consistent with Milia      Movable subcutaneous cyst with punctum c/w epidermal inclusion cyst      Subcutaneous movable cyst c/w pilar cyst      Firm pink to brown papule c/w dermatofibroma      Pedunculated fleshy papule(s) c/w skin tag(s)      Evenly pigmented macule c/w junctional nevus     Mildly variegated pigmented, slightly irregular-bordered macule c/w mildly atypical nevus      Flesh colored to evenly pigmented papule c/w intradermal  nevus       Pink pearly papule/plaque c/w basal cell carcinoma      Erythematous hyperkeratotic cursted plaque c/w SCC      Surgical scar with no sign of skin cancer recurrence      Open and closed comedones      Inflammatory papules and pustules      Verrucoid papule consistent consistent with wart     Erythematous eczematous patches and plaques     Dystrophic onycholytic nail with subungual debris c/w onychomycosis     Umbilicated papule    Erythematous-base heme-crusted tan verrucoid plaque consistent with inflamed seborrheic keratosis     Erythematous Silvery Scaling Plaque c/w Psoriasis     See annotation      Assessment / Plan:        Hair loss - discussed additional treatment options, including oral medications. Recommend biotin supplement, along with continuing topical clobetasol. Labs to check thyroid function. Can consider ILK injections in the future.   -     TSH; Future; Expected date: 04/18/2023         Follow up in about 4 months (around 8/18/2023).

## 2023-04-24 ENCOUNTER — LAB VISIT (OUTPATIENT)
Dept: LAB | Facility: HOSPITAL | Age: 78
End: 2023-04-24
Attending: INTERNAL MEDICINE
Payer: MEDICARE

## 2023-04-24 DIAGNOSIS — M05.79 RHEUMATOID ARTHRITIS INVOLVING MULTIPLE SITES WITH POSITIVE RHEUMATOID FACTOR: ICD-10-CM

## 2023-04-24 DIAGNOSIS — D84.9 IMMUNOSUPPRESSION: ICD-10-CM

## 2023-04-24 LAB
ALBUMIN SERPL BCP-MCNC: 3.9 G/DL (ref 3.5–5.2)
ALP SERPL-CCNC: 97 U/L (ref 55–135)
ALT SERPL W/O P-5'-P-CCNC: 14 U/L (ref 10–44)
ANION GAP SERPL CALC-SCNC: 6 MMOL/L (ref 8–16)
AST SERPL-CCNC: 17 U/L (ref 10–40)
BASOPHILS # BLD AUTO: 0.02 K/UL (ref 0–0.2)
BASOPHILS NFR BLD: 0.4 % (ref 0–1.9)
BILIRUB SERPL-MCNC: 0.5 MG/DL (ref 0.1–1)
BUN SERPL-MCNC: 9 MG/DL (ref 8–23)
CALCIUM SERPL-MCNC: 10.2 MG/DL (ref 8.7–10.5)
CHLORIDE SERPL-SCNC: 107 MMOL/L (ref 95–110)
CO2 SERPL-SCNC: 28 MMOL/L (ref 23–29)
CREAT SERPL-MCNC: 0.7 MG/DL (ref 0.5–1.4)
CRP SERPL-MCNC: 0.7 MG/L (ref 0–8.2)
DIFFERENTIAL METHOD: ABNORMAL
EOSINOPHIL # BLD AUTO: 0.3 K/UL (ref 0–0.5)
EOSINOPHIL NFR BLD: 6 % (ref 0–8)
ERYTHROCYTE [DISTWIDTH] IN BLOOD BY AUTOMATED COUNT: 14.1 % (ref 11.5–14.5)
ERYTHROCYTE [SEDIMENTATION RATE] IN BLOOD BY PHOTOMETRIC METHOD: 38 MM/HR (ref 0–36)
EST. GFR  (NO RACE VARIABLE): >60 ML/MIN/1.73 M^2
GLUCOSE SERPL-MCNC: 86 MG/DL (ref 70–110)
HCT VFR BLD AUTO: 35.1 % (ref 37–48.5)
HGB BLD-MCNC: 11.2 G/DL (ref 12–16)
IMM GRANULOCYTES # BLD AUTO: 0.01 K/UL (ref 0–0.04)
IMM GRANULOCYTES NFR BLD AUTO: 0.2 % (ref 0–0.5)
LYMPHOCYTES # BLD AUTO: 1.2 K/UL (ref 1–4.8)
LYMPHOCYTES NFR BLD: 23.5 % (ref 18–48)
MCH RBC QN AUTO: 32 PG (ref 27–31)
MCHC RBC AUTO-ENTMCNC: 31.9 G/DL (ref 32–36)
MCV RBC AUTO: 100 FL (ref 82–98)
MONOCYTES # BLD AUTO: 1 K/UL (ref 0.3–1)
MONOCYTES NFR BLD: 18.7 % (ref 4–15)
NEUTROPHILS # BLD AUTO: 2.6 K/UL (ref 1.8–7.7)
NEUTROPHILS NFR BLD: 51.2 % (ref 38–73)
NRBC BLD-RTO: 0 /100 WBC
PLATELET # BLD AUTO: 175 K/UL (ref 150–450)
PMV BLD AUTO: 12.9 FL (ref 9.2–12.9)
POTASSIUM SERPL-SCNC: 4.8 MMOL/L (ref 3.5–5.1)
PROT SERPL-MCNC: 7.5 G/DL (ref 6–8.4)
RBC # BLD AUTO: 3.5 M/UL (ref 4–5.4)
SODIUM SERPL-SCNC: 141 MMOL/L (ref 136–145)
WBC # BLD AUTO: 5.14 K/UL (ref 3.9–12.7)

## 2023-04-24 PROCEDURE — 85025 COMPLETE CBC W/AUTO DIFF WBC: CPT | Performed by: INTERNAL MEDICINE

## 2023-04-24 PROCEDURE — 80053 COMPREHEN METABOLIC PANEL: CPT | Performed by: INTERNAL MEDICINE

## 2023-04-24 PROCEDURE — 86140 C-REACTIVE PROTEIN: CPT | Performed by: INTERNAL MEDICINE

## 2023-04-24 PROCEDURE — 36415 COLL VENOUS BLD VENIPUNCTURE: CPT | Mod: PO | Performed by: INTERNAL MEDICINE

## 2023-04-24 PROCEDURE — 85652 RBC SED RATE AUTOMATED: CPT | Performed by: INTERNAL MEDICINE

## 2023-04-25 ENCOUNTER — TELEPHONE (OUTPATIENT)
Dept: RHEUMATOLOGY | Facility: CLINIC | Age: 78
End: 2023-04-25
Payer: MEDICARE

## 2023-04-25 NOTE — TELEPHONE ENCOUNTER
Results show a mild elevation in sed rate but when corrected for age falls within the normal limit.  Will follow.  Will discuss labs at the next visit.

## 2023-05-02 ENCOUNTER — TELEPHONE (OUTPATIENT)
Dept: DERMATOLOGY | Facility: CLINIC | Age: 78
End: 2023-05-02
Payer: MEDICARE

## 2023-05-02 NOTE — TELEPHONE ENCOUNTER
Called and spoke to pt. Patient informed of results and verbalized understanding.   ----- Message from Ramone Pearce MD sent at 4/21/2023  6:50 AM CDT -----  Please inform the patient that her thyroid levels are normal.

## 2023-05-17 DIAGNOSIS — M05.79 RHEUMATOID ARTHRITIS INVOLVING MULTIPLE SITES WITH POSITIVE RHEUMATOID FACTOR: ICD-10-CM

## 2023-05-17 DIAGNOSIS — M51.36 DDD (DEGENERATIVE DISC DISEASE), LUMBAR: ICD-10-CM

## 2023-05-17 DIAGNOSIS — K21.9 GASTROESOPHAGEAL REFLUX DISEASE: ICD-10-CM

## 2023-05-17 DIAGNOSIS — D84.9 IMMUNOSUPPRESSION: ICD-10-CM

## 2023-05-17 DIAGNOSIS — M10.9 GOUT, ARTHRITIS: ICD-10-CM

## 2023-05-17 DIAGNOSIS — I10 ESSENTIAL HYPERTENSION: ICD-10-CM

## 2023-05-17 DIAGNOSIS — Z79.1 NSAID LONG-TERM USE: ICD-10-CM

## 2023-05-17 NOTE — TELEPHONE ENCOUNTER
Care Due:                  Date            Visit Type   Department     Provider  --------------------------------------------------------------------------------                                [facility]                              PRIMARY      MEDICINE/INTERN  Last Visit: 10-      CARE ([facility])   AL MED         Ileana Mix  Next Visit: None Scheduled  None         None Found                                                            Last  Test          Frequency    Reason                     Performed    Due Date  --------------------------------------------------------------------------------    HBA1C.......  6 months...  semaglutide..............  Not Found    Overdue    [employer] Embedded Care Due Messages. Reference number: [id number].   5/17/2023 3:59:31 PM CDT

## 2023-05-17 NOTE — TELEPHONE ENCOUNTER
Refill Routing Note   Medication(s) are not appropriate for processing by [facility] for the following reason(s):      Required vitals abnormal    [facility] action(s):  Approve Labs due            Appointments  past 12m or future 3m with PCP    Date Provider   Last Visit   10/14/2022 Ileana Martin MD   Next Visit   Visit date not found Ileana Martin MD   ED visits in past 90 days: 0        Note composed:4:07 PM 05/17/2023

## 2023-05-18 RX ORDER — NABUMETONE 500 MG/1
TABLET, FILM COATED ORAL
Qty: 180 TABLET | Refills: 0 | Status: SHIPPED | OUTPATIENT
Start: 2023-05-18 | End: 2023-07-11

## 2023-05-19 RX ORDER — IRBESARTAN 300 MG/1
TABLET ORAL
Qty: 90 TABLET | Refills: 0 | Status: SHIPPED | OUTPATIENT
Start: 2023-05-19 | End: 2023-07-11

## 2023-05-22 ENCOUNTER — TELEPHONE (OUTPATIENT)
Dept: FAMILY MEDICINE | Facility: CLINIC | Age: 78
End: 2023-05-22
Payer: MEDICARE

## 2023-05-22 NOTE — TELEPHONE ENCOUNTER
Patient was called to schedule an appoint per  and there was no answer. I was unable to leave voicemail>

## 2023-06-20 DIAGNOSIS — K21.9 GASTROESOPHAGEAL REFLUX DISEASE: ICD-10-CM

## 2023-06-20 DIAGNOSIS — D84.9 IMMUNOSUPPRESSION: ICD-10-CM

## 2023-06-20 DIAGNOSIS — M10.9 GOUT, ARTHRITIS: ICD-10-CM

## 2023-06-20 DIAGNOSIS — Z79.1 NSAID LONG-TERM USE: ICD-10-CM

## 2023-06-20 DIAGNOSIS — M05.79 RHEUMATOID ARTHRITIS INVOLVING MULTIPLE SITES WITH POSITIVE RHEUMATOID FACTOR: ICD-10-CM

## 2023-06-21 RX ORDER — MISOPROSTOL 100 MCG
TABLET ORAL
Qty: 120 TABLET | Refills: 5 | Status: SHIPPED | OUTPATIENT
Start: 2023-06-21

## 2023-06-23 RX ORDER — METHOTREXATE 2.5 MG/1
TABLET ORAL
Qty: 120 TABLET | Refills: 0 | Status: SHIPPED | OUTPATIENT
Start: 2023-06-23 | End: 2023-07-10 | Stop reason: SDUPTHER

## 2023-07-10 ENCOUNTER — LAB VISIT (OUTPATIENT)
Dept: LAB | Facility: HOSPITAL | Age: 78
End: 2023-07-10
Attending: INTERNAL MEDICINE
Payer: MEDICARE

## 2023-07-10 ENCOUNTER — OFFICE VISIT (OUTPATIENT)
Dept: RHEUMATOLOGY | Facility: CLINIC | Age: 78
End: 2023-07-10
Payer: MEDICARE

## 2023-07-10 VITALS
WEIGHT: 175.06 LBS | BODY MASS INDEX: 32.22 KG/M2 | HEART RATE: 63 BPM | SYSTOLIC BLOOD PRESSURE: 145 MMHG | HEIGHT: 62 IN | DIASTOLIC BLOOD PRESSURE: 79 MMHG

## 2023-07-10 DIAGNOSIS — I10 ESSENTIAL HYPERTENSION: ICD-10-CM

## 2023-07-10 DIAGNOSIS — D84.9 IMMUNOSUPPRESSION: ICD-10-CM

## 2023-07-10 DIAGNOSIS — K21.9 GASTROESOPHAGEAL REFLUX DISEASE: ICD-10-CM

## 2023-07-10 DIAGNOSIS — M05.79 RHEUMATOID ARTHRITIS INVOLVING MULTIPLE SITES WITH POSITIVE RHEUMATOID FACTOR: ICD-10-CM

## 2023-07-10 DIAGNOSIS — M51.36 DDD (DEGENERATIVE DISC DISEASE), LUMBAR: ICD-10-CM

## 2023-07-10 DIAGNOSIS — M10.9 GOUT, ARTHRITIS: ICD-10-CM

## 2023-07-10 DIAGNOSIS — Z79.1 NSAID LONG-TERM USE: ICD-10-CM

## 2023-07-10 LAB
ALBUMIN SERPL BCP-MCNC: 4 G/DL (ref 3.5–5.2)
ALP SERPL-CCNC: 95 U/L (ref 55–135)
ALT SERPL W/O P-5'-P-CCNC: 20 U/L (ref 10–44)
ANION GAP SERPL CALC-SCNC: 6 MMOL/L (ref 8–16)
AST SERPL-CCNC: 21 U/L (ref 10–40)
BASOPHILS # BLD AUTO: 0.02 K/UL (ref 0–0.2)
BASOPHILS NFR BLD: 0.3 % (ref 0–1.9)
BILIRUB SERPL-MCNC: 0.5 MG/DL (ref 0.1–1)
BUN SERPL-MCNC: 13 MG/DL (ref 8–23)
CALCIUM SERPL-MCNC: 9.9 MG/DL (ref 8.7–10.5)
CHLORIDE SERPL-SCNC: 106 MMOL/L (ref 95–110)
CO2 SERPL-SCNC: 28 MMOL/L (ref 23–29)
CREAT SERPL-MCNC: 0.7 MG/DL (ref 0.5–1.4)
CRP SERPL-MCNC: 0.5 MG/L (ref 0–8.2)
DIFFERENTIAL METHOD: ABNORMAL
EOSINOPHIL # BLD AUTO: 0.3 K/UL (ref 0–0.5)
EOSINOPHIL NFR BLD: 4.5 % (ref 0–8)
ERYTHROCYTE [DISTWIDTH] IN BLOOD BY AUTOMATED COUNT: 14 % (ref 11.5–14.5)
ERYTHROCYTE [SEDIMENTATION RATE] IN BLOOD BY PHOTOMETRIC METHOD: 25 MM/HR (ref 0–36)
EST. GFR  (NO RACE VARIABLE): >60 ML/MIN/1.73 M^2
GLUCOSE SERPL-MCNC: 92 MG/DL (ref 70–110)
HCT VFR BLD AUTO: 34 % (ref 37–48.5)
HGB BLD-MCNC: 11.1 G/DL (ref 12–16)
IMM GRANULOCYTES # BLD AUTO: 0.02 K/UL (ref 0–0.04)
IMM GRANULOCYTES NFR BLD AUTO: 0.3 % (ref 0–0.5)
LYMPHOCYTES # BLD AUTO: 0.8 K/UL (ref 1–4.8)
LYMPHOCYTES NFR BLD: 13.8 % (ref 18–48)
MCH RBC QN AUTO: 32.8 PG (ref 27–31)
MCHC RBC AUTO-ENTMCNC: 32.6 G/DL (ref 32–36)
MCV RBC AUTO: 101 FL (ref 82–98)
MONOCYTES # BLD AUTO: 1.1 K/UL (ref 0.3–1)
MONOCYTES NFR BLD: 19.4 % (ref 4–15)
NEUTROPHILS # BLD AUTO: 3.6 K/UL (ref 1.8–7.7)
NEUTROPHILS NFR BLD: 61.7 % (ref 38–73)
NRBC BLD-RTO: 0 /100 WBC
PLATELET # BLD AUTO: 155 K/UL (ref 150–450)
PMV BLD AUTO: 12.8 FL (ref 9.2–12.9)
POTASSIUM SERPL-SCNC: 4.3 MMOL/L (ref 3.5–5.1)
PROT SERPL-MCNC: 7.6 G/DL (ref 6–8.4)
RBC # BLD AUTO: 3.38 M/UL (ref 4–5.4)
SODIUM SERPL-SCNC: 140 MMOL/L (ref 136–145)
WBC # BLD AUTO: 5.78 K/UL (ref 3.9–12.7)

## 2023-07-10 PROCEDURE — 86140 C-REACTIVE PROTEIN: CPT | Performed by: INTERNAL MEDICINE

## 2023-07-10 PROCEDURE — 99214 PR OFFICE/OUTPT VISIT, EST, LEVL IV, 30-39 MIN: ICD-10-PCS | Mod: S$PBB,,, | Performed by: INTERNAL MEDICINE

## 2023-07-10 PROCEDURE — 85025 COMPLETE CBC W/AUTO DIFF WBC: CPT | Performed by: INTERNAL MEDICINE

## 2023-07-10 PROCEDURE — 85652 RBC SED RATE AUTOMATED: CPT | Performed by: INTERNAL MEDICINE

## 2023-07-10 PROCEDURE — 36415 COLL VENOUS BLD VENIPUNCTURE: CPT | Performed by: INTERNAL MEDICINE

## 2023-07-10 PROCEDURE — 99999 PR PBB SHADOW E&M-EST. PATIENT-LVL III: ICD-10-PCS | Mod: PBBFAC,,, | Performed by: INTERNAL MEDICINE

## 2023-07-10 PROCEDURE — 99214 OFFICE O/P EST MOD 30 MIN: CPT | Mod: S$PBB,,, | Performed by: INTERNAL MEDICINE

## 2023-07-10 PROCEDURE — 99213 OFFICE O/P EST LOW 20 MIN: CPT | Mod: PBBFAC | Performed by: INTERNAL MEDICINE

## 2023-07-10 PROCEDURE — 80053 COMPREHEN METABOLIC PANEL: CPT | Performed by: INTERNAL MEDICINE

## 2023-07-10 PROCEDURE — 99999 PR PBB SHADOW E&M-EST. PATIENT-LVL III: CPT | Mod: PBBFAC,,, | Performed by: INTERNAL MEDICINE

## 2023-07-10 RX ORDER — METHOTREXATE 2.5 MG/1
TABLET ORAL
Qty: 120 TABLET | Refills: 0 | Status: SHIPPED | OUTPATIENT
Start: 2023-07-10 | End: 2023-07-11

## 2023-07-10 ASSESSMENT — ROUTINE ASSESSMENT OF PATIENT INDEX DATA (RAPID3)
TOTAL RAPID3 SCORE: 0.94
AM STIFFNESS SCORE: 0, NO
PATIENT GLOBAL ASSESSMENT SCORE: 1.5
PSYCHOLOGICAL DISTRESS SCORE: 1.1
PAIN SCORE: 0
MDHAQ FUNCTION SCORE: 0.4
FATIGUE SCORE: 3

## 2023-07-10 NOTE — TELEPHONE ENCOUNTER
Refill Routing Note   Medication(s) are not appropriate for processing by [facility] for the following reason(s):      Required vitals abnormal    [facility] action(s):  Defer None identified          Appointments  past 12m or future 3m with PCP    Date Provider   Last Visit   10/14/2022 Ileana Martin MD   Next Visit   Visit date not found Ileana Martin MD   ED visits in past 90 days: 0        Note composed:1:52 PM 07/10/2023

## 2023-07-10 NOTE — PROGRESS NOTES
Subjective:       Patient ID: Yulia Peralta is a 78 y.o. female.    Chief Complaint: Rheumatoid arthritis    HPI:  Yulia Peralta is a 78 y.o. female with a history of rheumatoid arthritis for 25 years.  She had been on methotrexate for the past 5 years and her current dose is methotrexate 10 tabs qweek (5 on Monday and 5 on Tuesday).  Off prednisone    History of gout some years ago.        Interval History:    Doing well. She continues to have pain in feet and legs.    Previously saw foot doctor regarding pain and was told to change shoes and gave steroid pack which helped a little.  She is still trying to find the shoes.  She was told to contact podiatrist office if no improvement for possible injection.    She saw neurosurgery who referred her to healthy back program.  She finished Healthy Back Program last year but has not been doing the exercises.     4/10 ache in feet and legs at times.  Worsened with being on feet all day or staying.   No morning stiffness.  Improves with resting feet.  Worse with walking.    Saw dermatologist who gave her Clobetasol and told her to take Biotin daily.     Seeing neurosurgery regarding aneurysm anterior communicating artery on CT who recommended conservative follow-up with imaging.  No additional intervention at this particular time. Would recommend repeat MRA with vessel wall imaging in 2-3 year and would continue to follow conservatively moving forward.  Recommend consulting Neurology, for the other neurologic symptoms.  She may also need follow-up with ENT as well.        Review of Systems   Constitutional:  Positive for fatigue.   HENT: Negative.     Eyes: Negative.    Cardiovascular: Negative.    Gastrointestinal: Negative.    Endocrine: Negative.    Genitourinary: Negative.    Musculoskeletal:  Positive for arthralgias and joint swelling.   Skin:  Negative for rash.        Hair loss   Allergic/Immunologic: Negative.    Neurological:  Negative for headaches.  "  Hematological: Negative.  Does not bruise/bleed easily.   Psychiatric/Behavioral: Negative.         Objective:   BP (!) 145/79   Pulse 63   Ht 5' 2" (1.575 m)   Wt 79.4 kg (175 lb 0.7 oz)   BMI 32.02 kg/m²   Virtual visit       Physical Exam   Constitutional: She is oriented to person, place, and time.   HENT:   Head: Normocephalic and atraumatic.   Eyes: Conjunctivae are normal.   Cardiovascular: Normal rate, regular rhythm and normal heart sounds.   Pulmonary/Chest: Effort normal and breath sounds normal.   Abdominal: Soft. Bowel sounds are normal.   Musculoskeletal:      Cervical back: Neck supple.      Comments: 28 joint count: 0 swollen and 0 tender  No swelling of hands  No pain on compression MTPs bilaterally  Contractures elbows   Neurological: She is alert and oriented to person, place, and time. Gait normal.   Skin: Skin is warm and dry.   Psychiatric: Mood and affect normal.          LABS    Component      Latest Ref Rng & Units 4/24/2023 4/18/2023 1/30/2023   WBC      3.90 - 12.70 K/uL 5.14  6.43   RBC      4.00 - 5.40 M/uL 3.50 (L)  3.45 (L)   Hemoglobin      12.0 - 16.0 g/dL 11.2 (L)  11.0 (L)   Hematocrit      37.0 - 48.5 % 35.1 (L)  34.2 (L)   MCV      82 - 98 fL 100 (H)  99 (H)   MCH      27.0 - 31.0 pg 32.0 (H)  31.9 (H)   MCHC      32.0 - 36.0 g/dL 31.9 (L)  32.2   RDW      11.5 - 14.5 % 14.1  14.3   Platelets      150 - 450 K/uL 175  182   MPV      9.2 - 12.9 fL 12.9  13.3 (H)   Immature Granulocytes      0.0 - 0.5 % 0.2  0.6 (H)   Gran # (ANC)      1.8 - 7.7 K/uL 2.6  3.7   Immature Grans (Abs)      0.00 - 0.04 K/uL 0.01  0.04   Lymph #      1.0 - 4.8 K/uL 1.2  1.3   Mono #      0.3 - 1.0 K/uL 1.0  1.0   Eos #      0.0 - 0.5 K/uL 0.3  0.3   Baso #      0.00 - 0.20 K/uL 0.02  0.03   nRBC      0 /100 WBC 0  0   Gran %      38.0 - 73.0 % 51.2  57.4   Lymph %      18.0 - 48.0 % 23.5  20.4   Mono %      4.0 - 15.0 % 18.7 (H)  16.0 (H)   Eosinophil %      0.0 - 8.0 % 6.0  5.1   Basophil %      " 0.0 - 1.9 % 0.4  0.5   Differential Method       Automated  Automated   Sodium      136 - 145 mmol/L 141  139   Potassium      3.5 - 5.1 mmol/L 4.8  4.6   Chloride      95 - 110 mmol/L 107  105   CO2      23 - 29 mmol/L 28  23   Glucose      70 - 110 mg/dL 86  95   BUN      8 - 23 mg/dL 9  12   Creatinine      0.5 - 1.4 mg/dL 0.7  0.8   Calcium      8.7 - 10.5 mg/dL 10.2  10.2   PROTEIN TOTAL      6.0 - 8.4 g/dL 7.5  7.7   Albumin      3.5 - 5.2 g/dL 3.9  4.1   BILIRUBIN TOTAL      0.1 - 1.0 mg/dL 0.5  0.4   Alkaline Phosphatase      55 - 135 U/L 97  97   AST      10 - 40 U/L 17  13   ALT      10 - 44 U/L 14  14   Anion Gap      8 - 16 mmol/L 6 (L)  11   eGFR      >60 mL/min/1.73 m:2 >60.0  >60.0   CRP      0.0 - 8.2 mg/L 0.7  0.7   Sed Rate      0 - 36 mm/Hr 38 (H)  28   TSH      0.400 - 4.000 uIU/mL  1.086      Assessment:       1. Rheumatoid arthritis manifested by rheumatoid nodule, polyarthritis in the past, and contractures of the elbows.    Treated in the past with gold. Plaquenil did not work in the past and she never took SSZ.   Doing well. Continue MTX (Monday 5 in morning and 5 in evening) and folic acid   2. Encounter for long-term (current) use of other medications    3. Fatigue.   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  She decided to go for second opinion at [facility]. Biopsy was negative. MRI repeat stable and most recent 10/2017 stable  5. 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 Felt not to have hepatitis C by hepatology.  6. Right anserine bursitis.   7. Obesity  8. HTN.   9. Chronic back pain.  Bulging disc.  May be cause of paresthesias in legs with standing.  10.  Paresthesia of in left leg.  May be due to #9.    11.  Right clavicle mass.  Evaluated by x-ray   12.  Trigger fingers.  Left 4th and right 3rd and right 5th.  Therapy helped in past.    13.  Left lower leg pain and swelling.  History  of trauma  14.  Anterior communicating artery aneurysm  15.  Hair loss.  Not sure if from MTX.  Will have dermatology evaluate    Plan:       1. Continue methotrexate 10 tabs qweek.  2. Check CRP, ESR, CBC, and CMP.   3. Continue Nabumetone.  Patient says GI ok with her continuing despite healing ulcer on EGD.   Patient on misoprostol  4. Continue folic acid 2 tabs.    5. Patient to restart Healthy Back exercises for home  6. Consider OT in future for arms if pain returns and no improvement with home exercises from previous PT  7. Topical arthritis OTC ointment to feet and ankles.  8.  Encourage walking in the house  9.  Ice feet at end of day and apply heat in morning.          Return to the office in 6 months/prn;

## 2023-07-10 NOTE — TELEPHONE ENCOUNTER
No care due was identified.  [facility] Embedded Care Due Messages. Reference number: [id number].   7/10/2023 11:24:33 AM CDT

## 2023-07-11 RX ORDER — NABUMETONE 500 MG/1
TABLET, FILM COATED ORAL
Qty: 180 TABLET | Refills: 0 | Status: SHIPPED | OUTPATIENT
Start: 2023-07-11 | End: 2024-02-06

## 2023-07-11 RX ORDER — IRBESARTAN 300 MG/1
TABLET ORAL
Qty: 90 TABLET | Refills: 0 | Status: SHIPPED | OUTPATIENT
Start: 2023-07-11 | End: 2023-09-26

## 2023-07-11 RX ORDER — METHOTREXATE 2.5 MG/1
TABLET ORAL
Qty: 120 TABLET | Refills: 0 | Status: SHIPPED | OUTPATIENT
Start: 2023-07-11 | End: 2023-12-20

## 2023-07-11 RX ORDER — FOLIC ACID 1 MG/1
TABLET ORAL
Qty: 180 TABLET | Refills: 3 | Status: SHIPPED | OUTPATIENT
Start: 2023-07-11 | End: 2023-09-12

## 2023-09-11 ENCOUNTER — HOSPITAL ENCOUNTER (OUTPATIENT)
Dept: RADIOLOGY | Facility: HOSPITAL | Age: 78
Discharge: HOME | End: 2023-09-11
Attending: FAMILY MEDICINE
Payer: MEDICARE

## 2023-09-11 ENCOUNTER — OFFICE VISIT (OUTPATIENT)
Dept: FAMILY MEDICINE | Facility: CLINIC | Age: 78
End: 2023-09-11
Payer: MEDICARE

## 2023-09-11 VITALS
TEMPERATURE: 98 F | WEIGHT: 179.44 LBS | DIASTOLIC BLOOD PRESSURE: 68 MMHG | HEIGHT: 62 IN | SYSTOLIC BLOOD PRESSURE: 138 MMHG | BODY MASS INDEX: 33.02 KG/M2 | HEART RATE: 67 BPM | OXYGEN SATURATION: 97 %

## 2023-09-11 DIAGNOSIS — R51.9 SINUS HEADACHE: ICD-10-CM

## 2023-09-11 DIAGNOSIS — E66.01 SEVERE OBESITY (BMI 35.0-39.9) WITH COMORBIDITY: ICD-10-CM

## 2023-09-11 DIAGNOSIS — E78.5 HYPERLIPIDEMIA, UNSPECIFIED HYPERLIPIDEMIA TYPE: Primary | ICD-10-CM

## 2023-09-11 DIAGNOSIS — R26.9 GAIT DIFFICULTY: ICD-10-CM

## 2023-09-11 PROCEDURE — 70220 X-RAY EXAM OF SINUSES: CPT | Mod: TC,FY

## 2023-09-11 PROCEDURE — 70220 X-RAY EXAM OF SINUSES: CPT | Mod: 26,,, | Performed by: RADIOLOGY

## 2023-09-11 PROCEDURE — 70220 XR SINUSES MIN 3 VIEWS: ICD-10-PCS | Mod: 26,,, | Performed by: RADIOLOGY

## 2023-09-11 PROCEDURE — 99214 OFFICE O/P EST MOD 30 MIN: CPT | Mod: S$PBB,,, | Performed by: FAMILY MEDICINE

## 2023-09-11 PROCEDURE — 99999 PR PBB SHADOW E&M-EST. PATIENT-LVL V: ICD-10-PCS | Mod: PBBFAC,,, | Performed by: FAMILY MEDICINE

## 2023-09-11 PROCEDURE — 99999 PR PBB SHADOW E&M-EST. PATIENT-LVL V: CPT | Mod: PBBFAC,,, | Performed by: FAMILY MEDICINE

## 2023-09-11 PROCEDURE — 99214 PR OFFICE/OUTPT VISIT, EST, LEVL IV, 30-39 MIN: ICD-10-PCS | Mod: S$PBB,,, | Performed by: FAMILY MEDICINE

## 2023-09-11 PROCEDURE — 99215 OFFICE O/P EST HI 40 MIN: CPT | Mod: PBBFAC,PO | Performed by: FAMILY MEDICINE

## 2023-09-11 RX ORDER — ATORVASTATIN CALCIUM 40 MG/1
40 TABLET, FILM COATED ORAL DAILY
Qty: 90 TABLET | Refills: 3 | Status: SHIPPED | OUTPATIENT
Start: 2023-09-11 | End: 2024-09-10

## 2023-09-11 NOTE — PROGRESS NOTES
"Subjective     Patient ID: Yulia Peralta is a 78 y.o. female.    Chief Complaint: Gait Problem (Patient has been having problems with stumbling for a while when walking and just feeling unsteady)    78 year old female presents for concerns about feeling off balanced. She states she feels like she has headaches over her right eye in the frontal region. She has no issues with hearing or tinnitus. She has no sinus congestion. She states she doesn't feel like her legs are weak, but she has some swelling in both legs. She has no chest pain or chest tightness with this. She has no dizziness with changes in position from sitting to standing or with change of position.       Review of Systems       Objective     Vitals:    09/11/23 1541   BP: 138/68   Pulse: 67   Temp: 97.9 °F (36.6 °C)   TempSrc: Oral   SpO2: 97%   Weight: 81.4 kg (179 lb 7.3 oz)   Height: 5' 2" (1.575 m)       Physical Exam  Constitutional:       Appearance: Normal appearance.   HENT:      Head: Normocephalic and atraumatic.   Musculoskeletal:      Right lower leg: No edema.   Neurological:      Mental Status: She is alert.            Assessment and Plan     1. Hyperlipidemia, unspecified hyperlipidemia type  -     Lipid Panel; Future; Expected date: 09/11/2023  -     atorvastatin (LIPITOR) 40 MG tablet; Take 1 tablet (40 mg total) by mouth once daily.  Dispense: 90 tablet; Refill: 3  Refilld and due for labs    2. Sinus headache  -     X-Ray Sinuses 3 or more views; Future; Expected date: 09/11/2023  Will check sinuses for signs of sinusitis  3. Gait difficulty  -     Ambulatory referral/consult to Physical/Occupational Therapy; Future; Expected date: 09/18/2023  Referral to PT for gait training.   4. Severe obesity (BMI 35.0-39.9) with comorbidity                 No follow-ups on file.    "

## 2023-09-12 ENCOUNTER — TELEPHONE (OUTPATIENT)
Dept: RHEUMATOLOGY | Facility: CLINIC | Age: 78
End: 2023-09-12
Payer: MEDICARE

## 2023-09-12 DIAGNOSIS — M05.79 RHEUMATOID ARTHRITIS INVOLVING MULTIPLE SITES WITH POSITIVE RHEUMATOID FACTOR: ICD-10-CM

## 2023-09-12 DIAGNOSIS — Z79.1 NSAID LONG-TERM USE: ICD-10-CM

## 2023-09-12 DIAGNOSIS — M10.9 GOUT, ARTHRITIS: ICD-10-CM

## 2023-09-12 DIAGNOSIS — Z79.899 IMMUNOSUPPRESSION DUE TO DRUG THERAPY: Primary | ICD-10-CM

## 2023-09-12 DIAGNOSIS — K21.9 GASTROESOPHAGEAL REFLUX DISEASE: ICD-10-CM

## 2023-09-12 DIAGNOSIS — D84.821 IMMUNOSUPPRESSION DUE TO DRUG THERAPY: Primary | ICD-10-CM

## 2023-09-12 DIAGNOSIS — D84.9 IMMUNOSUPPRESSION: ICD-10-CM

## 2023-09-12 DIAGNOSIS — D53.9 MACROCYTIC ANEMIA: ICD-10-CM

## 2023-09-12 RX ORDER — FOLIC ACID 1 MG/1
2000 TABLET ORAL DAILY
Qty: 270 TABLET | Refills: 3 | Status: SHIPPED | OUTPATIENT
Start: 2023-09-12

## 2023-09-12 NOTE — TELEPHONE ENCOUNTER
Inflammatory markers normal.  Patient with increasing anemia and macrocytosis.  Will increase folic acid to 3 tabs daily and repeat labs in 3 month.  Staff to inform the patient.

## 2023-09-12 NOTE — TELEPHONE ENCOUNTER
Spoke with patient informed her of medication change per Dr. Ramirez's message. Patient verbalized understanding. Patient will get labs in 3 months.

## 2023-09-13 ENCOUNTER — TELEPHONE (OUTPATIENT)
Dept: FAMILY MEDICINE | Facility: CLINIC | Age: 78
End: 2023-09-13
Payer: MEDICARE

## 2023-09-13 NOTE — TELEPHONE ENCOUNTER
Her xray of her sinus cavities look good, one side is just smaller than the other, which could cuase more issues with congestion on that side if they get inflamed with allergies or a cold. Otherwise, normal.

## 2023-09-25 DIAGNOSIS — I10 ESSENTIAL HYPERTENSION: ICD-10-CM

## 2023-09-26 RX ORDER — IRBESARTAN 300 MG/1
TABLET ORAL
Qty: 90 TABLET | Refills: 3 | Status: SHIPPED | OUTPATIENT
Start: 2023-09-26

## 2023-09-26 NOTE — TELEPHONE ENCOUNTER
Refill Decision Note   Yulia Peralta  is requesting a refill authorization.  Brief Assessment and Rationale for Refill:  Approve     Medication Therapy Plan:         Comments:     Note composed:6:32 AM 09/26/2023

## 2023-10-04 ENCOUNTER — OFFICE VISIT (OUTPATIENT)
Dept: DERMATOLOGY | Facility: CLINIC | Age: 78
End: 2023-10-04
Payer: MEDICARE

## 2023-10-04 DIAGNOSIS — L65.9 HAIR LOSS: Primary | ICD-10-CM

## 2023-10-04 PROCEDURE — 99999 PR PBB SHADOW E&M-EST. PATIENT-LVL III: ICD-10-PCS | Mod: PBBFAC,,, | Performed by: STUDENT IN AN ORGANIZED HEALTH CARE EDUCATION/TRAINING PROGRAM

## 2023-10-04 PROCEDURE — 99213 PR OFFICE/OUTPT VISIT, EST, LEVL III, 20-29 MIN: ICD-10-PCS | Mod: S$PBB,,, | Performed by: STUDENT IN AN ORGANIZED HEALTH CARE EDUCATION/TRAINING PROGRAM

## 2023-10-04 PROCEDURE — 99213 OFFICE O/P EST LOW 20 MIN: CPT | Mod: PBBFAC | Performed by: STUDENT IN AN ORGANIZED HEALTH CARE EDUCATION/TRAINING PROGRAM

## 2023-10-04 PROCEDURE — 99213 OFFICE O/P EST LOW 20 MIN: CPT | Mod: S$PBB,,, | Performed by: STUDENT IN AN ORGANIZED HEALTH CARE EDUCATION/TRAINING PROGRAM

## 2023-10-04 PROCEDURE — 99999 PR PBB SHADOW E&M-EST. PATIENT-LVL III: CPT | Mod: PBBFAC,,, | Performed by: STUDENT IN AN ORGANIZED HEALTH CARE EDUCATION/TRAINING PROGRAM

## 2023-10-04 NOTE — PROGRESS NOTES
Subjective:       Patient ID:  Yulia Peralta is a 78 y.o. female who presents for   Chief Complaint   Patient presents with    Hair Loss     Pt in for f/u hair loss pt last seen pcp 9/15     History of Present Illness: The patient presents for follow up of hair thinning/loss on the scalp, last seen on 4/18/23 where she was on topical steroids to the scalp. Reports minimal improvement in symptoms. Continues to have thinning and loss on the scalp. Still with no itching, scaling or other skin changes.       Hair Loss        Review of Systems   Constitutional:  Negative for fever and chills.   Skin:  Negative for itching, rash and dry skin.        Objective:    Physical Exam   Constitutional: She appears well-developed and well-nourished. No distress.   Neurological: She is alert and oriented to person, place, and time. She is not disoriented.   Psychiatric: She has a normal mood and affect.   Skin:   Areas Examined (abnormalities noted in diagram):   Scalp / Hair Palpated and Inspected  Head / Face Inspection Performed  Neck Inspection Performed  RUE Inspected  LUE Inspection Performed  Nails and Digits Inspection Performed              Diagram Legend     Erythematous scaling macule/papule c/w actinic keratosis       Vascular papule c/w angioma      Pigmented verrucoid papule/plaque c/w seborrheic keratosis      Yellow umbilicated papule c/w sebaceous hyperplasia      Irregularly shaped tan macule c/w lentigo     1-2 mm smooth white papules consistent with Milia      Movable subcutaneous cyst with punctum c/w epidermal inclusion cyst      Subcutaneous movable cyst c/w pilar cyst      Firm pink to brown papule c/w dermatofibroma      Pedunculated fleshy papule(s) c/w skin tag(s)      Evenly pigmented macule c/w junctional nevus     Mildly variegated pigmented, slightly irregular-bordered macule c/w mildly atypical nevus      Flesh colored to evenly pigmented papule c/w intradermal nevus       Pink pearly  papule/plaque c/w basal cell carcinoma      Erythematous hyperkeratotic cursted plaque c/w SCC      Surgical scar with no sign of skin cancer recurrence      Open and closed comedones      Inflammatory papules and pustules      Verrucoid papule consistent consistent with wart     Erythematous eczematous patches and plaques     Dystrophic onycholytic nail with subungual debris c/w onychomycosis     Umbilicated papule    Erythematous-base heme-crusted tan verrucoid plaque consistent with inflamed seborrheic keratosis     Erythematous Silvery Scaling Plaque c/w Psoriasis     See annotation      Assessment / Plan:        Hair loss - symptoms persistent on topical steroids. Discussed oral medication options, including minoxidil and spironolactone. Patient would like to discuss this with her PCP (as she is already on antihypertensive medications). Will notify us if she wishes to start.              Follow up in about 6 months (around 4/4/2024).

## 2023-10-24 DIAGNOSIS — E66.01 SEVERE OBESITY (BMI 35.0-39.9) WITH COMORBIDITY: ICD-10-CM

## 2023-10-24 NOTE — TELEPHONE ENCOUNTER
Care Due:                  Date            Visit Type   Department     Provider  --------------------------------------------------------------------------------                                [facility]                              PRIMARY      MEDICINE/INTERN  Last Visit: 09-      CARE ([facility])   AL MED         Ileana Mix  Next Visit: None Scheduled  None         None Found                                                            Last  Test          Frequency    Reason                     Performed    Due Date  --------------------------------------------------------------------------------    Lipid Panel.  12 months..  atorvastatin.............  02- 01-    [facility] Embedded Care Due Messages. Reference number: [id number].   10/24/2023 4:45:06 PM CDT

## 2023-10-25 RX ORDER — SEMAGLUTIDE 0.68 MG/ML
0.5 INJECTION, SOLUTION SUBCUTANEOUS
Qty: 9 ML | Refills: 5 | Status: SHIPPED | OUTPATIENT
Start: 2023-10-25 | End: 2024-03-11

## 2023-10-25 NOTE — TELEPHONE ENCOUNTER
Refill Routing Note   Medication(s) are not appropriate for processing by [facility] for the following reason(s):      Required labs outdated: a1c, Lipid panel    [facility] action(s):  Defer Care Due:  Labs due   Medication Therapy Plan:         Appointments  past 12m or future 3m with PCP    Date Provider   Last Visit   9/11/2023 Ileana Martin MD   Next Visit   Visit date not found Ileana Martin MD   ED visits in past 90 days: 0        Note composed:11:12 AM 10/25/2023

## 2023-11-17 ENCOUNTER — TELEPHONE (OUTPATIENT)
Dept: VASCULAR SURGERY | Facility: CLINIC | Age: 78
End: 2023-11-17
Payer: MEDICARE

## 2023-11-17 NOTE — TELEPHONE ENCOUNTER
"I attempted to contact the patient to reschedule her appt. With Dr. Sandhu,no answer, left message on VM advising the patient to contact the office to r/s her appt. "Provider cancelled in person visits" for 11/24.   "

## 2023-12-11 ENCOUNTER — LAB VISIT (OUTPATIENT)
Dept: LAB | Facility: HOSPITAL | Age: 78
End: 2023-12-11
Attending: INTERNAL MEDICINE
Payer: MEDICARE

## 2023-12-11 DIAGNOSIS — D84.9 IMMUNOSUPPRESSION: ICD-10-CM

## 2023-12-11 DIAGNOSIS — D53.9 MACROCYTIC ANEMIA: ICD-10-CM

## 2023-12-11 DIAGNOSIS — Z79.899 IMMUNOSUPPRESSION DUE TO DRUG THERAPY: ICD-10-CM

## 2023-12-11 DIAGNOSIS — M05.79 RHEUMATOID ARTHRITIS INVOLVING MULTIPLE SITES WITH POSITIVE RHEUMATOID FACTOR: ICD-10-CM

## 2023-12-11 DIAGNOSIS — D84.821 IMMUNOSUPPRESSION DUE TO DRUG THERAPY: ICD-10-CM

## 2023-12-11 LAB
ALBUMIN SERPL BCP-MCNC: 3.9 G/DL (ref 3.5–5.2)
ALP SERPL-CCNC: 93 U/L (ref 55–135)
ALT SERPL W/O P-5'-P-CCNC: 19 U/L (ref 10–44)
ANION GAP SERPL CALC-SCNC: 9 MMOL/L (ref 8–16)
AST SERPL-CCNC: 19 U/L (ref 10–40)
BASOPHILS # BLD AUTO: 0.04 K/UL (ref 0–0.2)
BASOPHILS NFR BLD: 0.6 % (ref 0–1.9)
BILIRUB SERPL-MCNC: 0.5 MG/DL (ref 0.1–1)
BUN SERPL-MCNC: 14 MG/DL (ref 8–23)
CALCIUM SERPL-MCNC: 10 MG/DL (ref 8.7–10.5)
CHLORIDE SERPL-SCNC: 106 MMOL/L (ref 95–110)
CO2 SERPL-SCNC: 25 MMOL/L (ref 23–29)
CREAT SERPL-MCNC: 0.7 MG/DL (ref 0.5–1.4)
CRP SERPL-MCNC: 0.6 MG/L (ref 0–8.2)
DIFFERENTIAL METHOD: ABNORMAL
EOSINOPHIL # BLD AUTO: 0.3 K/UL (ref 0–0.5)
EOSINOPHIL NFR BLD: 3.9 % (ref 0–8)
ERYTHROCYTE [DISTWIDTH] IN BLOOD BY AUTOMATED COUNT: 13.1 % (ref 11.5–14.5)
ERYTHROCYTE [SEDIMENTATION RATE] IN BLOOD BY PHOTOMETRIC METHOD: 44 MM/HR (ref 0–36)
EST. GFR  (NO RACE VARIABLE): >60 ML/MIN/1.73 M^2
FOLATE SERPL-MCNC: 17.8 NG/ML (ref 4–24)
GLUCOSE SERPL-MCNC: 99 MG/DL (ref 70–110)
HCT VFR BLD AUTO: 33.3 % (ref 37–48.5)
HGB BLD-MCNC: 11.2 G/DL (ref 12–16)
IMM GRANULOCYTES # BLD AUTO: 0.04 K/UL (ref 0–0.04)
IMM GRANULOCYTES NFR BLD AUTO: 0.6 % (ref 0–0.5)
LYMPHOCYTES # BLD AUTO: 1.6 K/UL (ref 1–4.8)
LYMPHOCYTES NFR BLD: 22.2 % (ref 18–48)
MCH RBC QN AUTO: 32.7 PG (ref 27–31)
MCHC RBC AUTO-ENTMCNC: 33.6 G/DL (ref 32–36)
MCV RBC AUTO: 97 FL (ref 82–98)
MONOCYTES # BLD AUTO: 1.2 K/UL (ref 0.3–1)
MONOCYTES NFR BLD: 16.8 % (ref 4–15)
NEUTROPHILS # BLD AUTO: 4 K/UL (ref 1.8–7.7)
NEUTROPHILS NFR BLD: 55.9 % (ref 38–73)
NRBC BLD-RTO: 0 /100 WBC
PLATELET # BLD AUTO: 172 K/UL (ref 150–450)
PMV BLD AUTO: 13.9 FL (ref 9.2–12.9)
POTASSIUM SERPL-SCNC: 4.8 MMOL/L (ref 3.5–5.1)
PROT SERPL-MCNC: 7.8 G/DL (ref 6–8.4)
RBC # BLD AUTO: 3.43 M/UL (ref 4–5.4)
SODIUM SERPL-SCNC: 140 MMOL/L (ref 136–145)
WBC # BLD AUTO: 7.13 K/UL (ref 3.9–12.7)

## 2023-12-11 PROCEDURE — 36415 COLL VENOUS BLD VENIPUNCTURE: CPT | Mod: PO | Performed by: INTERNAL MEDICINE

## 2023-12-11 PROCEDURE — 85652 RBC SED RATE AUTOMATED: CPT | Performed by: INTERNAL MEDICINE

## 2023-12-11 PROCEDURE — 80053 COMPREHEN METABOLIC PANEL: CPT | Performed by: INTERNAL MEDICINE

## 2023-12-11 PROCEDURE — 85025 COMPLETE CBC W/AUTO DIFF WBC: CPT | Performed by: INTERNAL MEDICINE

## 2023-12-11 PROCEDURE — 86140 C-REACTIVE PROTEIN: CPT | Performed by: INTERNAL MEDICINE

## 2023-12-11 PROCEDURE — 82746 ASSAY OF FOLIC ACID SERUM: CPT | Performed by: INTERNAL MEDICINE

## 2023-12-12 ENCOUNTER — TELEPHONE (OUTPATIENT)
Dept: RHEUMATOLOGY | Facility: CLINIC | Age: 78
End: 2023-12-12
Payer: MEDICARE

## 2023-12-18 ENCOUNTER — TELEPHONE (OUTPATIENT)
Dept: OBSTETRICS AND GYNECOLOGY | Facility: CLINIC | Age: 78
End: 2023-12-18
Payer: MEDICARE

## 2023-12-18 DIAGNOSIS — Z12.31 BREAST CANCER SCREENING BY MAMMOGRAM: Primary | ICD-10-CM

## 2023-12-19 ENCOUNTER — HOSPITAL ENCOUNTER (OUTPATIENT)
Dept: RADIOLOGY | Facility: HOSPITAL | Age: 78
Discharge: HOME | End: 2023-12-19
Attending: OBSTETRICS & GYNECOLOGY
Payer: MEDICARE

## 2023-12-19 VITALS — WEIGHT: 179.44 LBS | HEIGHT: 62 IN | BODY MASS INDEX: 33.02 KG/M2

## 2023-12-19 DIAGNOSIS — Z12.31 BREAST CANCER SCREENING BY MAMMOGRAM: ICD-10-CM

## 2023-12-19 DIAGNOSIS — M05.79 RHEUMATOID ARTHRITIS INVOLVING MULTIPLE SITES WITH POSITIVE RHEUMATOID FACTOR: ICD-10-CM

## 2023-12-19 PROCEDURE — 77067 SCR MAMMO BI INCL CAD: CPT | Mod: 26,,, | Performed by: RADIOLOGY

## 2023-12-19 PROCEDURE — 77063 BREAST TOMOSYNTHESIS BI: CPT | Mod: 26,,, | Performed by: RADIOLOGY

## 2023-12-19 PROCEDURE — 77067 SCR MAMMO BI INCL CAD: CPT | Mod: TC

## 2023-12-19 PROCEDURE — 77067 MAMMO DIGITAL SCREENING BILAT WITH TOMO: ICD-10-PCS | Mod: 26,,, | Performed by: RADIOLOGY

## 2023-12-19 PROCEDURE — 77063 MAMMO DIGITAL SCREENING BILAT WITH TOMO: ICD-10-PCS | Mod: 26,,, | Performed by: RADIOLOGY

## 2023-12-20 ENCOUNTER — TELEPHONE (OUTPATIENT)
Dept: FAMILY MEDICINE | Facility: CLINIC | Age: 78
End: 2023-12-20
Payer: MEDICARE

## 2023-12-20 RX ORDER — METHOTREXATE 2.5 MG/1
TABLET ORAL
Qty: 120 TABLET | Refills: 0 | Status: SHIPPED | OUTPATIENT
Start: 2023-12-20 | End: 2024-03-27

## 2023-12-20 NOTE — TELEPHONE ENCOUNTER
Call returned. Patient reports she has experienced cold/sinus symptoms x2 weeks. No appointments available for today. Patient declined offer for appointment with NP tomorrow. Care advice given to be seen at local urgent care clinic today. Patient verbalized understanding.

## 2023-12-20 NOTE — TELEPHONE ENCOUNTER
----- Message from Olamide Rodriguez sent at 12/20/2023  8:59 AM CST -----  Regarding: Same Day Appointment  .Type:  Same Day Appointment Request    Caller is requesting a same day appointment.  Caller declined first available   appointment listed below.      Name of Caller: Self     When is the first available appointment? Jan 2nd     Symptoms: Difficulty breathing with cold and sinuses       Would the patient rather a call back or a response via [facility]? Call       Best Call Back Number: [phone]

## 2023-12-28 ENCOUNTER — HOSPITAL ENCOUNTER (EMERGENCY)
Facility: HOSPITAL | Age: 78
Discharge: HOME | End: 2023-12-28
Attending: EMERGENCY MEDICINE
Payer: MEDICARE

## 2023-12-28 VITALS
BODY MASS INDEX: 32.19 KG/M2 | DIASTOLIC BLOOD PRESSURE: 74 MMHG | SYSTOLIC BLOOD PRESSURE: 161 MMHG | HEART RATE: 60 BPM | TEMPERATURE: 99 F | OXYGEN SATURATION: 99 % | WEIGHT: 176 LBS | RESPIRATION RATE: 20 BRPM

## 2023-12-28 DIAGNOSIS — R10.9 ABDOMINAL PAIN, UNSPECIFIED ABDOMINAL LOCATION: ICD-10-CM

## 2023-12-28 DIAGNOSIS — J32.9 SINUSITIS, UNSPECIFIED CHRONICITY, UNSPECIFIED LOCATION: Primary | ICD-10-CM

## 2023-12-28 PROCEDURE — 63600175 PHARM REV CODE 636 W HCPCS: Mod: ER | Performed by: EMERGENCY MEDICINE

## 2023-12-28 PROCEDURE — 96372 THER/PROPH/DIAG INJ SC/IM: CPT | Performed by: EMERGENCY MEDICINE

## 2023-12-28 PROCEDURE — 99284 EMERGENCY DEPT VISIT MOD MDM: CPT | Mod: ER

## 2023-12-28 RX ORDER — FLUTICASONE PROPIONATE 50 MCG
1 SPRAY, SUSPENSION (ML) NASAL 2 TIMES DAILY PRN
Qty: 15 G | Refills: 0 | Status: SHIPPED | OUTPATIENT
Start: 2023-12-28

## 2023-12-28 RX ORDER — DEXAMETHASONE SODIUM PHOSPHATE 4 MG/ML
8 INJECTION, SOLUTION INTRA-ARTICULAR; INTRALESIONAL; INTRAMUSCULAR; INTRAVENOUS; SOFT TISSUE
Status: COMPLETED | OUTPATIENT
Start: 2023-12-28 | End: 2023-12-28

## 2023-12-28 RX ORDER — DOXYCYCLINE 100 MG/1
100 CAPSULE ORAL 2 TIMES DAILY
Qty: 14 CAPSULE | Refills: 0 | Status: SHIPPED | OUTPATIENT
Start: 2023-12-28 | End: 2024-01-04

## 2023-12-28 RX ORDER — CETIRIZINE HYDROCHLORIDE, PSEUDOEPHEDRINE HYDROCHLORIDE 5; 120 MG/1; MG/1
1 TABLET, FILM COATED, EXTENDED RELEASE ORAL DAILY
Qty: 30 TABLET | Refills: 0 | Status: SHIPPED | OUTPATIENT
Start: 2023-12-28 | End: 2024-01-07

## 2023-12-28 RX ADMIN — DEXAMETHASONE SODIUM PHOSPHATE 8 MG: 4 INJECTION INTRA-ARTICULAR; INTRALESIONAL; INTRAMUSCULAR; INTRAVENOUS; SOFT TISSUE at 02:12

## 2023-12-28 NOTE — ED NOTES
Yulia Peralta, a 78 y.o. female presents to the ED w/ complaint of nasal congestion, chills and body aches x3 weeks with no relief of OTC meds. Patient denies cough, SOB, or CP. Patient also reporting left side groin pain intermittently x1 month. Reports hx of diverticulitis. Patient denies urinary symptoms. Also reporting left ankle swelling x1 week. Reports pain radiating from left hip, down to left foot.     Triage note:  Chief Complaint   Patient presents with    Influenza    Groin Pain     Sinus congestion for 3 weeks        Review of patient's allergies indicates:   Allergen Reactions    No known drug allergies      Past Medical History:   Diagnosis Date    Abnormal colonoscopy 07/24/2020    colon polyps nad repeat in 3 years.     Acid reflux     Anemia     Anxiety     Arthritis     Blood transfusion     Cataract     Depression     Dry eyes     H/O colonoscopy 07/22/2020    dr. nicholas. repeat in 3 years.     Heart murmur     Hypertension     Left lumbar radiculitis 5/19/2015    Mixed hyperlipidemia 11/5/2021    Multiple thyroid nodules 12/18/2012    Osteoporosis     Rheumatoid arthritis     Thoracic or lumbosacral neuritis or radiculitis, unspecified 10/30/2013

## 2023-12-28 NOTE — ED PROVIDER NOTES
"Encounter Date: 12/28/2023    SCRIBE #1 NOTE: I, Ghazala Coronado, am scribing for, and in the presence of,  Efrain Amor MD. I have scribed the following portions of the note - Other sections scribed: HPI, ROS.       History     Chief Complaint   Patient presents with    Influenza    Groin Pain     Sinus congestion for 3 weeks        Yulia Peralta is a 78 y.o. female with PMHx of rheumatoid arthritis, HTN, and mixed HLD who presents to the ED complaining of sinus congestion for 3 weeks. Pt reports thick yellow/green mucus and sneezing. Pt has been taking Mucinex and navage nasal with temporary relief. No other alleviating or exacerbating factors. Pt denies any nausea, emesis, diarrhea. Pt daily medicates with methotrexate.     Pt also reports swollen ankles and bilateral foot pain "for a while". She states swelling goes down at night when she raises her legs and is exacerbated when she is on her feet a lot. It is also reports swelling is exacerbated with compression. She also notes intermittent, sharp LLQ abdominal pain for approximately 2 months. Pt states that the pain sometimes radiates to her RLQ. She claims she has been told she has diverticulitis and notes uncontrolled flatulence with slight stool.  Denies any hematuria, dysuria, vaginal bleeding or discharge (yellow at baseline). Pt his concerned with possible hernia.       The history is provided by the patient. No  was used.     Review of patient's allergies indicates:   Allergen Reactions    No known drug allergies      Past Medical History:   Diagnosis Date    Abnormal colonoscopy 07/24/2020    colon polyps nad repeat in 3 years.     Acid reflux     Anemia     Anxiety     Arthritis     Blood transfusion     Cataract     Depression     Dry eyes     H/O colonoscopy 07/22/2020    dr. nicholas. repeat in 3 years.     Heart murmur     Hypertension     Left lumbar radiculitis 5/19/2015    Mixed hyperlipidemia 11/5/2021    Multiple " thyroid nodules 12/18/2012    Osteoporosis     Rheumatoid arthritis     Thoracic or lumbosacral neuritis or radiculitis, unspecified 10/30/2013     Past Surgical History:   Procedure Laterality Date    CATARACT EXTRACTION W/  INTRAOCULAR LENS IMPLANT Left 1/26/2021    Procedure: EXTRACTION, CATARACT, WITH IOL INSERTION;  Surgeon: Elvis Pete MD;  Location: [facility];  Service: Ophthalmology;  Laterality: Left;    CATARACT EXTRACTION W/  INTRAOCULAR LENS IMPLANT Right 2/23/2021    Procedure: EXTRACTION, CATARACT, WITH IOL INSERTION;  Surgeon: Elvis Pete MD;  Location: [facility];  Service: Ophthalmology;  Laterality: Right;    CEREBRAL ANGIOGRAM N/A 1/23/2020    Procedure: ANGIOGRAM-CEREBRAL;  Surgeon: [facility];  Location: [address];  Service: Radiology;  Laterality: N/A;  /[place]    gallstones  [id number]    HYSTERECTOMY      JOINT REPLACEMENT  [id number]     bilateral knee    OOPHORECTOMY      [place] REMOVAL OF OVARY/TUBE(S)      TONSILLECTOMY       Family History   Problem Relation Age of Onset    Heart failure Mother     Cataracts Mother     Hypertension Mother     Colon cancer Mother     Colon cancer Father     Hypertension Sister     Liver cancer Sister     Glaucoma Brother     Lung cancer Brother     Colon cancer Brother     No Known Problems Maternal Grandmother     No Known Problems Maternal Grandfather     No Known Problems Paternal Grandmother     No Known Problems Paternal Grandfather     No Known Problems Daughter     No Known Problems Son     Breast cancer Maternal Aunt     No Known Problems Maternal Uncle     No Known Problems Paternal Aunt     No Known Problems Paternal Uncle     No Known Problems Other     Lupus Neg Hx     Rheum arthritis Neg Hx     Psoriasis Neg Hx     Amblyopia Neg Hx     Blindness Neg Hx     Cancer Neg Hx     Diabetes Neg Hx     Macular degeneration Neg Hx     Retinal detachment Neg Hx     Strabismus Neg Hx     Stroke Neg Hx     Thyroid  disease Neg Hx      Social History     Tobacco Use    Smoking status: Former     Current packs/day: 0.25     Average packs/day: 0.3 packs/day for 1 year (0.3 ttl pk-yrs)     Types: Cigarettes    Smokeless tobacco: Never   Substance Use Topics    Alcohol use: Yes     Alcohol/week: 2.0 standard drinks of alcohol     Types: 1 Glasses of wine, 1 Cans of beer per week     Comment: very seldom    Drug use: No     Review of Systems   Constitutional: Negative.    HENT:  Positive for congestion (sinus) and sneezing.         (+) thick yellow/green mucus   Eyes: Negative.    Respiratory: Negative.     Cardiovascular: Negative.    Gastrointestinal:  Positive for abdominal pain (LLQ, sometimes radiating to RLQ). Negative for diarrhea, nausea and vomiting.        (+) uncontrolled flatulence   Genitourinary: Negative.  Negative for dysuria, vaginal bleeding and vaginal discharge.   Musculoskeletal:  Positive for joint swelling (bilateral ankles).        (+) bilateral feet pain   Skin: Negative.    Neurological: Negative.        Physical Exam     Initial Vitals [12/28/23 1237]   BP Pulse Resp Temp SpO2   (!) 190/76 61 20 98.7 °F (37.1 °C) 99 %      MAP       --         Physical Exam    Nursing note and vitals reviewed.  Constitutional: She appears well-developed and well-nourished. She is not diaphoretic. No distress.   HENT:   Head: Normocephalic and atraumatic.   Nose: Nose normal.   Mouth/Throat: No oropharyngeal exudate.   Boggy nasal mucosa, mild posterior oropharyngeal erythema noted.   Eyes: EOM are normal. Pupils are equal, round, and reactive to light.   Neck: Neck supple. No JVD present.   Normal range of motion.  Cardiovascular:  Normal rate, regular rhythm, normal heart sounds and intact distal pulses.           Pulmonary/Chest: Breath sounds normal. No stridor. No respiratory distress. She has no wheezes. She has no rhonchi. She has no rales.   Abdominal: Abdomen is soft. Bowel sounds are normal. She exhibits no  distension. There is no abdominal tenderness. There is no rebound and no guarding.   Musculoskeletal:         General: Edema (trace nonpitting edema bilateral lower extremity ankles) present. No tenderness. Normal range of motion.      Cervical back: Normal range of motion and neck supple.     Neurological: She is alert and oriented to person, place, and time. She has normal strength.   Skin: Skin is warm and dry. Capillary refill takes less than 2 seconds. No rash noted. No erythema.         ED Course   Procedures  Labs Reviewed - No data to display       Imaging Results    None          Medications   dexAMETHasone injection 8 mg (8 mg Intramuscular Given 12/28/23 5688)     Medical Decision Making  Risk  OTC drugs.  Prescription drug management.      MDM:    78-year-old female with past medical history as noted above presenting with cough, congestion, left-sided groin pain, ankle swelling.  Differential Diagnosis includes:  CHF exacerbation, sepsis, diverticulitis, intra-abdominal surgical emergency.  Physical exam as noted above.  ED workup not indicated.  Patient presented with 3 different significant complaints, ankle swelling appears to be more dependent edema with improvement overnight with elevation appears to be more benign in nature.  Her intermittent abdominal pain which she currently does not have does not have any food association, does not occur with any increased intra-abdominal pressure does not appear to be related to a hernia, does not appear to be representative of diverticulitis or any further acute surgical or medical emergency.  Will have her follow-up with Gastroenterology for further evaluation and management of this.  Additionally has evidence of sinus congestion, boggy nasal mucosa, has been symptomatic for greater than 3 weeks, at this point will treat with short course of doxycycline, additional Flonase and decongestants discussed and recommended.  Do not suspect any additional surgical or  medical emergency. Discussed diagnosis and further treatment with patient, including f/u.  Return precautions given and all questions answered.  Patient in understanding of plan.  Pt discharged to home improved and stable.        Note was created using voice recognition software. Note may have occasional typographical or grammatical errors, garbled syntax, and other bizarre constructions that may not have been identified and edited despite good guanaco initial review prior to signing.             Scribe Attestation:   Scribe #1: I performed the above scribed service and the documentation accurately describes the services I performed. I attest to the accuracy of the note.                             I, Efrain Amor M.D., personally performed the services described in this documentation. All medical record entries made by the scribe were at my direction and in my presence. I have reviewed the chart and agree that the record reflects my personal performance and is accurate and complete.    Clinical Impression:  Final diagnoses:  [J32.9] Sinusitis, unspecified chronicity, unspecified location (Primary)  [R10.9] Abdominal pain, unspecified abdominal location          ED Disposition Condition    Discharge Stable          ED Prescriptions       Medication Sig Dispense Start Date End Date Auth. Provider    doxycycline (VIBRAMYCIN) 100 MG Cap Take 1 capsule (100 mg total) by mouth 2 (two) times daily. for 7 days 14 capsule 12/28/2023 1/4/2024 Efrain Amor MD    fluticasone propionate (FLONASE) 50 mcg/actuation nasal spray 1 spray (50 mcg total) by Each Nostril route 2 (two) times daily as needed. 15 g 12/28/2023 -- Efrain Amor MD    cetirizine-pseudoephedrine 5-120 mg Tb12 Take 1 tablet by mouth once daily. for 10 days 30 tablet 12/28/2023 1/7/2024 Efrain Amor MD          Follow-up Information       Follow up With Specialties Details Why Contact Info Additional Information    Kellie Tena  [facility] Emergency Medicine Go to  If symptoms worsen [address] [facility] [address] [phone]     Ileana Martin MD Family Medicine Go in 1 week As needed [address] [address] [phone]       [facility] Gastroenterology Schedule an appointment as soon as possible for a visit   [address] [facility] [address]  [place] [address] [place] [address] [phone] Please park in garage or surface lot and use Medical Office Bldg entrance. Check in at [address]             Efrain Amor MD  12/28/23 8838

## 2024-01-05 ENCOUNTER — TELEPHONE (OUTPATIENT)
Dept: FAMILY MEDICINE | Facility: CLINIC | Age: 79
End: 2024-01-05
Payer: MEDICARE

## 2024-01-05 NOTE — TELEPHONE ENCOUNTER
----- Message from Olamide Rodriguez sent at 1/5/2024  1:30 PM CST -----  Regarding: Request for Sooner Apt  Type:  Sooner Appointment Request    Patient is requesting a sooner appointment.  Patient declined first available appointment listed as well as another facility and provider .  Patient will not accept being placed on the waitlist and is requesting a message be sent to doctor.    Name of Caller: self     When is the first available appointment? 1/18/24    Symptoms: oing on 4 weeks of having congestion     Would the patient rather a call back or a response via [facility]? Call     Best Call Back Number: [phone]

## 2024-01-05 NOTE — TELEPHONE ENCOUNTER
Called patient back verified name and . Patient states had been seen at an urgent care facility on 28 Dec 2023. She completed her antibiotic yesterday and given a Prednisone injection in the urgent care and feels that she is starting to feel the onset of the congestion. She states she's is draining mucus not coughing as much, denies fevers.   Encourage to drink fluids, warm teas, use flonase as directed, steam showers and elevate with 2 pillows at night. Instructed patient to monitor symptoms and if condition becomes worse then to go to ER department. Patient stated understanding.

## 2024-01-08 NOTE — PROGRESS NOTES
Fasting labs ordered. Will call with results when available.   Pap 1/2021. Has appt with gyn 2/2024  Declines immunizations today   Subjective:       Patient ID: Yulia Peralta is a 75 y.o. female with multiple medical diagnoses as listed in the medical history and problem list that presents for Cough  .    Chief Complaint: Cough      Cough   This is a new problem. The current episode started yesterday. The cough is non-productive. Associated symptoms include headaches (slight ) and postnasal drip. Pertinent negatives include no chills, ear pain, eye redness, fever, myalgias, rhinorrhea, shortness of breath or wheezing. Treatments tried: dayquil and nyquil  The treatment provided moderate relief.        Review of Systems   Constitutional: Negative for chills, fatigue and fever.   HENT: Positive for postnasal drip and sneezing. Negative for congestion, ear pain, rhinorrhea, sinus pressure and sinus pain.         Pt is outside during the day all day    Eyes: Negative for pain, discharge, redness and itching.   Respiratory: Positive for cough (dry). Negative for chest tightness, shortness of breath and wheezing.         Acid reflux like symptoms off and on--due for egd   Gastrointestinal: Negative for abdominal pain, diarrhea, nausea and vomiting.   Musculoskeletal: Positive for back pain (lower left chronic ). Negative for myalgias.   Neurological: Positive for headaches (slight ).         PAST MEDICAL HISTORY:  Past Medical History:   Diagnosis Date    Acid reflux     Anemia     Anxiety     Arthritis     Blood transfusion     Cataract     Depression     Dry eyes     Heart murmur     Hypertension     Left lumbar radiculitis 5/19/2015    Multiple thyroid nodules 12/18/2012    Osteoporosis     Rheumatoid arthritis     Thoracic or lumbosacral neuritis or radiculitis, unspecified 10/30/2013       SOCIAL HISTORY:  Social History     Socioeconomic History    Marital status:      Spouse name: Not on file    Number of children: Not on file    Years of education: Not on file    Highest education level: Not on file    Occupational History    Not on file   Social Needs    Financial resource strain: Not on file    Food insecurity:     Worry: Not on file     Inability: Not on file    Transportation needs:     Medical: Not on file     Non-medical: Not on file   Tobacco Use    Smoking status: Former Smoker    Smokeless tobacco: Never Used   Substance and Sexual Activity    Alcohol use: Yes     Comment: socially    Drug use: No    Sexual activity: Not on file   Lifestyle    Physical activity:     Days per week: Not on file     Minutes per session: Not on file    Stress: Only a little   Relationships    Social connections:     Talks on phone: Not on file     Gets together: Not on file     Attends Orthodoxy service: Not on file     Active member of club or organization: Not on file     Attends meetings of clubs or organizations: Not on file     Relationship status: Not on file   Other Topics Concern    Not on file   Social History Narrative    Adult Screenings updated and reviewed  6/12/14    Mammogram( for females) ordered for DIS    Pap ( for females) Dr Zepeda  Due for f/u   For  2014    Colonoscopy  Done once    Flu shot yearly done  2013    Td 2005    Pneumovax  Updated  12/3/13    Zostavax done 2012    Eye exam recommended yearly done 2013    Bone density  12/9/13    Thyroid ultrasound  11/6/2012 Normal sized thyroid containing several subcentimeter nodules, similar to the 5/12/11 exam.  No concerning nodules identified..            ALLERGIES AND MEDICATIONS: updated and reviewed.  Review of patient's allergies indicates:   Allergen Reactions    No known drug allergies      Current Outpatient Medications   Medication Sig Dispense Refill    amLODIPine (NORVASC) 5 MG tablet TAKE ONE TABLET BY MOUTH EVERY DAY 30 tablet 1    azelastine (ASTELIN) 137 mcg (0.1 %) nasal spray 1 spray (137 mcg total) by Nasal route 2 (two) times daily. 30 mL 1    ciclopirox (PENLAC) 8 % Soln APPLY 1 APPLICATION TOPICALLY ONCE DAILY FOR  24 WEEKS  2    fluticasone (FLONASE) 50 mcg/actuation nasal spray 1 spray (50 mcg total) by Each Nare route once daily. 16 g 5    folic acid (FOLVITE) 1 MG tablet Take 2 tablets (2 mg total) by mouth once daily. 180 tablet 3    furosemide (LASIX) 40 MG tablet TAKE ONE TABLET BY MOUTH EVERY DAY 90 tablet 1    gabapentin (NEURONTIN) 100 MG capsule Take 1 capsule (100 mg total) by mouth 3 (three) times daily. (Patient taking differently: Take 100 mg by mouth as needed. ) 270 capsule 1    hydrocortisone 2.5 % cream as needed.   0    hydrOXYzine (ATARAX) 50 MG tablet Take 1 tablet (50 mg total) by mouth 3 (three) times daily as needed for Itching. 60 tablet 0    irbesartan (AVAPRO) 300 MG tablet Take 1 tablet (300 mg total) by mouth every evening. 90 tablet 3    methotrexate 2.5 MG Tab 5 tabs po twice a day on monday 160 tablet 0    miSOPROStoL (CYTOTEC) 100 MCG Tab Take 1 tablet (100 mcg total) by mouth 2 (two) times daily. 180 tablet 3    nabumetone (RELAFEN) 500 MG tablet Take 1 tablet (500 mg total) by mouth 2 (two) times daily. 180 tablet 3    pantoprazole (PROTONIX) 40 MG tablet Take 1 tablet (40 mg total) by mouth once daily. 1 Tablet, Delayed Release (E.C.) Oral Every day 90 tablet 3    valacyclovir (VALTREX) 500 MG tablet 1 Tablet DAILY for suppression, increase to BID for outbreak 180 tablet 2    famotidine (PEPCID) 40 MG tablet Take 0.5 tablets (20 mg total) by mouth 2 (two) times daily as needed for Heartburn. 15 tablet 2    nystatin-triamcinolone (MYCOLOG II) cream Apply to affected area 2 times daily (Patient taking differently: as needed. Apply to affected area 2 times daily) 90 g 1    water Liqd 150 mL with Milk of Magnesia 400 mg/5 mL Susp 400 mg, diphenhydrAMINE 12.5 mg/5 mL Elix 60 mg, nystatin 100,000 unit/mL Susp 500,000 Units Take 5 mLs by mouth.       No current facility-administered medications for this visit.          Objective:   /66   Pulse 71   Temp 99.1 °F (37.3 °C)  "(Oral)   Ht 5' 2" (1.575 m)   Wt 92 kg (202 lb 13.2 oz)   SpO2 98%   BMI 37.10 kg/m²      Physical Exam   Constitutional: She is oriented to person, place, and time. No distress.   HENT:   Head: Normocephalic and atraumatic.   Eyes: Conjunctivae and EOM are normal.   Cardiovascular: Normal rate and regular rhythm.   Pulmonary/Chest: Effort normal and breath sounds normal. She has no wheezes.   No cough  Speak in full sentences    Neurological: She is alert and oriented to person, place, and time.           Assessment:       1. Suspected Covid-19 Virus Infection    2. Gastroesophageal reflux disease without esophagitis        Plan:       Suspected Covid-19 Virus Infection  -     COVID-19 Routine Screening; Future; Expected date: 05/22/2020  Will take anti-histamine   -will contact with results       Instructions for Patients with Confirmed or Suspected COVID-19    If you are awaiting your test result, you will either be called or it will be released to the patient portal.  If you have any questions about your test, please visit [email or web address] or call our COVID-19 information line at [phone].       Stay home and stay away from family members and friends. The CDC says, you can leave home after these three things have happened: 1) You have had no fever for at least 72 hours (that is three full days of no fever without the use of medicine that reduces fevers) 2) AND other symptoms have improved (for example, when your cough or shortness of breath have improved) 3) AND at least 7 days have passed since your symptoms first appeared.   Separate yourself from other people and animals in your home.   Call ahead before visiting your doctor.   Wear a facemask.   Cover your coughs and sneezes.   Wash your hands often with soap and water; hand  can be used, too.   Avoid sharing personal household items.   Wipe down surfaces used daily.   Monitor your symptoms. Seek prompt medical " attention if your illness is worsening (e.g., difficulty breathing).    Before seeking care, call your healthcare provider.   If you have a medical emergency and need to call 911, notify the dispatch personnel that you have, or are being evaluated for COVID-19. If possible, put on a facemask before emergency medical services arrive.        Recommended precautions for household members, intimate partners, and caregivers in a home setting of a patient with symptomatic laboratory-confirmed COVID-19 or a patient under investigation.  Household members, intimate partners, and caregivers in the home setting awaiting tests results have close contact with a person with symptomatic, laboratory-confirmed COVID-19 or a person under investigation. Close contacts should monitor their health; they should call their provider right away if they develop symptoms suggestive of COVID-19 (e.g., fever, cough, shortness of breath).    Close contacts should also follow these recommendations:   Make sure that you understand and can help the patient follow their provider's instructions for medication(s) and care. You should help the patient with basic needs in the home and provide support for getting groceries, prescriptions, and other personal needs.   Monitor the patient's symptoms. If the patient is getting sicker, call his or her healthcare provider and tell them that the patient has laboratory-confirmed COVID-19. If the patient has a medical emergency and you need to call 911, notify the dispatch personnel that the patient has, or is being evaluated for COVID-19.   Household members should stay in another room or be  from the patient. Household members should use a separate bedroom and bathroom, if available.   Prohibit visitors.   Household members should care for any pets in the home.   Make sure that shared spaces in the home have good air flow, such as by an air conditioner or an opened window, weather  permitting.   Perform hand hygiene frequently. Wash your hands often with soap and water for at least 20 seconds or use an alcohol-based hand  (that contains > 60% alcohol) covering all surfaces of your hands and rubbing them together until they feel dry. Soap and water should be used preferentially.   Avoid touching your eyes, nose, and mouth.   The patient should wear a facemask. If the patient is not able to wear a facemask (for example, because it causes trouble breathing), caregivers should wear a mask when they are in the same room as the patient.   Wear a disposable facemask and gloves when you touch or have contact with the patient's blood, stool, or body fluids, such as saliva, sputum, nasal mucus, vomit, urine.  o Throw out disposable facemasks and gloves after using them. Do not reuse.  o When removing personal protective equipment, first remove and dispose of gloves. Then, immediately clean your hands with soap and water or alcohol-based hand . Next, remove and dispose of facemask, and immediately clean your hands again with soap and water or alcohol-based hand .   You should not share dishes, drinking glasses, cups, eating utensils, towels, bedding, or other items with the patient. After the patient uses these items, you should wash them thoroughly (see below Wash laundry thoroughly).   Clean all high-touch surfaces, such as counters, tabletops, doorknobs, bathroom fixtures, toilets, phones, keyboards, tablets, and bedside tables, every day. Also, clean any surfaces that may have blood, stool, or body fluids on them.   Use a household cleaning spray or wipe, according to the label instructions. Labels contain instructions for safe and effective use of the cleaning product including precautions you should take when applying the product, such as wearing gloves and making sure you have good ventilation during use of the product.   Wash laundry  thoroughly.  o Immediately remove and wash clothes or bedding that have blood, stool, or body fluids on them.  o Wear disposable gloves while handling soiled items and keep soiled items away from your body. Clean your hands (with soap and water or an alcohol-based hand ) immediately after removing your gloves.  o Read and follow directions on labels of laundry or clothing items and detergent. In general, using a normal laundry detergent according to washing machine instructions and dry thoroughly using the warmest temperatures recommended on the clothing label.   Place all used disposable gloves, facemasks, and other contaminated items in a lined container before disposing of them with other household waste. Clean your hands (with soap and water or an alcohol-based hand ) immediately after handling these items. Soap and water should be used preferentially if hands are visibly dirty.   Discuss any additional questions with your state or local health department or healthcare provider. Check available hours when contacting your local health department.    For more information see CDC link below.      https://www.cdc.gov/coronavirus/2019-ncov/hcp/guidance-prevent-spread.html#precautions        Sources:  [facility], [facility] and [facility]      Gastroesophageal reflux disease without esophagitis  -     famotidine (PEPCID) 40 MG tablet; Take 0.5 tablets (20 mg total) by mouth 2 (two) times daily as needed for Heartburn.  Dispense: 15 tablet; Refill: 2  Follow up with GI           No follow-ups on file.

## 2024-02-05 DIAGNOSIS — D84.9 IMMUNOSUPPRESSION: ICD-10-CM

## 2024-02-05 DIAGNOSIS — M10.9 GOUT, ARTHRITIS: ICD-10-CM

## 2024-02-05 DIAGNOSIS — M51.36 DDD (DEGENERATIVE DISC DISEASE), LUMBAR: ICD-10-CM

## 2024-02-05 DIAGNOSIS — M05.79 RHEUMATOID ARTHRITIS INVOLVING MULTIPLE SITES WITH POSITIVE RHEUMATOID FACTOR: ICD-10-CM

## 2024-02-05 DIAGNOSIS — K21.9 GASTROESOPHAGEAL REFLUX DISEASE: ICD-10-CM

## 2024-02-05 DIAGNOSIS — Z79.1 NSAID LONG-TERM USE: ICD-10-CM

## 2024-02-06 RX ORDER — NABUMETONE 500 MG/1
TABLET, FILM COATED ORAL
Qty: 180 TABLET | Refills: 0 | Status: SHIPPED | OUTPATIENT
Start: 2024-02-06

## 2024-03-08 ENCOUNTER — TELEPHONE (OUTPATIENT)
Dept: OBSTETRICS AND GYNECOLOGY | Facility: CLINIC | Age: 79
End: 2024-03-08
Payer: MEDICARE

## 2024-03-08 ENCOUNTER — OFFICE VISIT (OUTPATIENT)
Dept: OBSTETRICS AND GYNECOLOGY | Facility: CLINIC | Age: 79
End: 2024-03-08
Payer: MEDICARE

## 2024-03-08 VITALS
DIASTOLIC BLOOD PRESSURE: 70 MMHG | BODY MASS INDEX: 32.54 KG/M2 | SYSTOLIC BLOOD PRESSURE: 130 MMHG | WEIGHT: 177.94 LBS

## 2024-03-08 DIAGNOSIS — N89.8 VAGINAL DISCHARGE: ICD-10-CM

## 2024-03-08 DIAGNOSIS — L30.4 INTERTRIGO: ICD-10-CM

## 2024-03-08 DIAGNOSIS — N64.4 BREAST PAIN: ICD-10-CM

## 2024-03-08 DIAGNOSIS — Z01.419 WELL WOMAN EXAM WITH ROUTINE GYNECOLOGICAL EXAM: Primary | ICD-10-CM

## 2024-03-08 DIAGNOSIS — B00.9 HERPES: ICD-10-CM

## 2024-03-08 PROCEDURE — 81514 NFCT DS BV&VAGINITIS DNA ALG: CPT | Performed by: OBSTETRICS & GYNECOLOGY

## 2024-03-08 PROCEDURE — 99214 OFFICE O/P EST MOD 30 MIN: CPT | Mod: PBBFAC,25 | Performed by: OBSTETRICS & GYNECOLOGY

## 2024-03-08 PROCEDURE — G0101 CA SCREEN;PELVIC/BREAST EXAM: HCPCS | Mod: S$PBB,,, | Performed by: OBSTETRICS & GYNECOLOGY

## 2024-03-08 PROCEDURE — 99999 PR PBB SHADOW E&M-EST. PATIENT-LVL IV: CPT | Mod: PBBFAC,,, | Performed by: OBSTETRICS & GYNECOLOGY

## 2024-03-08 PROCEDURE — G0101 CA SCREEN;PELVIC/BREAST EXAM: HCPCS | Mod: PBBFAC | Performed by: OBSTETRICS & GYNECOLOGY

## 2024-03-08 RX ORDER — NYSTATIN AND TRIAMCINOLONE ACETONIDE 100000; 1 [USP'U]/G; MG/G
CREAM TOPICAL
Qty: 30 G | Refills: 1 | Status: SHIPPED | OUTPATIENT
Start: 2024-03-08 | End: 2024-03-26

## 2024-03-08 RX ORDER — NYSTATIN 100000 [USP'U]/G
POWDER TOPICAL 2 TIMES DAILY
Qty: 30 G | Refills: 1 | Status: SHIPPED | OUTPATIENT
Start: 2024-03-08 | End: 2024-03-26

## 2024-03-08 RX ORDER — VALACYCLOVIR HYDROCHLORIDE 500 MG/1
TABLET, FILM COATED ORAL
Qty: 180 TABLET | Refills: 2 | Status: SHIPPED | OUTPATIENT
Start: 2024-03-08 | End: 2024-03-26

## 2024-03-08 NOTE — TELEPHONE ENCOUNTER
Pt call was returned. Lvm. Pharmacy will give her  a call when medications are ready.          ----- Message from Joe Somers sent at 3/8/2024 12:07 PM CST -----  Can the clinic reply in [facility]:           Please refill the medication(s) listed below. Please call the patient when the prescription(s) is ready for  at this phone number   Telephone Information:  Mobile          [phone]           PT WANTS TO CHANGE PHARMACY     Medication #1valACYclovir (VALTREX) 500 MG tablet       nystatin (MYCOSTATIN) powder         nystatin-triamcinolone (MYCOLOG II) cream    Preferred Pharmacy:       [facility] - [address]  [address] [address]  Phone: [phone] Fax: [phone]

## 2024-03-08 NOTE — PROGRESS NOTES
History & Physical  Gynecology      SUBJECTIVE:     Chief Complaint: Annual Exam       History of Present Illness:  Annual Exam-Postmenopausal  Ms. Peralta is a 78/ y/o female who presents for annual exam. The patient reports that she has breast pain shooting through her left breast. She also reports irritation under her breast and underneath her stomach.  She patient is not sexually active. GYN screening history: last mammogram: was normal 2023.    The patient is not taking hormone replacement therapy. Patient denies post-menopausal vaginal bleeding. The patient wears seatbelts: yes. The patient participates in regular exercise: no. Has the patient ever been transfused or tattooed?: no. The patient reports that there is not domestic violence in her life.      Review of patient's allergies indicates:   Allergen Reactions    No known drug allergies        Past Medical History:   Diagnosis Date    Abnormal colonoscopy 07/24/2020    colon polyps nad repeat in 3 years.     Acid reflux     Anemia     Anxiety     Arthritis     Blood transfusion     Cataract     Depression     Dry eyes     H/O colonoscopy 07/22/2020    dr. nicholas. repeat in 3 years.     Heart murmur     Hypertension     Left lumbar radiculitis 5/19/2015    Mixed hyperlipidemia 11/5/2021    Multiple thyroid nodules 12/18/2012    Osteoporosis     Rheumatoid arthritis     Thoracic or lumbosacral neuritis or radiculitis, unspecified 10/30/2013     Past Surgical History:   Procedure Laterality Date    CATARACT EXTRACTION W/  INTRAOCULAR LENS IMPLANT Left 1/26/2021    Procedure: EXTRACTION, CATARACT, WITH IOL INSERTION;  Surgeon: Elvis Pete MD;  Location: [facility];  Service: Ophthalmology;  Laterality: Left;    CATARACT EXTRACTION W/  INTRAOCULAR LENS IMPLANT Right 2/23/2021    Procedure: EXTRACTION, CATARACT, WITH IOL INSERTION;  Surgeon: Elvis Pete MD;  Location: [facility];  Service: Ophthalmology;  Laterality: Right;    CEREBRAL  ANGIOGRAM N/A 2020    Procedure: ANGIOGRAM-CEREBRAL;  Surgeon: Hira Diagnostic Provider;  [facility];  Service: Radiology;  Laterality: N/A;  /[place]    gallstones  [id number]    HYSTERECTOMY      JOINT REPLACEMENT  [id number]     bilateral knee    OOPHORECTOMY      DC REMOVAL OF OVARY/TUBE(S)      TONSILLECTOMY       OB History          2    Para   2    Term   2            AB        Living             SAB        IAB        Ectopic        Multiple        Live Births                   Family History   Problem Relation Age of Onset    Heart failure Mother     Cataracts Mother     Hypertension Mother     Colon cancer Mother     Colon cancer Father     Hypertension Sister     Liver cancer Sister     Glaucoma Brother     Lung cancer Brother     Colon cancer Brother     No Known Problems Maternal Grandmother     No Known Problems Maternal Grandfather     No Known Problems Paternal Grandmother     No Known Problems Paternal Grandfather     No Known Problems Daughter     No Known Problems Son     Breast cancer Maternal Aunt     No Known Problems Maternal Uncle     No Known Problems Paternal Aunt     No Known Problems Paternal Uncle     No Known Problems Other     Lupus Neg Hx     Rheum arthritis Neg Hx     Psoriasis Neg Hx     Amblyopia Neg Hx     Blindness Neg Hx     Cancer Neg Hx     Diabetes Neg Hx     Macular degeneration Neg Hx     Retinal detachment Neg Hx     Strabismus Neg Hx     Stroke Neg Hx     Thyroid disease Neg Hx      Social History     Tobacco Use    Smoking status: Former     Current packs/day: 0.25     Average packs/day: 0.3 packs/day for 1 year (0.3 ttl pk-yrs)     Types: Cigarettes    Smokeless tobacco: Never   Substance Use Topics    Alcohol use: Yes     Alcohol/week: 2.0 standard drinks of alcohol     Types: 1 Glasses of wine, 1 Cans of beer per week     Comment: very seldom    Drug use: No       Current Outpatient Medications   Medication Sig    amLODIPine  (NORVASC) 5 MG tablet Take 1 tablet (5 mg total) by mouth once daily.    aspirin (ECOTRIN) 81 MG EC tablet Take 81 mg by mouth once daily.    atorvastatin (LIPITOR) 40 MG tablet Take 1 tablet (40 mg total) by mouth once daily.    clobetasoL (TEMOVATE) 0.05 % external solution Apply topically 2 (two) times daily.    CYTOTEC 100 mcg Tab TAKE ONE TABLET BY MOUTH TWICE A DAY    famotidine (PEPCID) 40 MG tablet Take 0.5 tablets (20 mg total) by mouth 2 (two) times daily as needed for Heartburn.    fluticasone (FLONASE) 50 mcg/actuation nasal spray 1 spray (50 mcg total) by Each Nare route once daily.    fluticasone propionate (FLONASE) 50 mcg/actuation nasal spray 1 spray (50 mcg total) by Each Nostril route 2 (two) times daily as needed.    folic acid (FOLVITE) 1 MG tablet Take 2 tablets (2,000 mcg total) by mouth once daily.    furosemide (LASIX) 40 MG tablet Take 1 tablet (40 mg total) by mouth once daily.    hydrocortisone 2.5 % cream as needed.     irbesartan (AVAPRO) 300 MG tablet TAKE ONE TABLET BY MOUTH EVERY DAY IN THE EVENING    KGCL ketamine 10%- gabapentin 6%- cyclobenzaprine 2%- LIDOcaine 4% in transdermal cream APPLY 2 GRAMS 3-4 TIMES DAILY FOR TREATMENT OF PAIN    methotrexate 2.5 MG Tab TAKE FIVE TABLETS BY MOUTH TWICE A DAY ONCE A WEEK ON MONDAY IN THE MORNING AND IN THE EVENING    MOTEGRITY 2 mg Tab Take 1 tablet by mouth.    nabumetone (RELAFEN) 500 MG tablet TAKE ONE TABLET BY MOUTH TWICE A DAY WITH FOOD    nystatin-triamcinolone (MYCOLOG II) cream Apply to affected area 2 times daily    pantoprazole (PROTONIX) 40 MG tablet Take 1 tablet (40 mg total) by mouth once daily.    semaglutide (OZEMPIC) 0.25 mg or 0.5 mg (2 mg/3 mL) pen injector Inject 0.5 mg into the skin every 7 days.    triamcinolone acetonide 0.1% (KENALOG) 0.1 % cream Apply topically 3 (three) times daily.    valACYclovir (VALTREX) 500 MG tablet 1 Tablet DAILY for suppression, increase to BID for outbreak     No current  facility-administered medications for this visit.     Facility-Administered Medications Ordered in Other Visits   Medication    cyclopentolate 1% ophthalmic solution 1 drop    ofloxacin 0.3 % ophthalmic solution 1 drop    sodium chloride 0.9% flush 10 mL         Review of Systems:  Review of Systems   Constitutional:  Negative for chills and fever.   Eyes:  Negative for visual disturbance.   Respiratory:  Negative for cough and wheezing.    Cardiovascular:  Negative for chest pain and palpitations.   Gastrointestinal:  Negative for abdominal pain, nausea and vomiting.   Genitourinary:  Negative for dysuria, frequency, hematuria, pelvic pain, vaginal bleeding, vaginal discharge and vaginal pain.   Integumentary:  Positive for rash.   Neurological:  Negative for headaches.   Psychiatric/Behavioral:  Negative for depression.    Breast: Positive for mastodynia.       OBJECTIVE:     Physical Exam:  Physical Exam  Vitals and nursing note reviewed. Exam conducted with a chaperone present.   Constitutional:       Appearance: She is well-developed.   Cardiovascular:      Rate and Rhythm: Normal rate.   Pulmonary:      Effort: Pulmonary effort is normal. No respiratory distress.   Chest:   Breasts:     Breasts are symmetrical.       Abdominal:      General: There is no distension.      Palpations: Abdomen is soft.      Tenderness: There is no abdominal tenderness.       Skin:     General: Skin is warm and dry.   Neurological:      Mental Status: She is alert and oriented to person, place, and time.           ASSESSMENT:     No diagnosis found.       Plan:      Yulia was seen today for annual exam.    Diagnoses and all orders for this visit:    Well woman exam with routine gynecological exam  -     Mammo Digital Screening Bilat w/ Edilberto; Future    Breast pain  -     Mammo Digital Screening Bilat w/ Edilberto; Future  -     Mammo Digital Diagnostic Bilat with Edilberto; Future    Vaginal discharge  -     Vaginosis Screen by DNA  Probe    Intertrigo  -     nystatin-triamcinolone (MYCOLOG II) cream; Apply to affected area 2 times daily  -     nystatin (MYCOSTATIN) powder; Apply topically 2 (two) times daily.    Herpes  -     valACYclovir (VALTREX) 500 MG tablet; 1 Tablet DAILY for suppression, increase to BID for outbreak        Orders Placed This Encounter   Procedures    Vaginosis Screen by DNA Probe    Mammo Digital Screening Bilat w/ Edilberto    Mammo Digital Diagnostic Bilat with Edilberto       Follow up in about 2 years (around 3/8/2026) for Well Woman/Annual.     Counseling time: 15 minutes    Shanti Gutierrez

## 2024-03-11 ENCOUNTER — OFFICE VISIT (OUTPATIENT)
Dept: CARDIOLOGY | Facility: CLINIC | Age: 79
End: 2024-03-11
Payer: MEDICARE

## 2024-03-11 VITALS
BODY MASS INDEX: 33.31 KG/M2 | HEART RATE: 59 BPM | HEIGHT: 62 IN | DIASTOLIC BLOOD PRESSURE: 71 MMHG | WEIGHT: 181 LBS | SYSTOLIC BLOOD PRESSURE: 147 MMHG

## 2024-03-11 DIAGNOSIS — M54.17 LUMBOSACRAL RADICULOPATHY: ICD-10-CM

## 2024-03-11 DIAGNOSIS — I73.9 PAD (PERIPHERAL ARTERY DISEASE): ICD-10-CM

## 2024-03-11 DIAGNOSIS — I65.23 BILATERAL CAROTID ARTERY STENOSIS: ICD-10-CM

## 2024-03-11 DIAGNOSIS — I73.9 PERIPHERAL VASCULAR DISEASE, UNSPECIFIED: ICD-10-CM

## 2024-03-11 DIAGNOSIS — M47.816 LUMBAR SPONDYLOSIS: ICD-10-CM

## 2024-03-11 DIAGNOSIS — M51.36 DDD (DEGENERATIVE DISC DISEASE), LUMBAR: ICD-10-CM

## 2024-03-11 DIAGNOSIS — E78.2 MIXED HYPERLIPIDEMIA: ICD-10-CM

## 2024-03-11 DIAGNOSIS — M48.062 SPINAL STENOSIS, LUMBAR REGION, WITH NEUROGENIC CLAUDICATION: ICD-10-CM

## 2024-03-11 DIAGNOSIS — I77.1 TORTUOUS AORTA: ICD-10-CM

## 2024-03-11 DIAGNOSIS — I10 ESSENTIAL HYPERTENSION: ICD-10-CM

## 2024-03-11 DIAGNOSIS — E66.01 SEVERE OBESITY (BMI 35.0-39.9) WITH COMORBIDITY: ICD-10-CM

## 2024-03-11 DIAGNOSIS — R07.89 OTHER CHEST PAIN: Primary | ICD-10-CM

## 2024-03-11 DIAGNOSIS — E66.9 OBESITY (BMI 30-39.9): ICD-10-CM

## 2024-03-11 DIAGNOSIS — I73.9 CLAUDICATION OF BOTH LOWER EXTREMITIES: ICD-10-CM

## 2024-03-11 PROCEDURE — 99215 OFFICE O/P EST HI 40 MIN: CPT | Mod: S$PBB,,, | Performed by: INTERNAL MEDICINE

## 2024-03-11 PROCEDURE — 99214 OFFICE O/P EST MOD 30 MIN: CPT | Mod: PBBFAC,PO | Performed by: INTERNAL MEDICINE

## 2024-03-11 PROCEDURE — 99999 PR PBB SHADOW E&M-EST. PATIENT-LVL IV: CPT | Mod: PBBFAC,,, | Performed by: INTERNAL MEDICINE

## 2024-03-11 NOTE — PATIENT INSTRUCTIONS
Assessment/Plan:  Yulia Peralta is a 78 y.o. female with PAD, HTN, HLD, Rheumatoid arthritis, lumbar degenerative disc disease, severe obesity, who presents for a follow up appointment.     1. Chest Pain- Pt with risk factors for CAD.  Check echo and nuclear stress test.      2. Pain/ with numbness/tingling in both legs and feet- ONESIMO Study on 8/13/2021 revealed normal rest and exercise ONESIMO bilaterally.  CTA Abd/Pelvis with Iliofemoral Runoff on 8/13/2021 revealed mild scattered atherosclerotic plaque.  Probable stenosis of the proximal left posterior tibial artery estimated 50%.  Otherwise arteries of the abdomen and lower extremities demonstrate no significant stenosis.  MRI Lumbar Spine 4/28/2021 revealed severe degenerative disc disease at all levels of the lumbar spine.  Severe canal stenosis at L4-5 and moderate to severe canal stenosis at L5-S1.  Hence, symptoms are likely due to severe lumbar disc disease, and not flow limiting PAD.  Continue management of lumbar disc disease per Neurosurgery.      3. PAD- Pt has no claudication or tissue loss.  CTA abd/pelvis as above.  Echo from 2/4/2022 revealed EF 65%; grade I left ventricular diastolic dysfunction; severe left atrial enlargement; moderate right atrial enlargement; mild mitral regurgitation; normal central venous pressure (3 mmHg); estimated PA systolic pressure is 43 mmHg.  Continue ASA 81 mg daily and atorvastatin 40 mg daily.      4. Obesity- Encourage diet, exercise and weight loss.     5. Carotid Artery Disease- Carotid Ultrasound on 10/8/2021 20-39% right internal carotid artery stenosis.  There is 40-49% left internal carotid artery stenosis.  Continue ASA 81 mg daily and atorvastatin 40 mg daily.     6. Left Foot Pain- Likely due to degenerative/structural abnormalities.  Check MRI left foot/ankle.  Refer to Podiatry for evaluation.    7. Gait Instability- Refer to physical therapy.      8. HLD- LDL at goal of <70.  Continue ASA 81 mg daily  and atorvastatin 40 mg daily.    9. HTN- Stable.  Continue current medications.    10. Pulmonary HTN- Pt is asymptomatic.  Continue current medications.     Follow up in 4 months

## 2024-03-11 NOTE — PROGRESS NOTES
"[facility]      Chief Complaint   Patient presents with    Pain in both lower legs       Patient ID: Yulia Peralta is a 78 y.o. female with PAD, HTN, Rheumatoid arthritis, lumbar degenerative disc disease, obesity, who presents for a follow up appointment.  Pertinent history/events are as follows:     -Pt presents for evaluation of pain, numbness/tingling in both legs and feet.      -At our initial clinic visit on 8/3/2021, Mrs. Peralta reports pain, numbness/tingling in both legs and feet.  States symptoms are "constant, but improve with walking".  She has no tissue loss.  Plan:   Pain/ with numbness/tingling in both legs and feet- Etiology likely multifactorial with contributions from degenerative joint disease, PAD, neuropathic pain, and RA.  MRI Lumbar Spine 4/28/2021 revealed severe degenerative disc disease at all levels of the lumbar spine.  Severe canal stenosis at L4-5 and moderate to severe canal stenosis at L5-S1.  Check exercise ONESIMO and CTA abd/pelvis with iliofemoral runoff.  Pt to elevate legs when resting.  Refer to Neurosurgery for evaluation.    Obesity- Encourage diet, exercise and weight loss.     -At follow up clinic visit on 11/5/2021, Mrs. Peralta reported continued pain, numbness/tingling in both legs and feet as described at clinic visit on 8/3/2021.  ONESIMO Study on 8/13/2021 revealed normal rest and exercise ONESIMO bilaterally.  Normal PVR waveforms bilaterally.CTA Abd/Pelvis with Iliofemoral Runoff on 8/13/2021 revealed mild scattered atherosclerotic plaque.  Probable stenosis of the proximal left posterior tibial artery estimated 50%.  Otherwise arteries of the abdomen and lower extremities demonstrate no significant stenosis.  Plan:   Pain/ with numbness/tingling in both legs and feet- ONESIMO Study on 8/13/2021 revealed normal rest and exercise ONESIMO bilaterally.  CTA Abd/Pelvis with Iliofemoral Runoff on 8/13/2021 revealed mild scattered atherosclerotic plaque.  Probable " stenosis of the proximal left posterior tibial artery estimated 50%.  Otherwise arteries of the abdomen and lower extremities demonstrate no significant stenosis.  MRI Lumbar Spine 4/28/2021 revealed severe degenerative disc disease at all levels of the lumbar spine.  Severe canal stenosis at L4-5 and moderate to severe canal stenosis at L5-S1.  Hence, symptoms are likely due to severe lumbar disc disease, and not flow limiting PAD.  Continue management of lumbar disc disease per Neurosurgery.    PAD- Pt has no claudication or tissue loss.  CTA abd/pelvis as above.  Check echo.  Start ASA 81 mg daily and atorvastatin 40 mg daily.    Obesity- Encourage diet, exercise and weight loss.   Carotid Artery Disease- Carotid Ultrasound on 10/8/2021 20-39% right internal carotid artery stenosis.  There is 40-49% left internal carotid artery stenosis.  Start ASA 81 mg daily and atorvastatin 40 mg daily.     -2/10/2022 clinic visit: Mrs. Peralta reports no chest pain, SOB, LE edema, TIA symptoms or syncope.  Her main complaint today is severe pain in left foot when bearing weight on it.  She has no claudication or tissue loss.  Echo from 2/4/2022 revealed EF 65%; grade I left ventricular diastolic dysfunction; severe left atrial enlargement; moderate right atrial enlargement; mild mitral regurgitation; normal central venous pressure (3 mmHg); estimated PA systolic pressure is 43 mmHg.  Plan:   Pain/ with numbness/tingling in both legs and feet- ONESIMO Study on 8/13/2021 revealed normal rest and exercise ONESIMO bilaterally.  CTA Abd/Pelvis with Iliofemoral Runoff on 8/13/2021 revealed mild scattered atherosclerotic plaque.  Probable stenosis of the proximal left posterior tibial artery estimated 50%.  Otherwise arteries of the abdomen and lower extremities demonstrate no significant stenosis.  MRI Lumbar Spine 4/28/2021 revealed severe degenerative disc disease at all levels of the lumbar spine.  Severe canal stenosis at L4-5 and  moderate to severe canal stenosis at L5-S1.  Hence, symptoms are likely due to severe lumbar disc disease, and not flow limiting PAD.  Continue management of lumbar disc disease per Neurosurgery.    PAD- Pt has no claudication or tissue loss.  CTA abd/pelvis as above.  Echo from 2/4/2022 revealed EF 65%; grade I left ventricular diastolic dysfunction; severe left atrial enlargement; moderate right atrial enlargement; mild mitral regurgitation; normal central venous pressure (3 mmHg); estimated PA systolic pressure is 43 mmHg.  Continue ASA 81 mg daily and atorvastatin 40 mg daily.    Obesity- Encourage diet, exercise and weight loss.   Carotid Artery Disease- Carotid Ultrasound on 10/8/2021 20-39% right internal carotid artery stenosis.  There is 40-49% left internal carotid artery stenosis.  Continue ASA 81 mg daily and atorvastatin 40 mg daily.   Left Foot Pain- Likely due to degenerative/structural abnormalities.  Check MRI left foot/ankle.  Refer to Podiatry for evaluation.  Gait Instability- Refer to physical therapy.    HLD- LDL at goal of <70.  Continue ASA 81 mg daily and atorvastatin 40 mg daily.  HTN- Stable.  Continue current medications.  Pulmonary HTN- Pt is asymptomatic.  Continue current medications.    HPI:  Mrs. Peralta reports chest pain which started 1 month ago.  Chest pain usually occurs at rest, left chest area, non-radiating, 3/10 in intensity, lasting 10 minutes with no n/v/d.  She reports numbness in both shins.  No classic claudication symptoms.      Past Medical History:   Diagnosis Date    Abnormal colonoscopy 07/24/2020    colon polyps nad repeat in 3 years.     Acid reflux     Anemia     Anxiety     Arthritis     Blood transfusion     Cataract     Depression     Dry eyes     H/O colonoscopy 07/22/2020    dr. nicholas. repeat in 3 years.     Heart murmur     Hypertension     Left lumbar radiculitis 5/19/2015    Mixed hyperlipidemia 11/5/2021    Multiple thyroid nodules 12/18/2012     Osteoporosis     Rheumatoid arthritis     Thoracic or lumbosacral neuritis or radiculitis, unspecified 10/30/2013     Past Surgical History:   Procedure Laterality Date    CATARACT EXTRACTION W/  INTRAOCULAR LENS IMPLANT Left 1/26/2021    Procedure: EXTRACTION, CATARACT, WITH IOL INSERTION;  Surgeon: Elvis Pete MD;  Location: [facility];  Service: Ophthalmology;  Laterality: Left;    CATARACT EXTRACTION W/  INTRAOCULAR LENS IMPLANT Right 2/23/2021    Procedure: EXTRACTION, CATARACT, WITH IOL INSERTION;  Surgeon: Elvis Pete MD;  [place] [facility], [place] by [facility] OR;  Service: Ophthalmology;  Laterality: Right;    CEREBRAL ANGIOGRAM N/A 1/23/2020    Procedure: ANGIOGRAM-CEREBRAL;  Surgeon: Dosc Diagnostic Provider;  Location: [facility] OR [address] [place] [address];  Service: Radiology;  Laterality: N/A;  /[place]    gallstones  [id number]    HYSTERECTOMY      JOINT REPLACEMENT  [id number]     bilateral knee    OOPHORECTOMY      MO REMOVAL OF OVARY/TUBE(S)      TONSILLECTOMY       Social History     Socioeconomic History    Marital status:      Spouse name: Ric     Number of children: 2    Highest education level: Some college, no degree   Occupational History    Occupation: retired    Tobacco Use    Smoking status: Former     Current packs/day: 0.25     Average packs/day: 0.3 packs/day for 1 year (0.3 ttl pk-yrs)     Types: Cigarettes    Smokeless tobacco: Never   Substance and Sexual Activity    Alcohol use: Yes     Alcohol/week: 2.0 standard drinks of alcohol     Types: 1 Glasses of wine, 1 Cans of beer per week     Comment: very seldom    Drug use: No    Sexual activity: Not Currently     Partners: Male   Social History Narrative    Adult Screenings updated and reviewed  6/12/14    Mammogram( for females) ordered for DIS    Pap ( for females) Dr Zepeda  Due for f/u   For  2014    Colonoscopy  Done once    Flu shot yearly done  2013    Td 2005    Pneumovax  Updated  12/3/13    Zostavax done 2012    Eye exam recommended  yearly done 2013    Bone density  12/9/13    Thyroid ultrasound  11/6/2012 Normal sized thyroid containing several subcentimeter nodules, similar to the 5/12/11 exam.  No concerning nodules identified..          Social Determinants of Health     Financial Resource Strain: Low Risk  (3/27/2023)    Overall Financial Resource Strain (CARDIA)     Difficulty of Paying Living Expenses: Not hard at all   Food Insecurity: No Food Insecurity (3/27/2023)    Hunger Vital Sign     Worried About Running Out of Food in the Last Year: Never true     Ran Out of Food in the Last Year: Never true   Transportation Needs: No Transportation Needs (3/27/2023)    PRAPARE - Transportation     Lack of Transportation (Medical): No     Lack of Transportation (Non-Medical): No   Physical Activity: Inactive (3/27/2023)    Exercise Vital Sign     Days of Exercise per Week: 0 days     Minutes of Exercise per Session: 0 min   Stress: No Stress Concern Present (3/27/2023)    [employer] [place] [employer] - Occupational Stress Questionnaire     Feeling of Stress : Only a little   Social Connections: Socially Integrated (3/27/2023)    Social Connection and Isolation Panel [NHANES]     Frequency of Communication with Friends and Family: More than three times a week     Frequency of Social Gatherings with Friends and Family: More than three times a week     Attends Hindu Services: More than 4 times per year     Active Member of Clubs or Organizations: Yes     Attends Club or Organization Meetings: More than 4 times per year     Marital Status:    Housing Stability: Unknown (3/27/2023)    Housing Stability Vital Sign     Unable to Pay for Housing in the Last Year: No     Unstable Housing in the Last Year: No     Family History   Problem Relation Age of Onset    Heart failure Mother     Cataracts Mother     Hypertension Mother     Colon cancer Mother     Colon cancer Father     Hypertension Sister     Liver cancer Sister      Glaucoma Brother     Lung cancer Brother     Colon cancer Brother     No Known Problems Maternal Grandmother     No Known Problems Maternal Grandfather     No Known Problems Paternal Grandmother     No Known Problems Paternal Grandfather     No Known Problems Daughter     No Known Problems Son     Breast cancer Maternal Aunt     No Known Problems Maternal Uncle     No Known Problems Paternal Aunt     No Known Problems Paternal Uncle     No Known Problems Other     Lupus Neg Hx     Rheum arthritis Neg Hx     Psoriasis Neg Hx     Amblyopia Neg Hx     Blindness Neg Hx     Cancer Neg Hx     Diabetes Neg Hx     Macular degeneration Neg Hx     Retinal detachment Neg Hx     Strabismus Neg Hx     Stroke Neg Hx     Thyroid disease Neg Hx        Review of patient's allergies indicates:   Allergen Reactions    No known drug allergies        Medication List with Changes/Refills   Current Medications    AMLODIPINE (NORVASC) 5 MG TABLET    Take 1 tablet (5 mg total) by mouth once daily.    ASPIRIN (ECOTRIN) 81 MG EC TABLET    Take 81 mg by mouth once daily.    ATORVASTATIN (LIPITOR) 40 MG TABLET    Take 1 tablet (40 mg total) by mouth once daily.    CLOBETASOL (TEMOVATE) 0.05 % EXTERNAL SOLUTION    Apply topically 2 (two) times daily.    CYTOTEC 100 MCG TAB    TAKE ONE TABLET BY MOUTH TWICE A DAY    FAMOTIDINE (PEPCID) 40 MG TABLET    Take 0.5 tablets (20 mg total) by mouth 2 (two) times daily as needed for Heartburn.    FLUTICASONE (FLONASE) 50 MCG/ACTUATION NASAL SPRAY    1 spray (50 mcg total) by Each Nare route once daily.    FOLIC ACID (FOLVITE) 1 MG TABLET    Take 2 tablets (2,000 mcg total) by mouth once daily.    FUROSEMIDE (LASIX) 40 MG TABLET    Take 1 tablet (40 mg total) by mouth once daily.    HYDROCORTISONE 2.5 % CREAM    as needed.     IRBESARTAN (AVAPRO) 300 MG TABLET    TAKE ONE TABLET BY MOUTH EVERY DAY IN THE EVENING    KGCL KETAMINE 10%- GABAPENTIN 6%- CYCLOBENZAPRINE 2%- LIDOCAINE 4% IN TRANSDERMAL CREAM     "APPLY 2 GRAMS 3-4 TIMES DAILY FOR TREATMENT OF PAIN    METHOTREXATE 2.5 MG TAB    TAKE FIVE TABLETS BY MOUTH TWICE A DAY ONCE A WEEK ON MONDAY IN THE MORNING AND IN THE EVENING    MOTEGRITY 2 MG TAB    Take 1 tablet by mouth.    NABUMETONE (RELAFEN) 500 MG TABLET    TAKE ONE TABLET BY MOUTH TWICE A DAY WITH FOOD    NYSTATIN (MYCOSTATIN) POWDER    Apply topically 2 (two) times daily.    NYSTATIN-TRIAMCINOLONE (MYCOLOG II) CREAM    Apply to affected area 2 times daily    PANTOPRAZOLE (PROTONIX) 40 MG TABLET    Take 1 tablet (40 mg total) by mouth once daily.    TRIAMCINOLONE ACETONIDE 0.1% (KENALOG) 0.1 % CREAM    Apply topically 3 (three) times daily.    VALACYCLOVIR (VALTREX) 500 MG TABLET    1 Tablet DAILY for suppression, increase to BID for outbreak   Discontinued Medications    FLUTICASONE PROPIONATE (FLONASE) 50 MCG/ACTUATION NASAL SPRAY    1 spray (50 mcg total) by Each Nostril route 2 (two) times daily as needed.    SEMAGLUTIDE (OZEMPIC) 0.25 MG OR 0.5 MG (2 MG/3 ML) PEN INJECTOR    Inject 0.5 mg into the skin every 7 days.       Review of Systems  Constitution: Denies chills, fever, and sweats.  HENT: Denies headaches or blurry vision.  Cardiovascular: Denies chest pain or irregular heart beat.  Respiratory: Denies cough or shortness of breath.  Gastrointestinal: Denies abdominal pain, nausea, or vomiting.  Musculoskeletal: Positive for left foot pain.  Neurological: Denies dizziness or focal weakness.  Psychiatric/Behavioral: Normal mental status.  Hematologic/Lymphatic: Denies bleeding problem or easy bruising/bleeding.  Skin: Denies rash or suspicious lesions    Physical Examination  BP (!) 147/71   Pulse (!) 59   Ht 5' 2" (1.575 m)   Wt 82.1 kg (181 lb)   BMI 33.10 kg/m²     Constitutional: No acute distress, conversant  HEENT: Sclera anicteric, Pupils equal, round and reactive to light, extraocular motions intact, Oropharynx clear  Neck: No JVD, no carotid bruits  Cardiovascular: regular rate and " rhythm, no murmur, rubs or gallops, normal S1/S2  Pulmonary: Clear to auscultation bilaterally  Abdominal: Abdomen soft, nontender, nondistended, positive bowel sounds  Extremities: No lower extremity edema,   Pulses:  Carotid pulses are 2+ on the right side, and 2+ on the left side.  Radial pulses are 2+ on the right side, and 2+ on the left side.   Femoral pulses are 2+ on the right side, and 2+ on the left side.  Popliteal pulses are 2+ on the right side, and 2+ on the left side.   Dorsalis pedis pulses are 2+ on the right side, and 2+ on the left side.   Posterior tibial pulses are 2+ on the right side, and 2+ on the left side.    Skin: No ecchymosis, erythema, or ulcers  Psych: Alert and oriented x 3, appropriate affect  Neuro: CNII-XII intact, no focal deficits    Labs:  Most Recent Data  CBC:   Lab Results   Component Value Date    WBC 7.13 12/11/2023    HGB 11.2 (L) 12/11/2023    HCT 33.3 (L) 12/11/2023     12/11/2023    MCV 97 12/11/2023    RDW 13.1 12/11/2023     BMP:   Lab Results   Component Value Date     12/11/2023    K 4.8 12/11/2023     12/11/2023    CO2 25 12/11/2023    BUN 14 12/11/2023    CREATININE 0.7 12/11/2023    GLU 99 12/11/2023    CALCIUM 10.0 12/11/2023     LFTS;   Lab Results   Component Value Date    PROT 7.8 12/11/2023    ALBUMIN 3.9 12/11/2023    BILITOT 0.5 12/11/2023    AST 19 12/11/2023    ALKPHOS 93 12/11/2023    ALT 19 12/11/2023     COAGS:   Lab Results   Component Value Date    INR 1.0 01/23/2020     FLP:   Lab Results   Component Value Date    CHOL 120 02/04/2022    HDL 55 02/04/2022    LDLCALC 54.0 (L) 02/04/2022    TRIG 55 02/04/2022    CHOLHDL 45.8 02/04/2022     CARDIAC:   Lab Results   Component Value Date    TROPONINI <0.006 06/08/2020    BNP 27 06/12/2014       Echo 2/4/2022:  The left ventricle is normal in size with eccentric hypertrophy and normal systolic function.  The estimated ejection fraction is 65%.  Grade I left ventricular diastolic  dysfunction.  Severe left atrial enlargement.  Normal right ventricular size with normal right ventricular systolic function.  Moderate right atrial enlargement.  Mild mitral regurgitation.  Normal central venous pressure (3 mmHg).  The estimated PA systolic pressure is 43 mmHg.  There is pulmonary hypertension.  LVOT mean grad 6.6 mm Hg, max PG 11.7 mm Hg    Carotid Ultrasound 10/8/2021:  There is 20-39% right internal carotid artery stenosis.  There is 40-49% left internal carotid artery stenosis.    CTA Abd/Pelvis with Iliofemoral Runoff 8/13/2021:   1. Mild scattered atherosclerotic plaque.  Probable stenosis of the proximal left posterior tibial artery estimated 50%.  Otherwise arteries of the abdomen and lower extremities demonstrate no significant stenosis, noting that the popliteal artery and trifurcation are partially obscured by extensive beam hardening artifact related to knee arthroplasty.  2. Small hiatal hernia  3. Mild hepatomegaly  4. Post cholecystectomy and hysterectomy  5. Colonic diverticula without evidence of diverticulitis  6. Osteoarthritis involving the spine, hips, and feet.    ONESIMO Study 8/13/2021:  Normal rest and exercise ONESIMO bilaterally.  Normal PVR waveforms bilaterally.    MRI Lumbar Spine 4/28/2021:  Severe degenerative disc disease at all levels of the lumbar spine.   Severe canal stenosis at L4-5 and moderate to severe canal stenosis at L5-S1, please see details of each levels above.    Echo 8/1/2016:  CONCLUSIONS     1 - Normal left ventricular systolic function (EF 60-65%).  Normal wall motion.     2 - Eccentric hypertrophy.     3 - Left ventricular diastolic dysfunction.     4 - Trivial mitral regurgitation.     5 - Trivial tricuspid regurgitation.     6 - Pulmonary hypertension. The estimated PA systolic pressure is 42 mmHg.     Assessment/Plan:  Yulia Peralta is a 78 y.o. female with PAD, HTN, HLD, Rheumatoid arthritis, lumbar degenerative disc disease, severe obesity, who  presents for a follow up appointment.     1. Chest Pain- Pt with risk factors for CAD.  Check echo and nuclear stress test.      2. Pain/ with numbness/tingling in both legs and feet- ONESIMO Study on 8/13/2021 revealed normal rest and exercise ONESIMO bilaterally.  CTA Abd/Pelvis with Iliofemoral Runoff on 8/13/2021 revealed mild scattered atherosclerotic plaque.  Probable stenosis of the proximal left posterior tibial artery estimated 50%.  Otherwise arteries of the abdomen and lower extremities demonstrate no significant stenosis.  MRI Lumbar Spine 4/28/2021 revealed severe degenerative disc disease at all levels of the lumbar spine.  Severe canal stenosis at L4-5 and moderate to severe canal stenosis at L5-S1.  Hence, symptoms are likely due to severe lumbar disc disease, and not flow limiting PAD.  Continue management of lumbar disc disease per Neurosurgery.      3. PAD- Pt has no claudication or tissue loss.  CTA abd/pelvis as above.  Echo from 2/4/2022 revealed EF 65%; grade I left ventricular diastolic dysfunction; severe left atrial enlargement; moderate right atrial enlargement; mild mitral regurgitation; normal central venous pressure (3 mmHg); estimated PA systolic pressure is 43 mmHg.  Continue ASA 81 mg daily and atorvastatin 40 mg daily.      4. Obesity- Encourage diet, exercise and weight loss.     5. Carotid Artery Disease- Carotid Ultrasound on 10/8/2021 20-39% right internal carotid artery stenosis.  There is 40-49% left internal carotid artery stenosis.  Continue ASA 81 mg daily and atorvastatin 40 mg daily.     6. Left Foot Pain- Likely due to degenerative/structural abnormalities.  Check MRI left foot/ankle.  Refer to Podiatry for evaluation.    7. Gait Instability- Refer to physical therapy.      8. HLD- LDL at goal of <70.  Continue ASA 81 mg daily and atorvastatin 40 mg daily.    9. HTN- Stable.  Continue current medications.    10. Pulmonary HTN- Pt is asymptomatic.  Continue current medications.      Follow up in 4 months     Total duration of face to face visit time 30 minutes.  Total time spent counseling greater than fifty percent of total visit time.  Counseling included discussion regarding imaging findings, diagnosis, possibilities, treatment options, risks and benefits.  The patient had many questions regarding the options and long-term effects.    Alfie Simeon MD, PhD  Interventional Cardiology

## 2024-03-12 ENCOUNTER — TELEPHONE (OUTPATIENT)
Dept: NEUROSURGERY | Facility: CLINIC | Age: 79
End: 2024-03-12
Payer: MEDICARE

## 2024-03-12 DIAGNOSIS — I67.1 CEREBRAL ANEURYSM, NONRUPTURED: Primary | ICD-10-CM

## 2024-03-12 LAB
BACTERIAL VAGINOSIS DNA: NEGATIVE
CANDIDA GLABRATA DNA: NEGATIVE
CANDIDA KRUSEI DNA: NEGATIVE
CANDIDA RRNA VAG QL PROBE: NEGATIVE
T VAGINALIS RRNA GENITAL QL PROBE: NEGATIVE

## 2024-03-12 NOTE — TELEPHONE ENCOUNTER
----- Message from Laisha Tejeda RN sent at 3/12/2024 10:01 AM CDT -----  Regarding: FW: PT IS CALLING TO BE SCHEDULED FOR A FOLLOW UP APPT  Contact: PT  Hey I ordered the MRA with vessel wall imaging. Just schedule at your convience.   ----- Message -----  From: Estrella Araujo  Sent: 3/12/2024   9:56 AM CDT  To: [facility]ag[facility] PETERSON Staff  Subject: PT IS CALLING TO BE SCHEDULED FOR A FOLLOW U#    Pt was last seen in 10/2021    Confirmed contact info below:  Contact Name: Yulia Peralta  Phone Number: [phone] home or cell

## 2024-03-25 ENCOUNTER — TELEPHONE (OUTPATIENT)
Dept: OBSTETRICS AND GYNECOLOGY | Facility: CLINIC | Age: 79
End: 2024-03-25
Payer: MEDICARE

## 2024-03-25 NOTE — TELEPHONE ENCOUNTER
----- Message from Sully Camilo MA sent at 3/25/2024  8:01 AM CDT -----  Type: Patient Call Back    Who called: Self    What is the request in detail: pt. Is asking for her medication to be sent to [facility] where it would e free for her to get rather than $100.. she is asking for a nurse to call her please ..     Can the clinic reply by [facility]?No    Would the patient rather a call back or a response via [facility]? Yes, call     Best call back number: [phone] (home)

## 2024-03-26 DIAGNOSIS — L30.4 INTERTRIGO: ICD-10-CM

## 2024-03-26 DIAGNOSIS — B00.9 HERPES: ICD-10-CM

## 2024-03-26 RX ORDER — NYSTATIN 100000 [USP'U]/G
POWDER TOPICAL 2 TIMES DAILY
Qty: 30 G | Refills: 1 | Status: SHIPPED | OUTPATIENT
Start: 2024-03-26 | End: 2024-05-31 | Stop reason: SDUPTHER

## 2024-03-26 RX ORDER — NYSTATIN AND TRIAMCINOLONE ACETONIDE 100000; 1 [USP'U]/G; MG/G
CREAM TOPICAL
Qty: 30 G | Refills: 1 | Status: SHIPPED | OUTPATIENT
Start: 2024-03-26 | End: 2024-05-31 | Stop reason: SDUPTHER

## 2024-03-26 RX ORDER — VALACYCLOVIR HYDROCHLORIDE 500 MG/1
TABLET, FILM COATED ORAL
Qty: 180 TABLET | Refills: 2 | Status: SHIPPED | OUTPATIENT
Start: 2024-03-26

## 2024-03-27 ENCOUNTER — HOSPITAL ENCOUNTER (OUTPATIENT)
Dept: RADIOLOGY | Facility: HOSPITAL | Age: 79
Discharge: HOME | End: 2024-03-27
Attending: INTERNAL MEDICINE
Payer: MEDICARE

## 2024-03-27 DIAGNOSIS — I73.9 PERIPHERAL VASCULAR DISEASE, UNSPECIFIED: ICD-10-CM

## 2024-03-27 DIAGNOSIS — M05.79 RHEUMATOID ARTHRITIS INVOLVING MULTIPLE SITES WITH POSITIVE RHEUMATOID FACTOR: ICD-10-CM

## 2024-03-27 PROCEDURE — 75635 CT ANGIO ABDOMINAL ARTERIES: CPT | Mod: TC

## 2024-03-27 PROCEDURE — 25500020 PHARM REV CODE 255: Performed by: INTERNAL MEDICINE

## 2024-03-27 PROCEDURE — 75635 CT ANGIO ABDOMINAL ARTERIES: CPT | Mod: 26,,, | Performed by: STUDENT IN AN ORGANIZED HEALTH CARE EDUCATION/TRAINING PROGRAM

## 2024-03-27 RX ORDER — METHOTREXATE 2.5 MG/1
TABLET ORAL
Qty: 120 TABLET | Refills: 0 | Status: SHIPPED | OUTPATIENT
Start: 2024-03-27

## 2024-03-27 RX ADMIN — IOHEXOL 100 ML: 350 INJECTION, SOLUTION INTRAVENOUS at 08:03

## 2024-03-28 ENCOUNTER — LAB VISIT (OUTPATIENT)
Dept: LAB | Facility: HOSPITAL | Age: 79
End: 2024-03-28
Attending: INTERNAL MEDICINE
Payer: MEDICARE

## 2024-03-28 ENCOUNTER — TELEPHONE (OUTPATIENT)
Dept: RHEUMATOLOGY | Facility: CLINIC | Age: 79
End: 2024-03-28
Payer: MEDICARE

## 2024-03-28 DIAGNOSIS — M05.79 RHEUMATOID ARTHRITIS INVOLVING MULTIPLE SITES WITH POSITIVE RHEUMATOID FACTOR: ICD-10-CM

## 2024-03-28 DIAGNOSIS — D84.9 IMMUNOSUPPRESSION: ICD-10-CM

## 2024-03-28 LAB
ALBUMIN SERPL BCP-MCNC: 3.9 G/DL (ref 3.5–5.2)
ALP SERPL-CCNC: 103 U/L (ref 55–135)
ALT SERPL W/O P-5'-P-CCNC: 18 U/L (ref 10–44)
ANION GAP SERPL CALC-SCNC: 7 MMOL/L (ref 8–16)
AST SERPL-CCNC: 18 U/L (ref 10–40)
BASOPHILS # BLD AUTO: 0.02 K/UL (ref 0–0.2)
BASOPHILS NFR BLD: 0.4 % (ref 0–1.9)
BILIRUB SERPL-MCNC: 0.4 MG/DL (ref 0.1–1)
BUN SERPL-MCNC: 19 MG/DL (ref 8–23)
CALCIUM SERPL-MCNC: 9.5 MG/DL (ref 8.7–10.5)
CHLORIDE SERPL-SCNC: 107 MMOL/L (ref 95–110)
CO2 SERPL-SCNC: 26 MMOL/L (ref 23–29)
CREAT SERPL-MCNC: 0.8 MG/DL (ref 0.5–1.4)
CRP SERPL-MCNC: 0.4 MG/L (ref 0–8.2)
DIFFERENTIAL METHOD BLD: ABNORMAL
EOSINOPHIL # BLD AUTO: 0.2 K/UL (ref 0–0.5)
EOSINOPHIL NFR BLD: 4.1 % (ref 0–8)
ERYTHROCYTE [DISTWIDTH] IN BLOOD BY AUTOMATED COUNT: 14.1 % (ref 11.5–14.5)
ERYTHROCYTE [SEDIMENTATION RATE] IN BLOOD BY PHOTOMETRIC METHOD: 22 MM/HR (ref 0–36)
EST. GFR  (NO RACE VARIABLE): >60 ML/MIN/1.73 M^2
GLUCOSE SERPL-MCNC: 93 MG/DL (ref 70–110)
HCT VFR BLD AUTO: 32.6 % (ref 37–48.5)
HGB BLD-MCNC: 10.6 G/DL (ref 12–16)
IMM GRANULOCYTES # BLD AUTO: 0.02 K/UL (ref 0–0.04)
IMM GRANULOCYTES NFR BLD AUTO: 0.4 % (ref 0–0.5)
LYMPHOCYTES # BLD AUTO: 1.2 K/UL (ref 1–4.8)
LYMPHOCYTES NFR BLD: 21.1 % (ref 18–48)
MCH RBC QN AUTO: 32.9 PG (ref 27–31)
MCHC RBC AUTO-ENTMCNC: 32.5 G/DL (ref 32–36)
MCV RBC AUTO: 101 FL (ref 82–98)
MONOCYTES # BLD AUTO: 0.8 K/UL (ref 0.3–1)
MONOCYTES NFR BLD: 14.9 % (ref 4–15)
NEUTROPHILS # BLD AUTO: 3.3 K/UL (ref 1.8–7.7)
NEUTROPHILS NFR BLD: 59.1 % (ref 38–73)
NRBC BLD-RTO: 0 /100 WBC
PLATELET # BLD AUTO: 153 K/UL (ref 150–450)
PMV BLD AUTO: 13.9 FL (ref 9.2–12.9)
POTASSIUM SERPL-SCNC: 4.3 MMOL/L (ref 3.5–5.1)
PROT SERPL-MCNC: 7.2 G/DL (ref 6–8.4)
RBC # BLD AUTO: 3.22 M/UL (ref 4–5.4)
SODIUM SERPL-SCNC: 140 MMOL/L (ref 136–145)
WBC # BLD AUTO: 5.63 K/UL (ref 3.9–12.7)

## 2024-03-28 PROCEDURE — 80053 COMPREHEN METABOLIC PANEL: CPT | Performed by: INTERNAL MEDICINE

## 2024-03-28 PROCEDURE — 85652 RBC SED RATE AUTOMATED: CPT | Performed by: INTERNAL MEDICINE

## 2024-03-28 PROCEDURE — 36415 COLL VENOUS BLD VENIPUNCTURE: CPT | Mod: PO | Performed by: INTERNAL MEDICINE

## 2024-03-28 PROCEDURE — 85025 COMPLETE CBC W/AUTO DIFF WBC: CPT | Performed by: INTERNAL MEDICINE

## 2024-03-28 PROCEDURE — 86140 C-REACTIVE PROTEIN: CPT | Performed by: INTERNAL MEDICINE

## 2024-03-28 NOTE — TELEPHONE ENCOUNTER
Staff to inform the patient that we do not use lidocaine patches for rheumatoid arthritis.  She can try an over-the-counter lidocaine patch if she wants or discuss with her pain management doctor.

## 2024-03-28 NOTE — TELEPHONE ENCOUNTER
Spoke with patient to follow up with the phone call on yesterday. To advise her that Dr. Ramirez, does not prescribe pain medication for arthritis. She will need to follow up with pain management or use over the counter patches. Patient verbalized understanding.

## 2024-03-28 NOTE — TELEPHONE ENCOUNTER
----- Message from Shahnaz Fuller MA sent at 3/27/2024  3:19 PM CDT -----  Regarding: medication  ,I spoke with patient and you she would like to speak with you about ordering lidocaine patches for arthritis pain. If it is something she should see primary care about I can call her back.  Please advise.

## 2024-03-30 LAB
CREAT SERPL-MCNC: 0.6 MG/DL (ref 0.5–1.4)
SAMPLE: NORMAL

## 2024-04-02 NOTE — PROGRESS NOTES
History & Physical    SUBJECTIVE:     Interval:   No major changes over last year. No diplopia or fatigable weakness.     History of Present Illness:  Patient is a 78 y.o. female former smoker with DDD, lumbar spinal stenosis, acquired scoliosis, cerebral aneurysm, HTN, PAD, HLD, RA, GERD, and obesity who presents to clinic for follow up evaluation of mediastinal mass. Originally followed at [facility] in 2015 then transitioned care to [facility]. Underwent percutaneous biopsy. 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 More recently it has become difficult for then to travel back and forth thus she is requesting follow-up here. Subsequently followed in surveillance clinic until October 2019 according to available imaging. MRI Chest w/ and w/o contrast 10/15/2019 revealed prominent lobes of the thymus remain stable in size since 2016. They are homogeneous in signal on T1 and T2 sequences. Spicules of fat are seen within it, an expected finding. No mediastinal, hilar or axillary adenopathy by CT size criteria. Today patient reports no complaints.     PSH: b/l knee replacement ([id number]), hysterectomy and oopherectomy, cerebral angiogram 01/20/20, cataract surgery b/l, tonsillectomy  Meds: aspirin 81 mg, lasix, gabapentin 600mg TID, KGCL - ketamine 10%, gabapentin 6%, cyclobenzabrine 2%,  lidocaine 4% cream, methotrexate, misoprostol, nabumetone, montegrity, ozempic, valacyclovir    Chief Complaint   Patient presents with    Follow-up       Review of patient's allergies indicates:   Allergen Reactions    No known drug allergies        Current Outpatient Medications   Medication Sig Dispense Refill    amLODIPine (NORVASC) 5 MG tablet Take 1 tablet (5 mg total) by mouth once daily. 90 tablet 3    aspirin (ECOTRIN) 81 MG EC tablet Take 81 mg by mouth once daily.      atorvastatin (LIPITOR) 40 MG tablet Take 1 tablet (40 mg total) by mouth once daily. 90 tablet 3    clobetasoL (TEMOVATE) 0.05 %  external solution Apply topically 2 (two) times daily. 100 mL 4    CYTOTEC 100 mcg Tab TAKE ONE TABLET BY MOUTH TWICE A  tablet 5    fluticasone (FLONASE) 50 mcg/actuation nasal spray 1 spray (50 mcg total) by Each Nare route once daily. 16 g 5    folic acid (FOLVITE) 1 MG tablet Take 2 tablets (2,000 mcg total) by mouth once daily. 270 tablet 3    furosemide (LASIX) 40 MG tablet Take 1 tablet (40 mg total) by mouth once daily. 90 tablet 3    hydrocortisone 2.5 % cream as needed.   0    irbesartan (AVAPRO) 300 MG tablet TAKE ONE TABLET BY MOUTH EVERY DAY IN THE EVENING 90 tablet 3    KGCL ketamine 10%- gabapentin 6%- cyclobenzaprine 2%- LIDOcaine 4% in transdermal cream APPLY 2 GRAMS 3-4 TIMES DAILY FOR TREATMENT OF PAIN      methotrexate 2.5 MG Tab TAKE FIVE TABLETS BY MOUTH TWICE A DAY ONCE A WEEK ON MONDAY IN THE MORNING AND IN THE EVENING 120 tablet 0    MOTEGRITY 2 mg Tab Take 1 tablet by mouth.      nabumetone (RELAFEN) 500 MG tablet TAKE ONE TABLET BY MOUTH TWICE A DAY WITH FOOD 180 tablet 0    nystatin (MYCOSTATIN) powder Apply topically 2 (two) times daily. 30 g 1    nystatin-triamcinolone (MYCOLOG II) cream Apply to affected area 2 times daily 30 g 1    pantoprazole (PROTONIX) 40 MG tablet Take 1 tablet (40 mg total) by mouth once daily. 90 tablet 3    triamcinolone acetonide 0.1% (KENALOG) 0.1 % cream Apply topically 3 (three) times daily. 80 g 1    valACYclovir (VALTREX) 500 MG tablet 1 Tablet DAILY for suppression, increase to BID for outbreak 180 tablet 2    famotidine (PEPCID) 40 MG tablet Take 0.5 tablets (20 mg total) by mouth 2 (two) times daily as needed for Heartburn. 15 tablet 2     No current facility-administered medications for this visit.     Facility-Administered Medications Ordered in Other Visits   Medication Dose Route Frequency Provider Last Rate Last Admin    cyclopentolate 1% ophthalmic solution 1 drop  1 drop Left Eye On Call Procedure Elvis Pete MD   1 drop at  01/26/21 0914    ofloxacin 0.3 % ophthalmic solution 1 drop  1 drop Left Eye On Call Procedure Elvis Pete MD   1 drop at 01/26/21 0914    sodium chloride 0.9% flush 10 mL  10 mL Intravenous PRN Elvis Pete MD           Past Medical History:   Diagnosis Date    Abnormal colonoscopy 07/24/2020    colon polyps nad repeat in 3 years.     Acid reflux     Anemia     Anxiety     Arthritis     Blood transfusion     Cataract     Depression     Dry eyes     H/O colonoscopy 07/22/2020    dr. nicholas. repeat in 3 years.     Heart murmur     Hypertension     Left lumbar radiculitis 5/19/2015    Mixed hyperlipidemia 11/5/2021    Multiple thyroid nodules 12/18/2012    Osteoporosis     Rheumatoid arthritis     Thoracic or lumbosacral neuritis or radiculitis, unspecified 10/30/2013     Past Surgical History:   Procedure Laterality Date    CATARACT EXTRACTION W/  INTRAOCULAR LENS IMPLANT Left 1/26/2021    Procedure: EXTRACTION, CATARACT, WITH IOL INSERTION;  Surgeon: Elvis Pete MD;  Location: [facility];  Service: Ophthalmology;  Laterality: Left;    CATARACT EXTRACTION W/  INTRAOCULAR LENS IMPLANT Right 2/23/2021    Procedure: EXTRACTION, CATARACT, WITH IOL INSERTION;  Surgeon: Elvis Pete MD;  Location: [facility];  Service: Ophthalmology;  Laterality: Right;    CEREBRAL ANGIOGRAM N/A 1/23/2020    Procedure: ANGIOGRAM-CEREBRAL;  Surgeon: [facility] Diagnostic Provider;  Location: [address];  Service: Radiology;  Laterality: N/A;  /Fuentes    gallstones  [id number]    HYSTERECTOMY      JOINT REPLACEMENT  [id number]     bilateral knee    OOPHORECTOMY      VA REMOVAL OF OVARY/TUBE(S)      TONSILLECTOMY       Family History   Problem Relation Age of Onset    Heart failure Mother     Cataracts Mother     Hypertension Mother     Colon cancer Mother     Colon cancer Father     Hypertension Sister     Liver cancer Sister     Glaucoma Brother     Lung cancer Brother     Colon cancer Brother      "No Known Problems Maternal Grandmother     No Known Problems Maternal Grandfather     No Known Problems Paternal Grandmother     No Known Problems Paternal Grandfather     No Known Problems Daughter     No Known Problems Son     Breast cancer Maternal Aunt     No Known Problems Maternal Uncle     No Known Problems Paternal Aunt     No Known Problems Paternal Uncle     No Known Problems Other     Lupus Neg Hx     Rheum arthritis Neg Hx     Psoriasis Neg Hx     Amblyopia Neg Hx     Blindness Neg Hx     Cancer Neg Hx     Diabetes Neg Hx     Macular degeneration Neg Hx     Retinal detachment Neg Hx     Strabismus Neg Hx     Stroke Neg Hx     Thyroid disease Neg Hx      Social History     Tobacco Use    Smoking status: Former     Current packs/day: 0.25     Average packs/day: 0.3 packs/day for 1 year (0.3 ttl pk-yrs)     Types: Cigarettes    Smokeless tobacco: Never   Substance Use Topics    Alcohol use: Yes     Alcohol/week: 2.0 standard drinks of alcohol     Types: 1 Glasses of wine, 1 Cans of beer per week     Comment: very seldom    Drug use: No        Review of Systems:  Review of Systems   Constitutional: Negative.    HENT: Negative.     Eyes: Negative.         No visual disturbances such as diplopia   Respiratory: Negative.     All other systems reviewed and are negative.      OBJECTIVE:     Vital Signs (Most Recent)  Pulse: 64 (04/03/24 0921)  BP: (!) 168/73 (04/03/24 0921)  SpO2: 99 % (04/03/24 0921)  5' 2" (1.575 m)  84.1 kg (185 lb 6.5 oz)     Physical Exam:  Physical Exam  Constitutional:       Appearance: Normal appearance.   Eyes:      Extraocular Movements: Extraocular movements intact.      Conjunctiva/sclera: Conjunctivae normal.      Pupils: Pupils are equal, round, and reactive to light.   Cardiovascular:      Rate and Rhythm: Normal rate and regular rhythm.      Pulses: Normal pulses.      Heart sounds: Normal heart sounds.   Pulmonary:      Effort: Pulmonary effort is normal.      Breath sounds: " Normal breath sounds.   Abdominal:      Palpations: Abdomen is soft.   Musculoskeletal:         General: Normal range of motion.   Skin:     Capillary Refill: Capillary refill takes less than 2 seconds.   Neurological:      General: No focal deficit present.      Mental Status: She is alert.      Gait: Gait abnormal.      Comments: Slow gait with slight limp     Diagnostic Results:    MRI CHEST W WO CONTRAST - 10/15/2019:  Clinical History: Thymoma. Mediastinal biopsy in November 2015 revealed lymphoepithelial cellular proliferation with the differential including thymic hyperplasia and thymoma. 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   Indication: Thyroid nodule status post biopsy   Comparison: MR chest 10/16/2018 and 10/18/2016; PET CT 10/29/2015   Findings: Prominent lobes of the thymus remain stable in size since 2016. They are homogeneous in signal on T1 and T2 sequences. Spicules of fat are seen within it, an expected finding. No mediastinal, hilar or axillary adenopathy by CT size criteria. No pleural effusion.     Chest CT 4/5/2023:  I reviewed the images  Prominent thymic tissue unchanged from 2015 chest CT exam  Stable 1.7 cm rounded soft tissue attenuation mass in the anterior mediastinum.    Chest CT 04/03/24:  I reviewed the images  1. Grossly stable 17 mm soft tissue density abnormality in the superior aspect of the prevascular compartment of the mediastinum.  2. Grossly stable soft tissue density measuring 36 x 20 mm, prior 36 x 21 mm, in the prevascular compartment of the mediastinum (thymic rebound?).  3. Additional findings.  Please see the above discussion.       ASSESSMENT/PLAN:     78 y.o.  female former smoker with DDD, lumbar spinal stenosis, acquired scoliosis, cerebral aneurysm w/o rupture, HTN, tortuous aorta, PAD, b/l carotid artery stenosis, HLD, RA, GERD, and obesity who presents to clinic for follow up evaluation of mediastinal mass  Stable anterior mediastinal mass and prior biopsy c/w thymic  hyperplasia.    PLAN:Plan     RTC in 1 year with chest CT without contrast will alternate contrast and no contrast scans if its stable.

## 2024-04-03 ENCOUNTER — HOSPITAL ENCOUNTER (OUTPATIENT)
Dept: RADIOLOGY | Facility: HOSPITAL | Age: 79
Discharge: HOME | End: 2024-04-03
Attending: THORACIC SURGERY (CARDIOTHORACIC VASCULAR SURGERY)
Payer: MEDICARE

## 2024-04-03 ENCOUNTER — OFFICE VISIT (OUTPATIENT)
Dept: CARDIOTHORACIC SURGERY | Facility: CLINIC | Age: 79
End: 2024-04-03
Payer: MEDICARE

## 2024-04-03 VITALS
HEIGHT: 62 IN | BODY MASS INDEX: 34.12 KG/M2 | WEIGHT: 185.44 LBS | DIASTOLIC BLOOD PRESSURE: 73 MMHG | HEART RATE: 64 BPM | OXYGEN SATURATION: 99 % | SYSTOLIC BLOOD PRESSURE: 168 MMHG

## 2024-04-03 DIAGNOSIS — D49.89 THYMOMA: Primary | ICD-10-CM

## 2024-04-03 DIAGNOSIS — D49.89 THYMOMA: ICD-10-CM

## 2024-04-03 PROCEDURE — 71260 CT THORAX DX C+: CPT | Mod: TC

## 2024-04-03 PROCEDURE — 99999 PR PBB SHADOW E&M-EST. PATIENT-LVL IV: CPT | Mod: PBBFAC,,, | Performed by: THORACIC SURGERY (CARDIOTHORACIC VASCULAR SURGERY)

## 2024-04-03 PROCEDURE — 99214 OFFICE O/P EST MOD 30 MIN: CPT | Mod: PBBFAC,25 | Performed by: THORACIC SURGERY (CARDIOTHORACIC VASCULAR SURGERY)

## 2024-04-03 PROCEDURE — 99213 OFFICE O/P EST LOW 20 MIN: CPT | Mod: S$PBB,,, | Performed by: THORACIC SURGERY (CARDIOTHORACIC VASCULAR SURGERY)

## 2024-04-03 PROCEDURE — 71260 CT THORAX DX C+: CPT | Mod: 26,,, | Performed by: RADIOLOGY

## 2024-04-03 PROCEDURE — 25500020 PHARM REV CODE 255: Performed by: THORACIC SURGERY (CARDIOTHORACIC VASCULAR SURGERY)

## 2024-04-03 RX ADMIN — IOHEXOL 75 ML: 350 INJECTION, SOLUTION INTRAVENOUS at 09:04

## 2024-04-09 ENCOUNTER — HOSPITAL ENCOUNTER (OUTPATIENT)
Dept: CARDIOLOGY | Facility: HOSPITAL | Age: 79
Discharge: HOME | End: 2024-04-09
Attending: INTERNAL MEDICINE
Payer: MEDICARE

## 2024-04-09 VITALS
HEIGHT: 62 IN | DIASTOLIC BLOOD PRESSURE: 70 MMHG | SYSTOLIC BLOOD PRESSURE: 140 MMHG | HEART RATE: 62 BPM | BODY MASS INDEX: 34.04 KG/M2 | WEIGHT: 185 LBS

## 2024-04-09 DIAGNOSIS — R07.89 OTHER CHEST PAIN: ICD-10-CM

## 2024-04-09 LAB
ASCENDING AORTA: 3.91 CM
AV INDEX (PROSTH): 0.85
AV MEAN GRADIENT: 6 MMHG
AV PEAK GRADIENT: 12 MMHG
AV VALVE AREA BY VELOCITY RATIO: 2.16 CM²
AV VALVE AREA: 2.47 CM²
AV VELOCITY RATIO: 0.75
BSA FOR ECHO PROCEDURE: 1.92 M2
CV ECHO LV RWT: 0.41 CM
DOP CALC AO PEAK VEL: 1.74 M/S
DOP CALC AO VTI: 39.88 CM
DOP CALC LVOT AREA: 2.9 CM2
DOP CALC LVOT DIAMETER: 1.92 CM
DOP CALC LVOT PEAK VEL: 1.3 M/S
DOP CALC LVOT STROKE VOLUME: 98.33 CM3
DOP CALCLVOT PEAK VEL VTI: 33.98 CM
E WAVE DECELERATION TIME: 295.77 MSEC
E/A RATIO: 1.04
E/E' RATIO: 11.73 M/S
ECHO LV POSTERIOR WALL: 1.07 CM (ref 0.6–1.1)
EJECTION FRACTION: 65 %
FRACTIONAL SHORTENING: 44 % (ref 28–44)
INTERVENTRICULAR SEPTUM: 1.04 CM (ref 0.6–1.1)
LA MAJOR: 6.06 CM
LA MINOR: 5.6 CM
LA WIDTH: 4.8 CM
LEFT ATRIUM SIZE: 4.26 CM
LEFT ATRIUM VOLUME INDEX MOD: 58.5 ML/M2
LEFT ATRIUM VOLUME INDEX: 54.7 ML/M2
LEFT ATRIUM VOLUME MOD: 108.27 CM3
LEFT ATRIUM VOLUME: 101.17 CM3
LEFT INTERNAL DIMENSION IN SYSTOLE: 2.95 CM (ref 2.1–4)
LEFT VENTRICLE DIASTOLIC VOLUME INDEX: 72.02 ML/M2
LEFT VENTRICLE DIASTOLIC VOLUME: 133.23 ML
LEFT VENTRICLE MASS INDEX: 115 G/M2
LEFT VENTRICLE SYSTOLIC VOLUME INDEX: 18.2 ML/M2
LEFT VENTRICLE SYSTOLIC VOLUME: 33.64 ML
LEFT VENTRICULAR INTERNAL DIMENSION IN DIASTOLE: 5.26 CM (ref 3.5–6)
LEFT VENTRICULAR MASS: 212.58 G
LV LATERAL E/E' RATIO: 9.78 M/S
LV SEPTAL E/E' RATIO: 14.67 M/S
MV PEAK A VEL: 0.85 M/S
MV PEAK E VEL: 0.88 M/S
MV STENOSIS PRESSURE HALF TIME: 85.77 MS
MV VALVE AREA P 1/2 METHOD: 2.56 CM2
PISA TR MAX VEL: 2.83 M/S
RA MAJOR: 4.77 CM
RA PRESSURE ESTIMATED: 3 MMHG
RA WIDTH: 3.66 CM
RIGHT VENTRICULAR END-DIASTOLIC DIMENSION: 4.28 CM
RV TB RVSP: 6 MMHG
SINUS: 2.75 CM
STJ: 2.76 CM
TDI LATERAL: 0.09 M/S
TDI SEPTAL: 0.06 M/S
TDI: 0.08 M/S
TR MAX PG: 32 MMHG
TRICUSPID ANNULAR PLANE SYSTOLIC EXCURSION: 2.73 CM
TV REST PULMONARY ARTERY PRESSURE: 35 MMHG
Z-SCORE OF LEFT VENTRICULAR DIMENSION IN END DIASTOLE: 0.23
Z-SCORE OF LEFT VENTRICULAR DIMENSION IN END SYSTOLE: -0.57

## 2024-04-09 PROCEDURE — 93306 TTE W/DOPPLER COMPLETE: CPT

## 2024-04-09 PROCEDURE — 93306 TTE W/DOPPLER COMPLETE: CPT | Mod: 26,,, | Performed by: INTERNAL MEDICINE

## 2024-04-13 ENCOUNTER — HOSPITAL ENCOUNTER (OUTPATIENT)
Dept: RADIOLOGY | Facility: HOSPITAL | Age: 79
Discharge: HOME | End: 2024-04-13
Attending: NEUROLOGICAL SURGERY
Payer: MEDICARE

## 2024-04-13 DIAGNOSIS — I67.1 CEREBRAL ANEURYSM, NONRUPTURED: ICD-10-CM

## 2024-04-13 PROCEDURE — A9585 GADOBUTROL INJECTION: HCPCS | Performed by: NEUROLOGICAL SURGERY

## 2024-04-13 PROCEDURE — 25500020 PHARM REV CODE 255: Performed by: NEUROLOGICAL SURGERY

## 2024-04-13 PROCEDURE — 70546 MR ANGIOGRAPH HEAD W/O&W/DYE: CPT | Mod: TC

## 2024-04-13 PROCEDURE — 70546 MR ANGIOGRAPH HEAD W/O&W/DYE: CPT | Mod: 26,,, | Performed by: RADIOLOGY

## 2024-04-13 RX ORDER — GADOBUTROL 604.72 MG/ML
9 INJECTION INTRAVENOUS
Status: COMPLETED | OUTPATIENT
Start: 2024-04-13 | End: 2024-04-13

## 2024-04-13 RX ADMIN — GADOBUTROL 9 ML: 604.72 INJECTION INTRAVENOUS at 01:04

## 2024-04-15 ENCOUNTER — TELEPHONE (OUTPATIENT)
Dept: CARDIOLOGY | Facility: HOSPITAL | Age: 79
End: 2024-04-15
Payer: MEDICARE

## 2024-04-16 ENCOUNTER — TELEPHONE (OUTPATIENT)
Dept: NEUROSURGERY | Facility: CLINIC | Age: 79
End: 2024-04-16
Payer: MEDICARE

## 2024-04-16 ENCOUNTER — OFFICE VISIT (OUTPATIENT)
Dept: NEUROSURGERY | Facility: CLINIC | Age: 79
End: 2024-04-16
Payer: MEDICARE

## 2024-04-16 VITALS — SYSTOLIC BLOOD PRESSURE: 151 MMHG | DIASTOLIC BLOOD PRESSURE: 73 MMHG | HEART RATE: 66 BPM

## 2024-04-16 DIAGNOSIS — I67.1 CEREBRAL ANEURYSM, NONRUPTURED: Primary | ICD-10-CM

## 2024-04-16 PROCEDURE — 99214 OFFICE O/P EST MOD 30 MIN: CPT | Mod: S$PBB,,, | Performed by: NEUROLOGICAL SURGERY

## 2024-04-16 PROCEDURE — 99999 PR PBB SHADOW E&M-EST. PATIENT-LVL III: CPT | Mod: PBBFAC,,, | Performed by: NEUROLOGICAL SURGERY

## 2024-04-16 PROCEDURE — 99213 OFFICE O/P EST LOW 20 MIN: CPT | Mod: PBBFAC | Performed by: NEUROLOGICAL SURGERY

## 2024-04-16 NOTE — PROGRESS NOTES
Neurosurgery  Established Patient    Scribe Attestation  I, Mirella Saldivar, attest that this documentation has been prepared under the direction and in the presence of Dr. Amparo Linares MD.    SUBJECTIVE:     History of Present Illness 9/13/2021  Ms. Peralta, is a 76-year-old woman who is referred to me by Dr. Alfred Curry.  He had been treating her for her lumbar stenosis.  She also had associated left lower extremity pain without any on antecedent trauma.  She has a known anterior circulation aneurysm for which she had been followed conservatively.  She had been followed by another physician for which she no longer sees.     She was noted to have left lower extremity pain approximately 2 years ago, and again did not endorse any antecedent trauma or incident prior to the pain.  She had a history of bilateral foot pain left more pronounced on the right which started approximately 6 months prior to seeing Dr. Curry.  She has had difficulty walking after standing up, difficulty with bearing weight, and history of rheumatoid arthritis and is previously undergone bilateral knee replacement.  She had previously gone to healthy back clinic for conservative treatment approximately 8-9 times without any significant relief.  Had 1 previous injection which did give significant relief.  Her lumbar pathology is being followed by Dr. Curry.     Interval History 11/01/2022  She currently denies any headache, nausea, vomiting. She does complain of feeling off balance. She denies any jayce falls. She denies any difficulty with buttoning buttons, tying shoes. She denies any jayce weakness in the upper lower extremities. She denies any sensory deficits in the upper lower extremities. She also notes that she has some electric current feeling in the right frontal parietal region of the skull and scalp.  She also complains of new hair thinning, which has been more notable over the past couple of months. She comes now with a repeat 1  "year follow-up of an MRA with vessel wall imaging.  No new or significant change can be noted on MRA with vessel wall imaging to date.     Interval History 4/16/2024  Yulia Peralta is a 77 yo F with PMHx of HTN, peripheral artery disease, HLD, DDD in the lumbar, and spinal stenosis in the lumbar region. She presents to me today for 2 year follow-up of MRA with vessel wall imaging. Imaging revealed stable aneurysm with no new or significant changes noted to date. Today she reports feeling unbalanced with tendency to lean to the right when walking. Denies weakness in bilateral LE, but does note pain and numbness that radiates to the feet. Pt describes pain as throbbing. Denies nausea and vomiting. She does share that she has been having headaches that occur "every now and then" that last for 3-4 minutes before resolving on their own. Headache severity can go up to 8-9 and is concentrated at the R frontal region of skull.       Review of patient's allergies indicates:   Allergen Reactions    No known drug allergies        Current Outpatient Medications   Medication Sig Dispense Refill    amLODIPine (NORVASC) 5 MG tablet Take 1 tablet (5 mg total) by mouth once daily. 90 tablet 3    aspirin (ECOTRIN) 81 MG EC tablet Take 81 mg by mouth once daily.      atorvastatin (LIPITOR) 40 MG tablet Take 1 tablet (40 mg total) by mouth once daily. 90 tablet 3    clobetasoL (TEMOVATE) 0.05 % external solution Apply topically 2 (two) times daily. 100 mL 4    CYTOTEC 100 mcg Tab TAKE ONE TABLET BY MOUTH TWICE A  tablet 5    famotidine (PEPCID) 40 MG tablet Take 0.5 tablets (20 mg total) by mouth 2 (two) times daily as needed for Heartburn. 15 tablet 2    fluticasone (FLONASE) 50 mcg/actuation nasal spray 1 spray (50 mcg total) by Each Nare route once daily. 16 g 5    folic acid (FOLVITE) 1 MG tablet Take 2 tablets (2,000 mcg total) by mouth once daily. 270 tablet 3    furosemide (LASIX) 40 MG tablet Take 1 tablet (40 mg " total) by mouth once daily. 90 tablet 3    hydrocortisone 2.5 % cream as needed.   0    irbesartan (AVAPRO) 300 MG tablet TAKE ONE TABLET BY MOUTH EVERY DAY IN THE EVENING 90 tablet 3    KGCL ketamine 10%- gabapentin 6%- cyclobenzaprine 2%- LIDOcaine 4% in transdermal cream APPLY 2 GRAMS 3-4 TIMES DAILY FOR TREATMENT OF PAIN      methotrexate 2.5 MG Tab TAKE FIVE TABLETS BY MOUTH TWICE A DAY ONCE A WEEK ON MONDAY IN THE MORNING AND IN THE EVENING 120 tablet 0    MOTEGRITY 2 mg Tab Take 1 tablet by mouth.      nabumetone (RELAFEN) 500 MG tablet TAKE ONE TABLET BY MOUTH TWICE A DAY WITH FOOD 180 tablet 0    nystatin (MYCOSTATIN) powder Apply topically 2 (two) times daily. 30 g 1    nystatin-triamcinolone (MYCOLOG II) cream Apply to affected area 2 times daily 30 g 1    pantoprazole (PROTONIX) 40 MG tablet Take 1 tablet (40 mg total) by mouth once daily. 90 tablet 3    triamcinolone acetonide 0.1% (KENALOG) 0.1 % cream Apply topically 3 (three) times daily. 80 g 1    valACYclovir (VALTREX) 500 MG tablet 1 Tablet DAILY for suppression, increase to BID for outbreak 180 tablet 2     No current facility-administered medications for this visit.     Facility-Administered Medications Ordered in Other Visits   Medication Dose Route Frequency Provider Last Rate Last Admin    cyclopentolate 1% ophthalmic solution 1 drop  1 drop Left Eye On Call Procedure Elvis Pete MD   1 drop at 01/26/21 0914    ofloxacin 0.3 % ophthalmic solution 1 drop  1 drop Left Eye On Call Procedure Elvis Pete MD   1 drop at 01/26/21 0914    sodium chloride 0.9% flush 10 mL  10 mL Intravenous PRN Elvis Pete MD           Past Medical History:   Diagnosis Date    Abnormal colonoscopy 07/24/2020    colon polyps nad repeat in 3 years.     Acid reflux     Anemia     Anxiety     Arthritis     Blood transfusion     Cataract     Depression     Dry eyes     H/O colonoscopy 07/22/2020    dr. nicholas. repeat in 3 years.      Heart murmur     Hypertension     Left lumbar radiculitis 5/19/2015    Mixed hyperlipidemia 11/5/2021    Multiple thyroid nodules 12/18/2012    Osteoporosis     Rheumatoid arthritis     Thoracic or lumbosacral neuritis or radiculitis, unspecified 10/30/2013     Past Surgical History:   Procedure Laterality Date    CATARACT EXTRACTION W/  INTRAOCULAR LENS IMPLANT Left 1/26/2021    Procedure: EXTRACTION, CATARACT, WITH IOL INSERTION;  Surgeon: Elvis Pete MD;  Location: [facility], [place] OR;  Service: Ophthalmology;  Laterality: Left;    CATARACT EXTRACTION W/  INTRAOCULAR LENS IMPLANT Right 2/23/2021    Procedure: EXTRACTION, CATARACT, WITH IOL INSERTION;  Surgeon: Elvis Pete MD;  Location: [facility], [place] OR;  Service: Ophthalmology;  Laterality: Right;    CEREBRAL ANGIOGRAM N/A 1/23/2020    Procedure: ANGIOGRAM-CEREBRAL;  Surgeon: Hira Diagnostic Provider;  Location: [address];  Service: Radiology;  Laterality: N/A;  /Fuentes    gallstones  [id number]    HYSTERECTOMY      JOINT REPLACEMENT  [id number]     bilateral knee    OOPHORECTOMY      SD REMOVAL OF OVARY/TUBE(S)      TONSILLECTOMY       Family History       Problem Relation (Age of Onset)    Breast cancer Maternal Aunt    Cataracts Mother    Colon cancer Mother, Father, Brother    Glaucoma Brother    Heart failure Mother    Hypertension Mother, Sister    Liver cancer Sister    Lung cancer Brother    No Known Problems Maternal Grandmother, Maternal Grandfather, Paternal Grandmother, Paternal Grandfather, Daughter, Son, Maternal Uncle, Paternal Aunt, Paternal Uncle, Other          Social History     Socioeconomic History    Marital status:      Spouse name: Ric     Number of children: 2    Highest education level: Some college, no degree   Occupational History    Occupation: retired    Tobacco Use    Smoking status: Former     Current packs/day: 0.25     Average packs/day: 0.3 packs/day for 1 year (0.3 ttl pk-yrs)     Types: Cigarettes     Smokeless tobacco: Never   Substance and Sexual Activity    Alcohol use: Yes     Alcohol/week: 2.0 standard drinks of alcohol     Types: 1 Glasses of wine, 1 Cans of beer per week     Comment: very seldom    Drug use: No    Sexual activity: Not Currently     Partners: Male   Social History Narrative    Adult Screenings updated and reviewed  6/12/14    Mammogram( for females) ordered for DIS    Pap ( for females) Dr Zepeda  Due for f/u   For  2014    Colonoscopy  Done once    Flu shot yearly done  2013    Td 2005    Pneumovax  Updated  12/3/13    Zostavax done 2012    Eye exam recommended yearly done 2013    Bone density  12/9/13    Thyroid ultrasound  11/6/2012 Normal sized thyroid containing several subcentimeter nodules, similar to the 5/12/11 exam.  No concerning nodules identified..          Social Determinants of Health     Financial Resource Strain: Low Risk  (3/27/2023)    Overall Financial Resource Strain (CARDIA)     Difficulty of Paying Living Expenses: Not hard at all   Food Insecurity: No Food Insecurity (3/27/2023)    Hunger Vital Sign     Worried About Running Out of Food in the Last Year: Never true     Ran Out of Food in the Last Year: Never true   Transportation Needs: No Transportation Needs (3/27/2023)    PRAPARE - Transportation     Lack of Transportation (Medical): No     Lack of Transportation (Non-Medical): No   Physical Activity: Inactive (3/27/2023)    Exercise Vital Sign     Days of Exercise per Week: 0 days     Minutes of Exercise per Session: 0 min   Stress: No Stress Concern Present (3/27/2023)    [employer] [facility] - Occupational Stress Questionnaire     Feeling of Stress : Only a little   Social Connections: Socially Integrated (3/27/2023)    Social Connection and Isolation Panel [NHANES]     Frequency of Communication with Friends and Family: More than three times a week     Frequency of Social Gatherings with Friends and Family: More than three times a week      Attends [employer] Services: More than 4 times per year     Active Member of Clubs or Organizations: Yes     Attends Club or Organization Meetings: More than 4 times per year     Marital Status:    Housing Stability: Unknown (3/27/2023)    Housing Stability Vital Sign     Unable to Pay for Housing in the Last Year: No     Unstable Housing in the Last Year: No       Review of Systems   Neurological:  Positive for numbness (Bilateral LE that radiates to foot) and headaches (Concentrated in R frontal region of skull).   All other systems reviewed and are negative.    OBJECTIVE:     Vital Signs  Pulse: 66  BP: (!) 151/73  Pain Score: 0-No pain  There is no height or weight on file to calculate BMI.    Neurosurgery Physical Exam  General: no acute distress  Head: Non-traumatic, normocephalic  Eyes: Pupils equal, EOMI  Neck: Supple, normal ROM, no tenderness to palpation  CVS: Normal rate and rhythm, distal pulses present  Pulm: Symmetric expansion, no respiratory distress  GI: Abdomen nondistended, nontender    MSK: Moves all extremities without restriction, atraumatic  Skin: Dry, intact  Psych: Normal thought content and cognition    Neuro:  Alert, awake, oriented, to self, place, time  Language:  Speech is fluent, goal directed without any noted dysarthria or aphasia    Cranial nerves:    CNII-XII: Intact on fine exam,   Pupils equal round react to light,   Extraocular muscles are intact  V1 to V3 is intact to light touch,   no facial asymmetry,   Hearing is intact to finger rub and voice  tongue/uvula/palate midline,   shoulder shrug equal,     No pronator drift    Extremities:  Motor:  Upper Extremity    Deltoid Triceps Biceps Wrist  Extension Wrist  Flexion Interosseous   R 5/5 5/5 5/5 5/5 5/5 5/5   L 5/5 5/5 5/5 5/5 5/5 5/5       Thumb   Abduction Thumb  ADDuction Finger  Flexion Finger  Extension     R 5/5 5/5 5/5 5/5     L 5/5 5/5 5/5 5/5        Lower Extremity     Iliopsoas Quadriceps Hamstring  Plantarflexion Dorsiflexion EHL   R 5/5 5/5 5/5 5/5 5/5 5/5   L 5/5 5/5 5/5 5/5 5/5 5/5       Reflexes:     DTR: 2+ biceps    2+ triceps   2+ brachioradialis   2+ patellar  2+ Achilles     Simmons's: Negative     Babinski's: Negative     Clonus: Negative     Sensory:      Sensation intact to light touch, temperature sensation, vibration, pinprick     Coordination:      Coordination intact throughout, no dysmetria, normal rapid alternating movements, no dysdiadochokinesia  Cerebellar:  Normal finger-to-nose, normal heel-to-shin  Cervical spine:  No midline cervical tenderness, negative Lhermitte, negative Spurling  Thoracic spine:  No midline thoracic tenderness  Lumbar spine:  No midline lumbar tenderness     Diagnostic Results:  I personally reviewed the patient's Diagnostic Imaging.    MRA Brain W WO Contrast 4/13/2024  MRA head: Stable and subtle 2 mm left A-comm aneurysm,  Vessel wall imaging: No abnormal wall enhancement      ASSESSMENT/PLAN:   77 y/o F with history of lumbar stenosis and stable left anterior communicating aneurysm.    Pt presents with bilateral LE pain and numbness that radiates to feet. Reports constantly feeling imbalanced with tendency to lean R. She also noted short episodes of headaches that resolve on their own, lasting 3-4 minutes.  Discussed imaging results of MRA brain which showed stable aneurysm with no abnormal wall enhancement or change in size.  After discussion with pt, I'd like to order an MRA brain with vessel wall imaging in one year.     Thank you so very much for allowing me to participate in the care of this patient.  Please feel free to call any questions, comments, or concerns.     Amparo Linares MD,MSc  Department of Neurosurgery   Department of Radiology  Department of Neurology  [facility]    [facility] [employer]   [employer] [facility] [employer]     Total time  spent in counseling and discussion about further management options including relevant lab work, treatment,  prognosis, medications and intended side effects was more than 60 minutes. More than 50 % of the time was spent in counseling and coordination of care.  I spent a total of 60 minutes on the day of the visit.This includes face to face time and non-face to face time preparing to see the patient (eg, review of tests), Obtaining and/or reviewing separately obtained history, Documenting clinical information in the electronic or other health record, Independently interpreting resultsand communicating results to the patient/family/caregiver, or Care coordination        Scribe Attestation  I, Dr.Vernard Linares personally performed the services described in this documentation. All medical record entries made by the scribe, Mirella Saldivar, were at my direction and in my presence.  I have reviewed the chart and agree that the record reflects my personal performance and is accurate and complete.

## 2024-04-17 ENCOUNTER — HOSPITAL ENCOUNTER (OUTPATIENT)
Dept: CARDIOLOGY | Facility: HOSPITAL | Age: 79
Discharge: HOME | End: 2024-04-17
Attending: INTERNAL MEDICINE
Payer: MEDICARE

## 2024-04-17 VITALS
SYSTOLIC BLOOD PRESSURE: 180 MMHG | HEART RATE: 63 BPM | DIASTOLIC BLOOD PRESSURE: 87 MMHG | BODY MASS INDEX: 34.04 KG/M2 | HEIGHT: 62 IN | WEIGHT: 185 LBS

## 2024-04-17 DIAGNOSIS — I65.23 BILATERAL CAROTID ARTERY STENOSIS: ICD-10-CM

## 2024-04-17 DIAGNOSIS — R07.89 OTHER CHEST PAIN: ICD-10-CM

## 2024-04-17 LAB
CV PHARM DOSE: 0.4 MG
CV STRESS BASE HR: 63 BPM
DIASTOLIC BLOOD PRESSURE: 87 MMHG
EJECTION FRACTION- HIGH: 59 %
END DIASTOLIC INDEX-HIGH: 155 ML/M2
END DIASTOLIC INDEX-LOW: 91 ML/M2
END SYSTOLIC INDEX-HIGH: 78 ML/M2
END SYSTOLIC INDEX-LOW: 40 ML/M2
LEFT ARM DIASTOLIC BLOOD PRESSURE: 74 MMHG
LEFT ARM SYSTOLIC BLOOD PRESSURE: 163 MMHG
LEFT CBA DIAS: 13 CM/S
LEFT CBA SYS: 43 CM/S
LEFT CCA DIST DIAS: 16 CM/S
LEFT CCA DIST SYS: 71 CM/S
LEFT CCA MID DIAS: 20 CM/S
LEFT CCA MID SYS: 91 CM/S
LEFT CCA PROX DIAS: 14 CM/S
LEFT CCA PROX SYS: 76 CM/S
LEFT ECA DIAS: 9 CM/S
LEFT ECA SYS: 71 CM/S
LEFT ICA DIST DIAS: 28 CM/S
LEFT ICA DIST SYS: 112 CM/S
LEFT ICA MID DIAS: 31 CM/S
LEFT ICA MID SYS: 110 CM/S
LEFT ICA PROX DIAS: 26 CM/S
LEFT ICA PROX SYS: 108 CM/S
LEFT VERTEBRAL DIAS: 13 CM/S
LEFT VERTEBRAL SYS: 54 CM/S
OHS CV CAROTID RIGHT ICA EDV HIGHEST: 34
OHS CV CAROTID ULTRASOUND LEFT ICA/CCA RATIO: 1.58
OHS CV CAROTID ULTRASOUND RIGHT ICA/CCA RATIO: 1.23
OHS CV CPX 1 MINUTE RECOVERY HEART RATE: 98 BPM
OHS CV CPX 85 PERCENT MAX PREDICTED HEART RATE MALE: 121
OHS CV CPX MAX PREDICTED HEART RATE: 142
OHS CV CPX PATIENT IS FEMALE: 1
OHS CV CPX PATIENT IS MALE: 0
OHS CV CPX PEAK DIASTOLIC BLOOD PRESSURE: 78 MMHG
OHS CV CPX PEAK HEAR RATE: 102 BPM
OHS CV CPX PEAK RATE PRESSURE PRODUCT: NORMAL
OHS CV CPX PEAK SYSTOLIC BLOOD PRESSURE: 199 MMHG
OHS CV CPX PERCENT MAX PREDICTED HEART RATE ACHIEVED: 74
OHS CV CPX RATE PRESSURE PRODUCT PRESENTING: NORMAL
OHS CV PV CAROTID LEFT HIGHEST CCA: 91
OHS CV PV CAROTID LEFT HIGHEST ICA: 112
OHS CV PV CAROTID RIGHT HIGHEST CCA: 101
OHS CV PV CAROTID RIGHT HIGHEST ICA: 106
OHS CV US CAROTID LEFT HIGHEST EDV: 31
RETIRED EF AND QEF - SEE NOTES: 47 %
RIGHT ARM DIASTOLIC BLOOD PRESSURE: 73 MMHG
RIGHT ARM SYSTOLIC BLOOD PRESSURE: 153 MMHG
RIGHT CBA DIAS: 9 CM/S
RIGHT CBA SYS: 53 CM/S
RIGHT CCA DIST DIAS: 17 CM/S
RIGHT CCA DIST SYS: 86 CM/S
RIGHT CCA MID DIAS: 20 CM/S
RIGHT CCA MID SYS: 81 CM/S
RIGHT CCA PROX DIAS: 18 CM/S
RIGHT CCA PROX SYS: 101 CM/S
RIGHT ECA DIAS: 14 CM/S
RIGHT ECA SYS: 75 CM/S
RIGHT ICA DIST DIAS: 34 CM/S
RIGHT ICA DIST SYS: 106 CM/S
RIGHT ICA MID DIAS: 26 CM/S
RIGHT ICA MID SYS: 83 CM/S
RIGHT ICA PROX DIAS: 18 CM/S
RIGHT ICA PROX SYS: 73 CM/S
RIGHT VERTEBRAL DIAS: 12 CM/S
RIGHT VERTEBRAL SYS: 50 CM/S
SYSTOLIC BLOOD PRESSURE: 180 MMHG

## 2024-04-17 PROCEDURE — 63600175 PHARM REV CODE 636 W HCPCS: Performed by: INTERNAL MEDICINE

## 2024-04-17 PROCEDURE — 93018 CV STRESS TEST I&R ONLY: CPT | Mod: ,,, | Performed by: INTERNAL MEDICINE

## 2024-04-17 PROCEDURE — 93016 CV STRESS TEST SUPVJ ONLY: CPT | Mod: ,,, | Performed by: INTERNAL MEDICINE

## 2024-04-17 PROCEDURE — 93017 CV STRESS TEST TRACING ONLY: CPT

## 2024-04-17 PROCEDURE — 78452 HT MUSCLE IMAGE SPECT MULT: CPT | Mod: 26,,, | Performed by: INTERNAL MEDICINE

## 2024-04-17 PROCEDURE — A9502 TC99M TETROFOSMIN: HCPCS | Performed by: INTERNAL MEDICINE

## 2024-04-17 PROCEDURE — 93880 EXTRACRANIAL BILAT STUDY: CPT | Mod: 26,,, | Performed by: INTERNAL MEDICINE

## 2024-04-17 PROCEDURE — 93880 EXTRACRANIAL BILAT STUDY: CPT

## 2024-04-17 RX ORDER — REGADENOSON 0.08 MG/ML
0.4 INJECTION, SOLUTION INTRAVENOUS
Status: COMPLETED | OUTPATIENT
Start: 2024-04-17 | End: 2024-04-17

## 2024-04-17 RX ADMIN — TETROFOSMIN 10.8 MILLICURIE: 1.38 INJECTION, POWDER, LYOPHILIZED, FOR SOLUTION INTRAVENOUS at 09:04

## 2024-04-17 RX ADMIN — TETROFOSMIN 30.1 MILLICURIE: 1.38 INJECTION, POWDER, LYOPHILIZED, FOR SOLUTION INTRAVENOUS at 10:04

## 2024-04-17 RX ADMIN — REGADENOSON 0.4 MG: 0.08 INJECTION, SOLUTION INTRAVENOUS at 10:04

## 2024-04-22 ENCOUNTER — OFFICE VISIT (OUTPATIENT)
Dept: RHEUMATOLOGY | Facility: CLINIC | Age: 79
End: 2024-04-22
Payer: MEDICARE

## 2024-04-22 VITALS
BODY MASS INDEX: 33.13 KG/M2 | HEART RATE: 66 BPM | WEIGHT: 180 LBS | DIASTOLIC BLOOD PRESSURE: 77 MMHG | SYSTOLIC BLOOD PRESSURE: 163 MMHG | HEIGHT: 62 IN

## 2024-04-22 DIAGNOSIS — E66.9 OBESITY (BMI 30-39.9): ICD-10-CM

## 2024-04-22 DIAGNOSIS — D84.821 IMMUNOSUPPRESSION DUE TO DRUG THERAPY: ICD-10-CM

## 2024-04-22 DIAGNOSIS — M05.79 RHEUMATOID ARTHRITIS INVOLVING MULTIPLE SITES WITH POSITIVE RHEUMATOID FACTOR: Primary | ICD-10-CM

## 2024-04-22 DIAGNOSIS — E78.2 MIXED HYPERLIPIDEMIA: ICD-10-CM

## 2024-04-22 DIAGNOSIS — Z79.899 IMMUNOSUPPRESSION DUE TO DRUG THERAPY: ICD-10-CM

## 2024-04-22 PROCEDURE — 99213 OFFICE O/P EST LOW 20 MIN: CPT | Mod: PBBFAC | Performed by: INTERNAL MEDICINE

## 2024-04-22 PROCEDURE — 99214 OFFICE O/P EST MOD 30 MIN: CPT | Mod: S$PBB,,, | Performed by: INTERNAL MEDICINE

## 2024-04-22 PROCEDURE — 99999 PR PBB SHADOW E&M-EST. PATIENT-LVL III: CPT | Mod: PBBFAC,,, | Performed by: INTERNAL MEDICINE

## 2024-04-22 ASSESSMENT — ROUTINE ASSESSMENT OF PATIENT INDEX DATA (RAPID3)
PSYCHOLOGICAL DISTRESS SCORE: 3.3
AM STIFFNESS SCORE: 0, NO
PAIN SCORE: 8
FATIGUE SCORE: 5
TOTAL RAPID3 SCORE: 7
MDHAQ FUNCTION SCORE: 0.9
PATIENT GLOBAL ASSESSMENT SCORE: 10

## 2024-04-22 NOTE — PROGRESS NOTES
Subjective:       Patient ID: Yulia Peralta is a 78 y.o. female.    Chief Complaint: Rheumatoid arthritis    HPI:  Yulia Peralta is a 78 y.o. female with a history of rheumatoid arthritis for 25 years.  She had been on methotrexate for the past 5 years and her current dose is methotrexate 10 tabs qweek (5 on Monday and 5 on Tuesday).  Off prednisone    History of gout some years ago.        Interval History:    Saw Dr. Simeon in vascular cardiology did stress test due to chest pain.   Saw Dr. Amparo Linares in neurosurgery regarding back and leg pain and he is evaluating for aneurysm in brain with MRA.   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      Doing well. She continues to have pain in feet and legs.    Previously saw foot doctor regarding pain and was told to change shoes and gave steroid pack which helped a little.  She is still trying to find the shoes.  She was told to contact podiatrist office if no improvement for possible injection.    She saw neurosurgery who referred her to healthy back program.  She finished Healthy Back Program last year but has not been doing the exercises.     4/10 ache in feet and legs at times.  Worsened with being on feet all day or staying.   No morning stiffness.  Improves with resting feet.  Worse with walking.    Saw dermatologist who gave her Clobetasol and told her to take Biotin daily.     Seeing neurosurgery regarding aneurysm anterior communicating artery on CT who recommended conservative follow-up with imaging.  No additional intervention at this particular time. Would recommend repeat MRA with vessel wall imaging in 2-3 year and would continue to follow conservatively moving forward.  Recommend consulting Neurology, for the other neurologic symptoms.  She may also need follow-up with ENT as well.        Review of Systems   Constitutional:  Positive for fatigue.   HENT: Negative.     Eyes: Negative.    Cardiovascular: Negative.    Gastrointestinal:  "Negative.    Endocrine: Negative.    Genitourinary: Negative.    Musculoskeletal:  Positive for arthralgias and joint swelling.   Skin:  Negative for rash.        Hair loss   Allergic/Immunologic: Negative.    Neurological:  Negative for headaches.   Hematological: Negative.  Does not bruise/bleed easily.   Psychiatric/Behavioral: Negative.           Objective:   BP (!) 163/77   Pulse 66   Ht 5' 2" (1.575 m)   Wt 81.6 kg (180 lb)   BMI 32.92 kg/m²   Virtual visit       Physical Exam   Constitutional: She is oriented to person, place, and time.   HENT:   Head: Normocephalic and atraumatic.   Eyes: Conjunctivae are normal.   Cardiovascular: Normal rate, regular rhythm and normal heart sounds.   Pulmonary/Chest: Effort normal and breath sounds normal.   Abdominal: Soft. Bowel sounds are normal.   Musculoskeletal:      Cervical back: Neck supple.      Comments: 28 joint count: 0 swollen and 0 tender  No swelling of hands  No pain on compression MTPs bilaterally  Contractures elbows   Neurological: She is alert and oriented to person, place, and time. Gait normal.   Skin: Skin is warm and dry.   Psychiatric: Mood and affect normal.            LABS    Component      Latest Ref Rng & Units 4/24/2023 4/18/2023 1/30/2023   WBC      3.90 - 12.70 K/uL 5.14  6.43   RBC      4.00 - 5.40 M/uL 3.50 (L)  3.45 (L)   Hemoglobin      12.0 - 16.0 g/dL 11.2 (L)  11.0 (L)   Hematocrit      37.0 - 48.5 % 35.1 (L)  34.2 (L)   MCV      82 - 98 fL 100 (H)  99 (H)   MCH      27.0 - 31.0 pg 32.0 (H)  31.9 (H)   MCHC      32.0 - 36.0 g/dL 31.9 (L)  32.2   RDW      11.5 - 14.5 % 14.1  14.3   Platelets      150 - 450 K/uL 175  182   MPV      9.2 - 12.9 fL 12.9  13.3 (H)   Immature Granulocytes      0.0 - 0.5 % 0.2  0.6 (H)   Gran # (ANC)      1.8 - 7.7 K/uL 2.6  3.7   Immature Grans (Abs)      0.00 - 0.04 K/uL 0.01  0.04   Lymph #      1.0 - 4.8 K/uL 1.2  1.3   Mono #      0.3 - 1.0 K/uL 1.0  1.0   Eos #      0.0 - 0.5 K/uL 0.3  0.3   Baso " #      0.00 - 0.20 K/uL 0.02  0.03   nRBC      0 /100 WBC 0  0   Gran %      38.0 - 73.0 % 51.2  57.4   Lymph %      18.0 - 48.0 % 23.5  20.4   Mono %      4.0 - 15.0 % 18.7 (H)  16.0 (H)   Eosinophil %      0.0 - 8.0 % 6.0  5.1   Basophil %      0.0 - 1.9 % 0.4  0.5   Differential Method       Automated  Automated   Sodium      136 - 145 mmol/L 141  139   Potassium      3.5 - 5.1 mmol/L 4.8  4.6   Chloride      95 - 110 mmol/L 107  105   CO2      23 - 29 mmol/L 28  23   Glucose      70 - 110 mg/dL 86  95   BUN      8 - 23 mg/dL 9  12   Creatinine      0.5 - 1.4 mg/dL 0.7  0.8   Calcium      8.7 - 10.5 mg/dL 10.2  10.2   PROTEIN TOTAL      6.0 - 8.4 g/dL 7.5  7.7   Albumin      3.5 - 5.2 g/dL 3.9  4.1   BILIRUBIN TOTAL      0.1 - 1.0 mg/dL 0.5  0.4   Alkaline Phosphatase      55 - 135 U/L 97  97   AST      10 - 40 U/L 17  13   ALT      10 - 44 U/L 14  14   Anion Gap      8 - 16 mmol/L 6 (L)  11   eGFR      >60 mL/min/1.73 m:2 >60.0  >60.0   CRP      0.0 - 8.2 mg/L 0.7  0.7   Sed Rate      0 - 36 mm/Hr 38 (H)  28   TSH      0.400 - 4.000 uIU/mL  1.086      Assessment:       1. Rheumatoid arthritis manifested by rheumatoid nodule, polyarthritis in the past, and contractures of the elbows.    Treated in the past with gold. Plaquenil did not work in the past and she never took SSZ.   Doing well. Continue MTX (Monday 5 in morning and 5 in evening) and folic acid   2. Encounter for long-term (current) use of other medications    3. Fatigue.   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  She decided to go for second opinion at [facility]. Biopsy was negative. MRI repeat stable and most recent 10/2017 stable  5. 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 Felt not to have hepatitis C by hepatology.  6. Right anserine bursitis.   7. Obesity  8. HTN.   9. Chronic back pain.  Bulging disc.  May be cause of paresthesias in legs with  standing.  10.  Paresthesia of in left leg.  May be due to #9.    11.  Right clavicle mass.  Evaluated by x-ray   12.  Trigger fingers.  Left 4th and right 3rd and right 5th.  Therapy helped in past.    13.  Left lower leg pain and swelling.  History of trauma  14.  Anterior communicating artery aneurysm  15.  Hair loss.  Not sure if from MTX.  Will have dermatology evaluate    Plan:       1. Continue methotrexate 10 tabs qweek.  2. Check CRP, ESR, CBC, and CMP.   3. Continue Nabumetone.  Patient says GI ok with her continuing despite healing ulcer on EGD.   Patient on misoprostol  4. Continue folic acid 2 tabs.    5. Patient to restart Healthy Back exercises for home  6. Consider OT in future for arms if pain returns and no improvement with home exercises from previous PT  7. Topical arthritis OTC ointment to feet and ankles.  8.  Encourage walking in the house  9.  Ice feet at end of day and apply heat in morning.          Return to the office in 6 months/prn;

## 2024-04-24 NOTE — TELEPHONE ENCOUNTER
----- Message from Dulce Gomez sent at 9/25/2017  2:24 PM CDT -----  Contact: Self`  Pt calling regarding injection. Please call [phone]   

## 2024-05-09 ENCOUNTER — TELEPHONE (OUTPATIENT)
Dept: CARDIOLOGY | Facility: CLINIC | Age: 79
End: 2024-05-09
Payer: MEDICARE

## 2024-05-09 NOTE — TELEPHONE ENCOUNTER
----- Message from Luly Churchill sent at 5/9/2024  2:19 PM CDT -----  Regarding: Test Results  Contact: Patient  Type:  Test Results    Who Called: Patient   Name of Test (Lab/Mammo/Etc):  CV US CAROTID   Nuclear Stress - Cardiology Interpreted  Date of Test: 04/17/2024   Ordering Provider:  Blanco   Where the test was performed: [facility]   Would the patient rather a call back or a response via [facility]? Call back   Best Call Back Number:  [phone]  Additional Information:  Patient stated she would like a return call to discuss results please assist  
Patient would like to review results of carotid study and stress test. Please advise.  
no

## 2024-05-13 ENCOUNTER — TELEPHONE (OUTPATIENT)
Dept: NEUROSURGERY | Facility: CLINIC | Age: 79
End: 2024-05-13
Payer: MEDICARE

## 2024-05-13 NOTE — TELEPHONE ENCOUNTER
----- Message from Roseline Kunz sent at 5/13/2024  3:48 PM CDT -----  Regarding: results  Contact: @ [phone]  Pt is calling to see if someone can call  in regards to recent lab/scans results from back in April  ..Please call and adv @ [phone]

## 2024-05-14 ENCOUNTER — TELEPHONE (OUTPATIENT)
Dept: NEUROSURGERY | Facility: CLINIC | Age: 79
End: 2024-05-14
Payer: MEDICARE

## 2024-05-14 NOTE — TELEPHONE ENCOUNTER
----- Message from Mel Jerome sent at 5/14/2024 10:19 AM CDT -----  Regarding: Lab Results  Contact: pt @ [phone]  Pt is calling to get lab results that was taken in April. Asking for a call back

## 2024-05-14 NOTE — TELEPHONE ENCOUNTER
Returned call to pt. Explained that Dr. Linares already went over the result with the pt on the last visit. However, if pt would like, we can set  up an appointment to go over the result because Dr. Linares does not go over the result through the phone. Pt asked for the plan of care. Informed that Dr. Linares would like to see the pt back in a year with another MRA. Pt said she will stick to the plan.

## 2024-05-29 DIAGNOSIS — M05.79 RHEUMATOID ARTHRITIS INVOLVING MULTIPLE SITES WITH POSITIVE RHEUMATOID FACTOR: ICD-10-CM

## 2024-05-29 DIAGNOSIS — D84.9 IMMUNOSUPPRESSION: ICD-10-CM

## 2024-05-29 DIAGNOSIS — I10 ESSENTIAL HYPERTENSION: ICD-10-CM

## 2024-05-29 DIAGNOSIS — Z79.1 NSAID LONG-TERM USE: ICD-10-CM

## 2024-05-29 DIAGNOSIS — M10.9 GOUT, ARTHRITIS: ICD-10-CM

## 2024-05-29 DIAGNOSIS — K21.9 GASTROESOPHAGEAL REFLUX DISEASE: ICD-10-CM

## 2024-05-29 DIAGNOSIS — K21.9 GASTROESOPHAGEAL REFLUX DISEASE WITHOUT ESOPHAGITIS: ICD-10-CM

## 2024-05-29 RX ORDER — FUROSEMIDE 40 MG/1
40 TABLET ORAL DAILY
Qty: 90 TABLET | Refills: 3 | OUTPATIENT
Start: 2024-05-29

## 2024-05-29 RX ORDER — PANTOPRAZOLE SODIUM 40 MG/1
40 TABLET, DELAYED RELEASE ORAL DAILY
Qty: 90 TABLET | Refills: 3 | OUTPATIENT
Start: 2024-05-29

## 2024-05-29 RX ORDER — AMLODIPINE BESYLATE 5 MG/1
5 TABLET ORAL DAILY
Qty: 90 TABLET | Refills: 3 | OUTPATIENT
Start: 2024-05-29

## 2024-05-29 NOTE — TELEPHONE ENCOUNTER
Care Due:                  Date            Visit Type   Department     Provider  --------------------------------------------------------------------------------                                [facility] FAMILY                              PRIMARY      MEDICINE/INTERN  Last Visit: 09-      CARE ([facility])   AL MED         Ileana Mix  Next Visit: None Scheduled  None         None Found                                                            Last  Test          Frequency    Reason                     Performed    Due Date  --------------------------------------------------------------------------------    Lipid Panel.  12 months..  atorvastatin.............  02- 01-    [facility] Embedded Care Due Messages. Reference number: [id number].   5/29/2024 10:36:21 AM CDT

## 2024-05-29 NOTE — TELEPHONE ENCOUNTER
----- Message from Madeline Giuseppe sent at 5/29/2024 10:22 AM CDT -----  Regarding: self [phone]  Type: RX Refill Request    Who Called:  self     Have you contacted your pharmacy: yes    Refill or New Rx: refill     RX Name and Strength:amLODIPine (NORVASC) 5 MG tablet, pantoprazole (PROTONIX) 40 MG tablet, furosemide (LASIX) 40 MG tablet and miSOPROStoL (CYTOTEC) 100 MCG Ta    Preferred Pharmacy with phone number:  [facility] [address]  [address]  [place] [address]  Phone: [phone] Fax: [phone]      Local or Mail Order: local     Would the patient rather a call back or a response via [facility]?  Call back     Best Call Back Number: [phone]

## 2024-05-29 NOTE — TELEPHONE ENCOUNTER
Attempted to contact patient to schedule appointment, no answer. Voicemail left for patient to return call.

## 2024-05-31 ENCOUNTER — OFFICE VISIT (OUTPATIENT)
Dept: OBSTETRICS AND GYNECOLOGY | Facility: CLINIC | Age: 79
End: 2024-05-31
Payer: MEDICARE

## 2024-05-31 VITALS — SYSTOLIC BLOOD PRESSURE: 128 MMHG | DIASTOLIC BLOOD PRESSURE: 76 MMHG

## 2024-05-31 DIAGNOSIS — L30.4 INTERTRIGO: Primary | ICD-10-CM

## 2024-05-31 PROCEDURE — 99999 PR PBB SHADOW E&M-EST. PATIENT-LVL III: CPT | Mod: PBBFAC,,,

## 2024-05-31 PROCEDURE — 99213 OFFICE O/P EST LOW 20 MIN: CPT | Mod: S$PBB,,,

## 2024-05-31 PROCEDURE — 99213 OFFICE O/P EST LOW 20 MIN: CPT | Mod: PBBFAC

## 2024-05-31 RX ORDER — NYSTATIN 100000 [USP'U]/G
POWDER TOPICAL 2 TIMES DAILY
Qty: 30 G | Refills: 1 | Status: SHIPPED | OUTPATIENT
Start: 2024-05-31

## 2024-05-31 RX ORDER — FOLIC ACID 1 MG/1
2000 TABLET ORAL DAILY
Qty: 270 TABLET | Refills: 3 | Status: SHIPPED | OUTPATIENT
Start: 2024-05-31

## 2024-05-31 RX ORDER — NYSTATIN AND TRIAMCINOLONE ACETONIDE 100000; 1 [USP'U]/G; MG/G
CREAM TOPICAL
Qty: 30 G | Refills: 1 | Status: SHIPPED | OUTPATIENT
Start: 2024-05-31 | End: 2025-05-31

## 2024-05-31 RX ORDER — MISOPROSTOL 100 UG/1
100 TABLET ORAL 2 TIMES DAILY
Qty: 120 TABLET | Refills: 5 | Status: SHIPPED | OUTPATIENT
Start: 2024-05-31

## 2024-05-31 NOTE — PROGRESS NOTES
Subjective     Patient ID: Yulia Peralta is a 79 y.o. female.    Chief Complaint:  Rash      History of Present Illness  Rash      Ms. Yulia Peralta is a 80 yo  that presents with complaints of rash to skin fold areas. She states that she started to cancel appointment today because it looked much better this morning.   She has been using Nystatin cream and powder. Will refill today.    GYN & OB History  No LMP recorded. Patient has had a hysterectomy.   Date of Last Pap: No result found    OB History    Para Term  AB Living   2 2 2         SAB IAB Ectopic Multiple Live Births                  # Outcome Date GA Lbr Fady/2nd Weight Sex Type Anes PTL Lv   2 Term            1 Term                Review of Systems  Review of Systems   Constitutional:  Negative for activity change and appetite change.   Gastrointestinal:  Negative for abdominal pain.   Genitourinary:  Negative for dysuria, vaginal discharge and postmenopausal bleeding.   Integumentary:  Positive for rash.          Objective   Physical Exam:   Constitutional: She is oriented to person, place, and time. She appears well-developed.    HENT:   Head: Normocephalic and atraumatic.    Eyes: EOM are normal.     Cardiovascular:  Normal rate.             Pulmonary/Chest: Effort normal.        Abdominal: Soft. There is no abdominal tenderness.             Musculoskeletal: Normal range of motion.       Neurological: She is oriented to person, place, and time.    Skin: Skin is warm.    Psychiatric: She has a normal mood and affect.            Assessment and Plan     1. Intertrigo          Plan:  1. Intertrigo  - nystatin (MYCOSTATIN) powder; Apply topically 2 (two) times daily.  Dispense: 30 g; Refill: 1  - nystatin-triamcinolone (MYCOLOG II) cream; Apply to affected area 2 times daily  Dispense: 30 g; Refill: 1     Medications working- refills sent.  Pt instructed to  keep affected areas as clean and dry as possible.  Continue cream and  powder PRN     Alfredo Coffman, P-BC

## 2024-06-03 ENCOUNTER — HOSPITAL ENCOUNTER (OUTPATIENT)
Dept: RADIOLOGY | Facility: HOSPITAL | Age: 79
Discharge: HOME | End: 2024-06-03
Attending: OBSTETRICS & GYNECOLOGY
Payer: MEDICARE

## 2024-06-03 DIAGNOSIS — N64.4 BREAST PAIN: ICD-10-CM

## 2024-06-03 PROCEDURE — 77066 DX MAMMO INCL CAD BI: CPT | Mod: 26,,, | Performed by: RADIOLOGY

## 2024-06-03 PROCEDURE — 77062 BREAST TOMOSYNTHESIS BI: CPT | Mod: TC

## 2024-06-03 PROCEDURE — 77062 BREAST TOMOSYNTHESIS BI: CPT | Mod: 26,,, | Performed by: RADIOLOGY

## 2024-06-11 DIAGNOSIS — K21.9 GASTROESOPHAGEAL REFLUX DISEASE WITHOUT ESOPHAGITIS: ICD-10-CM

## 2024-06-11 DIAGNOSIS — I10 ESSENTIAL HYPERTENSION: ICD-10-CM

## 2024-06-11 NOTE — TELEPHONE ENCOUNTER
----- Message from Sadia Ling sent at 6/11/2024 10:55 AM CDT -----  Regarding: self  Type: RX Refill Request     Who Called:self     Have you contacted your pharmacy:     Refill     RX Name and Strength:pantoprazole (PROTONIX) 40 MG tablet  furosemide (LASIX) 40 MG tablet       Preferred Pharmacy with phone number:   [facility] [place] - [address]  [address]  [place] [address]  Phone: [phone] Fax: [phone]            Local or Mail Order:mail     Would the patient rather a call back or a response via My [employer]?call     Best Call Back Number: [phone]       Additional Information:pt is stating she requested these about a week ago and hasn't heard back     Thank you.

## 2024-06-11 NOTE — TELEPHONE ENCOUNTER
Patient has appointment scheduled for 6/18/2024 with PCP. She is requesting partial refill to hold her over until visit.

## 2024-06-11 NOTE — TELEPHONE ENCOUNTER
No care due was identified.  [facility] Embedded Care Due Messages. Reference number: [id number].   6/11/2024 11:02:52 AM CDT

## 2024-06-14 DIAGNOSIS — I10 ESSENTIAL HYPERTENSION: ICD-10-CM

## 2024-06-14 DIAGNOSIS — K21.9 GASTROESOPHAGEAL REFLUX DISEASE WITHOUT ESOPHAGITIS: ICD-10-CM

## 2024-06-14 RX ORDER — FUROSEMIDE 40 MG/1
40 TABLET ORAL DAILY
Qty: 90 TABLET | Refills: 0 | Status: SHIPPED | OUTPATIENT
Start: 2024-06-14 | End: 2024-06-18 | Stop reason: SDUPTHER

## 2024-06-14 RX ORDER — PANTOPRAZOLE SODIUM 40 MG/1
40 TABLET, DELAYED RELEASE ORAL DAILY
Qty: 90 TABLET | Refills: 0 | Status: SHIPPED | OUTPATIENT
Start: 2024-06-14 | End: 2024-06-18 | Stop reason: SDUPTHER

## 2024-06-14 NOTE — TELEPHONE ENCOUNTER
No care due was identified.  [facility] Embedded Care Due Messages. Reference number: [id number].   6/14/2024 1:39:25 PM CDT

## 2024-06-14 NOTE — TELEPHONE ENCOUNTER
----- Message from Jing De Paz sent at 6/14/2024  1:24 PM CDT -----  Regarding: self [phone]  .Type: Patient Call Back    Who called:self    What is the request in detail: patent requesting a new prescription for amLODIPine (NORVASC) 5 MG tablet, furosemide (LASIX) 40 MG tablet,and pantoprazole (PROTONIX) 40 MG tablet. Patient would like to speak with the office.    [facility] - [address]  [address] [place] [address]  Phone: [phone] Fax: [phone]    Can the clinic reply by [facility]?no    Would the patient rather a call back or a response via [facility]?call back    Best call back number [phone]

## 2024-06-17 RX ORDER — PANTOPRAZOLE SODIUM 40 MG/1
40 TABLET, DELAYED RELEASE ORAL DAILY
Qty: 90 TABLET | Refills: 3 | OUTPATIENT
Start: 2024-06-17

## 2024-06-17 RX ORDER — FUROSEMIDE 40 MG/1
40 TABLET ORAL DAILY
Qty: 90 TABLET | Refills: 3 | OUTPATIENT
Start: 2024-06-17

## 2024-06-17 RX ORDER — AMLODIPINE BESYLATE 5 MG/1
5 TABLET ORAL DAILY
Qty: 90 TABLET | Refills: 3 | OUTPATIENT
Start: 2024-06-17

## 2024-06-18 ENCOUNTER — OFFICE VISIT (OUTPATIENT)
Dept: FAMILY MEDICINE | Facility: CLINIC | Age: 79
End: 2024-06-18
Payer: MEDICARE

## 2024-06-18 VITALS
HEART RATE: 62 BPM | WEIGHT: 185.88 LBS | SYSTOLIC BLOOD PRESSURE: 114 MMHG | TEMPERATURE: 98 F | BODY MASS INDEX: 34.2 KG/M2 | DIASTOLIC BLOOD PRESSURE: 62 MMHG | HEIGHT: 62 IN | OXYGEN SATURATION: 98 %

## 2024-06-18 DIAGNOSIS — I10 ESSENTIAL HYPERTENSION: ICD-10-CM

## 2024-06-18 DIAGNOSIS — K21.9 GASTROESOPHAGEAL REFLUX DISEASE WITHOUT ESOPHAGITIS: ICD-10-CM

## 2024-06-18 PROCEDURE — 99214 OFFICE O/P EST MOD 30 MIN: CPT | Mod: S$PBB,,, | Performed by: FAMILY MEDICINE

## 2024-06-18 PROCEDURE — 99999 PR PBB SHADOW E&M-EST. PATIENT-LVL IV: CPT | Mod: PBBFAC,,, | Performed by: FAMILY MEDICINE

## 2024-06-18 PROCEDURE — 99214 OFFICE O/P EST MOD 30 MIN: CPT | Mod: PBBFAC,PO | Performed by: FAMILY MEDICINE

## 2024-06-18 RX ORDER — AZELASTINE 1 MG/ML
SPRAY, METERED NASAL
COMMUNITY

## 2024-06-18 RX ORDER — FUROSEMIDE 40 MG/1
40 TABLET ORAL DAILY
Qty: 90 TABLET | Refills: 1 | Status: SHIPPED | OUTPATIENT
Start: 2024-06-18

## 2024-06-18 RX ORDER — PANTOPRAZOLE SODIUM 40 MG/1
40 TABLET, DELAYED RELEASE ORAL DAILY
Qty: 90 TABLET | Refills: 1 | Status: SHIPPED | OUTPATIENT
Start: 2024-06-18

## 2024-06-18 RX ORDER — AMLODIPINE BESYLATE 5 MG/1
5 TABLET ORAL DAILY
Qty: 90 TABLET | Refills: 3 | Status: SHIPPED | OUTPATIENT
Start: 2024-06-18

## 2024-06-18 NOTE — PROGRESS NOTES
Subjective     Patient ID: Yulia Peralta is a 79 y.o. female.    Chief Complaint: Back Pain, Leg Pain, Fatigue, and Hypertension    79 year old female presents for refills of her blood pressure medication. Her blood pressure is not controlled at home. She is due for labs. She is on amlodipine 5 mg, avapro 300 mg, and furosemide.    She has acid reflux. She is taking protonix for her stomach. She reports she is going to have a hydrogen breath test.     Hypertension  This is a chronic problem. The current episode started more than 1 year ago. The problem is unchanged. The problem is controlled. Pertinent negatives include no chest pain, headaches, malaise/fatigue, palpitations, peripheral edema or shortness of breath. Risk factors for coronary artery disease include post-menopausal state, obesity and dyslipidemia. Past treatments include angiotensin blockers, calcium channel blockers and diuretics. The current treatment provides significant improvement.       Past Medical History:   Diagnosis Date    Abnormal colonoscopy 07/24/2020    colon polyps nad repeat in 3 years.     Acid reflux     Anemia     Anxiety     Arthritis     Blood transfusion     Cataract     Depression     Dry eyes     H/O colonoscopy 07/22/2020    dr. nicholas. repeat in 3 years.     Heart murmur     Hypertension     Left lumbar radiculitis 5/19/2015    Mixed hyperlipidemia 11/5/2021    Multiple thyroid nodules 12/18/2012    Osteoporosis     Rheumatoid arthritis     Thoracic or lumbosacral neuritis or radiculitis, unspecified 10/30/2013      Past Surgical History:   Procedure Laterality Date    CATARACT EXTRACTION W/  INTRAOCULAR LENS IMPLANT Left 1/26/2021    Procedure: EXTRACTION, CATARACT, WITH IOL INSERTION;  Surgeon: Elvis Pete MD;  [facility];  Service: Ophthalmology;  Laterality: Left;    CATARACT EXTRACTION W/  INTRAOCULAR LENS IMPLANT Right 2/23/2021    Procedure: EXTRACTION, CATARACT, WITH IOL INSERTION;  Surgeon:  Elvis Pete MD;  Location[facility];  Service: Ophthalmology;  Laterality: Right;    CEREBRAL ANGIOGRAM N/A 1/23/2020    Procedure: ANGIOGRAM-CEREBRAL;  Surgeon: Hira Diagnostic Provider;  Location: [facility] OR [facility] FLR;  Service: Radiology;  Laterality: N/A;  /[place]    gallstones  [id number]    HYSTERECTOMY      JOINT REPLACEMENT  [id number]     bilateral knee    OOPHORECTOMY      KY REMOVAL OF OVARY/TUBE(S)      TONSILLECTOMY       Family History   Problem Relation Name Age of Onset    Heart failure Mother      Cataracts Mother      Hypertension Mother      Colon cancer Mother      Colon cancer Father      Hypertension Sister 1     Liver cancer Sister 1     Glaucoma Brother 1     Lung cancer Brother 1     Colon cancer Brother twin     No Known Problems Maternal Grandmother      No Known Problems Maternal Grandfather      No Known Problems Paternal Grandmother      No Known Problems Paternal Grandfather      No Known Problems Daughter 1     No Known Problems Son 1     Breast cancer Maternal Aunt      No Known Problems Maternal Uncle      No Known Problems Paternal Aunt      No Known Problems Paternal Uncle      No Known Problems Other      Lupus Neg Hx      Rheum arthritis Neg Hx      Psoriasis Neg Hx      Amblyopia Neg Hx      Blindness Neg Hx      Cancer Neg Hx      Diabetes Neg Hx      Macular degeneration Neg Hx      Retinal detachment Neg Hx      Strabismus Neg Hx      Stroke Neg Hx      Thyroid disease Neg Hx       Social History     Socioeconomic History    Marital status:      Spouse name: Ric     Number of children: 2    Highest education level: Some college, no degree   Occupational History    Occupation: retired    Tobacco Use    Smoking status: Former     Current packs/day: 0.25     Average packs/day: 0.3 packs/day for 1 year (0.3 ttl pk-yrs)     Types: Cigarettes    Smokeless tobacco: Never   Substance and Sexual Activity    Alcohol use: Yes     Alcohol/week: 2.0 standard drinks of  alcohol     Types: 1 Glasses of wine, 1 Cans of beer per week     Comment: very seldom    Drug use: No    Sexual activity: Not Currently     Partners: Male   Social History Narrative    Adult Screenings updated and reviewed  6/12/14    Mammogram( for females) ordered for DIS    Pap ( for females) Dr Katelyn Irene for f/u   For  2014    Colonoscopy  Done once    Flu shot yearly done  2013    Td 2005    Pneumovax  Updated  12/3/13    Zostavax done 2012    Eye exam recommended yearly done 2013    Bone density  12/9/13    Thyroid ultrasound  11/6/2012 Normal sized thyroid containing several subcentimeter nodules, similar to the 5/12/11 exam.  No concerning nodules identified..          Social Determinants of Health     Financial Resource Strain: Low Risk  (3/27/2023)    Overall Financial Resource Strain (CARDIA)     Difficulty of Paying Living Expenses: Not hard at all   Food Insecurity: No Food Insecurity (3/27/2023)    Hunger Vital Sign     Worried About Running Out of Food in the Last Year: Never true     Ran Out of Food in the Last Year: Never true   Transportation Needs: No Transportation Needs (3/27/2023)    PRAPARE - Transportation     Lack of Transportation (Medical): No     Lack of Transportation (Non-Medical): No   Physical Activity: Inactive (3/27/2023)    Exercise Vital Sign     Days of Exercise per Week: 0 days     Minutes of Exercise per Session: 0 min   Stress: No Stress Concern Present (3/27/2023)    [employer] - Occupational Stress Questionnaire     Feeling of Stress : Only a little   Housing Stability: Unknown (3/27/2023)    Housing Stability Vital Sign     Unable to Pay for Housing in the Last Year: No     Unstable Housing in the Last Year: No       Review of Systems   Constitutional:  Negative for malaise/fatigue.   Respiratory:  Negative for shortness of breath.    Cardiovascular:  Negative for chest pain and palpitations.   Neurological:  Negative for headaches.         "  Objective   Vitals:    06/18/24 1448   BP: 114/62   Pulse: 62   Temp: 97.9 °F (36.6 °C)   TempSrc: Oral   SpO2: 98%   Weight: 84.3 kg (185 lb 13.6 oz)   Height: 5' 2" (1.575 m)     Physical Exam  Constitutional:       General: She is not in acute distress.     Appearance: Normal appearance. She is well-developed. She is not ill-appearing, toxic-appearing or diaphoretic.   HENT:      Head: Normocephalic and atraumatic.   Eyes:      Conjunctiva/sclera: Conjunctivae normal.   Neck:      Vascular: No carotid bruit.   Cardiovascular:      Rate and Rhythm: Regular rhythm.      Heart sounds: Normal heart sounds. No murmur heard.     No friction rub. No gallop.   Pulmonary:      Effort: Pulmonary effort is normal. No respiratory distress.      Breath sounds: Normal breath sounds. No stridor. No wheezing, rhonchi or rales.   Musculoskeletal:      Cervical back: Normal range of motion and neck supple.   Neurological:      Mental Status: She is alert and oriented to person, place, and time.            Assessment and Plan     1. Essential hypertension  -     amLODIPine (NORVASC) 5 MG tablet; Take 1 tablet (5 mg total) by mouth once daily.  Dispense: 90 tablet; Refill: 3  -     furosemide (LASIX) 40 MG tablet; Take 1 tablet (40 mg total) by mouth once daily.  Dispense: 90 tablet; Refill: 1  -     CBC Auto Differential; Future; Expected date: 06/18/2024  -     Comprehensive Metabolic Panel; Future; Expected date: 06/18/2024  -     Lipid Panel; Future; Expected date: 06/18/2024  -     TSH; Future; Expected date: 06/18/2024  Stable. Refilled meds and due for labs    2. Gastroesophageal reflux disease without esophagitis  -     pantoprazole (PROTONIX) 40 MG tablet; Take 1 tablet (40 mg total) by mouth once daily.  Dispense: 90 tablet; Refill: 1                 No follow-ups on file.    "

## 2024-06-21 ENCOUNTER — LAB VISIT (OUTPATIENT)
Dept: LAB | Facility: HOSPITAL | Age: 79
End: 2024-06-21
Attending: INTERNAL MEDICINE
Payer: MEDICARE

## 2024-06-21 DIAGNOSIS — I10 ESSENTIAL HYPERTENSION: ICD-10-CM

## 2024-06-21 DIAGNOSIS — Z79.899 IMMUNOSUPPRESSION DUE TO DRUG THERAPY: ICD-10-CM

## 2024-06-21 DIAGNOSIS — D84.821 IMMUNOSUPPRESSION DUE TO DRUG THERAPY: ICD-10-CM

## 2024-06-21 DIAGNOSIS — M05.79 RHEUMATOID ARTHRITIS INVOLVING MULTIPLE SITES WITH POSITIVE RHEUMATOID FACTOR: ICD-10-CM

## 2024-06-21 DIAGNOSIS — E78.5 HYPERLIPIDEMIA, UNSPECIFIED HYPERLIPIDEMIA TYPE: ICD-10-CM

## 2024-06-21 LAB
ALBUMIN SERPL BCP-MCNC: 3.9 G/DL (ref 3.5–5.2)
ALBUMIN SERPL BCP-MCNC: 3.9 G/DL (ref 3.5–5.2)
ALP SERPL-CCNC: 92 U/L (ref 55–135)
ALP SERPL-CCNC: 92 U/L (ref 55–135)
ALT SERPL W/O P-5'-P-CCNC: 18 U/L (ref 10–44)
ALT SERPL W/O P-5'-P-CCNC: 18 U/L (ref 10–44)
ANION GAP SERPL CALC-SCNC: 9 MMOL/L (ref 8–16)
ANION GAP SERPL CALC-SCNC: 9 MMOL/L (ref 8–16)
AST SERPL-CCNC: 19 U/L (ref 10–40)
AST SERPL-CCNC: 19 U/L (ref 10–40)
BASOPHILS # BLD AUTO: 0.02 K/UL (ref 0–0.2)
BASOPHILS # BLD AUTO: 0.02 K/UL (ref 0–0.2)
BASOPHILS NFR BLD: 0.4 % (ref 0–1.9)
BASOPHILS NFR BLD: 0.4 % (ref 0–1.9)
BILIRUB SERPL-MCNC: 0.7 MG/DL (ref 0.1–1)
BILIRUB SERPL-MCNC: 0.7 MG/DL (ref 0.1–1)
BUN SERPL-MCNC: 10 MG/DL (ref 8–23)
BUN SERPL-MCNC: 10 MG/DL (ref 8–23)
CALCIUM SERPL-MCNC: 9.8 MG/DL (ref 8.7–10.5)
CALCIUM SERPL-MCNC: 9.8 MG/DL (ref 8.7–10.5)
CHLORIDE SERPL-SCNC: 109 MMOL/L (ref 95–110)
CHLORIDE SERPL-SCNC: 109 MMOL/L (ref 95–110)
CHOLEST SERPL-MCNC: 113 MG/DL (ref 120–199)
CHOLEST SERPL-MCNC: 113 MG/DL (ref 120–199)
CHOLEST/HDLC SERPL: 2.1 {RATIO} (ref 2–5)
CHOLEST/HDLC SERPL: 2.1 {RATIO} (ref 2–5)
CO2 SERPL-SCNC: 22 MMOL/L (ref 23–29)
CO2 SERPL-SCNC: 22 MMOL/L (ref 23–29)
CREAT SERPL-MCNC: 0.7 MG/DL (ref 0.5–1.4)
CREAT SERPL-MCNC: 0.7 MG/DL (ref 0.5–1.4)
CRP SERPL-MCNC: <0.3 MG/L (ref 0–8.2)
DIFFERENTIAL METHOD BLD: ABNORMAL
DIFFERENTIAL METHOD BLD: ABNORMAL
EOSINOPHIL # BLD AUTO: 0.2 K/UL (ref 0–0.5)
EOSINOPHIL # BLD AUTO: 0.2 K/UL (ref 0–0.5)
EOSINOPHIL NFR BLD: 4.6 % (ref 0–8)
EOSINOPHIL NFR BLD: 4.6 % (ref 0–8)
ERYTHROCYTE [DISTWIDTH] IN BLOOD BY AUTOMATED COUNT: 13.7 % (ref 11.5–14.5)
ERYTHROCYTE [DISTWIDTH] IN BLOOD BY AUTOMATED COUNT: 13.7 % (ref 11.5–14.5)
ERYTHROCYTE [SEDIMENTATION RATE] IN BLOOD BY PHOTOMETRIC METHOD: 20 MM/HR (ref 0–36)
EST. GFR  (NO RACE VARIABLE): >60 ML/MIN/1.73 M^2
EST. GFR  (NO RACE VARIABLE): >60 ML/MIN/1.73 M^2
GLUCOSE SERPL-MCNC: 101 MG/DL (ref 70–110)
GLUCOSE SERPL-MCNC: 101 MG/DL (ref 70–110)
HCT VFR BLD AUTO: 33.6 % (ref 37–48.5)
HCT VFR BLD AUTO: 33.6 % (ref 37–48.5)
HDLC SERPL-MCNC: 53 MG/DL (ref 40–75)
HDLC SERPL-MCNC: 53 MG/DL (ref 40–75)
HDLC SERPL: 46.9 % (ref 20–50)
HDLC SERPL: 46.9 % (ref 20–50)
HGB BLD-MCNC: 11.2 G/DL (ref 12–16)
HGB BLD-MCNC: 11.2 G/DL (ref 12–16)
IMM GRANULOCYTES # BLD AUTO: 0.01 K/UL (ref 0–0.04)
IMM GRANULOCYTES # BLD AUTO: 0.01 K/UL (ref 0–0.04)
IMM GRANULOCYTES NFR BLD AUTO: 0.2 % (ref 0–0.5)
IMM GRANULOCYTES NFR BLD AUTO: 0.2 % (ref 0–0.5)
LDLC SERPL CALC-MCNC: 53 MG/DL (ref 63–159)
LDLC SERPL CALC-MCNC: 53 MG/DL (ref 63–159)
LYMPHOCYTES # BLD AUTO: 1.2 K/UL (ref 1–4.8)
LYMPHOCYTES # BLD AUTO: 1.2 K/UL (ref 1–4.8)
LYMPHOCYTES NFR BLD: 25.1 % (ref 18–48)
LYMPHOCYTES NFR BLD: 25.1 % (ref 18–48)
MCH RBC QN AUTO: 33 PG (ref 27–31)
MCH RBC QN AUTO: 33 PG (ref 27–31)
MCHC RBC AUTO-ENTMCNC: 33.3 G/DL (ref 32–36)
MCHC RBC AUTO-ENTMCNC: 33.3 G/DL (ref 32–36)
MCV RBC AUTO: 99 FL (ref 82–98)
MCV RBC AUTO: 99 FL (ref 82–98)
MONOCYTES # BLD AUTO: 0.6 K/UL (ref 0.3–1)
MONOCYTES # BLD AUTO: 0.6 K/UL (ref 0.3–1)
MONOCYTES NFR BLD: 11.8 % (ref 4–15)
MONOCYTES NFR BLD: 11.8 % (ref 4–15)
NEUTROPHILS # BLD AUTO: 2.8 K/UL (ref 1.8–7.7)
NEUTROPHILS # BLD AUTO: 2.8 K/UL (ref 1.8–7.7)
NEUTROPHILS NFR BLD: 57.9 % (ref 38–73)
NEUTROPHILS NFR BLD: 57.9 % (ref 38–73)
NONHDLC SERPL-MCNC: 60 MG/DL
NONHDLC SERPL-MCNC: 60 MG/DL
NRBC BLD-RTO: 0 /100 WBC
NRBC BLD-RTO: 0 /100 WBC
PLATELET # BLD AUTO: 169 K/UL (ref 150–450)
PLATELET # BLD AUTO: 169 K/UL (ref 150–450)
PMV BLD AUTO: 13.9 FL (ref 9.2–12.9)
PMV BLD AUTO: 13.9 FL (ref 9.2–12.9)
POTASSIUM SERPL-SCNC: 4.8 MMOL/L (ref 3.5–5.1)
POTASSIUM SERPL-SCNC: 4.8 MMOL/L (ref 3.5–5.1)
PROT SERPL-MCNC: 7.5 G/DL (ref 6–8.4)
PROT SERPL-MCNC: 7.5 G/DL (ref 6–8.4)
RBC # BLD AUTO: 3.39 M/UL (ref 4–5.4)
RBC # BLD AUTO: 3.39 M/UL (ref 4–5.4)
SODIUM SERPL-SCNC: 140 MMOL/L (ref 136–145)
SODIUM SERPL-SCNC: 140 MMOL/L (ref 136–145)
TRIGL SERPL-MCNC: 35 MG/DL (ref 30–150)
TRIGL SERPL-MCNC: 35 MG/DL (ref 30–150)
TSH SERPL DL<=0.005 MIU/L-ACNC: 1.05 UIU/ML (ref 0.4–4)
WBC # BLD AUTO: 4.82 K/UL (ref 3.9–12.7)
WBC # BLD AUTO: 4.82 K/UL (ref 3.9–12.7)

## 2024-06-21 PROCEDURE — 84443 ASSAY THYROID STIM HORMONE: CPT | Performed by: FAMILY MEDICINE

## 2024-06-21 PROCEDURE — 85025 COMPLETE CBC W/AUTO DIFF WBC: CPT | Performed by: INTERNAL MEDICINE

## 2024-06-21 PROCEDURE — 80061 LIPID PANEL: CPT | Performed by: FAMILY MEDICINE

## 2024-06-21 PROCEDURE — 86140 C-REACTIVE PROTEIN: CPT | Performed by: INTERNAL MEDICINE

## 2024-06-21 PROCEDURE — 36415 COLL VENOUS BLD VENIPUNCTURE: CPT | Mod: PO | Performed by: FAMILY MEDICINE

## 2024-06-21 PROCEDURE — 80053 COMPREHEN METABOLIC PANEL: CPT | Performed by: INTERNAL MEDICINE

## 2024-06-21 PROCEDURE — 85652 RBC SED RATE AUTOMATED: CPT | Performed by: INTERNAL MEDICINE

## 2024-06-26 DIAGNOSIS — M10.9 GOUT, ARTHRITIS: ICD-10-CM

## 2024-06-26 DIAGNOSIS — Z79.1 NSAID LONG-TERM USE: ICD-10-CM

## 2024-06-26 DIAGNOSIS — M51.36 DDD (DEGENERATIVE DISC DISEASE), LUMBAR: ICD-10-CM

## 2024-06-26 DIAGNOSIS — M05.79 RHEUMATOID ARTHRITIS INVOLVING MULTIPLE SITES WITH POSITIVE RHEUMATOID FACTOR: ICD-10-CM

## 2024-06-26 DIAGNOSIS — K21.9 GASTROESOPHAGEAL REFLUX DISEASE: ICD-10-CM

## 2024-06-26 DIAGNOSIS — E78.5 HYPERLIPIDEMIA, UNSPECIFIED HYPERLIPIDEMIA TYPE: ICD-10-CM

## 2024-06-26 DIAGNOSIS — D84.9 IMMUNOSUPPRESSION: ICD-10-CM

## 2024-06-26 RX ORDER — ATORVASTATIN CALCIUM 40 MG/1
40 TABLET, FILM COATED ORAL
Qty: 90 TABLET | Refills: 3 | Status: SHIPPED | OUTPATIENT
Start: 2024-06-26

## 2024-06-26 RX ORDER — NABUMETONE 500 MG/1
500 TABLET, FILM COATED ORAL 2 TIMES DAILY
Qty: 180 TABLET | Refills: 0 | Status: SHIPPED | OUTPATIENT
Start: 2024-06-26

## 2024-06-26 RX ORDER — METHOTREXATE 2.5 MG/1
TABLET ORAL
Qty: 120 TABLET | Refills: 0 | Status: SHIPPED | OUTPATIENT
Start: 2024-06-26

## 2024-06-26 NOTE — TELEPHONE ENCOUNTER
No care due was identified.  [facility] Embedded Care Due Messages. Reference number: [id number].   6/26/2024 5:29:48 PM CDT

## 2024-06-26 NOTE — TELEPHONE ENCOUNTER
Refill Decision Note   Yulia Peralta  is requesting a refill authorization.  Brief Assessment and Rationale for Refill:  Approve     Medication Therapy Plan:         Comments:     Note composed:5:31 PM 06/26/2024

## 2024-07-16 ENCOUNTER — OFFICE VISIT (OUTPATIENT)
Dept: CARDIOLOGY | Facility: CLINIC | Age: 79
End: 2024-07-16
Payer: MEDICARE

## 2024-07-16 VITALS
HEIGHT: 62 IN | DIASTOLIC BLOOD PRESSURE: 73 MMHG | SYSTOLIC BLOOD PRESSURE: 169 MMHG | HEART RATE: 62 BPM | BODY MASS INDEX: 33.43 KG/M2 | WEIGHT: 181.69 LBS

## 2024-07-16 DIAGNOSIS — I10 ESSENTIAL HYPERTENSION: Primary | ICD-10-CM

## 2024-07-16 DIAGNOSIS — E78.2 MIXED HYPERLIPIDEMIA: ICD-10-CM

## 2024-07-16 DIAGNOSIS — I65.23 BILATERAL CAROTID ARTERY STENOSIS: ICD-10-CM

## 2024-07-16 DIAGNOSIS — R26.81 GAIT INSTABILITY: ICD-10-CM

## 2024-07-16 DIAGNOSIS — I73.9 CLAUDICATION OF BOTH LOWER EXTREMITIES: ICD-10-CM

## 2024-07-16 DIAGNOSIS — I73.9 PAD (PERIPHERAL ARTERY DISEASE): ICD-10-CM

## 2024-07-16 DIAGNOSIS — E66.01 SEVERE OBESITY (BMI 35.0-39.9) WITH COMORBIDITY: ICD-10-CM

## 2024-07-16 DIAGNOSIS — E66.9 OBESITY (BMI 30-39.9): ICD-10-CM

## 2024-07-16 DIAGNOSIS — M79.672 LEFT FOOT PAIN: ICD-10-CM

## 2024-07-16 DIAGNOSIS — R07.89 OTHER CHEST PAIN: ICD-10-CM

## 2024-07-16 DIAGNOSIS — I77.1 TORTUOUS AORTA: ICD-10-CM

## 2024-07-16 PROCEDURE — 99214 OFFICE O/P EST MOD 30 MIN: CPT | Mod: S$PBB,,, | Performed by: INTERNAL MEDICINE

## 2024-07-16 PROCEDURE — 99214 OFFICE O/P EST MOD 30 MIN: CPT | Mod: PBBFAC | Performed by: INTERNAL MEDICINE

## 2024-07-16 PROCEDURE — 99999 PR PBB SHADOW E&M-EST. PATIENT-LVL IV: CPT | Mod: PBBFAC,,, | Performed by: INTERNAL MEDICINE

## 2024-08-06 ENCOUNTER — OFFICE VISIT (OUTPATIENT)
Dept: FAMILY MEDICINE | Facility: CLINIC | Age: 79
End: 2024-08-06
Payer: MEDICARE

## 2024-08-06 VITALS
WEIGHT: 181.88 LBS | SYSTOLIC BLOOD PRESSURE: 136 MMHG | HEIGHT: 62 IN | RESPIRATION RATE: 16 BRPM | TEMPERATURE: 98 F | DIASTOLIC BLOOD PRESSURE: 74 MMHG | OXYGEN SATURATION: 97 % | BODY MASS INDEX: 33.47 KG/M2 | HEART RATE: 64 BPM

## 2024-08-06 DIAGNOSIS — I10 ESSENTIAL HYPERTENSION: ICD-10-CM

## 2024-08-06 DIAGNOSIS — S99.921A INJURY OF RIGHT FOOT, INITIAL ENCOUNTER: Primary | ICD-10-CM

## 2024-08-06 DIAGNOSIS — I73.9 PAD (PERIPHERAL ARTERY DISEASE): ICD-10-CM

## 2024-08-06 DIAGNOSIS — M48.062 SPINAL STENOSIS, LUMBAR REGION, WITH NEUROGENIC CLAUDICATION: ICD-10-CM

## 2024-08-06 PROBLEM — M79.672 LEFT FOOT PAIN: Status: RESOLVED | Noted: 2022-02-10 | Resolved: 2024-08-06

## 2024-08-06 PROCEDURE — 99214 OFFICE O/P EST MOD 30 MIN: CPT | Mod: S$PBB,,, | Performed by: NURSE PRACTITIONER

## 2024-08-06 PROCEDURE — 99215 OFFICE O/P EST HI 40 MIN: CPT | Mod: PBBFAC,PO | Performed by: NURSE PRACTITIONER

## 2024-08-06 PROCEDURE — 99999 PR PBB SHADOW E&M-EST. PATIENT-LVL V: CPT | Mod: PBBFAC,,, | Performed by: NURSE PRACTITIONER

## 2024-08-06 RX ORDER — FUROSEMIDE 20 MG/1
60 TABLET ORAL DAILY
Qty: 90 TABLET | Refills: 2 | Status: SHIPPED | OUTPATIENT
Start: 2024-08-06

## 2024-08-06 RX ORDER — PREGABALIN 100 MG/1
100 CAPSULE ORAL 3 TIMES DAILY
Qty: 90 CAPSULE | Refills: 5 | Status: SHIPPED | OUTPATIENT
Start: 2024-08-06

## 2024-08-06 RX ORDER — LIDOCAINE 50 MG/G
1 PATCH TOPICAL DAILY
Qty: 30 PATCH | Refills: 2 | Status: SHIPPED | OUTPATIENT
Start: 2024-08-06

## 2024-09-06 ENCOUNTER — OFFICE VISIT (OUTPATIENT)
Dept: FAMILY MEDICINE | Facility: CLINIC | Age: 79
End: 2024-09-06
Payer: MEDICARE

## 2024-09-06 VITALS
WEIGHT: 184.94 LBS | SYSTOLIC BLOOD PRESSURE: 126 MMHG | HEIGHT: 62 IN | HEART RATE: 60 BPM | OXYGEN SATURATION: 99 % | BODY MASS INDEX: 34.03 KG/M2 | DIASTOLIC BLOOD PRESSURE: 68 MMHG | TEMPERATURE: 98 F

## 2024-09-06 DIAGNOSIS — G62.9 NEUROPATHY: Primary | ICD-10-CM

## 2024-09-06 PROCEDURE — 99214 OFFICE O/P EST MOD 30 MIN: CPT | Mod: PBBFAC,PO | Performed by: FAMILY MEDICINE

## 2024-09-06 PROCEDURE — 99999 PR PBB SHADOW E&M-EST. PATIENT-LVL IV: CPT | Mod: PBBFAC,,, | Performed by: FAMILY MEDICINE

## 2024-09-06 RX ORDER — DULOXETIN HYDROCHLORIDE 20 MG/1
20 CAPSULE, DELAYED RELEASE ORAL DAILY
Qty: 90 CAPSULE | Refills: 1 | Status: SHIPPED | OUTPATIENT
Start: 2024-09-06 | End: 2025-09-06

## 2024-09-16 ENCOUNTER — LAB VISIT (OUTPATIENT)
Dept: LAB | Facility: HOSPITAL | Age: 79
End: 2024-09-16
Attending: INTERNAL MEDICINE
Payer: MEDICARE

## 2024-09-16 DIAGNOSIS — Z79.899 IMMUNOSUPPRESSION DUE TO DRUG THERAPY: ICD-10-CM

## 2024-09-16 DIAGNOSIS — D84.821 IMMUNOSUPPRESSION DUE TO DRUG THERAPY: ICD-10-CM

## 2024-09-16 DIAGNOSIS — M05.79 RHEUMATOID ARTHRITIS INVOLVING MULTIPLE SITES WITH POSITIVE RHEUMATOID FACTOR: ICD-10-CM

## 2024-09-16 LAB
ALBUMIN SERPL BCP-MCNC: 3.9 G/DL (ref 3.5–5.2)
ALP SERPL-CCNC: 96 U/L (ref 55–135)
ALT SERPL W/O P-5'-P-CCNC: 20 U/L (ref 10–44)
ANION GAP SERPL CALC-SCNC: 6 MMOL/L (ref 8–16)
AST SERPL-CCNC: 19 U/L (ref 10–40)
BASOPHILS # BLD AUTO: 0.02 K/UL (ref 0–0.2)
BASOPHILS NFR BLD: 0.3 % (ref 0–1.9)
BILIRUB SERPL-MCNC: 0.5 MG/DL (ref 0.1–1)
BUN SERPL-MCNC: 15 MG/DL (ref 8–23)
CALCIUM SERPL-MCNC: 9.7 MG/DL (ref 8.7–10.5)
CHLORIDE SERPL-SCNC: 109 MMOL/L (ref 95–110)
CO2 SERPL-SCNC: 27 MMOL/L (ref 23–29)
CREAT SERPL-MCNC: 0.8 MG/DL (ref 0.5–1.4)
CRP SERPL-MCNC: 0.4 MG/L (ref 0–8.2)
DIFFERENTIAL METHOD BLD: ABNORMAL
EOSINOPHIL # BLD AUTO: 0.2 K/UL (ref 0–0.5)
EOSINOPHIL NFR BLD: 4 % (ref 0–8)
ERYTHROCYTE [DISTWIDTH] IN BLOOD BY AUTOMATED COUNT: 14.1 % (ref 11.5–14.5)
ERYTHROCYTE [SEDIMENTATION RATE] IN BLOOD BY PHOTOMETRIC METHOD: 19 MM/HR (ref 0–36)
EST. GFR  (NO RACE VARIABLE): >60 ML/MIN/1.73 M^2
GLUCOSE SERPL-MCNC: 101 MG/DL (ref 70–110)
HCT VFR BLD AUTO: 32 % (ref 37–48.5)
HGB BLD-MCNC: 10.7 G/DL (ref 12–16)
IMM GRANULOCYTES # BLD AUTO: 0.06 K/UL (ref 0–0.04)
IMM GRANULOCYTES NFR BLD AUTO: 1 % (ref 0–0.5)
LYMPHOCYTES # BLD AUTO: 1.4 K/UL (ref 1–4.8)
LYMPHOCYTES NFR BLD: 24.3 % (ref 18–48)
MCH RBC QN AUTO: 33.4 PG (ref 27–31)
MCHC RBC AUTO-ENTMCNC: 33.4 G/DL (ref 32–36)
MCV RBC AUTO: 100 FL (ref 82–98)
MONOCYTES # BLD AUTO: 1 K/UL (ref 0.3–1)
MONOCYTES NFR BLD: 18.2 % (ref 4–15)
NEUTROPHILS # BLD AUTO: 3 K/UL (ref 1.8–7.7)
NEUTROPHILS NFR BLD: 52.2 % (ref 38–73)
NRBC BLD-RTO: 0 /100 WBC
PLATELET # BLD AUTO: 148 K/UL (ref 150–450)
PMV BLD AUTO: 13.8 FL (ref 9.2–12.9)
POTASSIUM SERPL-SCNC: 4.7 MMOL/L (ref 3.5–5.1)
PROT SERPL-MCNC: 7.4 G/DL (ref 6–8.4)
RBC # BLD AUTO: 3.2 M/UL (ref 4–5.4)
SODIUM SERPL-SCNC: 142 MMOL/L (ref 136–145)
WBC # BLD AUTO: 5.73 K/UL (ref 3.9–12.7)

## 2024-09-16 PROCEDURE — 80053 COMPREHEN METABOLIC PANEL: CPT | Performed by: INTERNAL MEDICINE

## 2024-09-16 PROCEDURE — 85025 COMPLETE CBC W/AUTO DIFF WBC: CPT | Performed by: INTERNAL MEDICINE

## 2024-09-16 PROCEDURE — 36415 COLL VENOUS BLD VENIPUNCTURE: CPT | Mod: PO | Performed by: INTERNAL MEDICINE

## 2024-09-16 PROCEDURE — 86140 C-REACTIVE PROTEIN: CPT | Performed by: INTERNAL MEDICINE

## 2024-09-16 PROCEDURE — 85652 RBC SED RATE AUTOMATED: CPT | Performed by: INTERNAL MEDICINE

## 2024-09-17 ENCOUNTER — TELEPHONE (OUTPATIENT)
Dept: RHEUMATOLOGY | Facility: CLINIC | Age: 79
End: 2024-09-17
Payer: MEDICARE

## 2024-09-17 NOTE — TELEPHONE ENCOUNTER
Lab results are stable.  Staff to inform the patient of the results and schedule repeat labs in 3 months.

## 2024-09-30 DIAGNOSIS — M05.79 RHEUMATOID ARTHRITIS INVOLVING MULTIPLE SITES WITH POSITIVE RHEUMATOID FACTOR: ICD-10-CM

## 2024-10-01 RX ORDER — METHOTREXATE 2.5 MG/1
TABLET ORAL
Qty: 120 TABLET | Refills: 0 | Status: SHIPPED | OUTPATIENT
Start: 2024-10-01

## 2024-10-02 DIAGNOSIS — I10 ESSENTIAL HYPERTENSION: ICD-10-CM

## 2024-10-02 RX ORDER — IRBESARTAN 300 MG/1
TABLET ORAL
Qty: 90 TABLET | Refills: 3 | Status: SHIPPED | OUTPATIENT
Start: 2024-10-02

## 2024-10-02 NOTE — TELEPHONE ENCOUNTER
No care due was identified.  [facility] Embedded Care Due Messages. Reference number: [id number].   10/02/2024 8:31:48 AM CDT

## 2024-10-02 NOTE — TELEPHONE ENCOUNTER
Refill Decision Note   Yulia Peralta  is requesting a refill authorization.  Brief Assessment and Rationale for Refill:  Approve     Medication Therapy Plan:        Comments:     Note composed:2:09 PM 10/02/2024

## 2024-10-04 DIAGNOSIS — D84.9 IMMUNOSUPPRESSION: ICD-10-CM

## 2024-10-04 DIAGNOSIS — M05.79 RHEUMATOID ARTHRITIS INVOLVING MULTIPLE SITES WITH POSITIVE RHEUMATOID FACTOR: ICD-10-CM

## 2024-10-04 DIAGNOSIS — K21.9 GASTROESOPHAGEAL REFLUX DISEASE: ICD-10-CM

## 2024-10-04 DIAGNOSIS — Z79.1 NSAID LONG-TERM USE: ICD-10-CM

## 2024-10-04 DIAGNOSIS — M10.9 GOUT, ARTHRITIS: ICD-10-CM

## 2024-10-04 DIAGNOSIS — M51.369 DDD (DEGENERATIVE DISC DISEASE), LUMBAR: ICD-10-CM

## 2024-10-07 RX ORDER — NABUMETONE 500 MG/1
500 TABLET, FILM COATED ORAL 2 TIMES DAILY
Qty: 180 TABLET | Refills: 0 | Status: SHIPPED | OUTPATIENT
Start: 2024-10-07

## 2024-11-01 DIAGNOSIS — R15.9 FECAL INCONTINENCE: Primary | ICD-10-CM

## 2024-12-03 DIAGNOSIS — K21.9 GASTROESOPHAGEAL REFLUX DISEASE WITHOUT ESOPHAGITIS: ICD-10-CM

## 2024-12-03 RX ORDER — PANTOPRAZOLE SODIUM 40 MG/1
40 TABLET, DELAYED RELEASE ORAL
Qty: 90 TABLET | Refills: 3 | Status: SHIPPED | OUTPATIENT
Start: 2024-12-03

## 2024-12-03 NOTE — TELEPHONE ENCOUNTER
Refill Routing Note   Medication(s) are not appropriate for processing by [facility] for the following reason(s):        Drug-drug interaction  Due for refill >6 months ago    [facility] action(s):  Defer      Medication Therapy Plan: PEPCID      Appointments  past 12m or future 3m with PCP    Date Provider   Last Visit   9/6/2024 Ileana Martin MD   Next Visit   Visit date not found Ileana Martin MD   ED visits in past 90 days: 0        Note composed:9:35 AM 12/03/2024

## 2024-12-03 NOTE — TELEPHONE ENCOUNTER
No care due was identified.  [facility] Embedded Care Due Messages. Reference number: [id number].   12/03/2024 8:07:37 AM CST

## 2024-12-04 ENCOUNTER — CLINICAL SUPPORT (OUTPATIENT)
Dept: REHABILITATION | Facility: HOSPITAL | Age: 79
End: 2024-12-04
Attending: FAMILY MEDICINE
Payer: MEDICARE

## 2024-12-04 DIAGNOSIS — M62.89 PELVIC FLOOR DYSFUNCTION: Primary | ICD-10-CM

## 2024-12-04 PROCEDURE — 97110 THERAPEUTIC EXERCISES: CPT | Mod: PO

## 2024-12-04 PROCEDURE — 97162 PT EVAL MOD COMPLEX 30 MIN: CPT | Mod: PO

## 2024-12-04 NOTE — PATIENT INSTRUCTIONS
"Home Exercise Program: 12/04/2024    360 Breathing Technique          Inhale long, slow and deep. You should feel as if your lower ribs are expanding in all directions like the way an umbrella opens. You should feel the belly, back and sides gently expand and you may notice a relaxation in the pelvic floor. Then exhale and allow your belly to "snap back together."    Continue to breathe like this for 5 minutes. Repeat 1-2 times/day.        Lay on bed with knees bent. Tuck pelvis and lift hips up off bed.  If your low back becomes sore, don't lift quite as high.    Do 10 times, 2 sets.    Abdominal set    Lay on your back with both knees bent and feet flat on the bed. Draw in your lower belly as if zipping tight pants, and hold for 5 seconds. Don't hold your breath.    Hold for 5 seconds. Repeat 10 times. Do 2 sets per day.    "

## 2024-12-04 NOTE — PROGRESS NOTES
"[facility]  Pelvic Health Physical Therapy Initial Evaluation    Date: 2024   Name: Yulia Peralta  Clinic Number: [id number]  Therapy Diagnosis:   Encounter Diagnosis   Name Primary?    Pelvic floor dysfunction Yes     Physician: Ileana Martin MD    Physician Orders: PT Eval and Treat    Medical Diagnosis from Referral: Fecal incontinence [R15.9]   Evaluation Date: 2024  Authorization Period Expiration: 2025  Plan of Care Expiration: 3/4/2025  Visit # / Visits authorized:     Time In: 9:00  Time Out: 9:45  Total Appointment Time (timed & untimed codes): 45 minutes    Precautions: universal    Subjective     Date of onset: 3-4 months ago    History of current condition - Yulia reports: that she is not evacuating her bowels completely, and notes that she is having seepage.  It doesn't hit her underwear.      OB/GYN History: ; hysterectomy    Bladder/Bowel History: urinary incontinence and constipation/straining for movement  Frequency of urination:   Daytime: about 5-8 times           Nighttime: 2 times  Difficulty initiating urine stream: No  Urine stream: strong  Complete emptying: Yes  Bladder leakage: Yes  Frequency of incidents: cannot give me a clear answer- pad was wet this morning  Amount leaked (urine): few drops  Urinary Urgency: Yes, Able to delay the urge for at least 10 minute(s).  Frequency of bowel movements: once every 2 days  Difficulty initiating BM: No  Quality/Shape of BM: [place] Stool Chart 3-4  Does Patient Feel Empty after BM? No  Fiber Supplements or Laxative Use? Yes; prune juice on occasion, fiber supplement sometimes  Colon leakage: Yes  Frequency of incidents: "every now and then"   Amount leaked (bowels): streaking/staining  Form of protection: pad  Number of pads required in 24 hours: 1    Pain:  Location:  vaginal and rectal pain upon waking   Current 0/10, worst 7/10, best 0/10   Description: Variable  Aggravating Factors/Activities that " cause symptoms:  full rectum    Easing Factors:  BM     Medical History: Yulia  has a past medical history of Abnormal colonoscopy (07/24/2020), Acid reflux, Anemia, Anxiety, Arthritis, Blood transfusion, Cataract, Depression, Dry eyes, H/O colonoscopy (07/22/2020), Heart murmur, Hypertension, Left lumbar radiculitis (5/19/2015), Mixed hyperlipidemia (11/5/2021), Multiple thyroid nodules (12/18/2012), Osteoporosis, Rheumatoid arthritis, and Thoracic or lumbosacral neuritis or radiculitis, unspecified (10/30/2013).     Surgical History: Yulia Peralta  has a past surgical history that includes Hysterectomy; Joint replacement (4405-0708); Tonsillectomy; gallstones (2299-8360); pr removal of ovary/tube(s); Cerebral angiogram (N/A, 1/23/2020); Oophorectomy; Cataract extraction w/  intraocular lens implant (Left, 1/26/2021); and Cataract extraction w/  intraocular lens implant (Right, 2/23/2021).    Medications: Yulia has a current medication list which includes the following prescription(s): amlodipine, aspirin, atorvastatin, azelastine, clobetasol, duloxetine, famotidine, fluticasone propionate, folic acid, furosemide, hydrocortisone, irbesartan, ketamine hcl, lidocaine, methotrexate, misoprostol, nabumetone, nystatin, nystatin-triamcinolone, pantoprazole, pregabalin, triamcinolone acetonide 0.1%, and valacyclovir, and the following Facility-Administered Medications: cyclopentolate 1%, ofloxacin, and sodium chloride 0.9%.    Allergies:   Review of patient's allergies indicates:   Allergen Reactions    No known drug allergies         Prior Therapy/Previous treatment included: none  Social History:  lives with their spouse and older sister  Current exercise: walks with a cane due to back pain off and on; no exercise  Occupation: is a retired   Prior Level of Function: could completely evacuate stool without seepage  Current Level of Function: has a hard times getting clean    Types of fluid  intake: water, soda, and juice  Diet: no restrictions   Habitus: overweight  Abuse/Neglect: No     Pts goals: would like to be able to control gas and stool passage better in ADL's.      OBJECTIVE     See EMR under MEDIA for written consent provided 12/4/2024  Chaperone: declined    ORTHO SCREEN  Posture in sitting: slouched   Posture in standing:  flexed hips and trunk; FHP  Pelvic alignment: no sign of deviations noted in supine     ABDOMINAL WALL ASSESSMENT  Abdominal strength: Rectus abdominus: 2/5     Transverse abdominus: faintly palpable contraction, poorly isolated  Scarring: midline abdominal scar adherent to underling tissues in the suprapubic area  Diastasis: absent       BREATHING MECHANICS ASSESSMENT   Thorax Assessment During Quiet Respiration: WNL excursion of abdominal wall  Thorax Assessment During Deep Respiration: Decreased excursion of lateral ribs      RECTAL PELVIC FLOOR EXAM    EXTERNAL ASSESSMENT  Anus: gaping  Skin condition: WNL   Scarring: none  Sensation: WNL   Pain: none  Voluntary contraction: accessory muscle use  Voluntary relaxation: visible drop  Involuntary contraction: bulge  Bearing down: bulge  Anal [place]: difficult to elicit  Discharge: none       INTERNAL ASSESSMENT  EAS tone: hypotonic   Impaction: stool in vault   Pain: none  Sensation: able to localize pressure appropriately   Muscle Bulk: atrophy   Muscle Power: 2/5 at levators; 1/5 EAS  Muscle Endurance: 4 sec       Quality of contraction: tension change in LA on both sides- TA set created overflow; add set no overflow; glut contraction improved EAS  Specificity: patient contracts: gluts  Coordination: tends to hold breath during PFM contraction     Limitation/Restriction for FOTO PFDI Pain Survey    Therapist reviewed FOTO scores for Yulia Peralta on 12/4/2024.   FOTO documents entered into EPIC - see Media section.    Limitation Score: 8%       TREATMENT     Treatment Time In: 9:15  Treatment Time Out: 9:45  Total  Treatment time (time-based codes) separate from Evaluation: 15 minutes    Therapeutic Exercise to develop endurance and core stabilization for 15 minutes including:  Bridging, TA sets, and diaphragmatic breathing    Patient Education provided:   general anatomy/physiology of urinary/ bowel  system, benefits of treatment, risks of treatment, and alternative methods of treatment were discussed with the pt. Additionally, anatomy/physiology of pelvic floor and posture/body mechanices were reviewed.     Home Exercises provided:  Written Home Exercises provided: yes.  Exercises were reviewed and Yulia was able to demonstrate them prior to the end of the session.    Yulia demonstrated good  understanding of the education provided.     See EMR under Patient Instructions for exercises provided 12/4/2024.    Assessment     Yulia is a 79 y.o. female referred to outpatient Physical Therapy with a medical diagnosis of Fecal incontinence [R15.9] . Pt presents with altered posture, poor knowledge of body mechanics and posture, adhered abdominal scar, poor trunk stability, decreased pelvic muscle strength, decreased endurance of the pelvic muscles, poor quality of pelvic muscle contraction, and poor coordination of pelvic floor muscles during ADL's leading to urinary or fecal leakage.       Pt prognosis is Good.   Pt will benefit from skilled outpatient Physical Therapy to address the deficits stated above and in the chart below, provide pt/family education, and to maximize pt's level of independence.     Plan of care discussed with patient: Yes  Pt's spiritual, cultural and educational needs considered and patient is agreeable to the plan of care and goals as stated below:     Anticipated Barriers for therapy: none    Medical Necessity is demonstrated by the following:    History  Co-morbidities and personal factors that may impact the plan of care Co-morbidities   anxiety, depression, HTN, prior abdominal surgery, and  RA    Personal Factors  no deficits     moderate   Examination  Body structures and functions, activity limitations and participation restrictions that may impact the plan of care Body Regions/Systems/Functions:     altered posture, poor knowledge of body mechanics and posture, adhered abdominal scar, poor trunk stability, decreased pelvic muscle strength, decreased endurance of the pelvic muscles, poor quality of pelvic muscle contraction, and poor coordination of pelvic floor muscles during ADL's leading to urinary or fecal leakage    Activity Limitations:  urgency  and incontinence with ADLs    Participation Restrictions:  all ADLs/iADLs uninterrupted by fecal incontinence/urgency/frequency and ADL participation affected by pain    Activity limitations:   Learning and applying knowledge  None    General Tasks and Commands  None    Communication  None    Mobility  None    Self care  None    Domestic Life  None    Interactions/Relationships  None    Life Areas  None    Community and Social Life  None       moderate   Clinical Presentation unstable clinical presentation with unpredictable characteristics high   Decision Making/ Complexity Score: moderate       Goals:  Short Term Goals: 2 weeks  Pt to report increased awareness of PFM lift/drop during exercises and functional activities.  Pt to be I with double voiding techniques.  Pt to be I with diaphragmatic breathing.      Long Term Goals: 12 weeks   Pt will report improved ability to perform ADLs (ie. dressing, bathing, functional transfers) with little (drops) to no urinary leakage 7/7 days per week.  Pt will report improved ability to manage bladder spasms appropriately 100% of the time for an improvement in urinary frequency/urgency.   Pt will report improved ability to perform ADLs (ie. dressing, bathing, functional transfers) with little (drops) to no fecal leakage 7/7 days per week.   Pt will bear down appropriately 80% of the time for more effective  stooling and prevent adverse effects to adjacent structures.   Pt/family will be independent with HEP for continued self-management of symptoms.  Pt to report improved ability to control flatulence in ADL's.        Plan     Plan of care Certification: 12/4/2024 to 3/4/2025.    Outpatient Physical Therapy 1 times per 2 week(s)  for 12 weeks to include the following interventions: therapeutic exercises, therapeutic activity, neuromuscular re-education, manual therapy, modalities PRN, patient/family education, and self care/home management    Irene Baker, PT, BCB-PMD

## 2024-12-11 ENCOUNTER — LAB VISIT (OUTPATIENT)
Dept: LAB | Facility: HOSPITAL | Age: 79
End: 2024-12-11
Attending: INTERNAL MEDICINE
Payer: MEDICARE

## 2024-12-11 DIAGNOSIS — M05.79 RHEUMATOID ARTHRITIS INVOLVING MULTIPLE SITES WITH POSITIVE RHEUMATOID FACTOR: ICD-10-CM

## 2024-12-11 DIAGNOSIS — Z79.899 IMMUNOSUPPRESSION DUE TO DRUG THERAPY: ICD-10-CM

## 2024-12-11 DIAGNOSIS — D84.821 IMMUNOSUPPRESSION DUE TO DRUG THERAPY: ICD-10-CM

## 2024-12-11 LAB
ALBUMIN SERPL BCP-MCNC: 4 G/DL (ref 3.5–5.2)
ALP SERPL-CCNC: 103 U/L (ref 40–150)
ALT SERPL W/O P-5'-P-CCNC: 20 U/L (ref 10–44)
ANION GAP SERPL CALC-SCNC: 7 MMOL/L (ref 8–16)
AST SERPL-CCNC: 19 U/L (ref 10–40)
BASOPHILS # BLD AUTO: 0.02 K/UL (ref 0–0.2)
BASOPHILS NFR BLD: 0.3 % (ref 0–1.9)
BILIRUB SERPL-MCNC: 0.6 MG/DL (ref 0.1–1)
BUN SERPL-MCNC: 10 MG/DL (ref 8–23)
CALCIUM SERPL-MCNC: 9.8 MG/DL (ref 8.7–10.5)
CHLORIDE SERPL-SCNC: 106 MMOL/L (ref 95–110)
CO2 SERPL-SCNC: 26 MMOL/L (ref 23–29)
CREAT SERPL-MCNC: 0.7 MG/DL (ref 0.5–1.4)
CRP SERPL-MCNC: 0.4 MG/L (ref 0–8.2)
DIFFERENTIAL METHOD BLD: ABNORMAL
EOSINOPHIL # BLD AUTO: 0.2 K/UL (ref 0–0.5)
EOSINOPHIL NFR BLD: 3.8 % (ref 0–8)
ERYTHROCYTE [DISTWIDTH] IN BLOOD BY AUTOMATED COUNT: 13.5 % (ref 11.5–14.5)
ERYTHROCYTE [SEDIMENTATION RATE] IN BLOOD BY PHOTOMETRIC METHOD: 32 MM/HR (ref 0–36)
EST. GFR  (NO RACE VARIABLE): >60 ML/MIN/1.73 M^2
GLUCOSE SERPL-MCNC: 95 MG/DL (ref 70–110)
HCT VFR BLD AUTO: 34.7 % (ref 37–48.5)
HGB BLD-MCNC: 11.1 G/DL (ref 12–16)
IMM GRANULOCYTES # BLD AUTO: 0.01 K/UL (ref 0–0.04)
IMM GRANULOCYTES NFR BLD AUTO: 0.2 % (ref 0–0.5)
LYMPHOCYTES # BLD AUTO: 1.2 K/UL (ref 1–4.8)
LYMPHOCYTES NFR BLD: 20.3 % (ref 18–48)
MCH RBC QN AUTO: 32 PG (ref 27–31)
MCHC RBC AUTO-ENTMCNC: 32 G/DL (ref 32–36)
MCV RBC AUTO: 100 FL (ref 82–98)
MONOCYTES # BLD AUTO: 1 K/UL (ref 0.3–1)
MONOCYTES NFR BLD: 16.4 % (ref 4–15)
NEUTROPHILS # BLD AUTO: 3.6 K/UL (ref 1.8–7.7)
NEUTROPHILS NFR BLD: 59 % (ref 38–73)
NRBC BLD-RTO: 0 /100 WBC
PLATELET # BLD AUTO: 164 K/UL (ref 150–450)
PMV BLD AUTO: 13.7 FL (ref 9.2–12.9)
POTASSIUM SERPL-SCNC: 4.6 MMOL/L (ref 3.5–5.1)
PROT SERPL-MCNC: 7.6 G/DL (ref 6–8.4)
RBC # BLD AUTO: 3.47 M/UL (ref 4–5.4)
SODIUM SERPL-SCNC: 139 MMOL/L (ref 136–145)
WBC # BLD AUTO: 6.11 K/UL (ref 3.9–12.7)

## 2024-12-11 PROCEDURE — 80053 COMPREHEN METABOLIC PANEL: CPT | Performed by: INTERNAL MEDICINE

## 2024-12-11 PROCEDURE — 86140 C-REACTIVE PROTEIN: CPT | Performed by: INTERNAL MEDICINE

## 2024-12-11 PROCEDURE — 85025 COMPLETE CBC W/AUTO DIFF WBC: CPT | Performed by: INTERNAL MEDICINE

## 2024-12-11 PROCEDURE — 36415 COLL VENOUS BLD VENIPUNCTURE: CPT | Mod: PO | Performed by: INTERNAL MEDICINE

## 2024-12-11 PROCEDURE — 85652 RBC SED RATE AUTOMATED: CPT | Performed by: INTERNAL MEDICINE

## 2024-12-19 ENCOUNTER — HOSPITAL ENCOUNTER (OUTPATIENT)
Dept: RADIOLOGY | Facility: HOSPITAL | Age: 79
Discharge: HOME | End: 2024-12-19
Attending: OBSTETRICS & GYNECOLOGY
Payer: MEDICARE

## 2024-12-19 DIAGNOSIS — Z01.419 WELL WOMAN EXAM WITH ROUTINE GYNECOLOGICAL EXAM: ICD-10-CM

## 2024-12-19 DIAGNOSIS — N64.4 BREAST PAIN: ICD-10-CM

## 2024-12-30 DIAGNOSIS — M05.79 RHEUMATOID ARTHRITIS INVOLVING MULTIPLE SITES WITH POSITIVE RHEUMATOID FACTOR: ICD-10-CM

## 2024-12-30 RX ORDER — METHOTREXATE 2.5 MG/1
TABLET ORAL
Qty: 120 TABLET | Refills: 0 | Status: SHIPPED | OUTPATIENT
Start: 2024-12-30

## 2025-01-14 ENCOUNTER — CLINICAL SUPPORT (OUTPATIENT)
Dept: REHABILITATION | Facility: HOSPITAL | Age: 80
End: 2025-01-14
Payer: MEDICARE

## 2025-01-14 DIAGNOSIS — M62.89 PELVIC FLOOR DYSFUNCTION: Primary | ICD-10-CM

## 2025-01-14 PROCEDURE — 97110 THERAPEUTIC EXERCISES: CPT | Mod: PO

## 2025-01-14 PROCEDURE — 97112 NEUROMUSCULAR REEDUCATION: CPT | Mod: PO

## 2025-01-14 NOTE — PROGRESS NOTES
Pelvic Health Physical Therapy   Treatment Note     Name: Yulia Peralta  Clinic Number: [id number]    Therapy Diagnosis:   Encounter Diagnosis   Name Primary?    Pelvic floor dysfunction Yes     Physician: Ileana Martin MD    Visit Date: 1/14/2025    Physician Orders: PT Eval and Treat    Medical Diagnosis from Referral: Fecal incontinence [R15.9]   Evaluation Date: 12/4/2024  Authorization Period Expiration: 12/31/2025  Plan of Care Expiration: 3/4/2025  Visit # / Visits authorized: 2/20  Cancelled Visits: 0  No Show Visits: 0    Time In: 9:31  Time Out: 10:15  Total Billable Time: 40 minutes    Precautions: Standard    Subjective     Pt reports: that her anal pain is better, but her legs have been hurting and she has trouble lifting her buttocks to bridge.  Last BM was yesterday.  Hasn't had fecal seepage lately- has been a little constipated lately.    She  was  compliant with home exercise program.  Response to previous treatment: no adverse effects  Functional change: less anal pain    Pain: did not quantify    Objective     Yulia received therapeutic exercises to develop  strength and endurance for 25 minutes including:  bridges, add sets, OI sets, SL clams, supine GS,  diaphragmatic breathing     Yulia participated in neuromuscular re-education activities to develop Coordination for 15 minutes including: [ ] [place] with assist of SEMG  We worked in supine and sitting with external lead wires.  She demonstrated good holding ability but low WR rise in both positions.  Derecruitment was complete and timely; baseline resting WNL.      Home Exercises Provided and Patient Education Provided     Education provided:   - posture/body mechanices  Discussed progression of plan of care with patient; educated pt in activity modification; reviewed HEP with pt. Pt demonstrated and verbalized understanding of all instruction and was provided with a handout of HEP (see Patient Instructions).    Written Home  Exercises Provided: yes.  Exercises were reviewed and Yulia was able to demonstrate them prior to the end of the session.  Yulia demonstrated good  understanding of the education provided.     See EMR under Patient Instructions for exercises provided 1/14/2025.    Assessment     Will reassess the effect of glut strengthening- may need to add actual PFM contractions.  Yulia Is progressing well towards her goals.   Pt prognosis is Excellent.     Pt will continue to benefit from skilled outpatient physical therapy to address the deficits listed in the problem list box on initial evaluation, provide pt/family education and to maximize pt's level of independence in the home and community environment.     Pt's spiritual, cultural and educational needs considered and pt agreeable to plan of care and goals.     Anticipated barriers to physical therapy: none    Goals: Short Term Goals: 2 weeks  Pt to report increased awareness of PFM lift/drop during exercises and functional activities.  Pt to be I with double voiding techniques.  Pt to be I with diaphragmatic breathing.       Long Term Goals: 12 weeks   Pt will report improved ability to perform ADLs (ie. dressing, bathing, functional transfers) with little (drops) to no urinary leakage 7/7 days per week.  Pt will report improved ability to manage bladder spasms appropriately 100% of the time for an improvement in urinary frequency/urgency.   Pt will report improved ability to perform ADLs (ie. dressing, bathing, functional transfers) with little (drops) to no fecal leakage 7/7 days per week.   Pt will bear down appropriately 80% of the time for more effective stooling and prevent adverse effects to adjacent structures.   Pt/family will be independent with HEP for continued self-management of symptoms.  Pt to report improved ability to control flatulence in ADL's.      ALL PROGRESSING    Plan     Plan of care Certification: 12/4/2024 to 3/4/2025.     Outpatient  Physical Therapy 1 times per 2 week(s)  for 12 weeks to include the following interventions: therapeutic exercises, therapeutic activity, neuromuscular re-education, manual therapy, modalities PRN, patient/family education, and self care/home management       Irene Baker, PT, BCB-PMD

## 2025-01-14 NOTE — PATIENT INSTRUCTIONS
"  Hold 5 sec, repeat 2x10 times on each side.    ADDUCTION    Sit or lie down with hips and knees bent with feet flat on the floor. Place a rolled up towel, ball, or pillow between your knees and press your knees together so that you squeeze the object firmly. Do 50% of a full contraction.    Hold for 5 seconds. Repeat 10 times. Do 2 set per day.      ABDUCTION / EXTERNAL ROTATION    Sit or lie down on your back with your knees bent and feet together flat on the floor. Place an elastic band around your knees. Separate your knees, then bring them back together slowly.    Hold for 5 seconds. Repeat 10 times. Do 2 sets per day.    GLUTE SET SUPINE        Lie on your back and squeeze your buttocks together. Do not hold your breath.     Hold 5 seconds, Repeat 10 times, 2 Sets, once per day    BRIDGE    While lying on your back, tighten your lower abdominals, squeeze your buttocks and then raise your buttocks off the floor/bed as creating a "Bridge" with your body.        You should NOT feel any pressure or pain in your low back. If you do, tighten your tummy and don't lift quite as high.    Repeat 10 times. Do 2 sets per day.     Home Exercise Program: 12/04/2024     360 Breathing Technique             Inhale long, slow and deep. You should feel as if your lower ribs are expanding in all directions like the way an umbrella opens. You should feel the belly, back and sides gently expand and you may notice a relaxation in the pelvic floor. Then exhale and allow your belly to "snap back together."     Continue to breathe like this for 5 minutes. Repeat 1-2 times/day.         Lay on bed with knees bent. Tuck pelvis and lift hips up off bed.  If your low back becomes sore, don't lift quite as high.    Do 10 times, 2 sets.     Abdominal set    Lay on your back with both knees bent and feet flat on the bed. Draw in your lower belly as if zipping tight pants, and hold for 5 seconds. Don't hold your breath.     Hold for 5 seconds. " Repeat 10 times. Do 2 sets per day.

## 2025-02-06 ENCOUNTER — OFFICE VISIT (OUTPATIENT)
Dept: CARDIOLOGY | Facility: CLINIC | Age: 80
End: 2025-02-06
Payer: MEDICARE

## 2025-02-06 VITALS
HEIGHT: 62 IN | SYSTOLIC BLOOD PRESSURE: 130 MMHG | DIASTOLIC BLOOD PRESSURE: 60 MMHG | WEIGHT: 180.31 LBS | BODY MASS INDEX: 33.18 KG/M2 | HEART RATE: 84 BPM

## 2025-02-06 DIAGNOSIS — I27.20 PULMONARY HYPERTENSION: ICD-10-CM

## 2025-02-06 DIAGNOSIS — E66.01 SEVERE OBESITY (BMI 35.0-39.9) WITH COMORBIDITY: ICD-10-CM

## 2025-02-06 DIAGNOSIS — I67.1 CEREBRAL ANEURYSM WITHOUT RUPTURE: ICD-10-CM

## 2025-02-06 DIAGNOSIS — I67.89 CEREBRAL MICROVASCULAR DISEASE: ICD-10-CM

## 2025-02-06 DIAGNOSIS — I73.9 CLAUDICATION OF BOTH LOWER EXTREMITIES: ICD-10-CM

## 2025-02-06 DIAGNOSIS — M51.360 DEGENERATION OF INTERVERTEBRAL DISC OF LUMBAR REGION WITH DISCOGENIC BACK PAIN: ICD-10-CM

## 2025-02-06 DIAGNOSIS — M48.062 SPINAL STENOSIS, LUMBAR REGION, WITH NEUROGENIC CLAUDICATION: ICD-10-CM

## 2025-02-06 DIAGNOSIS — M47.816 LUMBAR SPONDYLOSIS: ICD-10-CM

## 2025-02-06 DIAGNOSIS — E78.2 MIXED HYPERLIPIDEMIA: ICD-10-CM

## 2025-02-06 DIAGNOSIS — M54.17 LUMBOSACRAL RADICULOPATHY: ICD-10-CM

## 2025-02-06 DIAGNOSIS — G62.9 NEUROPATHY: ICD-10-CM

## 2025-02-06 DIAGNOSIS — E66.9 OBESITY (BMI 30-39.9): ICD-10-CM

## 2025-02-06 DIAGNOSIS — I10 ESSENTIAL HYPERTENSION: ICD-10-CM

## 2025-02-06 DIAGNOSIS — I77.1 TORTUOUS AORTA: ICD-10-CM

## 2025-02-06 DIAGNOSIS — I73.9 PAD (PERIPHERAL ARTERY DISEASE): Primary | ICD-10-CM

## 2025-02-06 PROCEDURE — 99999 PR PBB SHADOW E&M-EST. PATIENT-LVL V: CPT | Mod: PBBFAC,,, | Performed by: INTERNAL MEDICINE

## 2025-02-06 PROCEDURE — 99215 OFFICE O/P EST HI 40 MIN: CPT | Mod: PBBFAC | Performed by: INTERNAL MEDICINE

## 2025-02-06 PROCEDURE — 99212 OFFICE O/P EST SF 10 MIN: CPT | Mod: S$PBB,,, | Performed by: INTERNAL MEDICINE

## 2025-02-06 NOTE — PROGRESS NOTES
"[facility]      Chief Complaint   Patient presents with    Follow-up       Patient ID: Yulia Peralta is a 79 y.o. female with PAD, HTN, Rheumatoid arthritis, lumbar degenerative disc disease, obesity, who presents for a follow up appointment.  Pertinent history/events are as follows:     -Pt presents for evaluation of pain, numbness/tingling in both legs and feet.      -At our initial clinic visit on 8/3/2021, Mrs. Peralta reports pain, numbness/tingling in both legs and feet.  States symptoms are "constant, but improve with walking".  She has no tissue loss.  Plan:   Pain/ with numbness/tingling in both legs and feet- Etiology likely multifactorial with contributions from degenerative joint disease, PAD, neuropathic pain, and RA.  MRI Lumbar Spine 4/28/2021 revealed severe degenerative disc disease at all levels of the lumbar spine.  Severe canal stenosis at L4-5 and moderate to severe canal stenosis at L5-S1.  Check exercise ONESIMO and CTA abd/pelvis with iliofemoral runoff.  Pt to elevate legs when resting.  Refer to Neurosurgery for evaluation.    Obesity- Encourage diet, exercise and weight loss.     -At follow up clinic visit on 11/5/2021, Mrs. Peralta reported continued pain, numbness/tingling in both legs and feet as described at clinic visit on 8/3/2021.  ONESIMO Study on 8/13/2021 revealed normal rest and exercise ONESIMO bilaterally.  Normal PVR waveforms bilaterally.CTA Abd/Pelvis with Iliofemoral Runoff on 8/13/2021 revealed mild scattered atherosclerotic plaque.  Probable stenosis of the proximal left posterior tibial artery estimated 50%.  Otherwise arteries of the abdomen and lower extremities demonstrate no significant stenosis.  Plan:   Pain/ with numbness/tingling in both legs and feet- ONESIMO Study on 8/13/2021 revealed normal rest and exercise ONESIMO bilaterally.  CTA Abd/Pelvis with Iliofemoral Runoff on 8/13/2021 revealed mild scattered atherosclerotic plaque.  Probable stenosis of the " proximal left posterior tibial artery estimated 50%.  Otherwise arteries of the abdomen and lower extremities demonstrate no significant stenosis.  MRI Lumbar Spine 4/28/2021 revealed severe degenerative disc disease at all levels of the lumbar spine.  Severe canal stenosis at L4-5 and moderate to severe canal stenosis at L5-S1.  Hence, symptoms are likely due to severe lumbar disc disease, and not flow limiting PAD.  Continue management of lumbar disc disease per Neurosurgery.    PAD- Pt has no claudication or tissue loss.  CTA abd/pelvis as above.  Check echo.  Start ASA 81 mg daily and atorvastatin 40 mg daily.    Obesity- Encourage diet, exercise and weight loss.   Carotid Artery Disease- Carotid Ultrasound on 10/8/2021 20-39% right internal carotid artery stenosis.  There is 40-49% left internal carotid artery stenosis.  Start ASA 81 mg daily and atorvastatin 40 mg daily.     -2/10/2022 clinic visit: Mrs. Peralta reports no chest pain, SOB, LE edema, TIA symptoms or syncope.  Her main complaint today is severe pain in left foot when bearing weight on it.  She has no claudication or tissue loss.  Echo from 2/4/2022 revealed EF 65%; grade I left ventricular diastolic dysfunction; severe left atrial enlargement; moderate right atrial enlargement; mild mitral regurgitation; normal central venous pressure (3 mmHg); estimated PA systolic pressure is 43 mmHg.  Plan:   Pain/ with numbness/tingling in both legs and feet- ONESIMO Study on 8/13/2021 revealed normal rest and exercise ONESIMO bilaterally.  CTA Abd/Pelvis with Iliofemoral Runoff on 8/13/2021 revealed mild scattered atherosclerotic plaque.  Probable stenosis of the proximal left posterior tibial artery estimated 50%.  Otherwise arteries of the abdomen and lower extremities demonstrate no significant stenosis.  MRI Lumbar Spine 4/28/2021 revealed severe degenerative disc disease at all levels of the lumbar spine.  Severe canal stenosis at L4-5 and moderate to severe  canal stenosis at L5-S1.  Hence, symptoms are likely due to severe lumbar disc disease, and not flow limiting PAD.  Continue management of lumbar disc disease per Neurosurgery.    PAD- Pt has no claudication or tissue loss.  CTA abd/pelvis as above.  Echo from 2/4/2022 revealed EF 65%; grade I left ventricular diastolic dysfunction; severe left atrial enlargement; moderate right atrial enlargement; mild mitral regurgitation; normal central venous pressure (3 mmHg); estimated PA systolic pressure is 43 mmHg.  Continue ASA 81 mg daily and atorvastatin 40 mg daily.    Obesity- Encourage diet, exercise and weight loss.   Carotid Artery Disease- Carotid Ultrasound on 10/8/2021 20-39% right internal carotid artery stenosis.  There is 40-49% left internal carotid artery stenosis.  Continue ASA 81 mg daily and atorvastatin 40 mg daily.   Left Foot Pain- Likely due to degenerative/structural abnormalities.  Check MRI left foot/ankle.  Refer to Podiatry for evaluation.  Gait Instability- Refer to physical therapy.    HLD- LDL at goal of <70.  Continue ASA 81 mg daily and atorvastatin 40 mg daily.  HTN- Stable.  Continue current medications.  Pulmonary HTN- Pt is asymptomatic.  Continue current medications.    -3/11/2024 clinic visit: Mrs. Peralta reports chest pain which started 1 month ago.  Chest pain usually occurs at rest, left chest area, non-radiating, 3/10 in intensity, lasting 10 minutes with no n/v/d.  She reports numbness in both shins.  No classic claudication symptoms.  Plan:   Chest Pain- Pt with risk factors for CAD.  Check echo and nuclear stress test.    Pain/ with numbness/tingling in both legs and feet- ONESIMO Study on 8/13/2021 revealed normal rest and exercise ONESIMO bilaterally.  CTA Abd/Pelvis with Iliofemoral Runoff on 8/13/2021 revealed mild scattered atherosclerotic plaque. Probable stenosis of the proximal left posterior tibial artery estimated 50%.  Otherwise arteries of the abdomen and lower extremities  demonstrate no significant stenosis.  MRI Lumbar Spine 4/28/2021 revealed severe degenerative disc disease at all levels of the lumbar spine.  Severe canal stenosis at L4-5 and moderate to severe canal stenosis at L5-S1.  Hence, symptoms are likely due to severe lumbar disc disease, and not flow limiting PAD.  Continue management of lumbar disc disease per Neurosurgery.    PAD- Pt has no claudication or tissue loss.  CTA abd/pelvis as above.  Echo from 2/4/2022 revealed EF 65%; grade I left ventricular diastolic dysfunction; severe left atrial enlargement; moderate right atrial enlargement; mild mitral regurgitation; normal central venous pressure (3 mmHg); estimated PA systolic pressure is 43 mmHg.  Continue ASA 81 mg daily and atorvastatin 40 mg daily.    Obesity- Encourage diet, exercise and weight loss.   Carotid Artery Disease- Carotid Ultrasound on 10/8/2021 20-39% right internal carotid artery stenosis.  There is 40-49% left internal carotid artery stenosis.  Continue ASA 81 mg daily and atorvastatin 40 mg daily.   Left Foot Pain- Likely due to degenerative/structural abnormalities.  Check MRI left foot/ankle.  Refer to Podiatry for evaluation.  Gait Instability- Refer to physical therapy.    HLD- LDL at goal of <70.  Continue ASA 81 mg daily and atorvastatin 40 mg daily.  HTN- Stable.  Continue current medications.  Pulmonary HTN- Pt is asymptomatic.  Continue current medications.     -7/16/2024 clinic visit: Mrs. Peralta reports no further chest pain. Nuclear Stress Test on 4/17/2024 revealed normal myocardial perfusion with no evidence of myocardial ischemia or infarction.  Carotid Ultrasound on 4/17/2024 demonstrated 20-39% bilateral Internal Carotid Stenosis. Echo on 4/9/2024 showed EF 65%;  normal diastolic function;estimated pulmonary artery systolic pressure is 35 mmHg; normal venous pressure at 3 mmHg.  Plan:  Chest Pain- Mrs. Peralta reports no further chest pain. Nuclear Stress Test on 4/17/2024  revealed normal myocardial perfusion with no evidence of myocardial ischemia or infarction.  Echo on 4/9/2024 showed EF 65%;  normal diastolic function; .estimated pulmonary artery systolic pressure is 35 mmHg; normal venous pressure at 3 mmHg. Continue aggressive risk factor modification, including weight loss.   Pain/ with numbness/tingling in both legs and feet- ONESIMO Study on 8/13/2021 revealed normal rest and exercise ONESIMO bilaterally.  CTA Abd/Pelvis with Iliofemoral Runoff on 8/13/2021 revealed mild scattered atherosclerotic plaque.  Probable stenosis of the proximal left posterior tibial artery estimated 50%.  Otherwise arteries of the abdomen and lower extremities demonstrate no significant stenosis.  MRI Lumbar Spine 4/28/2021 revealed severe degenerative disc disease at all levels of the lumbar spine.  Severe canal stenosis at L4-5 and moderate to severe canal stenosis at L5-S1.  Hence, symptoms are likely due to severe lumbar disc disease, and not flow limiting PAD.  Continue management of lumbar disc disease per Neurosurgery.    PAD- Pt has no claudication or tissue loss.  CTA abd/pelvis as above.  Continue ASA 81 mg daily and atorvastatin 40 mg daily.    Obesity- Encourage diet, exercise and weight loss.   Carotid Artery Disease- Carotid Ultrasound on 4/17/2024 demonstrated 20-39% bilateral Internal Carotid Stenosis. Continue ASA 81 mg daily and atorvastatin 40 mg daily.   Left Foot Pain- Likely due to degenerative/structural abnormalities. Continue management per Podiatry.  Gait Instability- Pt referred to physical therapy.    HLD- LDL at goal of <70.  Continue ASA 81 mg daily and atorvastatin 40 mg daily.  HTN- Stable.  Continue current medications.  Pulmonary HTN- Pt is asymptomatic.  Continue current medications.    HPI:  Mrs. Peralta reports numbness in both feet and legs. She has no chest pain or SOB.     Past Medical History:   Diagnosis Date    Abnormal colonoscopy 07/24/2020    colon polyps nad  repeat in 3 years.     Acid reflux     Anemia     Anxiety     Arthritis     Blood transfusion     Cataract     Depression     Dry eyes     H/O colonoscopy 07/22/2020    dr. nicholas. repeat in 3 years.     Heart murmur     Hypertension     Left lumbar radiculitis 5/19/2015    Mixed hyperlipidemia 11/5/2021    Multiple thyroid nodules 12/18/2012    Osteoporosis     Rheumatoid arthritis     Thoracic or lumbosacral neuritis or radiculitis, unspecified 10/30/2013     Past Surgical History:   Procedure Laterality Date    CATARACT EXTRACTION W/  INTRAOCULAR LENS IMPLANT Left 1/26/2021    Procedure: EXTRACTION, CATARACT, WITH IOL INSERTION;  Surgeon: Elvis Pete MD;  Location: [facility];  Service: Ophthalmology;  Laterality: Left;    CATARACT EXTRACTION W/  INTRAOCULAR LENS IMPLANT Right 2/23/2021    Procedure: EXTRACTION, CATARACT, WITH IOL INSERTION;  Surgeon: Elvis Pete MD;  Location: [facility];  Service: Ophthalmology;  Laterality: Right;    CEREBRAL ANGIOGRAM N/A 1/23/2020    Procedure: ANGIOGRAM-CEREBRAL;  [facility];  Location: [address];  Service: Radiology;  Laterality: N/A;  /[place]    gallstones  [id number]    HYSTERECTOMY      JOINT REPLACEMENT  [id number]     bilateral knee    OOPHORECTOMY      NM REMOVAL OF OVARY/TUBE(S)      TONSILLECTOMY       Social History     Socioeconomic History    Marital status:      Spouse name: Ric     Number of children: 2    Highest education level: Some college, no degree   Occupational History    Occupation: retired    Tobacco Use    Smoking status: Former     Current packs/day: 0.25     Average packs/day: 0.3 packs/day for 1 year (0.3 ttl pk-yrs)     Types: Cigarettes    Smokeless tobacco: Never   Substance and Sexual Activity    Alcohol use: Yes     Alcohol/week: 2.0 standard drinks of alcohol     Types: 1 Glasses of wine, 1 Cans of beer per week     Comment: very seldom    Drug use: No    Sexual activity: Not  Currently     Partners: Male   Social History Narrative    Adult Screenings updated and reviewed  6/12/14    Mammogram( for females) ordered for DIS    Pap ( for females) Dr Zepeda  Due for f/u   For  2014    Colonoscopy  Done once    Flu shot yearly done  2013    Td 2005    Pneumovax  Updated  12/3/13    Zostavax done 2012    Eye exam recommended yearly done 2013    Bone density  12/9/13    Thyroid ultrasound  11/6/2012 Normal sized thyroid containing several subcentimeter nodules, similar to the 5/12/11 exam.  No concerning nodules identified..          Social Drivers of Health     Financial Resource Strain: Low Risk  (3/27/2023)    Overall Financial Resource Strain (CARDIA)     Difficulty of Paying Living Expenses: Not hard at all   Food Insecurity: No Food Insecurity (3/27/2023)    Hunger Vital Sign     Worried About Running Out of Food in the Last Year: Never true     Ran Out of Food in the Last Year: Never true   Transportation Needs: No Transportation Needs (3/27/2023)    PRAPARE - Transportation     Lack of Transportation (Medical): No     Lack of Transportation (Non-Medical): No   Physical Activity: Inactive (3/27/2023)    Exercise Vital Sign     Days of Exercise per Week: 0 days     Minutes of Exercise per Session: 0 min   Stress: No Stress Concern Present (3/27/2023)    [employer] - Occupational Stress Questionnaire     Feeling of Stress : Only a little   Housing Stability: Unknown (3/27/2023)    Housing Stability Vital Sign     Unable to Pay for Housing in the Last Year: No     Unstable Housing in the Last Year: No     Family History   Problem Relation Name Age of Onset    Heart failure Mother      Cataracts Mother      Hypertension Mother      Colon cancer Mother      Colon cancer Father      Hypertension Sister 1     Liver cancer Sister 1     Glaucoma Brother 1     Lung cancer Brother 1     Colon cancer Brother twin     No Known Problems Maternal Grandmother      No Known  Problems Maternal Grandfather      No Known Problems Paternal Grandmother      No Known Problems Paternal Grandfather      No Known Problems Daughter 1     No Known Problems Son 1     Breast cancer Maternal Aunt      No Known Problems Maternal Uncle      No Known Problems Paternal Aunt      No Known Problems Paternal Uncle      No Known Problems Other      Lupus Neg Hx      Rheum arthritis Neg Hx      Psoriasis Neg Hx      Amblyopia Neg Hx      Blindness Neg Hx      Cancer Neg Hx      Diabetes Neg Hx      Macular degeneration Neg Hx      Retinal detachment Neg Hx      Strabismus Neg Hx      Stroke Neg Hx      Thyroid disease Neg Hx         Review of patient's allergies indicates:   Allergen Reactions    No known drug allergies        Medication List with Changes/Refills   Current Medications    AMLODIPINE (NORVASC) 5 MG TABLET    Take 1 tablet (5 mg total) by mouth once daily.    ASPIRIN (ECOTRIN) 81 MG EC TABLET    Take 81 mg by mouth once daily.    ATORVASTATIN (LIPITOR) 40 MG TABLET    TAKE ONE TABLET BY MOUTH EVERY DAY    AZELASTINE (ASTELIN) 137 MCG (0.1 %) NASAL SPRAY    2 sprays as needed.    CLOBETASOL (TEMOVATE) 0.05 % EXTERNAL SOLUTION    Apply topically 2 (two) times daily.    DULOXETINE (CYMBALTA) 20 MG CAPSULE    Take 1 capsule (20 mg total) by mouth once daily.    FAMOTIDINE (PEPCID) 40 MG TABLET    Take 0.5 tablets (20 mg total) by mouth 2 (two) times daily as needed for Heartburn.    FLUTICASONE (FLONASE) 50 MCG/ACTUATION NASAL SPRAY    1 spray (50 mcg total) by Each Nare route once daily.    FOLIC ACID (FOLVITE) 1 MG TABLET    Take 2 tablets (2,000 mcg total) by mouth once daily.    FUROSEMIDE (LASIX) 20 MG TABLET    Take 3 tablets (60 mg total) by mouth once daily.    HYDROCORTISONE 2.5 % CREAM    as needed.     IRBESARTAN (AVAPRO) 300 MG TABLET    TAKE ONE TABLET BY MOUTH EVERY DAY IN THE EVENING    KGCL KETAMINE 10%- GABAPENTIN 6%- CYCLOBENZAPRINE 2%- LIDOCAINE 4% IN TRANSDERMAL CREAM    as  "needed.    LIDOCAINE (LIDODERM) 5 %    Place 1 patch onto the skin once daily. Remove & Discard patch within 12 hours or as directed by MD    METHOTREXATE 2.5 MG TAB    TAKE TEN TABLETS BY MOUTH ONCE A WEEK ON MONDAYS ( TAKE 5 IN THE MORNING AND 5 IN THE EVENING )    MISOPROSTOL (CYTOTEC) 100 MCG TAB    TAKE ONE TABLET BY MOUTH TWICE A DAY    NABUMETONE (RELAFEN) 500 MG TABLET    Take 1 tablet (500 mg total) by mouth 2 (two) times daily.    NYSTATIN (MYCOSTATIN) POWDER    Apply topically 2 (two) times daily.    NYSTATIN-TRIAMCINOLONE (MYCOLOG II) CREAM    Apply to affected area 2 times daily    PANTOPRAZOLE (PROTONIX) 40 MG TABLET    TAKE ONE TABLET BY MOUTH EVERY DAY    PREGABALIN (LYRICA) 100 MG CAPSULE    Take 1 capsule (100 mg total) by mouth 3 (three) times daily.    TRIAMCINOLONE ACETONIDE 0.1% (KENALOG) 0.1 % CREAM    Apply topically 3 (three) times daily.    VALACYCLOVIR (VALTREX) 500 MG TABLET    1 Tablet DAILY for suppression, increase to BID for outbreak       Review of Systems  Constitution: Denies chills, fever, and sweats.  HENT: Denies headaches or blurry vision.  Cardiovascular: Denies chest pain or irregular heart beat.  Respiratory: Denies cough or shortness of breath.  Gastrointestinal: Denies abdominal pain, nausea, or vomiting.  Musculoskeletal: Positive for left foot pain.  Neurological: Denies dizziness or focal weakness.  Psychiatric/Behavioral: Normal mental status.  Hematologic/Lymphatic: Denies bleeding problem or easy bruising/bleeding.  Skin: Denies rash or suspicious lesions    Physical Examination  /60   Pulse 84   Ht 5' 2" (1.575 m)   Wt 81.8 kg (180 lb 5.4 oz)   BMI 32.98 kg/m²     Constitutional: No acute distress, conversant  HEENT: Sclera anicteric, Pupils equal, round and reactive to light, extraocular motions intact, Oropharynx clear  Neck: No JVD, no carotid bruits  Cardiovascular: regular rate and rhythm, no murmur, rubs or gallops, normal S1/S2  Pulmonary: Clear to " auscultation bilaterally  Abdominal: Abdomen soft, nontender, nondistended, positive bowel sounds  Extremities: No lower extremity edema,   Pulses:  Carotid pulses are 2+ on the right side, and 2+ on the left side.  Radial pulses are 2+ on the right side, and 2+ on the left side.   Femoral pulses are 2+ on the right side, and 2+ on the left side.  Popliteal pulses are 2+ on the right side, and 2+ on the left side.   Dorsalis pedis pulses are 2+ on the right side, and 2+ on the left side.   Posterior tibial pulses are 2+ on the right side, and 2+ on the left side.    Skin: No ecchymosis, erythema, or ulcers  Psych: Alert and oriented x 3, appropriate affect  Neuro: CNII-XII intact, no focal deficits    Labs:  Most Recent Data  CBC:   Lab Results   Component Value Date    WBC 6.11 12/11/2024    HGB 11.1 (L) 12/11/2024    HCT 34.7 (L) 12/11/2024     12/11/2024     (H) 12/11/2024    RDW 13.5 12/11/2024     BMP:   Lab Results   Component Value Date     12/11/2024    K 4.6 12/11/2024     12/11/2024    CO2 26 12/11/2024    BUN 10 12/11/2024    CREATININE 0.7 12/11/2024    GLU 95 12/11/2024    CALCIUM 9.8 12/11/2024     LFTS;   Lab Results   Component Value Date    PROT 7.6 12/11/2024    ALBUMIN 4.0 12/11/2024    BILITOT 0.6 12/11/2024    AST 19 12/11/2024    ALKPHOS 103 12/11/2024    ALT 20 12/11/2024     COAGS:   Lab Results   Component Value Date    INR 1.0 01/23/2020     FLP:   Lab Results   Component Value Date    CHOL 113 (L) 06/21/2024    CHOL 113 (L) 06/21/2024    HDL 53 06/21/2024    HDL 53 06/21/2024    LDLCALC 53.0 (L) 06/21/2024    LDLCALC 53.0 (L) 06/21/2024    TRIG 35 06/21/2024    TRIG 35 06/21/2024    CHOLHDL 46.9 06/21/2024    CHOLHDL 46.9 06/21/2024     CARDIAC:   Lab Results   Component Value Date    TROPONINI <0.006 06/08/2020    BNP 27 06/12/2014       Nuclear Stress Test 4/17/2024:    Normal myocardial perfusion scan. There is no evidence of myocardial ischemia or  infarction.    The visually estimated ejection fraction is normal at rest and normal during stress.    There is normal wall motion at rest and post stress.    LV cavity size is normal at rest and normal at stress.    The ECG portion of the study is negative for ischemia.    The patient reported no chest pain during the stress test.    There were no arrhythmias during stress.    There are no prior studies for comparison.     Carotid Ultrasound 4/17/2024:    There is 20-39% right Internal Carotid Stenosis.    There is 20-39% left Internal Carotid Stenosis.    Echo 4/9/2024:    Left Ventricle: The left ventricle is normal in size. Normal wall thickness. There is normal systolic function. Ejection fraction by visual approximation is 65%. There is normal diastolic function.    Right Ventricle: Normal right ventricular cavity size. Wall thickness is normal. Right ventricle wall motion  is normal. Systolic function is normal.    Pulmonary Artery: The estimated pulmonary artery systolic pressure is 35 mmHg.    IVC/SVC: Normal venous pressure at 3 mmHg.    Echo 2/4/2022:  The left ventricle is normal in size with eccentric hypertrophy and normal systolic function.  The estimated ejection fraction is 65%.  Grade I left ventricular diastolic dysfunction.  Severe left atrial enlargement.  Normal right ventricular size with normal right ventricular systolic function.  Moderate right atrial enlargement.  Mild mitral regurgitation.  Normal central venous pressure (3 mmHg).  The estimated PA systolic pressure is 43 mmHg.  There is pulmonary hypertension.  LVOT mean grad 6.6 mm Hg, max PG 11.7 mm Hg    Carotid Ultrasound 10/8/2021:  There is 20-39% right internal carotid artery stenosis.  There is 40-49% left internal carotid artery stenosis.    CTA Abd/Pelvis with Iliofemoral Runoff 8/13/2021:   1. Mild scattered atherosclerotic plaque.  Probable stenosis of the proximal left posterior tibial artery estimated 50%.  Otherwise  arteries of the abdomen and lower extremities demonstrate no significant stenosis, noting that the popliteal artery and trifurcation are partially obscured by extensive beam hardening artifact related to knee arthroplasty.  2. Small hiatal hernia  3. Mild hepatomegaly  4. Post cholecystectomy and hysterectomy  5. Colonic diverticula without evidence of diverticulitis  6. Osteoarthritis involving the spine, hips, and feet.    ONESIMO Study 8/13/2021:  Normal rest and exercise ONESIMO bilaterally.  Normal PVR waveforms bilaterally.    MRI Lumbar Spine 4/28/2021:  Severe degenerative disc disease at all levels of the lumbar spine.   Severe canal stenosis at L4-5 and moderate to severe canal stenosis at L5-S1, please see details of each levels above.    Echo 8/1/2016:  CONCLUSIONS     1 - Normal left ventricular systolic function (EF 60-65%).  Normal wall motion.     2 - Eccentric hypertrophy.     3 - Left ventricular diastolic dysfunction.     4 - Trivial mitral regurgitation.     5 - Trivial tricuspid regurgitation.     6 - Pulmonary hypertension. The estimated PA systolic pressure is 42 mmHg.     Assessment/Plan:  Yulia Peralta is a 79 y.o. female with PAD, HTN, HLD, Rheumatoid arthritis, lumbar degenerative disc disease, severe obesity, who presents for a follow up appointment.     1. Pain/ with numbness/tingling in both legs and feet- ONESIMO Study on 8/13/2021 revealed normal rest and exercise ONESIMO bilaterally.  CTA Abd/Pelvis with Iliofemoral Runoff on 8/13/2021 revealed mild scattered atherosclerotic plaque.  Probable stenosis of the proximal left posterior tibial artery estimated 50%.  Otherwise arteries of the abdomen and lower extremities demonstrate no significant stenosis.  MRI Lumbar Spine 4/28/2021 revealed severe degenerative disc disease at all levels of the lumbar spine.  Severe canal stenosis at L4-5 and moderate to severe canal stenosis at L5-S1.  Hence, symptoms are likely due to severe lumbar disc disease,  and not flow limiting PAD.  Continue management of lumbar disc disease per Neurosurgery.  Pt requests referral to Neurology. Will arrange.     2. PAD- Pt has no claudication or tissue loss.  CTA abd/pelvis as above.  Continue ASA 81 mg daily and atorvastatin 40 mg daily.      3. Obesity- Encourage diet, exercise and weight loss.     4. Carotid Artery Disease- Carotid Ultrasound on 4/17/2024 demonstrated 20-39% bilateral Internal Carotid Stenosis. Continue ASA 81 mg daily and atorvastatin 40 mg daily.     5. Left Foot Pain- Likely due to degenerative/structural abnormalities. Continue management per Podiatry.    6. HLD- LDL at goal of <70.  Continue ASA 81 mg daily and atorvastatin 40 mg daily.    7. HTN- Stable.  Continue current medications.    8. Pulmonary HTN- Pt is asymptomatic.  Continue current medications.     Follow up in 6 months     Total duration of face to face visit time 15 minutes.  Total time spent counseling greater than fifty percent of total visit time.  Counseling included discussion regarding imaging findings, diagnosis, possibilities, treatment options, risks and benefits.  The patient had many questions regarding the options and long-term effects.    Alfie Simeon MD, PhD  Interventional Cardiology

## 2025-02-06 NOTE — PATIENT INSTRUCTIONS
Assessment/Plan:  Yulia Peralta is a 79 y.o. female with PAD, HTN, HLD, Rheumatoid arthritis, lumbar degenerative disc disease, severe obesity, who presents for a follow up appointment.     1. Pain/ with numbness/tingling in both legs and feet- ONESIMO Study on 8/13/2021 revealed normal rest and exercise ONESIMO bilaterally.  CTA Abd/Pelvis with Iliofemoral Runoff on 8/13/2021 revealed mild scattered atherosclerotic plaque.  Probable stenosis of the proximal left posterior tibial artery estimated 50%.  Otherwise arteries of the abdomen and lower extremities demonstrate no significant stenosis.  MRI Lumbar Spine 4/28/2021 revealed severe degenerative disc disease at all levels of the lumbar spine.  Severe canal stenosis at L4-5 and moderate to severe canal stenosis at L5-S1.  Hence, symptoms are likely due to severe lumbar disc disease, and not flow limiting PAD.  Continue management of lumbar disc disease per Neurosurgery.  Pt requests referral to Neurology. Will arrange.     2. PAD- Pt has no claudication or tissue loss.  CTA abd/pelvis as above.  Continue ASA 81 mg daily and atorvastatin 40 mg daily.      3. Obesity- Encourage diet, exercise and weight loss.     4. Carotid Artery Disease- Carotid Ultrasound on 4/17/2024 demonstrated 20-39% bilateral Internal Carotid Stenosis. Continue ASA 81 mg daily and atorvastatin 40 mg daily.     5. Left Foot Pain- Likely due to degenerative/structural abnormalities. Continue management per Podiatry.    6. HLD- LDL at goal of <70.  Continue ASA 81 mg daily and atorvastatin 40 mg daily.    7. HTN- Stable.  Continue current medications.    8. Pulmonary HTN- Pt is asymptomatic.  Continue current medications.     Follow up in 6 months

## 2025-02-07 ENCOUNTER — TELEPHONE (OUTPATIENT)
Dept: NEUROSURGERY | Facility: CLINIC | Age: 80
End: 2025-02-07
Payer: MEDICARE

## 2025-02-07 NOTE — TELEPHONE ENCOUNTER
----- Message from Med Assistant Glenna sent at 2/7/2025 10:22 AM CST -----  Regarding: FW: Appt request  Adin Ahumada,     can you assist with this please, thank you  ----- Message -----  From: Zhao Lobato MD  Sent: 2/7/2025  10:13 AM CST  To: Glenna Lebron MA  Subject: RE: Appt request                                 If it is for the aneurysm that grace has been following, then its fennel.  If its for back pain and other things like that, then its general neuro.    Doesn't need to see vascular neurology  ----- Message -----  From: Glenna Lebron MA  Sent: 2/7/2025   9:14 AM CST  To: Zhao Lobato MD  Subject: FW: Appt request                                 Would this be you or should I just schedule with zelda?  ----- Message -----  From: Anna Islas MA  Sent: 2/6/2025   4:42 PM CST  To: Anna Islas MA; Cheryle Churchill Staff  Subject: Appt request                                     Dr. Simeon put in a referral. Pt is expecting a call back for scheduling.   Thanks,  Anna

## 2025-02-13 ENCOUNTER — TELEPHONE (OUTPATIENT)
Dept: NEUROLOGY | Facility: CLINIC | Age: 80
End: 2025-02-13
Payer: MEDICARE

## 2025-02-19 DIAGNOSIS — G62.9 NEUROPATHY: ICD-10-CM

## 2025-02-19 DIAGNOSIS — M51.369 DDD (DEGENERATIVE DISC DISEASE), LUMBAR: ICD-10-CM

## 2025-02-19 DIAGNOSIS — I10 ESSENTIAL HYPERTENSION: ICD-10-CM

## 2025-02-19 DIAGNOSIS — M10.9 GOUT, ARTHRITIS: ICD-10-CM

## 2025-02-19 DIAGNOSIS — K21.9 GASTROESOPHAGEAL REFLUX DISEASE: ICD-10-CM

## 2025-02-19 DIAGNOSIS — Z79.1 NSAID LONG-TERM USE: ICD-10-CM

## 2025-02-19 DIAGNOSIS — M05.79 RHEUMATOID ARTHRITIS INVOLVING MULTIPLE SITES WITH POSITIVE RHEUMATOID FACTOR: ICD-10-CM

## 2025-02-19 DIAGNOSIS — D84.9 IMMUNOSUPPRESSION: ICD-10-CM

## 2025-02-19 DIAGNOSIS — K21.9 GASTROESOPHAGEAL REFLUX DISEASE WITHOUT ESOPHAGITIS: ICD-10-CM

## 2025-02-19 RX ORDER — FOLIC ACID 1 MG/1
2000 TABLET ORAL DAILY
Qty: 270 TABLET | Refills: 3 | Status: SHIPPED | OUTPATIENT
Start: 2025-02-19

## 2025-02-19 RX ORDER — NABUMETONE 500 MG/1
500 TABLET, FILM COATED ORAL 2 TIMES DAILY
Qty: 180 TABLET | Refills: 0 | Status: SHIPPED | OUTPATIENT
Start: 2025-02-19

## 2025-02-19 NOTE — TELEPHONE ENCOUNTER
Zofia Campbell Staff  Caller: Unspecified (Today, 12:49 PM)  Who Called: self    Have you contacted your pharmacy:    Refill  DULoxetine (CYMBALTA) 20 MG capsule  famotidine (PEPCID) 40 MG tablet  furosemide (LASIX) 20 MG tablet      RX Name and Strength:    Preferred Pharmacy with phone number:  [facility] [place] [facility] [id number] [facility] [address] [facility]  [address]  [place] [address]  Phone: [phone] Fax: [phone]    Local or Mail Order: local    Would the patient rather a call back or a response via [facility]?    [facility] Call Back Number:  [phone]      Additional Information:    Thank you.

## 2025-02-19 NOTE — TELEPHONE ENCOUNTER
No care due was identified.  [facility] Embedded Care Due Messages. Reference number: [id number].   2/19/2025 1:17:44 PM CST

## 2025-02-20 RX ORDER — FUROSEMIDE 20 MG/1
60 TABLET ORAL DAILY
Qty: 90 TABLET | Refills: 2 | Status: SHIPPED | OUTPATIENT
Start: 2025-02-20

## 2025-02-20 RX ORDER — DULOXETIN HYDROCHLORIDE 20 MG/1
20 CAPSULE, DELAYED RELEASE ORAL DAILY
Qty: 90 CAPSULE | Refills: 1 | Status: SHIPPED | OUTPATIENT
Start: 2025-02-20 | End: 2026-02-20

## 2025-02-20 RX ORDER — FAMOTIDINE 40 MG/1
20 TABLET, FILM COATED ORAL 2 TIMES DAILY PRN
Qty: 15 TABLET | Refills: 2 | Status: SHIPPED | OUTPATIENT
Start: 2025-02-20 | End: 2026-02-20

## 2025-02-22 DIAGNOSIS — Z00.00 ENCOUNTER FOR MEDICARE ANNUAL WELLNESS EXAM: ICD-10-CM

## 2025-02-24 ENCOUNTER — CLINICAL SUPPORT (OUTPATIENT)
Dept: REHABILITATION | Facility: HOSPITAL | Age: 80
End: 2025-02-24
Payer: MEDICARE

## 2025-02-24 DIAGNOSIS — M62.89 PELVIC FLOOR DYSFUNCTION: Primary | ICD-10-CM

## 2025-02-24 PROCEDURE — 97112 NEUROMUSCULAR REEDUCATION: CPT | Mod: PO

## 2025-02-24 PROCEDURE — 97530 THERAPEUTIC ACTIVITIES: CPT | Mod: PO

## 2025-02-24 NOTE — PATIENT INSTRUCTIONS
Home Program: 2025    Continue the other exercises as you can (don't worsen your back and leg pain)    Kegels in sittin. sit comfortably with legs and buttocks relaxed.  2. Squeeze and LIFT the muscles that stop the flow of urine and passage of gas.  Keep legs and buttock muscles quiet.  3. Hold lift for 10 seconds without holding breath.  4. Release and DROP for 10 seconds.  5. Repeat 10 times, 2 sets, 1 time per day.      No Kegels while urinating!       Home Exercise Program: 2025      CONTROLLING URINARY / FECAL URGENCY      WHEN YOU EXPERIENCE A STRONG URGE TO URINATE OR DEFECATE:    FIRST Stop activity, stand quietly or sit down. Try to stay very still to maintain control. Avoid rushing to the toilet.    SECOND Begin Quick Flicks (1 second LIFT of pelvic floor muscles, 4 second DROP). Pelvic floor contractions send a message to the bladder to relax and hold urine.     THIRD Relax. Do not rush to the toilet. Take a deep belly or diaphragmatic breath and let it out slowly. Let the urge to urinate pass by using distraction techniques and positive thoughts. Try not to think about going to the bathroom.    FINALLY If the urge returns, repeat the above steps to regain control. When you feel the urge subside, walk normally to the bathroom. You can urinate once the urge has subsided.

## 2025-02-24 NOTE — PROGRESS NOTES
Outpatient Rehab    Physical Therapy Visit    Patient Name: Yulia Peralta  MRN: [id number]  YOB: 1945  Encounter Date: 2/24/2025    Therapy Diagnosis:   Encounter Diagnosis   Name Primary?    Pelvic floor dysfunction Yes     Physician: Ileana Martin MD    Physician Orders: Eval and Treat  Medical Diagnosis: Fecal incontinence [R15.9]     Visit # / Visits Authorized:  3 / 20   Date of Evaluation:  12/4/2024  Insurance Authorization Period: 1/1/2025 to 12/31/2025  Plan of Care Certification:  12/4/2024 to 3/4/2025      Time In: 0930   Time Out: 1015  Total Time: 45   Total Billable Time: 45 minutes    FOTO:  Intake Score: 8%  Survey Score 1: 4%  Survey Score 2:  %    Precautions     universal      Subjective   pt reports that she has back and L side pain (down her leg); does some of the exercises but not all..  Pain reported as 5/10. has been using pain patches    Objective            Treatment:  Balance/Neuromuscular Re-Education  Balance/Neuromuscular Re-Education Activity 1: supine 10 sec Kegels with assist of SEMG (normal baseline resting, good WR rise, good holding, complete and timely derecruitment.)  Balance/Neuromuscular Re-Education Activity 2: seated Kegels (fair/low WR rise and fair/good holding; complete derecruitment but some artifact between contractions.)  Balance/Neuromuscular Re-Education Activity 3: standing Kegels (derecruitment better in this position; low WR rise but good holding.)    Therapeutic Activity  Therapeutic Activity 1: instruction in urge suppression strategies per handout issued    Assessment & Plan   Assessment: pt's MSK pain continues to limit her ability to perform exercises- will reassess symptoms of urinary leakage next session  Evaluation/Treatment Tolerance: Patient tolerated treatment well    Patient will continue to benefit from skilled outpatient physical therapy to address the deficits listed in the problem list box on initial evaluation, provide  pt/family education and to maximize pt's level of independence in the home and community environment.     Patient's spiritual, cultural, and educational needs considered and patient agreeable to plan of care and goals.     Education  Education was done with Patient. The patient's learning style includes Listening and Reading. The patient Verbalizes understanding.         Revised HEP; urge suppression techniques       Plan: Outpatient Physical Therapy 1 times per 2 week(s)  for 12 weeks to include the following interventions: therapeutic exercises, therapeutic activity, neuromuscular re-education, manual therapy, modalities PRN, patient/family education, and self care/home management    Goals: Short Term Goals: 2 weeks  Pt to report increased awareness of PFM lift/drop during exercises and functional activities.  Pt to be I with double voiding techniques.  Pt to be I with diaphragmatic breathing.       Long Term Goals: 12 weeks   Pt will report improved ability to perform ADLs (ie. dressing, bathing, functional transfers) with little (drops) to no urinary leakage 7/7 days per week.  Pt will report improved ability to manage bladder spasms appropriately 100% of the time for an improvement in urinary frequency/urgency.   Pt will report improved ability to perform ADLs (ie. dressing, bathing, functional transfers) with little (drops) to no fecal leakage 7/7 days per week.   Pt will bear down appropriately 80% of the time for more effective stooling and prevent adverse effects to adjacent structures.   Pt/family will be independent with HEP for continued self-management of symptoms.  Pt to report improved ability to control flatulence in ADL's.      ALL PROGRESSING    Irene Baker, PT, BCB-PMD

## 2025-03-06 DIAGNOSIS — S99.921A INJURY OF RIGHT FOOT, INITIAL ENCOUNTER: ICD-10-CM

## 2025-03-06 DIAGNOSIS — M48.062 SPINAL STENOSIS, LUMBAR REGION, WITH NEUROGENIC CLAUDICATION: ICD-10-CM

## 2025-03-06 NOTE — TELEPHONE ENCOUNTER
----- Message from Med Assistant Deena sent at 3/6/2025  2:02 PM CST -----  Regarding: Refill Request  Who Called:JONH BUCIO [[id number]] New Prescription or Refill : RefillRX Name and Strength:  LIDOcaine (LIDODERM) 5 %  30 day or 90 day RX: 30 Local or Mail Order : mail  Preferred Pharmacy:[facility] [place] - [id number] Would the patient rather a call back or a response via [facility]? [facility] Call Back Number:  [phone]

## 2025-03-06 NOTE — TELEPHONE ENCOUNTER
No care due was identified.  [facility] Embedded Care Due Messages. Reference number: [id number].   3/06/2025 2:31:17 PM CST

## 2025-03-07 ENCOUNTER — TELEPHONE (OUTPATIENT)
Dept: NEUROSURGERY | Facility: CLINIC | Age: 80
End: 2025-03-07
Payer: MEDICARE

## 2025-03-07 RX ORDER — LIDOCAINE 50 MG/G
1 PATCH TOPICAL DAILY
Qty: 30 PATCH | Refills: 2 | Status: SHIPPED | OUTPATIENT
Start: 2025-03-07

## 2025-03-08 ENCOUNTER — HOSPITAL ENCOUNTER (OUTPATIENT)
Dept: RADIOLOGY | Facility: HOSPITAL | Age: 80
Discharge: HOME | End: 2025-03-08
Attending: NEUROLOGICAL SURGERY
Payer: MEDICARE

## 2025-03-08 DIAGNOSIS — I67.1 CEREBRAL ANEURYSM, NONRUPTURED: ICD-10-CM

## 2025-03-08 PROCEDURE — 70546 MR ANGIOGRAPH HEAD W/O&W/DYE: CPT | Mod: TC

## 2025-03-08 PROCEDURE — 70546 MR ANGIOGRAPH HEAD W/O&W/DYE: CPT | Mod: 26,,, | Performed by: RADIOLOGY

## 2025-03-08 PROCEDURE — A9585 GADOBUTROL INJECTION: HCPCS | Performed by: NEUROLOGICAL SURGERY

## 2025-03-08 PROCEDURE — 25500020 PHARM REV CODE 255: Performed by: NEUROLOGICAL SURGERY

## 2025-03-08 RX ORDER — GADOBUTROL 604.72 MG/ML
9 INJECTION INTRAVENOUS
Status: COMPLETED | OUTPATIENT
Start: 2025-03-08 | End: 2025-03-08

## 2025-03-08 RX ADMIN — GADOBUTROL 9 ML: 604.72 INJECTION INTRAVENOUS at 01:03

## 2025-03-12 ENCOUNTER — CLINICAL SUPPORT (OUTPATIENT)
Dept: REHABILITATION | Facility: HOSPITAL | Age: 80
End: 2025-03-12
Payer: MEDICARE

## 2025-03-12 DIAGNOSIS — M62.89 PELVIC FLOOR DYSFUNCTION: Primary | ICD-10-CM

## 2025-03-12 PROCEDURE — 97112 NEUROMUSCULAR REEDUCATION: CPT | Mod: PO

## 2025-03-12 PROCEDURE — 97530 THERAPEUTIC ACTIVITIES: CPT | Mod: PO

## 2025-03-12 NOTE — PROGRESS NOTES
Outpatient Rehab    Physical Therapy Discharge    Patient Name: Yulia Peralta  MRN: [id number]  YOB: 1945  Encounter Date: 3/12/2025    Therapy Diagnosis:   Encounter Diagnosis   Name Primary?    Pelvic floor dysfunction Yes     Physician: Ileana Martin MD    Physician Orders: Eval and Treat  Medical Diagnosis: Fecal incontinence [R15.9]     Visit # / Visits Authorized:  4 / 20   Date of Evaluation:  12/4/2024  Insurance Authorization Period: 1/1/2025 to 12/31/2025  Plan of Care Certification:  12/4/2024 to 3/4/2025      Time In: 1030   Time Out: 1110  Total Time: 40   Total Billable Time: 40 minutes    FOTO:  Intake Score: 8%  Survey Score 1: 4%  Survey Score 2:  %    Precautions     universal      Subjective   pt reports no episodes of fecal seepage/leakage.  Has been a little more on the constipated side..  Pain reported as 0/10. pain has been gone for the last few days    Objective            Treatment:  Balance/Neuromuscular Re-Education  Balance/Neuromuscular Re-Education Activity 1: supine 10 sec Kegels with assist of SEMG (external lead wires used; baseline resting normal, WR rise fair/good; holding very good; derecruitment complete and timely.)  Balance/Neuromuscular Re-Education Activity 2: standing Kegels    Therapeutic Activity  Therapeutic Activity 1: instruction in proper bearing down techniques    Assessment & Plan   Assessment: pt noting that her fecal seepage/leakage has resolved, and she has a good HEP for maintenance of her pelvic muscle strength.  No further need for PT services at this time.  Evaluation/Treatment Tolerance: Patient tolerated treatment well    Patient's spiritual, cultural, and educational needs considered and patient agreeable to plan of care and goals.     Education  Education was done with Patient. The patient's learning style includes Listening and Reading. The patient Verbalizes understanding.         Maintenance HEP and defecation techniques        Plan: d/c PT    Goals: Short Term Goals: 2 weeks  Pt to report increased awareness of PFM lift/drop during exercises and functional activities.  Pt to be I with double voiding techniques.  Pt to be I with diaphragmatic breathing.       Long Term Goals: 12 weeks   Pt will report improved ability to perform ADLs (ie. dressing, bathing, functional transfers) with little (drops) to no urinary leakage 7/7 days per week.  Pt will report improved ability to manage bladder spasms appropriately 100% of the time for an improvement in urinary frequency/urgency.   Pt will report improved ability to perform ADLs (ie. dressing, bathing, functional transfers) with little (drops) to no fecal leakage 7/7 days per week.   Pt will bear down appropriately 80% of the time for more effective stooling and prevent adverse effects to adjacent structures.   Pt/family will be independent with HEP for continued self-management of symptoms.  Pt to report improved ability to control flatulence in ADL's.      ALL MET    Irene Baker, PT, BCB-PMD

## 2025-03-12 NOTE — PATIENT INSTRUCTIONS
Home Exercise Program: 03/12/2025    Bowel Movement Body Mechanics  1. Sit on the toilet comfortably with legs and buttocks relaxed.  2. Put your feet on a step stool or squatty potty (7 inches tall).  3. Lean forward while keeping your back straight and rest your elbows on your knees.  4. Keep your knees apart.  5. Exhale like you are blowing out birthday candles while you gently bear down. YOUR BELLY SHOULD NOT PULL IN WHEN YOU PUSH  6. After 4-5 pushes perform 2-3 quick kegels and then resume pushing with exhalation.     Do not strain and Do not hold your breath.           Maintenance Kegels:  (do standing as much as you can)    Stand comfortably with good posture- you can stand against a wall if you want.  Squeeze the muscles that stop urine flow and gas passage.   Hold for 10 sec without holding breath  Release completely for 10 sec.  Repeat 10 times, 2 sets.  Do these 3 times per week for maintenance of your pelvic muscle strength and control.

## 2025-03-18 ENCOUNTER — OFFICE VISIT (OUTPATIENT)
Dept: NEUROSURGERY | Facility: CLINIC | Age: 80
End: 2025-03-18
Payer: MEDICARE

## 2025-03-18 ENCOUNTER — TELEPHONE (OUTPATIENT)
Dept: NEUROLOGY | Facility: CLINIC | Age: 80
End: 2025-03-18
Payer: MEDICARE

## 2025-03-18 DIAGNOSIS — I67.1 CEREBRAL ANEURYSM, NONRUPTURED: Primary | ICD-10-CM

## 2025-03-18 PROCEDURE — 99213 OFFICE O/P EST LOW 20 MIN: CPT | Mod: S$PBB,,, | Performed by: STUDENT IN AN ORGANIZED HEALTH CARE EDUCATION/TRAINING PROGRAM

## 2025-03-18 PROCEDURE — 99213 OFFICE O/P EST LOW 20 MIN: CPT | Mod: PBBFAC | Performed by: STUDENT IN AN ORGANIZED HEALTH CARE EDUCATION/TRAINING PROGRAM

## 2025-03-18 PROCEDURE — 99999 PR PBB SHADOW E&M-EST. PATIENT-LVL III: CPT | Mod: PBBFAC,,, | Performed by: STUDENT IN AN ORGANIZED HEALTH CARE EDUCATION/TRAINING PROGRAM

## 2025-03-18 NOTE — PROGRESS NOTES
Neurosurgery  Established Patient    Scribe Attestation  I, Mirella Saldivar, attest that this documentation has been prepared under the direction and in the presence of Dr. Amparo Linares MD.    SUBJECTIVE:     History of Present Illness 9/13/2021  Ms. Peralta, is a 76-year-old woman who is referred to me by Dr. Alfred Curry.  He had been treating her for her lumbar stenosis.  She also had associated left lower extremity pain without any on antecedent trauma.  She has a known anterior circulation aneurysm for which she had been followed conservatively.  She had been followed by another physician for which she no longer sees.     She was noted to have left lower extremity pain approximately 2 years ago, and again did not endorse any antecedent trauma or incident prior to the pain.  She had a history of bilateral foot pain left more pronounced on the right which started approximately 6 months prior to seeing Dr. Curry.  She has had difficulty walking after standing up, difficulty with bearing weight, and history of rheumatoid arthritis and is previously undergone bilateral knee replacement.  She had previously gone to healthy back clinic for conservative treatment approximately 8-9 times without any significant relief.  Had 1 previous injection which did give significant relief.  Her lumbar pathology is being followed by Dr. Curry.     Interval History 11/01/2022  She currently denies any headache, nausea, vomiting. She does complain of feeling off balance. She denies any jayce falls. She denies any difficulty with buttoning buttons, tying shoes. She denies any jayce weakness in the upper lower extremities. She denies any sensory deficits in the upper lower extremities. She also notes that she has some electric current feeling in the right frontal parietal region of the skull and scalp.  She also complains of new hair thinning, which has been more notable over the past couple of months. She comes now with a repeat 1  "year follow-up of an MRA with vessel wall imaging.  No new or significant change can be noted on MRA with vessel wall imaging to date.     Interval History 4/16/2024  Yulia Peralta is a 79 yo F with PMHx of HTN, peripheral artery disease, HLD, DDD in the lumbar, and spinal stenosis in the lumbar region. She presents to me today for 2 year follow-up of MRA with vessel wall imaging. Imaging revealed stable aneurysm with no new or significant changes noted to date. Today she reports feeling unbalanced with tendency to lean to the right when walking. Denies weakness in bilateral LE, but does note pain and numbness that radiates to the feet. Pt describes pain as throbbing. Denies nausea and vomiting. She does share that she has been having headaches that occur "every now and then" that last for 3-4 minutes before resolving on their own. Headache severity can go up to 8-9 and is concentrated at the R frontal region of skull.     Interval History 3/18/25:   Patient presents for follow up after undergoing repeat MRA Brain with Contrast. Her most recent imaging demonstrates stable 2mm anterior communicating aneurysm without any abnormal vessel wall enhancement. Clinically, she has been doing well. She has experienced intermittent headaches but finds they always resolve with Tylenol. She denies any history of tobacco use. Denies any known family history of aneurysms. She monitors her blood pressure at home and finds it tends to be in good range. Denies any seizures, acute vision changes, worsening weakness or sensory dysfunction.     Review of patient's allergies indicates:   Allergen Reactions    No known drug allergies        Current Outpatient Medications   Medication Sig Dispense Refill    amLODIPine (NORVASC) 5 MG tablet Take 1 tablet (5 mg total) by mouth once daily. 90 tablet 3    aspirin (ECOTRIN) 81 MG EC tablet Take 81 mg by mouth once daily.      atorvastatin (LIPITOR) 40 MG tablet TAKE ONE TABLET BY MOUTH EVERY " DAY 90 tablet 3    azelastine (ASTELIN) 137 mcg (0.1 %) nasal spray 2 sprays as needed.      clobetasoL (TEMOVATE) 0.05 % external solution Apply topically 2 (two) times daily. 100 mL 4    DULoxetine (CYMBALTA) 20 MG capsule Take 1 capsule (20 mg total) by mouth once daily. 90 capsule 1    famotidine (PEPCID) 40 MG tablet Take 0.5 tablets (20 mg total) by mouth 2 (two) times daily as needed for Heartburn. 15 tablet 2    fluticasone (FLONASE) 50 mcg/actuation nasal spray 1 spray (50 mcg total) by Each Nare route once daily. 16 g 5    folic acid (FOLVITE) 1 MG tablet Take 2 tablets (2,000 mcg total) by mouth once daily. 270 tablet 3    furosemide (LASIX) 20 MG tablet Take 3 tablets (60 mg total) by mouth once daily. 90 tablet 2    hydrocortisone 2.5 % cream as needed.   0    irbesartan (AVAPRO) 300 MG tablet TAKE ONE TABLET BY MOUTH EVERY DAY IN THE EVENING 90 tablet 3    KGCL ketamine 10%- gabapentin 6%- cyclobenzaprine 2%- LIDOcaine 4% in transdermal cream as needed.      LIDOcaine (LIDODERM) 5 % Place 1 patch onto the skin once daily. Remove & Discard patch within 12 hours or as directed by MD 30 patch 2    methotrexate 2.5 MG Tab TAKE TEN TABLETS BY MOUTH ONCE A WEEK ON MONDAYS ( TAKE 5 IN THE MORNING AND 5 IN THE EVENING ) 120 tablet 0    miSOPROStoL (CYTOTEC) 100 MCG Tab TAKE ONE TABLET BY MOUTH TWICE A  tablet 5    nabumetone (RELAFEN) 500 MG tablet Take 1 tablet (500 mg total) by mouth 2 (two) times daily. 180 tablet 0    nystatin (MYCOSTATIN) powder Apply topically 2 (two) times daily. 30 g 1    nystatin-triamcinolone (MYCOLOG II) cream Apply to affected area 2 times daily (Patient taking differently: as needed.) 30 g 1    pantoprazole (PROTONIX) 40 MG tablet TAKE ONE TABLET BY MOUTH EVERY DAY 90 tablet 3    pregabalin (LYRICA) 100 MG capsule Take 1 capsule (100 mg total) by mouth 3 (three) times daily. 90 capsule 5    triamcinolone acetonide 0.1% (KENALOG) 0.1 % cream Apply topically 3 (three) times  daily. (Patient taking differently: Apply topically as needed.) 80 g 1    valACYclovir (VALTREX) 500 MG tablet 1 Tablet DAILY for suppression, increase to BID for outbreak 180 tablet 2     No current facility-administered medications for this visit.     Facility-Administered Medications Ordered in Other Visits   Medication Dose Route Frequency Provider Last Rate Last Admin    cyclopentolate 1% ophthalmic solution 1 drop  1 drop Left Eye On Call Procedure Elvis Pete MD   1 drop at 01/26/21 0914    ofloxacin 0.3 % ophthalmic solution 1 drop  1 drop Left Eye On Call Procedure Elvis Pete MD   1 drop at 01/26/21 0914    sodium chloride 0.9% flush 10 mL  10 mL Intravenous PRN Elvis Pete MD           Past Medical History:   Diagnosis Date    Abnormal colonoscopy 07/24/2020    colon polyps nad repeat in 3 years.     Acid reflux     Anemia     Anxiety     Arthritis     Blood transfusion     Cataract     Depression     Dry eyes     H/O colonoscopy 07/22/2020    dr. nicholas. repeat in 3 years.     Heart murmur     Hypertension     Left lumbar radiculitis 5/19/2015    Mixed hyperlipidemia 11/5/2021    Multiple thyroid nodules 12/18/2012    Osteoporosis     Rheumatoid arthritis     Thoracic or lumbosacral neuritis or radiculitis, unspecified 10/30/2013     Past Surgical History:   Procedure Laterality Date    CATARACT EXTRACTION W/  INTRAOCULAR LENS IMPLANT Left 1/26/2021    Procedure: EXTRACTION, CATARACT, WITH IOL INSERTION;  Surgeon: Elvis Pete MD;  Location: [facility];  Service: Ophthalmology;  Laterality: Left;    CATARACT EXTRACTION W/  INTRAOCULAR LENS IMPLANT Right 2/23/2021    Procedure: EXTRACTION, CATARACT, WITH IOL INSERTION;  Surgeon: Elvis Pete MD;  Location: [facility];  Service: Ophthalmology;  Laterality: Right;    CEREBRAL ANGIOGRAM N/A 1/23/2020    Procedure: ANGIOGRAM-CEREBRAL;  Surgeon: [facility] Diagnostic Provider;  Location: [facility] OR [address] [place] [address];  Service:  Radiology;  Laterality: N/A;  /Holly    gallstones  [id number]    HYSTERECTOMY      JOINT REPLACEMENT  [id number]     bilateral knee    OOPHORECTOMY      PA REMOVAL OF OVARY/TUBE(S)      TONSILLECTOMY       Family History       Problem Relation (Age of Onset)    Breast cancer Maternal Aunt    Cataracts Mother    Colon cancer Mother, Father, Brother    Glaucoma Brother    Heart failure Mother    Hypertension Mother, Sister    Liver cancer Sister    Lung cancer Brother    No Known Problems Maternal Grandmother, Maternal Grandfather, Paternal Grandmother, Paternal Grandfather, Daughter, Son, Maternal Uncle, Paternal Aunt, Paternal Uncle, Other          Social History     Socioeconomic History    Marital status:      Spouse name: Ric     Number of children: 2    Highest education level: Some college, no degree   Occupational History    Occupation: retired    Tobacco Use    Smoking status: Former     Current packs/day: 0.25     Average packs/day: 0.3 packs/day for 1 year (0.3 ttl pk-yrs)     Types: Cigarettes    Smokeless tobacco: Never   Substance and Sexual Activity    Alcohol use: Yes     Alcohol/week: 2.0 standard drinks of alcohol     Types: 1 Glasses of wine, 1 Cans of beer per week     Comment: very seldom    Drug use: No    Sexual activity: Not Currently     Partners: Male   Social History Narrative    Adult Screenings updated and reviewed  6/12/14    Mammogram( for females) ordered for DIS    Pap ( for females) Dr Zepeda  Due for f/u   For  2014    Colonoscopy  Done once    Flu shot yearly done  2013    Td 2005    Pneumovax  Updated  12/3/13    Zostavax done 2012    Eye exam recommended yearly done 2013    Bone density  12/9/13    Thyroid ultrasound  11/6/2012 Normal sized thyroid containing several subcentimeter nodules, similar to the 5/12/11 exam.  No concerning nodules identified..          Social Drivers of Health     Financial Resource Strain: Low Risk  (3/27/2023)    Overall Financial  Resource Strain (CARDIA)     Difficulty of Paying Living Expenses: Not hard at all   Food Insecurity: No Food Insecurity (3/27/2023)    Hunger Vital Sign     Worried About Running Out of Food in the Last Year: Never true     Ran Out of Food in the Last Year: Never true   Transportation Needs: No Transportation Needs (3/27/2023)    PRAPARE - Transportation     Lack of Transportation (Medical): No     Lack of Transportation (Non-Medical): No   Physical Activity: Inactive (3/27/2023)    Exercise Vital Sign     Days of Exercise per Week: 0 days     Minutes of Exercise per Session: 0 min   Stress: No Stress Concern Present (3/27/2023)    [employer] - Occupational Stress Questionnaire     Feeling of Stress : Only a little   Housing Stability: Unknown (3/27/2023)    Housing Stability Vital Sign     Unable to Pay for Housing in the Last Year: No     Unstable Housing in the Last Year: No     OBJECTIVE:     Vital Signs     There is no height or weight on file to calculate BMI.    Neurosurgery Physical Exam  General: well developed, well nourished, no distress.   Head: normocephalic, atraumatic  Mental Status: Awake, Alert, Oriented  Speech: Clear with content appropriate to conversation  Cranial nerves: face symmetric, CN II-XII grossly intact.   Eyes: pupils equal, round, reactive to light, EOMI.  Sensory: intact to light touch throughout    Motor Strength: Moves all extremities spontaneously with good tone.  Full strength upper and lower extremities. No abnormal movements seen.     Strength  Deltoids Triceps Biceps Wrist Extension Wrist Flexion Hand    Upper: R 5/5 5/5 5/5 5/5 5/5 5/5    L 5/5 5/5 5/5 5/5 5/5 5/5     Iliopsoas Quadriceps Knee  Flexion Tibialis  anterior Gastro- cnemius EHL   Lower: R 5/5 5/5 5/5 5/5 5/5 5/5    L 5/5 5/5 5/5 5/5 5/5 5/5     Pronator Drift: no drift noted  Finger-to-nose: Intact bilaterally    Diagnostic Results:  I personally reviewed the patient's  Diagnostic Imaging.    MRA Brain W WO Contrast 3/9/25:  - Stable 2mm A-comm aneurysm. No abnormal vessel wall enhancement.    I have personally reviewed the above referenced imaging.     ASSESSMENT/PLAN:   78 y/o W with history of lumbar stenosis and stable left anterior communicating aneurysm.    Discussed imaging results of MRA brain which showed stable aneurysm with no abnormal wall enhancement or change in size.  Repeat MRA brain with vessel wall imaging in one year.     We additionally discussed alarm symptoms that should prompt her to seek emergent care in the meantime.       Alise Priest PA-C  Department of Neurosurgery  [facility]

## 2025-03-18 NOTE — TELEPHONE ENCOUNTER
----- Message from Zhao Lobato MD sent at 3/10/2025  4:44 PM CDT -----  Regarding: RE: Referral  No. Not caa  ----- Message -----  From: Glenna Lebron MA  Sent: 3/10/2025   4:25 PM CDT  To: Zhao Lobato MD  Subject: FW: Referral                                     Isn't this patient for CAA?  ----- Message -----  From: Anna Islas MA  Sent: 3/10/2025  11:40 AM CDT  To: Cheryle Churchill Staff  Subject: Referral                                         Can you please reach back out to pt for scheduling from referral.ThanksAnna

## 2025-03-27 NOTE — PROGRESS NOTES
History & Physical    SUBJECTIVE:     Interval:   No major changes over last year. No diplopia or fatigable weakness. Fell in lobby this morning. Denies hitting her head. Denies pain. Reports falling at home. Last fall 5 months ago. Denies LOC. She states that she is incapable of stopping herself from falling once the motion occurs.  She minimizes injury by breaking her fall.    History of Present Illness:  Patient is a 79 y.o. female former smoker with DDD, lumbar spinal stenosis, acquired scoliosis, cerebral aneurysm, HTN, PAD, HLD, RA, GERD, and obesity who presents to clinic for follow up evaluation of mediastinal mass. Originally followed at [facility] in 2015 then transitioned care to [facility]. Underwent percutaneous biopsy. 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 More recently it has become difficult for then to travel back and forth thus she is requesting follow-up here. Subsequently followed in surveillance clinic until October 2019 according to available imaging. MRI Chest w/ and w/o contrast 10/15/2019 revealed prominent lobes of the thymus remain stable in size since 2016. They are homogeneous in signal on T1 and T2 sequences. Spicules of fat are seen within it, an expected finding. No mediastinal, hilar or axillary adenopathy by CT size criteria. Today patient reports no complaints.     PSH: b/l knee replacement ([id number]), hysterectomy and oopherectomy, cerebral angiogram 01/20/20, cataract surgery b/l, tonsillectomy  Meds: aspirin 81 mg, lasix, gabapentin 600mg TID, KGCL - ketamine 10%, gabapentin 6%, cyclobenzabrine 2%,  lidocaine 4% cream, methotrexate, misoprostol, nabumetone, montegrity, ozempic, valacyclovir    Chief Complaint   Patient presents with    Follow-up       Review of patient's allergies indicates:   Allergen Reactions    No known drug allergies        Current Outpatient Medications   Medication Sig Dispense Refill    amLODIPine (NORVASC) 5 MG tablet  Take 1 tablet (5 mg total) by mouth once daily. 90 tablet 3    aspirin (ECOTRIN) 81 MG EC tablet Take 81 mg by mouth once daily.      atorvastatin (LIPITOR) 40 MG tablet TAKE ONE TABLET BY MOUTH EVERY DAY 90 tablet 3    azelastine (ASTELIN) 137 mcg (0.1 %) nasal spray 2 sprays as needed.      clobetasoL (TEMOVATE) 0.05 % external solution Apply topically 2 (two) times daily. 100 mL 4    DULoxetine (CYMBALTA) 20 MG capsule Take 1 capsule (20 mg total) by mouth once daily. 90 capsule 1    famotidine (PEPCID) 40 MG tablet Take 0.5 tablets (20 mg total) by mouth 2 (two) times daily as needed for Heartburn. 15 tablet 2    fluticasone (FLONASE) 50 mcg/actuation nasal spray 1 spray (50 mcg total) by Each Nare route once daily. 16 g 5    folic acid (FOLVITE) 1 MG tablet Take 2 tablets (2,000 mcg total) by mouth once daily. 270 tablet 3    furosemide (LASIX) 20 MG tablet Take 3 tablets (60 mg total) by mouth once daily. 90 tablet 2    hydrocortisone 2.5 % cream as needed.   0    irbesartan (AVAPRO) 300 MG tablet TAKE ONE TABLET BY MOUTH EVERY DAY IN THE EVENING 90 tablet 3    KGCL ketamine 10%- gabapentin 6%- cyclobenzaprine 2%- LIDOcaine 4% in transdermal cream as needed.      LIDOcaine (LIDODERM) 5 % Place 1 patch onto the skin once daily. Remove & Discard patch within 12 hours or as directed by MD 30 patch 2    methotrexate 2.5 MG Tab TAKE TEN TABLETS BY MOUTH ONCE A WEEK ON MONDAYS ( TAKE 5 IN THE MORNING AND 5 IN THE EVENING ) 120 tablet 0    miSOPROStoL (CYTOTEC) 100 MCG Tab TAKE ONE TABLET BY MOUTH TWICE A  tablet 5    nabumetone (RELAFEN) 500 MG tablet Take 1 tablet (500 mg total) by mouth 2 (two) times daily. 180 tablet 0    nystatin (MYCOSTATIN) powder Apply topically 2 (two) times daily. 30 g 1    nystatin-triamcinolone (MYCOLOG II) cream Apply to affected area 2 times daily 30 g 1    pantoprazole (PROTONIX) 40 MG tablet TAKE ONE TABLET BY MOUTH EVERY DAY 90 tablet 3    pregabalin  (LYRICA) 100 MG capsule Take 1 capsule (100 mg total) by mouth 3 (three) times daily. 90 capsule 5    triamcinolone acetonide 0.1% (KENALOG) 0.1 % cream Apply topically 3 (three) times daily. 80 g 1    valACYclovir (VALTREX) 500 MG tablet 1 Tablet DAILY for suppression, increase to BID for outbreak 180 tablet 2     No current facility-administered medications for this visit.     Facility-Administered Medications Ordered in Other Visits   Medication Dose Route Frequency Provider Last Rate Last Admin    cyclopentolate 1% ophthalmic solution 1 drop  1 drop Left Eye On Call Procedure Elvis Pete MD   1 drop at 01/26/21 0914    ofloxacin 0.3 % ophthalmic solution 1 drop  1 drop Left Eye On Call Procedure Elvis Pete MD   1 drop at 01/26/21 0914    sodium chloride 0.9% flush 10 mL  10 mL Intravenous PRN Elvis Pete MD           Past Medical History:   Diagnosis Date    Abnormal colonoscopy 07/24/2020    colon polyps nad repeat in 3 years.     Acid reflux     Anemia     Anxiety     Arthritis     Blood transfusion     Cataract     Depression     Dry eyes     H/O colonoscopy 07/22/2020    dr. nicholas. repeat in 3 years.     Heart murmur     Hypertension     Left lumbar radiculitis 5/19/2015    Mixed hyperlipidemia 11/5/2021    Multiple thyroid nodules 12/18/2012    Osteoporosis     Rheumatoid arthritis     Thoracic or lumbosacral neuritis or radiculitis, unspecified 10/30/2013     Past Surgical History:   Procedure Laterality Date    CATARACT EXTRACTION W/  INTRAOCULAR LENS IMPLANT Left 1/26/2021    Procedure: EXTRACTION, CATARACT, WITH IOL INSERTION;  Surgeon: Elvis Pete MD;  Location: [facility] OR;  Service: Ophthalmology;  Laterality: Left;    CATARACT EXTRACTION W/  INTRAOCULAR LENS IMPLANT Right 2/23/2021    Procedure: EXTRACTION, CATARACT, WITH IOL INSERTION;  Surgeon: Elvis Pete MD;  Location: [facility] OR;  Service: Ophthalmology;   Laterality: Right;    CEREBRAL ANGIOGRAM N/A 1/23/2020    Procedure: ANGIOGRAM-CEREBRAL;  Surgeon: [facility] Diagnostic Provider;  Location: [facility] OR [address] [place] [address];  Service: Radiology;  Laterality: N/A;  /Vincent    gallstones  [id number]    HYSTERECTOMY      JOINT REPLACEMENT  [id number]     bilateral knee    OOPHORECTOMY      DE REMOVAL OF OVARY/TUBE(S)      TONSILLECTOMY       Family History   Problem Relation Name Age of Onset    Heart failure Mother      Cataracts Mother      Hypertension Mother      Colon cancer Mother      Colon cancer Father      Hypertension Sister 1     Liver cancer Sister 1     Glaucoma Brother 1     Lung cancer Brother 1     Colon cancer Brother twin     No Known Problems Maternal Grandmother      No Known Problems Maternal Grandfather      No Known Problems Paternal Grandmother      No Known Problems Paternal Grandfather      No Known Problems Daughter 1     No Known Problems Son 1     Breast cancer Maternal Aunt      No Known Problems Maternal Uncle      No Known Problems Paternal Aunt      No Known Problems Paternal Uncle      No Known Problems Other      Lupus Neg Hx      Rheum arthritis Neg Hx      Psoriasis Neg Hx      Amblyopia Neg Hx      Blindness Neg Hx      Cancer Neg Hx      Diabetes Neg Hx      Macular degeneration Neg Hx      Retinal detachment Neg Hx      Strabismus Neg Hx      Stroke Neg Hx      Thyroid disease Neg Hx       Social History     Tobacco Use    Smoking status: Former     Current packs/day: 0.25     Average packs/day: 0.3 packs/day for 1 year (0.3 ttl pk-yrs)     Types: Cigarettes    Smokeless tobacco: Never   Substance Use Topics    Alcohol use: Yes     Alcohol/week: 2.0 standard drinks of alcohol     Types: 1 Glasses of wine, 1 Cans of beer per week     Comment: very seldom    Drug use: No        Review of Systems:  Review of Systems   Constitutional: Negative.    HENT: Negative.     Eyes: Negative.         No visual  "disturbances such as diplopia   Respiratory: Negative.     All other systems reviewed and are negative.      OBJECTIVE:     Vital Signs (Most Recent)  Pulse: 74 (04/02/25 0858)  BP: (!) 156/76 (04/02/25 0858)  SpO2: 97 % (04/02/25 0858)  5' 2" (1.575 m)  80.1 kg (176 lb 9.4 oz)     Physical Exam:  Physical Exam  Constitutional:       Appearance: Normal appearance.      Comments: Uses cane for ambulatory assistance   Eyes:      Extraocular Movements: Extraocular movements intact.      Conjunctiva/sclera: Conjunctivae normal.      Pupils: Pupils are equal, round, and reactive to light.   Cardiovascular:      Rate and Rhythm: Normal rate and regular rhythm.      Pulses: Normal pulses.      Heart sounds: Normal heart sounds.   Pulmonary:      Effort: Pulmonary effort is normal.      Breath sounds: Normal breath sounds.   Abdominal:      Palpations: Abdomen is soft.   Musculoskeletal:         General: Normal range of motion.   Skin:     Capillary Refill: Capillary refill takes less than 2 seconds.   Neurological:      General: No focal deficit present.      Mental Status: She is alert.      Gait: Gait abnormal.      Comments: Slow gait with slight limp     Diagnostic Results:    MRI CHEST W WO CONTRAST - 10/15/2019:  Clinical History: Thymoma. Mediastinal biopsy in November 2015 revealed lymphoepithelial cellular proliferation with the differential including thymic hyperplasia and thymoma. 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   Indication: Thyroid nodule status post biopsy   Comparison: MR chest 10/16/2018 and 10/18/2016; PET CT 10/29/2015   Findings: Prominent lobes of the thymus remain stable in size since 2016. They are homogeneous in signal on T1 and T2 sequences. Spicules of fat are seen within it, an expected finding. No mediastinal, hilar or axillary adenopathy by CT size criteria. No pleural effusion.     Chest CT 4/5/2023:  I reviewed the images  Prominent thymic tissue unchanged from 2015 chest CT exam  Stable 1.7 " cm rounded soft tissue attenuation mass in the anterior mediastinum.    Chest CT 04/03/24:  I reviewed the images  1. Grossly stable 17 mm soft tissue density abnormality in the superior aspect of the prevascular compartment of the mediastinum.  2. Grossly stable soft tissue density measuring 36 x 20 mm, prior 36 x 21 mm, in the prevascular compartment of the mediastinum (thymic rebound?).  3. Additional findings.  Please see the above discussion.    Chest CT 4/2/25: I reviewed the images   Grossly stable anterior mediastinal soft tissue density    ASSESSMENT/PLAN:     79 y.o.  female former smoker with DDD, lumbar spinal stenosis, acquired scoliosis, cerebral aneurysm w/o rupture, HTN, tortuous aorta, PAD, b/l carotid artery stenosis, HLD, RA, GERD, and obesity who presents to clinic for follow up evaluation of mediastinal mass  Stable anterior mediastinal mass and prior biopsy c/w thymic hyperplasia.  Frequent falls    PLAN:Plan     Check MG panel  RTC in 1 year with chest CT without contrast will alternate contrast and no contrast scans if its stable.

## 2025-04-02 ENCOUNTER — OFFICE VISIT (OUTPATIENT)
Dept: CARDIOTHORACIC SURGERY | Facility: CLINIC | Age: 80
End: 2025-04-02
Attending: THORACIC SURGERY (CARDIOTHORACIC VASCULAR SURGERY)
Payer: MEDICARE

## 2025-04-02 ENCOUNTER — HOSPITAL ENCOUNTER (OUTPATIENT)
Dept: RADIOLOGY | Facility: HOSPITAL | Age: 80
Discharge: HOME | End: 2025-04-02
Attending: THORACIC SURGERY (CARDIOTHORACIC VASCULAR SURGERY)
Payer: MEDICARE

## 2025-04-02 VITALS
DIASTOLIC BLOOD PRESSURE: 76 MMHG | WEIGHT: 176.56 LBS | HEIGHT: 62 IN | HEART RATE: 74 BPM | OXYGEN SATURATION: 97 % | BODY MASS INDEX: 32.49 KG/M2 | SYSTOLIC BLOOD PRESSURE: 156 MMHG

## 2025-04-02 DIAGNOSIS — D49.89 THYMOMA: Primary | ICD-10-CM

## 2025-04-02 DIAGNOSIS — J98.59 MEDIASTINAL MASS: ICD-10-CM

## 2025-04-02 DIAGNOSIS — D49.89 THYMOMA: ICD-10-CM

## 2025-04-02 PROCEDURE — 71250 CT THORAX DX C-: CPT | Mod: 26,,, | Performed by: RADIOLOGY

## 2025-04-02 PROCEDURE — 99999 PR PBB SHADOW E&M-EST. PATIENT-LVL IV: CPT | Mod: PBBFAC,,, | Performed by: THORACIC SURGERY (CARDIOTHORACIC VASCULAR SURGERY)

## 2025-04-02 PROCEDURE — 71250 CT THORAX DX C-: CPT | Mod: TC

## 2025-04-02 PROCEDURE — 99214 OFFICE O/P EST MOD 30 MIN: CPT | Mod: PBBFAC,25 | Performed by: THORACIC SURGERY (CARDIOTHORACIC VASCULAR SURGERY)

## 2025-04-02 NOTE — PROGRESS NOTES
Plan of Care Note:    Patient was found on the floor after a fall at [facility] 3rd floor waiting room.  She reports walking to her appointment and her left shoe getting caught and losing her balance. Of note, she uses a cane and was carrying a heavy purse. She denied any pain, hitting her head or other complaints. She declined additional assistance including ED evaluation and was helped up and walked to her appointment with Dr. Parish team updated as well. Educated to go to the ED if recurrent falls or new complaints and to consider using wheelchair when coming for visits.      Sean Romo D.O.  Hematology/Oncology Fellow, PGY-VI

## 2025-04-03 DIAGNOSIS — M05.79 RHEUMATOID ARTHRITIS INVOLVING MULTIPLE SITES WITH POSITIVE RHEUMATOID FACTOR: ICD-10-CM

## 2025-04-04 RX ORDER — METHOTREXATE 2.5 MG/1
TABLET ORAL
Qty: 120 TABLET | Refills: 0 | Status: SHIPPED | OUTPATIENT
Start: 2025-04-04

## 2025-04-14 ENCOUNTER — LAB VISIT (OUTPATIENT)
Dept: LAB | Facility: HOSPITAL | Age: 80
End: 2025-04-14
Attending: INTERNAL MEDICINE
Payer: MEDICARE

## 2025-04-14 DIAGNOSIS — Z79.899 IMMUNOSUPPRESSION DUE TO DRUG THERAPY: ICD-10-CM

## 2025-04-14 DIAGNOSIS — M05.79 RHEUMATOID ARTHRITIS INVOLVING MULTIPLE SITES WITH POSITIVE RHEUMATOID FACTOR: ICD-10-CM

## 2025-04-14 DIAGNOSIS — D84.821 IMMUNOSUPPRESSION DUE TO DRUG THERAPY: ICD-10-CM

## 2025-04-14 LAB
ABSOLUTE EOSINOPHIL (OHS): 0.28 K/UL
ABSOLUTE MONOCYTE (OHS): 0.86 K/UL (ref 0.3–1)
ABSOLUTE NEUTROPHIL COUNT (OHS): 3.79 K/UL (ref 1.8–7.7)
ALBUMIN SERPL BCP-MCNC: 3.8 G/DL (ref 3.5–5.2)
ALP SERPL-CCNC: 105 UNIT/L (ref 40–150)
ALT SERPL W/O P-5'-P-CCNC: 17 UNIT/L (ref 10–44)
ANION GAP (OHS): 11 MMOL/L (ref 8–16)
AST SERPL-CCNC: 20 UNIT/L (ref 11–45)
BASOPHILS # BLD AUTO: 0.01 K/UL
BASOPHILS NFR BLD AUTO: 0.2 %
BILIRUB SERPL-MCNC: 0.6 MG/DL (ref 0.1–1)
BUN SERPL-MCNC: 9 MG/DL (ref 8–23)
CALCIUM SERPL-MCNC: 9.5 MG/DL (ref 8.7–10.5)
CHLORIDE SERPL-SCNC: 109 MMOL/L (ref 95–110)
CO2 SERPL-SCNC: 24 MMOL/L (ref 23–29)
CREAT SERPL-MCNC: 0.6 MG/DL (ref 0.5–1.4)
CRP SERPL-MCNC: 0.3 MG/L
ERYTHROCYTE [DISTWIDTH] IN BLOOD BY AUTOMATED COUNT: 14.2 % (ref 11.5–14.5)
ERYTHROCYTE [SEDIMENTATION RATE] IN BLOOD BY PHOTOMETRIC METHOD: 26 MM/HR
GFR SERPLBLD CREATININE-BSD FMLA CKD-EPI: >60 ML/MIN/1.73/M2
GLUCOSE SERPL-MCNC: 98 MG/DL (ref 70–110)
HCT VFR BLD AUTO: 34.3 % (ref 37–48.5)
HGB BLD-MCNC: 10.9 GM/DL (ref 12–16)
IMM GRANULOCYTES # BLD AUTO: 0.03 K/UL (ref 0–0.04)
IMM GRANULOCYTES NFR BLD AUTO: 0.5 % (ref 0–0.5)
LYMPHOCYTES # BLD AUTO: 1.4 K/UL (ref 1–4.8)
MCH RBC QN AUTO: 31.3 PG (ref 27–31)
MCHC RBC AUTO-ENTMCNC: 31.8 G/DL (ref 32–36)
MCV RBC AUTO: 99 FL (ref 82–98)
NUCLEATED RBC (/100WBC) (OHS): 0 /100 WBC
PLATELET # BLD AUTO: 161 K/UL (ref 150–450)
PMV BLD AUTO: 14.1 FL (ref 9.2–12.9)
POTASSIUM SERPL-SCNC: 4.2 MMOL/L (ref 3.5–5.1)
PROT SERPL-MCNC: 7.4 GM/DL (ref 6–8.4)
RBC # BLD AUTO: 3.48 M/UL (ref 4–5.4)
RELATIVE EOSINOPHIL (OHS): 4.4 %
RELATIVE LYMPHOCYTE (OHS): 22 % (ref 18–48)
RELATIVE MONOCYTE (OHS): 13.5 % (ref 4–15)
RELATIVE NEUTROPHIL (OHS): 59.4 % (ref 38–73)
SODIUM SERPL-SCNC: 144 MMOL/L (ref 136–145)
WBC # BLD AUTO: 6.37 K/UL (ref 3.9–12.7)

## 2025-04-14 PROCEDURE — 36415 COLL VENOUS BLD VENIPUNCTURE: CPT | Mod: PO

## 2025-04-14 PROCEDURE — 85652 RBC SED RATE AUTOMATED: CPT

## 2025-04-14 PROCEDURE — 80053 COMPREHEN METABOLIC PANEL: CPT

## 2025-04-14 PROCEDURE — 86140 C-REACTIVE PROTEIN: CPT

## 2025-04-14 PROCEDURE — 85025 COMPLETE CBC W/AUTO DIFF WBC: CPT

## 2025-04-15 ENCOUNTER — TELEPHONE (OUTPATIENT)
Dept: RHEUMATOLOGY | Facility: CLINIC | Age: 80
End: 2025-04-15
Payer: MEDICARE

## 2025-04-15 ENCOUNTER — RESULTS FOLLOW-UP (OUTPATIENT)
Dept: RHEUMATOLOGY | Facility: CLINIC | Age: 80
End: 2025-04-15

## 2025-04-15 DIAGNOSIS — Z79.1 NSAID LONG-TERM USE: ICD-10-CM

## 2025-04-15 DIAGNOSIS — M05.79 RHEUMATOID ARTHRITIS INVOLVING MULTIPLE SITES WITH POSITIVE RHEUMATOID FACTOR: ICD-10-CM

## 2025-04-15 DIAGNOSIS — D84.9 IMMUNOSUPPRESSION: ICD-10-CM

## 2025-04-15 DIAGNOSIS — K21.9 GASTROESOPHAGEAL REFLUX DISEASE: ICD-10-CM

## 2025-04-15 DIAGNOSIS — M10.9 GOUT, ARTHRITIS: ICD-10-CM

## 2025-04-15 RX ORDER — FOLIC ACID 1 MG/1
3000 TABLET ORAL DAILY
Qty: 270 TABLET | Refills: 3 | Status: SHIPPED | OUTPATIENT
Start: 2025-04-15

## 2025-04-15 NOTE — TELEPHONE ENCOUNTER
Labs show normal inflammation tests.  Staff to inform the patient that she does have anemia and this should be evaluated and monitored by the primary doctor.

## 2025-04-23 ENCOUNTER — TELEPHONE (OUTPATIENT)
Dept: FAMILY MEDICINE | Facility: CLINIC | Age: 80
End: 2025-04-23
Payer: MEDICARE

## 2025-04-23 NOTE — TELEPHONE ENCOUNTER
----- Message from Karena sent at 4/23/2025  1:57 PM CDT -----  Type: Patient Call BackWho called: self What is the request in detail: pt stated that she was told my another Dr that she is anemia,  and would like to discuss. Please callCan the clinic reply by [facility]? No Would the patient rather a call back or a response via [facility]?  [facility] .[phone] (home)  Additional Information:

## 2025-04-28 DIAGNOSIS — B00.9 HERPES: ICD-10-CM

## 2025-04-29 NOTE — TELEPHONE ENCOUNTER
Refill Routing Note   Medication(s) are not appropriate for processing by [facility] for the following reason(s):        Due for refill >6 months ago    [facility] action(s):  Defer      Medication Therapy Plan: Per Epic data, no recent dispenses on file from 10/29/24 - 4/26/25      Appointments  past 12m or future 3m with PCP    Date Provider   Last Visit   3/8/2024 Shanti Gutierrez MD   Next Visit   5/9/2025 Shanti Gutierrez MD   ED visits in past 90 days: 0        Note composed:7:51 PM 04/28/2025

## 2025-04-30 ENCOUNTER — TELEPHONE (OUTPATIENT)
Dept: OBSTETRICS AND GYNECOLOGY | Facility: CLINIC | Age: 80
End: 2025-04-30
Payer: MEDICARE

## 2025-04-30 NOTE — TELEPHONE ENCOUNTER
LVM informing pt that Dr. Rod will be out of clinic on 05/09 and to c/b to be rescheduled for the week of 05/13.     Also, spoke with pt's sister at (H) number listed and informed of message above. States she will have Ms. Bender give the office a call to reschedule. Voiced understanding.

## 2025-05-05 RX ORDER — VALACYCLOVIR HYDROCHLORIDE 500 MG/1
TABLET, FILM COATED ORAL
Qty: 180 TABLET | Refills: 0 | Status: SHIPPED | OUTPATIENT
Start: 2025-05-05

## 2025-05-09 ENCOUNTER — TELEPHONE (OUTPATIENT)
Dept: OBSTETRICS AND GYNECOLOGY | Facility: CLINIC | Age: 80
End: 2025-05-09
Payer: MEDICARE

## 2025-05-09 NOTE — TELEPHONE ENCOUNTER
----- Message from Med Assistant Sen sent at 5/9/2025  9:14 AM CDT -----  Regarding: FW: Self [phone]    ----- Message -----  From: Karlos Chun  Sent: 5/9/2025   9:13 AM CDT  To: Margie Moser Staff  Subject: Self [phone]                                Type:  Sooner Appointment RequestPatient is requesting a sooner appointment.  Patient declined first available appointment listed as well as another facility and provider .  Patient will not accept being placed on the waitlist and is requesting a message be sent to doctor.Name of Caller:  Self When is the first available appointment?  July 2025 Symptoms:  wwe Would the patient rather a call back or a response via [facility]?  Call back [facility] Call Back Number:  [phone] Information:

## 2025-05-13 ENCOUNTER — OFFICE VISIT (OUTPATIENT)
Dept: FAMILY MEDICINE | Facility: CLINIC | Age: 80
End: 2025-05-13
Payer: MEDICARE

## 2025-05-13 VITALS
WEIGHT: 173.5 LBS | HEIGHT: 62 IN | HEART RATE: 65 BPM | OXYGEN SATURATION: 98 % | SYSTOLIC BLOOD PRESSURE: 122 MMHG | DIASTOLIC BLOOD PRESSURE: 66 MMHG | BODY MASS INDEX: 31.93 KG/M2

## 2025-05-13 DIAGNOSIS — K21.9 GASTROESOPHAGEAL REFLUX DISEASE WITHOUT ESOPHAGITIS: ICD-10-CM

## 2025-05-13 DIAGNOSIS — D64.9 ANEMIA, UNSPECIFIED TYPE: Primary | ICD-10-CM

## 2025-05-13 DIAGNOSIS — I10 ESSENTIAL HYPERTENSION: ICD-10-CM

## 2025-05-13 DIAGNOSIS — D53.9 NUTRITIONAL ANEMIA, UNSPECIFIED: ICD-10-CM

## 2025-05-13 PROCEDURE — 99999 PR PBB SHADOW E&M-EST. PATIENT-LVL IV: CPT | Mod: PBBFAC,,, | Performed by: FAMILY MEDICINE

## 2025-05-13 PROCEDURE — 99214 OFFICE O/P EST MOD 30 MIN: CPT | Mod: PBBFAC,PO | Performed by: FAMILY MEDICINE

## 2025-05-13 NOTE — PROGRESS NOTES
Subjective     Patient ID: Yulia Peralta is a 79 y.o. female.    Chief Complaint: Anemia    79 year old female presents for concerns about anemia. She was told by rheumatology and told that she has anemia. She has anemia of chronic disease since 2012. She  is noted to have macrocytic anemia. She states she eats a well balanced diet. She has hard pebble stools, and then she is passing a normal stool after that.   She has a mammogram in 2 weeks.   She also wants refills of her blood pressure medication. It has been controlled so far.         History of Present Illness             Past Medical History:   Diagnosis Date    Abnormal colonoscopy 07/24/2020    colon polyps nad repeat in 3 years.     Acid reflux     Anemia     Anxiety     Arthritis     Blood transfusion     Cataract     Depression     Dry eyes     H/O colonoscopy 07/22/2020    dr. nicholas. repeat in 3 years.     Heart murmur     Hypertension     Left lumbar radiculitis 05/19/2015    Mixed hyperlipidemia 11/05/2021    Multiple thyroid nodules 12/18/2012    Osteoporosis     Rheumatoid arthritis     Thoracic or lumbosacral neuritis or radiculitis, unspecified 10/30/2013      Past Surgical History:   Procedure Laterality Date    CATARACT EXTRACTION W/  INTRAOCULAR LENS IMPLANT Left 1/26/2021    Procedure: EXTRACTION, CATARACT, WITH IOL INSERTION;  Surgeon: Elvis Pete MD;  Location: [facility];  Service: Ophthalmology;  Laterality: Left;    CATARACT EXTRACTION W/  INTRAOCULAR LENS IMPLANT Right 2/23/2021    Procedure: EXTRACTION, CATARACT, WITH IOL INSERTION;  Surgeon: Elvis Pete MD;  Location: [facility] OR;  Service: Ophthalmology;  Laterality: Right;    CEREBRAL ANGIOGRAM N/A 1/23/2020    Procedure: ANGIOGRAM-CEREBRAL;  Surgeon: [facility] Diagnostic Provider;  Location: [address];  Service: Radiology;  Laterality: N/A;  /[place]    gallstones  [id number]    HYSTERECTOMY      JOINT REPLACEMENT  [id number]     bilateral knee     "OOPHORECTOMY      DC REMOVAL OF OVARY/TUBE(S)      TONSILLECTOMY       Family History   Problem Relation Name Age of Onset    Heart failure Mother      Cataracts Mother      Hypertension Mother      Colon cancer Mother      Colon cancer Father      Hypertension Sister 1     Liver cancer Sister 1     Glaucoma Brother 1     Lung cancer Brother 1     Colon cancer Brother twin     No Known Problems Maternal Grandmother      No Known Problems Maternal Grandfather      No Known Problems Paternal Grandmother      No Known Problems Paternal Grandfather      No Known Problems Daughter 1     No Known Problems Son 1     Breast cancer Maternal Aunt      No Known Problems Maternal Uncle      No Known Problems Paternal Aunt      No Known Problems Paternal Uncle      No Known Problems Other      Lupus Neg Hx      Rheum arthritis Neg Hx      Psoriasis Neg Hx      Amblyopia Neg Hx      Blindness Neg Hx      Cancer Neg Hx      Diabetes Neg Hx      Macular degeneration Neg Hx      Retinal detachment Neg Hx      Strabismus Neg Hx      Stroke Neg Hx      Thyroid disease Neg Hx       Social History[1]    Review of Systems         Objective     Vitals:    05/13/25 1542   BP: 122/66   Pulse: 65   TempSrc: Oral   SpO2: 98%   Weight: 78.7 kg (173 lb 8 oz)   Height: 5' 2" (1.575 m)        Physical Exam  Constitutional:       General: She is not in acute distress.     Appearance: She is well-developed. She is not diaphoretic.   HENT:      Head: Normocephalic and atraumatic.   Eyes:      Conjunctiva/sclera: Conjunctivae normal.   Neck:      Vascular: No carotid bruit.   Cardiovascular:      Rate and Rhythm: Normal rate and regular rhythm.      Heart sounds: Normal heart sounds. No murmur heard.     No friction rub. No gallop.   Pulmonary:      Effort: Pulmonary effort is normal. No respiratory distress.      Breath sounds: Normal breath sounds. No stridor. No wheezing, rhonchi or rales.   Musculoskeletal:      Cervical back: Normal range of " motion and neck supple.   Neurological:      Mental Status: She is alert and oriented to person, place, and time.       Physical Exam                Assessment and Plan     1. Anemia, unspecified type  -     Folate; Future; Expected date: 05/13/2025  Checking for other causes as she has macrocytic cells patient carries a diagnosis of anemia of chronic disease likely due to rheumatoid arthritis.    2. Nutritional anemia, unspecified  -     Folate; Future; Expected date: 05/13/2025    3. Essential hypertension  -     irbesartan (AVAPRO) 300 MG tablet; Take 1 tablet (300 mg total) by mouth every evening.  Dispense: 90 tablet; Refill: 3  Stable. Refilled meds.     4. Gastroesophageal reflux disease without esophagitis  -     pantoprazole (PROTONIX) 40 MG tablet; Take 1 tablet (40 mg total) by mouth once daily.  Dispense: 90 tablet; Refill: 3  Stable. Refilled meds.       Yulia was seen today for anemia.    Diagnoses and all orders for this visit:    Anemia, unspecified type  -     Folate; Future    Nutritional anemia, unspecified  -     Folate; Future    Essential hypertension  -     irbesartan (AVAPRO) 300 MG tablet; Take 1 tablet (300 mg total) by mouth every evening.    Gastroesophageal reflux disease without esophagitis  -     pantoprazole (PROTONIX) 40 MG tablet; Take 1 tablet (40 mg total) by mouth once daily.        Assessment & Plan                      No follow-ups on file.        This note was generated with the assistance of ambient listening technology. Verbal consent was obtained by the patient and accompanying visitor(s) for the recording of patient appointment to facilitate this note. I attest to having reviewed and edited the generated note for accuracy, though some syntax or spelling errors may persist. Please contact the author of this note for any clarification.         [1]   Social History  Socioeconomic History    Marital status:      Spouse name: Ric     Number of children: 2    Highest  education level: Some college, no degree   Occupational History    Occupation: retired    Tobacco Use    Smoking status: Former     Current packs/day: 0.25     Average packs/day: 0.3 packs/day for 1 year (0.3 ttl pk-yrs)     Types: Cigarettes    Smokeless tobacco: Never   Substance and Sexual Activity    Alcohol use: Yes     Alcohol/week: 2.0 standard drinks of alcohol     Types: 1 Glasses of wine, 1 Cans of beer per week     Comment: very seldom    Drug use: No    Sexual activity: Not Currently     Partners: Male   Social History Narrative    Adult Screenings updated and reviewed  6/12/14    Mammogram( for females) ordered for DIS    Pap ( for females) Dr Zepeda  Due for f/u   For  2014    Colonoscopy  Done once    Flu shot yearly done  2013    Td 2005    Pneumovax  Updated  12/3/13    Zostavax done 2012    Eye exam recommended yearly done 2013    Bone density  12/9/13    Thyroid ultrasound  11/6/2012 Normal sized thyroid containing several subcentimeter nodules, similar to the 5/12/11 exam.  No concerning nodules identified..          Social Drivers of Health     Financial Resource Strain: Low Risk  (3/27/2023)    Overall Financial Resource Strain (CARDIA)     Difficulty of Paying Living Expenses: Not hard at all   Food Insecurity: No Food Insecurity (3/27/2023)    Hunger Vital Sign     Worried About Running Out of Food in the Last Year: Never true     Ran Out of Food in the Last Year: Never true   Transportation Needs: No Transportation Needs (3/27/2023)    PRAPARE - Transportation     Lack of Transportation (Medical): No     Lack of Transportation (Non-Medical): No   Physical Activity: Inactive (3/27/2023)    Exercise Vital Sign     Days of Exercise per Week: 0 days     Minutes of Exercise per Session: 0 min   Stress: No Stress Concern Present (3/27/2023)    [employer] - Occupational Stress Questionnaire     Feeling of Stress : Only a little   Housing Stability: Unknown (3/27/2023)     Housing Stability Vital Sign     Unable to Pay for Housing in the Last Year: No     Unstable Housing in the Last Year: No

## 2025-05-14 ENCOUNTER — RESULTS FOLLOW-UP (OUTPATIENT)
Dept: FAMILY MEDICINE | Facility: CLINIC | Age: 80
End: 2025-05-14
Payer: MEDICARE

## 2025-05-14 ENCOUNTER — LAB VISIT (OUTPATIENT)
Dept: LAB | Facility: HOSPITAL | Age: 80
End: 2025-05-14
Attending: FAMILY MEDICINE
Payer: MEDICARE

## 2025-05-14 DIAGNOSIS — D64.9 ANEMIA, UNSPECIFIED TYPE: ICD-10-CM

## 2025-05-14 DIAGNOSIS — D53.9 NUTRITIONAL ANEMIA, UNSPECIFIED: ICD-10-CM

## 2025-05-14 LAB
FERRITIN SERPL-MCNC: 210 NG/ML (ref 20–300)
FOLATE SERPL-MCNC: >40 NG/ML (ref 4–24)
IRON SATN MFR SERPL: 33 % (ref 20–50)
IRON SERPL-MCNC: 93 UG/DL (ref 30–160)
RETICS/RBC NFR AUTO: 2.6 % (ref 0.5–2.5)
TIBC SERPL-MCNC: 283 UG/DL (ref 250–450)
TRANSFERRIN SERPL-MCNC: 191 MG/DL (ref 200–375)
VIT B12 SERPL-MCNC: 414 PG/ML (ref 210–950)

## 2025-05-14 PROCEDURE — 82746 ASSAY OF FOLIC ACID SERUM: CPT

## 2025-05-14 PROCEDURE — 82728 ASSAY OF FERRITIN: CPT

## 2025-05-14 PROCEDURE — 36415 COLL VENOUS BLD VENIPUNCTURE: CPT | Mod: PO

## 2025-05-14 PROCEDURE — 85045 AUTOMATED RETICULOCYTE COUNT: CPT

## 2025-05-14 PROCEDURE — 84466 ASSAY OF TRANSFERRIN: CPT

## 2025-05-14 PROCEDURE — 82607 VITAMIN B-12: CPT

## 2025-05-14 RX ORDER — IRBESARTAN 300 MG/1
300 TABLET ORAL NIGHTLY
Qty: 90 TABLET | Refills: 3 | Status: SHIPPED | OUTPATIENT
Start: 2025-05-14

## 2025-05-14 RX ORDER — PANTOPRAZOLE SODIUM 40 MG/1
40 TABLET, DELAYED RELEASE ORAL DAILY
Qty: 90 TABLET | Refills: 3 | Status: SHIPPED | OUTPATIENT
Start: 2025-05-14

## 2025-05-20 ENCOUNTER — OFFICE VISIT (OUTPATIENT)
Dept: OBSTETRICS AND GYNECOLOGY | Facility: CLINIC | Age: 80
End: 2025-05-20
Payer: MEDICARE

## 2025-05-20 VITALS
DIASTOLIC BLOOD PRESSURE: 80 MMHG | BODY MASS INDEX: 31.85 KG/M2 | WEIGHT: 174.19 LBS | SYSTOLIC BLOOD PRESSURE: 130 MMHG

## 2025-05-20 DIAGNOSIS — N64.4 BREAST PAIN: ICD-10-CM

## 2025-05-20 DIAGNOSIS — R10.2 PELVIC CRAMPING: ICD-10-CM

## 2025-05-20 DIAGNOSIS — L30.4 INTERTRIGO: ICD-10-CM

## 2025-05-20 DIAGNOSIS — Z01.419 WELL WOMAN EXAM WITH ROUTINE GYNECOLOGICAL EXAM: Primary | ICD-10-CM

## 2025-05-20 PROCEDURE — 81515 NFCT DS BV&VAGINITIS DNA ALG: CPT | Performed by: OBSTETRICS & GYNECOLOGY

## 2025-05-20 PROCEDURE — G0101 CA SCREEN;PELVIC/BREAST EXAM: HCPCS | Mod: S$PBB,,, | Performed by: OBSTETRICS & GYNECOLOGY

## 2025-05-20 PROCEDURE — 99214 OFFICE O/P EST MOD 30 MIN: CPT | Mod: PBBFAC | Performed by: OBSTETRICS & GYNECOLOGY

## 2025-05-20 PROCEDURE — 99999 PR PBB SHADOW E&M-EST. PATIENT-LVL IV: CPT | Mod: PBBFAC,,, | Performed by: OBSTETRICS & GYNECOLOGY

## 2025-05-20 RX ORDER — NYSTATIN AND TRIAMCINOLONE ACETONIDE 100000; 1 [USP'U]/G; MG/G
CREAM TOPICAL
Qty: 30 G | Refills: 2 | Status: SHIPPED | OUTPATIENT
Start: 2025-05-20 | End: 2026-05-20

## 2025-05-20 NOTE — PROGRESS NOTES
History & Physical  Gynecology Problem Visit      SUBJECTIVE:     Chief Complaint: Annual Exam       History of Present Illness:  Annual Exam-Postmenopausal  Ms. Peralta is a 81 y/o female who presents for annual exam. The patient reports that she continues to have shooting pains in her breast. She continues to have irritation between her breast.     She patient is not sexually active. GYN screening history: last mammogram: was normal 06/2024. The patient is not taking hormone replacement therapy. Patient denies post-menopausal vaginal bleeding. The patient wears seatbelts: yes. The patient participates in regular exercise: no. Has the patient ever been transfused or tattooed?: no. The patient reports that there is not domestic violence in her life.      Review of patient's allergies indicates:   Allergen Reactions    No known drug allergies        Past Medical History:   Diagnosis Date    Abnormal colonoscopy 07/24/2020    colon polyps nad repeat in 3 years.     Acid reflux     Anemia     Anxiety     Arthritis     Blood transfusion     Cataract     Depression     Dry eyes     H/O colonoscopy 07/22/2020    dr. nicholas. repeat in 3 years.     Heart murmur     Hypertension     Left lumbar radiculitis 05/19/2015    Mixed hyperlipidemia 11/05/2021    Multiple thyroid nodules 12/18/2012    Osteoporosis     Rheumatoid arthritis     Thoracic or lumbosacral neuritis or radiculitis, unspecified 10/30/2013     Past Surgical History:   Procedure Laterality Date    CATARACT EXTRACTION W/  INTRAOCULAR LENS IMPLANT Left 1/26/2021    Procedure: EXTRACTION, CATARACT, WITH IOL INSERTION;  Surgeon: Elvis Pete MD;  Location: [facility];  Service: Ophthalmology;  Laterality: Left;    CATARACT EXTRACTION W/  INTRAOCULAR LENS IMPLANT Right 2/23/2021    Procedure: EXTRACTION, CATARACT, WITH IOL INSERTION;  Surgeon: Elvis Pete MD;  Location: [facility];  Service: Ophthalmology;  Laterality: Right;    CEREBRAL ANGIOGRAM  N/A 2020    Procedure: ANGIOGRAM-CEREBRAL;  Surgeon: [facility] Diagnostic Provider;  Location: [facility] OR [address] [place] [address];  Service: Radiology;  Laterality: N/A;  /[place]    gallstones  [id number]    HYSTERECTOMY      JOINT REPLACEMENT  [id number]     bilateral knee    OOPHORECTOMY      RI REMOVAL OF OVARY/TUBE(S)      TONSILLECTOMY       OB History          2    Para   2    Term   2            AB        Living             SAB        IAB        Ectopic        Multiple        Live Births                   Family History   Problem Relation Name Age of Onset    Heart failure Mother      Cataracts Mother      Hypertension Mother      Colon cancer Mother      Colon cancer Father      Hypertension Sister 1     Liver cancer Sister 1     Glaucoma Brother 1     Lung cancer Brother 1     Colon cancer Brother twin     No Known Problems Maternal Grandmother      No Known Problems Maternal Grandfather      No Known Problems Paternal Grandmother      No Known Problems Paternal Grandfather      No Known Problems Daughter 1     No Known Problems Son 1     Breast cancer Maternal Aunt      No Known Problems Maternal Uncle      No Known Problems Paternal Aunt      No Known Problems Paternal Uncle      No Known Problems Other      Lupus Neg Hx      Rheum arthritis Neg Hx      Psoriasis Neg Hx      Amblyopia Neg Hx      Blindness Neg Hx      Cancer Neg Hx      Diabetes Neg Hx      Macular degeneration Neg Hx      Retinal detachment Neg Hx      Strabismus Neg Hx      Stroke Neg Hx      Thyroid disease Neg Hx       Social History[1]    Current Outpatient Medications   Medication Sig    amLODIPine (NORVASC) 5 MG tablet Take 1 tablet (5 mg total) by mouth once daily.    aspirin (ECOTRIN) 81 MG EC tablet Take 81 mg by mouth once daily.    atorvastatin (LIPITOR) 40 MG tablet TAKE ONE TABLET BY MOUTH EVERY DAY    azelastine (ASTELIN) 137 mcg (0.1 %) nasal spray 2 sprays as needed.    clobetasoL (TEMOVATE) 0.05 % external solution Apply  topically 2 (two) times daily.    DULoxetine (CYMBALTA) 20 MG capsule Take 1 capsule (20 mg total) by mouth once daily.    famotidine (PEPCID) 40 MG tablet Take 0.5 tablets (20 mg total) by mouth 2 (two) times daily as needed for Heartburn.    fluticasone (FLONASE) 50 mcg/actuation nasal spray 1 spray (50 mcg total) by Each Nare route once daily.    folic acid (FOLVITE) 1 MG tablet Take 3 tablets (3,000 mcg total) by mouth once daily.    furosemide (LASIX) 20 MG tablet Take 3 tablets (60 mg total) by mouth once daily.    hydrocortisone 2.5 % cream as needed.     irbesartan (AVAPRO) 300 MG tablet Take 1 tablet (300 mg total) by mouth every evening.    KGCL ketamine 10%- gabapentin 6%- cyclobenzaprine 2%- LIDOcaine 4% in transdermal cream as needed.    LIDOcaine (LIDODERM) 5 % Place 1 patch onto the skin once daily. Remove & Discard patch within 12 hours or as directed by MD    methotrexate 2.5 MG Tab TAKE TEN TABLETS BY MOUTH ONCE A WEEK ON MONDAYS ( TAKE 5 IN THE MORNING AND 5 IN THE EVENING )    miSOPROStoL (CYTOTEC) 100 MCG Tab TAKE ONE TABLET BY MOUTH TWICE A DAY    nabumetone (RELAFEN) 500 MG tablet Take 1 tablet (500 mg total) by mouth 2 (two) times daily.    nystatin (MYCOSTATIN) powder Apply topically 2 (two) times daily.    nystatin-triamcinolone (MYCOLOG II) cream Apply to affected area 2 times daily    pantoprazole (PROTONIX) 40 MG tablet Take 1 tablet (40 mg total) by mouth once daily.    pregabalin (LYRICA) 100 MG capsule Take 1 capsule (100 mg total) by mouth 3 (three) times daily.    triamcinolone acetonide 0.1% (KENALOG) 0.1 % cream Apply topically 3 (three) times daily.    valACYclovir (VALTREX) 500 MG tablet TAKE ONE TABLET BY MOUTH EVERY DAY FOR SUPPRESSION, INCREASE TO TWICE A DAY FOR OUTBREAK     No current facility-administered medications for this visit.     Facility-Administered Medications Ordered in Other Visits   Medication    cyclopentolate 1% ophthalmic solution 1 drop    ofloxacin 0.3 %  ophthalmic solution 1 drop    sodium chloride 0.9% flush 10 mL         Review of Systems:  Review of Systems   Constitutional:  Negative for activity change and fatigue.   HENT:  Negative for congestion, sneezing and sore throat.    Respiratory:  Negative for shortness of breath.    Cardiovascular:  Negative for chest pain.        Shooting breast pain   Gastrointestinal:  Negative for abdominal pain, nausea and vomiting.   Genitourinary:  Positive for pelvic pain. Negative for flank pain, menstrual problem and vaginal discharge.   Musculoskeletal:  Negative for back pain.   Skin:  Positive for rash.   Neurological:  Negative for headaches.        OBJECTIVE:   Vitals:     Vitals:    05/20/25 1016   BP: 130/80   Weight: 79 kg (174 lb 2.6 oz)        Physical Exam:  Physical Exam  Vitals and nursing note reviewed. Exam conducted with a chaperone present.   Constitutional:       Appearance: She is well-developed.   Cardiovascular:      Rate and Rhythm: Normal rate.   Pulmonary:      Effort: Pulmonary effort is normal. No respiratory distress.   Chest:   Breasts:     Breasts are symmetrical.   Abdominal:      General: There is no distension.      Palpations: Abdomen is soft.      Tenderness: There is no abdominal tenderness.   Genitourinary:     Comments: Uterus mobile and non-tender  Skin:     General: Skin is warm and dry.   Neurological:      Mental Status: She is alert and oriented to person, place, and time.       ASSESSMENT:       ICD-10-CM ICD-9-CM    1. Well woman exam with routine gynecological exam  Z01.419 V72.31 Mammo Digital Diagnostic Bilat with Edilberto (XPD)      2. Breast pain  N64.4 611.71 Mammo Digital Diagnostic Bilat with Edilberto (XPD)      3. Pelvic cramping  R10.2 625.9 Vaginosis Screen by DNA Probe      Urinalysis, Reflex to Urine Culture Urine, Clean Catch      4. Intertrigo  L30.4 695.89 nystatin-triamcinolone (MYCOLOG II) cream           Plan:      Yulia was seen today for annual exam.    Diagnoses  and all orders for this visit:    Well woman exam with routine gynecological exam  -     Mammo Digital Diagnostic Bilat with Edilberto (XPD); Future  - Pap smear UTD and no longer indicated    Breast pain  -     Mammo Digital Diagnostic Bilat with Edilberto (XPD); Future    Pelvic cramping  -     Vaginosis Screen by DNA Probe  -     Urinalysis, Reflex to Urine Culture Urine, Clean Catch        Orders Placed This Encounter   Procedures    Mammo Digital Diagnostic Bilat with Edilberto (XPD)    Vaginosis Screen by DNA Probe    Urinalysis, Reflex to Urine Culture Urine, Clean Catch       Follow up in about 1 year (around 5/20/2026) for Well Woman/Annual.           [1]   Social History  Tobacco Use    Smoking status: Former     Current packs/day: 0.25     Average packs/day: 0.3 packs/day for 1 year (0.3 ttl pk-yrs)     Types: Cigarettes    Smokeless tobacco: Never   Substance Use Topics    Alcohol use: Yes     Alcohol/week: 2.0 standard drinks of alcohol     Types: 1 Glasses of wine, 1 Cans of beer per week     Comment: very seldom    Drug use: No

## 2025-05-22 LAB
BACTERIAL VAGINOSIS DNA (OHS): NOT DETECTED
CANDIDA GLABRATA/KRUSEI DNA (OHS): NOT DETECTED
CANDIDA SPECIES DNA (OHS): NOT DETECTED
TRICHOMONAS VAGINALIS DNA (OHS): NOT DETECTED

## 2025-05-30 ENCOUNTER — OFFICE VISIT (OUTPATIENT)
Dept: FAMILY MEDICINE | Facility: CLINIC | Age: 80
End: 2025-05-30
Payer: MEDICARE

## 2025-05-30 ENCOUNTER — TELEPHONE (OUTPATIENT)
Dept: FAMILY MEDICINE | Facility: CLINIC | Age: 80
End: 2025-05-30
Payer: MEDICARE

## 2025-05-30 VITALS
SYSTOLIC BLOOD PRESSURE: 138 MMHG | WEIGHT: 178.56 LBS | TEMPERATURE: 98 F | RESPIRATION RATE: 16 BRPM | OXYGEN SATURATION: 98 % | BODY MASS INDEX: 32.86 KG/M2 | HEIGHT: 62 IN | DIASTOLIC BLOOD PRESSURE: 70 MMHG | HEART RATE: 81 BPM

## 2025-05-30 DIAGNOSIS — M10.9 GOUT, ARTHRITIS: ICD-10-CM

## 2025-05-30 DIAGNOSIS — J30.9 CHRONIC ALLERGIC RHINITIS: ICD-10-CM

## 2025-05-30 DIAGNOSIS — K21.00 GASTROESOPHAGEAL REFLUX DISEASE WITH ESOPHAGITIS, UNSPECIFIED WHETHER HEMORRHAGE: ICD-10-CM

## 2025-05-30 DIAGNOSIS — R05.9 COUGH, UNSPECIFIED TYPE: ICD-10-CM

## 2025-05-30 DIAGNOSIS — I73.9 CLAUDICATION OF BOTH LOWER EXTREMITIES: ICD-10-CM

## 2025-05-30 DIAGNOSIS — Z79.1 NSAID LONG-TERM USE: ICD-10-CM

## 2025-05-30 DIAGNOSIS — I73.9 PAD (PERIPHERAL ARTERY DISEASE): ICD-10-CM

## 2025-05-30 DIAGNOSIS — M81.0 AGE-RELATED OSTEOPOROSIS WITHOUT CURRENT PATHOLOGICAL FRACTURE: ICD-10-CM

## 2025-05-30 DIAGNOSIS — M05.79 RHEUMATOID ARTHRITIS INVOLVING MULTIPLE SITES WITH POSITIVE RHEUMATOID FACTOR: ICD-10-CM

## 2025-05-30 DIAGNOSIS — R09.82 POST-NASAL DRIP: Primary | ICD-10-CM

## 2025-05-30 DIAGNOSIS — D84.9 IMMUNOSUPPRESSION: ICD-10-CM

## 2025-05-30 PROBLEM — K21.9 GASTROESOPHAGEAL REFLUX DISEASE: Status: ACTIVE | Noted: 2025-05-30

## 2025-05-30 PROCEDURE — 99999 PR PBB SHADOW E&M-EST. PATIENT-LVL V: CPT | Mod: PBBFAC,,,

## 2025-05-30 PROCEDURE — 99215 OFFICE O/P EST HI 40 MIN: CPT | Mod: PBBFAC,PO

## 2025-05-30 RX ORDER — FLUTICASONE PROPIONATE 50 MCG
1 SPRAY, SUSPENSION (ML) NASAL DAILY
Qty: 16 G | Refills: 5 | Status: SHIPPED | OUTPATIENT
Start: 2025-05-30

## 2025-05-30 RX ORDER — MISOPROSTOL 100 UG/1
100 TABLET ORAL 2 TIMES DAILY
Qty: 120 TABLET | Refills: 5 | Status: SHIPPED | OUTPATIENT
Start: 2025-05-30

## 2025-05-30 RX ORDER — LORATADINE 10 MG/1
10 TABLET ORAL DAILY
Qty: 30 TABLET | Refills: 0 | Status: SHIPPED | OUTPATIENT
Start: 2025-05-30 | End: 2026-05-30

## 2025-05-30 RX ORDER — METHYLPREDNISOLONE 4 MG/1
TABLET ORAL
Qty: 21 EACH | Refills: 0 | Status: SHIPPED | OUTPATIENT
Start: 2025-05-30

## 2025-05-30 NOTE — PROGRESS NOTES
HPI     Chief Complaint:  Chief Complaint   Patient presents with    Cough    Sinus Problem     Since Sunday       Yulia Peralta is a 80 y.o. female with multiple medical diagnoses as listed in the medical history and problem list that presents for cough and sinus problem 6 days ago    HPI    History of Present Illness    CHIEF COMPLAINT:  Yulia presents today for cough and sinus problems that started Sunday    HISTORY OF PRESENT ILLNESS:  She developed symptoms Sunday after working in the yard and spraying for ants, with concern about chemical inhalation. Initially experienced sore throat Sunday evening followed by excessive white saliva production, which progressed to yellow and green thick mucus. She started Mucinex Monday and is now on second box. She experiences tickly, itchy cough with postnasal drip. She reports facial pressure and headache; headache improved with medication but returned with continued Mucinex use.    CURRENT MEDICATIONS:  She takes Pantoprazole and has an old prescription for Flonase.    MEDICAL HISTORY:  She has arthritis. She experiences numbness and tingling in legs and feet with occasional leg swelling, describing a persistent sensation of wearing knee-high stockings.      ROS:  General: -fever, -chills, -fatigue, -weight gain, -weight loss  Eyes: -vision changes, -redness, -discharge  ENT: -ear pain, -nasal congestion, +sore throat, +nasal discharge, +excessive saliva/drooling  Cardiovascular: -chest pain, -palpitations, +lower extremity edema  Respiratory: +cough, -shortness of breath, +productive cough, +sputum production  Gastrointestinal: -abdominal pain, -nausea, -vomiting, -diarrhea, -constipation, -blood in stool  Genitourinary: -dysuria, -hematuria, -frequency  Musculoskeletal: -joint pain, -muscle pain  Skin: -rash, -lesion  Neurological: +headache, -dizziness, +numbness, +tingling  Psychiatric: -anxiety, -depression, -sleep difficulty  Head: +sinus pressure, +head pain              Assessment & Plan     Assessment & Plan    Assessed symptoms of cough, sinus problems, and mucus production.  Evaluated for potential respiratory infection or allergic reaction.  Determined symptoms likely due to environmental allergens and post-nasal drip.  Initiated treatment with steroids and antihistamines to reduce inflammation and mucus production.        ## FOLLOW-UP:  - Follow up as needed.  - Yulia instructed to contact the office if any additional questions or concerns arise.         Problem List Items Addressed This Visit       Rheumatoid arthritis    Relevant Medications    miSOPROStoL (CYTOTEC) 100 MCG Tab  HEREDITARY AND IDIOPATHIC NEUROPATHY:  - Yulia reports numbness and tingling, possibly related to neuropathy.  - Referred to vascular specialist for further evaluation of these symptoms.    Immunosuppression    Relevant Medications    miSOPROStoL (CYTOTEC) 100 MCG Tab    Claudication of both lower extremities    Relevant Orders    Ambulatory referral/consult to Vascular Surgery  LOCALIZED EDEMA:  - Yulia reports occasional swelling in legs and feet.  - Evaluated edema in lower extremities.  - Referred to vascular specialist for further evaluation of swelling (same referral as for neuropathy symptoms).    PAD (peripheral artery disease)    Relevant Orders    Ambulatory referral/consult to Vascular Surgery  LOCALIZED EDEMA:  - Yulia reports occasional swelling in legs and feet.  - Evaluated edema in lower extremities.  - Referred to vascular specialist for further evaluation of swelling (same referral as for neuropathy symptoms).     Other Visit Diagnoses         Post-nasal drip    -  Primary    Relevant Medications    loratadine (CLARITIN) 10 mg tablet    methylPREDNISolone (MEDROL DOSEPACK) 4 mg tablet   CHRONIC PHARYNGITIS:  - Sore throat and cough with thick mucus production appear related to post-nasal drip from environmental allergies.  - This condition is connected to the  patient's sinus problems discussed above.  - Management will be addressed through the same steroid and antihistamine regimen prescribed for hypersensitivity pneumonitis and allergic rhinitis.      Cough, unspecified type        Relevant Medications    loratadine (CLARITIN) 10 mg tablet    methylPREDNISolone (MEDROL DOSEPACK) 4 mg tablet     CHRONIC PHARYNGITIS:  - Sore throat and cough with thick mucus production appear related to post-nasal drip from environmental allergies.  - This condition is connected to the patient's sinus problems discussed above.  - Management will be addressed through the same steroid and antihistamine regimen prescribed for hypersensitivity pneumonitis and allergic rhinitis.     IMPACTED CERUMEN:  - Observed wax buildup in patient's ears.  - No signs of infection present.  - Will monitor earwax buildup and check for signs of infection at future visits.      Chronic allergic rhinitis        Relevant Medications    fluticasone propionate (FLONASE) 50 mcg/actuation nasal spray   HYPERSENSITIVITY PNEUMONITIS AND ALLERGIC RHINITIS:  - Yulia reports cough and sinus problems after spraying for ants, possibly inhaling fuel.  - Symptoms include sore throat, cough, production of yellow/green mucus, pressure in face and eyes, and post-nasal drip.  - These symptoms are likely due to environmental factors causing hypersensitivity.  - Started Medrol Dosepak (methylprednisolone) and antihistamine to reduce inflammation and allergic response.  - Continuing Flonase nasal spray to manage symptoms.  - Will check for signs of infection.  - Explained the body's natural response to allergens, including mucus production and coughing to clear airways.  - Recommend increased hydration, particularly warm fluids like tea with honey, to help expand tissues and facilitate drainage.        Gout, arthritis        Relevant Medications    miSOPROStoL (CYTOTEC) 100 MCG Tab  Request refill      Gastroesophageal reflux  "disease        Relevant Medications    miSOPROStoL (CYTOTEC) 100 MCG Tab  GASTRO-ESOPHAGEAL REFLUX DISEASE:  - Reviewed patient's use of pantoprazole for GERD management.  - Advised patient to continue taking medication as prescribed.      NSAID long-term use        Relevant Medications    miSOPROStoL (CYTOTEC) 100 MCG Tab  Request refill      Age-related osteoporosis without current pathological fracture        Relevant Orders    DXA Bone Density Axial Skeleton 1 or more sites  OSTEOARTHRITIS:  - Reviewed and evaluated current medication regimen for arthritis management.  - Ordered DEXA scan for bone density.  - Advised patient to continue taking arthritis medication as prescribed.              --------------------------------------------      Health Maintenance:  Health Maintenance         Date Due Completion Date    RSV Vaccine (Age 60+ and Pregnant patients) (1 - 1-dose 75+ series) Never done ---    DEXA Scan 01/02/2023 1/2/2020    COVID-19 Vaccine (8 - 2024-25 season) 12/19/2024 10/24/2024    Aspirin/Antiplatelet Therapy 05/30/2026 5/30/2025    TETANUS VACCINE 11/09/2031 11/9/2021            Health maintenance reviewed, DEXA ordered, and Advised patient on the importance of completing overdue health maintenance items    Follow Up:  Follow up if symptoms worsen or fail to improve.    Exam     Review of Systems:  (as noted above)  Review of Systems    Physical Exam:   Physical Exam  Vitals:    05/30/25 1131   BP: 138/70   BP Location: Left arm   Patient Position: Sitting   Pulse: 81   Resp: 16   Temp: 98.1 °F (36.7 °C)   TempSrc: Oral   SpO2: 98%   Weight: 81 kg (178 lb 9.2 oz)   Height: 5' 2" (1.575 m)      Body mass index is 32.66 kg/m².    Physical Exam    General: No acute distress. Well-developed. Well-nourished.  Eyes: EOMI. Sclerae anicteric.  HENT: Normocephalic. Atraumatic. Nares patent. Moist oral mucosa.  Ears: Bilateral TMs clear. Bilateral EACs clear. Cerumen present in ears.  Cardiovascular: Regular " rate. Regular rhythm. No murmurs. No rubs. No gallops. Normal S1, S2.  Respiratory: Normal respiratory effort. Clear to auscultation bilaterally. No rales. No rhonchi. No wheezing. Lungs are clear.  Musculoskeletal: No  obvious deformity.  Extremities: No lower extremity edema.  Neurological: Alert & oriented x3. No slurred speech.  Antalgic gait uses cane  Psychiatric: Normal mood. Normal affect. Good insight. Good judgment.  Skin: Warm. Dry. No rash.         Cane use  History     Past Medical History:  Past Medical History:   Diagnosis Date    Abnormal colonoscopy 07/24/2020    colon polyps nad repeat in 3 years.     Acid reflux     Anemia     Anxiety     Arthritis     Blood transfusion     Cataract     Depression     Dry eyes     H/O colonoscopy 07/22/2020    dr. nicholas. repeat in 3 years.     Heart murmur     Hypertension     Left lumbar radiculitis 05/19/2015    Mixed hyperlipidemia 11/05/2021    Multiple thyroid nodules 12/18/2012    Osteoporosis     Rheumatoid arthritis     Thoracic or lumbosacral neuritis or radiculitis, unspecified 10/30/2013       Past Surgical History:  Past Surgical History:   Procedure Laterality Date    CATARACT EXTRACTION W/  INTRAOCULAR LENS IMPLANT Left 1/26/2021    Procedure: EXTRACTION, CATARACT, WITH IOL INSERTION;  Surgeon: Elvis Pete MD;  Location: [facility];  Service: Ophthalmology;  Laterality: Left;    CATARACT EXTRACTION W/  INTRAOCULAR LENS IMPLANT Right 2/23/2021    Procedure: EXTRACTION, CATARACT, WITH IOL INSERTION;  Surgeon: Elvis Pete MD;  Location: [facility];  Service: Ophthalmology;  Laterality: Right;    CEREBRAL ANGIOGRAM N/A 1/23/2020    Procedure: ANGIOGRAM-CEREBRAL;  Surgeon: [facility] Provider;  Location: [address];  Service: Radiology;  Laterality: N/A;  /[place]    gallstones  [id number]    HYSTERECTOMY      JOINT REPLACEMENT  [id number]     bilateral knee    OOPHORECTOMY      OH REMOVAL OF OVARY/TUBE(S)       TONSILLECTOMY         Social History:  Social History[1]    Family History:  Family History   Problem Relation Name Age of Onset    Heart failure Mother      Cataracts Mother      Hypertension Mother      Colon cancer Mother      Colon cancer Father      Hypertension Sister 1     Liver cancer Sister 1     Glaucoma Brother 1     Lung cancer Brother 1     Colon cancer Brother twin     No Known Problems Maternal Grandmother      No Known Problems Maternal Grandfather      No Known Problems Paternal Grandmother      No Known Problems Paternal Grandfather      No Known Problems Daughter 1     No Known Problems Son 1     Breast cancer Maternal Aunt      No Known Problems Maternal Uncle      No Known Problems Paternal Aunt      No Known Problems Paternal Uncle      No Known Problems Other      Lupus Neg Hx      Rheum arthritis Neg Hx      Psoriasis Neg Hx      Amblyopia Neg Hx      Blindness Neg Hx      Cancer Neg Hx      Diabetes Neg Hx      Macular degeneration Neg Hx      Retinal detachment Neg Hx      Strabismus Neg Hx      Stroke Neg Hx      Thyroid disease Neg Hx         Allergies and Medications: (updated and reviewed)  Review of patient's allergies indicates:   Allergen Reactions    No known drug allergies      Current Medications[2]    Patient Care Team:  Ileana Martin MD as PCP - General (Family Medicine)  [facility] Referring Physician (Rheumatology)  Nando Figueroa MD (Gastroenterology)  Dermatology, Anthony (Pediatric Dermatology)  Alfie Simeon MD PhD as Consulting Physician (Interventional Cardiology)         - The patient is given an After Visit Summary that lists all medications with directions, allergies, education, orders placed during this encounter and follow-up instructions.      - I have reviewed the patient's medical information including past medical, family, and social history sections including the medications and allergies.      - We discussed the patient's  current medications.     This note was created by combination of typed  and MModal dictation.  Transcription errors may be present.  If there are any questions, please contact me.     This note was generated with the assistance of ambient listening technology. Verbal consent was obtained by the patient and accompanying visitor(s) for the recording of patient appointment to facilitate this note. I attest to having reviewed and edited the generated note for accuracy, though some syntax or spelling errors may persist. Please contact the author of this note for any clarification.          Juana Osei PA-C                      [1]   Social History  Socioeconomic History    Marital status:      Spouse name: Ric     Number of children: 2    Highest education level: Some college, no degree   Occupational History    Occupation: retired    Tobacco Use    Smoking status: Former     Current packs/day: 0.25     Average packs/day: 0.3 packs/day for 1 year (0.3 ttl pk-yrs)     Types: Cigarettes    Smokeless tobacco: Never   Substance and Sexual Activity    Alcohol use: Yes     Alcohol/week: 2.0 standard drinks of alcohol     Types: 1 Glasses of wine, 1 Cans of beer per week     Comment: very seldom    Drug use: No    Sexual activity: Not Currently     Partners: Male   Social History Narrative    Adult Screenings updated and reviewed  6/12/14    Mammogram( for females) ordered for DIS    Pap ( for females) Dr Zepeda  Due for f/u   For  2014    Colonoscopy  Done once    Flu shot yearly done  2013    Td 2005    Pneumovax  Updated  12/3/13    Zostavax done 2012    Eye exam recommended yearly done 2013    Bone density  12/9/13    Thyroid ultrasound  11/6/2012 Normal sized thyroid containing several subcentimeter nodules, similar to the 5/12/11 exam.  No concerning nodules identified..          Social Drivers of Health     Financial Resource Strain: Low Risk  (3/27/2023)    Overall Financial Resource  Strain (CARDIA)     Difficulty of Paying Living Expenses: Not hard at all   Food Insecurity: No Food Insecurity (3/27/2023)    Hunger Vital Sign     Worried About Running Out of Food in the Last Year: Never true     Ran Out of Food in the Last Year: Never true   Transportation Needs: No Transportation Needs (3/27/2023)    PRAPARE - Transportation     Lack of Transportation (Medical): No     Lack of Transportation (Non-Medical): No   Physical Activity: Inactive (3/27/2023)    Exercise Vital Sign     Days of Exercise per Week: 0 days     Minutes of Exercise per Session: 0 min   Stress: No Stress Concern Present (3/27/2023)    [employer] - Occupational Stress Questionnaire     Feeling of Stress : Only a little   Housing Stability: Unknown (3/27/2023)    Housing Stability Vital Sign     Unable to Pay for Housing in the Last Year: No     Unstable Housing in the Last Year: No   [2]   Current Outpatient Medications   Medication Sig Dispense Refill    amLODIPine (NORVASC) 5 MG tablet Take 1 tablet (5 mg total) by mouth once daily. 90 tablet 3    aspirin (ECOTRIN) 81 MG EC tablet Take 81 mg by mouth once daily.      atorvastatin (LIPITOR) 40 MG tablet TAKE ONE TABLET BY MOUTH EVERY DAY 90 tablet 3    azelastine (ASTELIN) 137 mcg (0.1 %) nasal spray 2 sprays as needed.      clobetasoL (TEMOVATE) 0.05 % external solution Apply topically 2 (two) times daily. 100 mL 4    DULoxetine (CYMBALTA) 20 MG capsule Take 1 capsule (20 mg total) by mouth once daily. 90 capsule 1    famotidine (PEPCID) 40 MG tablet Take 0.5 tablets (20 mg total) by mouth 2 (two) times daily as needed for Heartburn. 15 tablet 2    folic acid (FOLVITE) 1 MG tablet Take 3 tablets (3,000 mcg total) by mouth once daily. 270 tablet 3    furosemide (LASIX) 20 MG tablet Take 3 tablets (60 mg total) by mouth once daily. 90 tablet 2    hydrocortisone 2.5 % cream as needed.   0    irbesartan (AVAPRO) 300 MG tablet Take 1 tablet (300 mg  total) by mouth every evening. 90 tablet 3    KGCL ketamine 10%- gabapentin 6%- cyclobenzaprine 2%- LIDOcaine 4% in transdermal cream as needed.      LIDOcaine (LIDODERM) 5 % Place 1 patch onto the skin once daily. Remove & Discard patch within 12 hours or as directed by MD 30 patch 2    methotrexate 2.5 MG Tab TAKE TEN TABLETS BY MOUTH ONCE A WEEK ON MONDAYS ( TAKE 5 IN THE MORNING AND 5 IN THE EVENING ) 120 tablet 0    nabumetone (RELAFEN) 500 MG tablet Take 1 tablet (500 mg total) by mouth 2 (two) times daily. 180 tablet 0    nystatin (MYCOSTATIN) powder Apply topically 2 (two) times daily. 30 g 1    nystatin-triamcinolone (MYCOLOG II) cream Apply to affected area 2 times daily 30 g 2    pantoprazole (PROTONIX) 40 MG tablet Take 1 tablet (40 mg total) by mouth once daily. 90 tablet 3    pregabalin (LYRICA) 100 MG capsule Take 1 capsule (100 mg total) by mouth 3 (three) times daily. 90 capsule 5    triamcinolone acetonide 0.1% (KENALOG) 0.1 % cream Apply topically 3 (three) times daily. 80 g 1    valACYclovir (VALTREX) 500 MG tablet TAKE ONE TABLET BY MOUTH EVERY DAY FOR SUPPRESSION, INCREASE TO TWICE A DAY FOR OUTBREAK 180 tablet 0    fluticasone propionate (FLONASE) 50 mcg/actuation nasal spray 1 spray (50 mcg total) by Each Nostril route once daily. 16 g 5    loratadine (CLARITIN) 10 mg tablet Take 1 tablet (10 mg total) by mouth once daily. 30 tablet 0    methylPREDNISolone (MEDROL DOSEPACK) 4 mg tablet use as directed 21 each 0    miSOPROStoL (CYTOTEC) 100 MCG Tab Take 1 tablet (100 mcg total) by mouth 2 (two) times daily. 120 tablet 5     No current facility-administered medications for this visit.     Facility-Administered Medications Ordered in Other Visits   Medication Dose Route Frequency Provider Last Rate Last Admin    cyclopentolate 1% ophthalmic solution 1 drop  1 drop Left Eye On Call Procedure Elvis Pete MD   1 drop at 01/26/21 0914    ofloxacin 0.3 % ophthalmic solution 1 drop  1 drop  Left Eye On Call Procedure Elvis Pete MD   1 drop at 01/26/21 0914    sodium chloride 0.9% flush 10 mL  10 mL Intravenous PRN Elvis Pete MD

## 2025-05-30 NOTE — TELEPHONE ENCOUNTER
----- Message from Karena sent at 5/30/2025  7:57 AM CDT -----  Type:  Needs Medical Advice/Symptom-based CallWho Called: self Symptoms (please be specific): cough with green mucus How long has patient had these symptoms:  1 week Would the patient rather a call back or a response via My [facility]? [facility] Call Back Number: .[phone] (home)

## 2025-06-04 ENCOUNTER — HOSPITAL ENCOUNTER (OUTPATIENT)
Dept: RADIOLOGY | Facility: HOSPITAL | Age: 80
Discharge: HOME | End: 2025-06-04
Attending: OBSTETRICS & GYNECOLOGY
Payer: MEDICARE

## 2025-06-04 PROCEDURE — 77067 SCR MAMMO BI INCL CAD: CPT | Mod: 26,,, | Performed by: RADIOLOGY

## 2025-06-04 PROCEDURE — 77067 SCR MAMMO BI INCL CAD: CPT | Mod: TC

## 2025-06-04 PROCEDURE — 77063 BREAST TOMOSYNTHESIS BI: CPT | Mod: 26,,, | Performed by: RADIOLOGY

## 2025-06-10 ENCOUNTER — TELEPHONE (OUTPATIENT)
Dept: CARDIOLOGY | Facility: CLINIC | Age: 80
End: 2025-06-10
Payer: MEDICARE

## 2025-06-10 NOTE — TELEPHONE ENCOUNTER
----- Message from Zofia sent at 6/10/2025 11:34 AM CDT -----  Regarding: [facility] f/u  Pt [phone] or [phone] called to [facility] her 6 month f/u appt due in August nothing available. Offered her 9/18/25 she refused and called her back to offer an appt today and one on Thursday no answer.Thanks

## 2025-06-24 DIAGNOSIS — I10 ESSENTIAL HYPERTENSION: ICD-10-CM

## 2025-06-24 RX ORDER — AMLODIPINE BESYLATE 5 MG/1
5 TABLET ORAL
Qty: 90 TABLET | Refills: 3 | Status: SHIPPED | OUTPATIENT
Start: 2025-06-24

## 2025-06-24 NOTE — TELEPHONE ENCOUNTER
Care Due:                  Date            Visit Type   Department     Provider  --------------------------------------------------------------------------------                                [facility]                              PRIMARY      MEDICINE /  Last Visit: 05-      CARE ([facility])   INTERNAL MED   Ileana Mix  Next Visit: None Scheduled  None         None Found                                                            Last  Test          Frequency    Reason                     Performed    Due Date  --------------------------------------------------------------------------------    Lipid Panel.  12 months..  atorvastatin.............  06- 06-    [facility] Embedded Care Due Messages. Reference number: [id number].   6/24/2025 11:50:46 AM CDT

## 2025-06-24 NOTE — TELEPHONE ENCOUNTER
Refill Decision Note   Yulia Peralta  is requesting a refill authorization.  Brief Assessment and Rationale for Refill:  Approve     Medication Therapy Plan: Lipid panel      Comments:     Note composed:11:51 AM 06/24/2025

## 2025-07-01 ENCOUNTER — TELEPHONE (OUTPATIENT)
Dept: PAIN MEDICINE | Facility: CLINIC | Age: 80
End: 2025-07-01
Payer: MEDICARE

## 2025-07-01 ENCOUNTER — INITIAL CONSULT (OUTPATIENT)
Dept: VASCULAR SURGERY | Facility: CLINIC | Age: 80
End: 2025-07-01
Payer: MEDICARE

## 2025-07-01 VITALS
DIASTOLIC BLOOD PRESSURE: 78 MMHG | BODY MASS INDEX: 33.81 KG/M2 | HEIGHT: 62 IN | WEIGHT: 183.75 LBS | SYSTOLIC BLOOD PRESSURE: 152 MMHG | HEART RATE: 73 BPM

## 2025-07-01 DIAGNOSIS — I73.9 CLAUDICATION OF BOTH LOWER EXTREMITIES: ICD-10-CM

## 2025-07-01 DIAGNOSIS — G62.9 NEUROPATHY: Primary | ICD-10-CM

## 2025-07-01 DIAGNOSIS — I73.9 PAD (PERIPHERAL ARTERY DISEASE): ICD-10-CM

## 2025-07-01 DIAGNOSIS — R60.0 LOCALIZED EDEMA: ICD-10-CM

## 2025-07-01 DIAGNOSIS — B96.89 BV (BACTERIAL VAGINOSIS): Primary | ICD-10-CM

## 2025-07-01 DIAGNOSIS — N76.0 BV (BACTERIAL VAGINOSIS): Primary | ICD-10-CM

## 2025-07-01 PROCEDURE — 99214 OFFICE O/P EST MOD 30 MIN: CPT | Mod: S$PBB,,, | Performed by: SURGERY

## 2025-07-01 PROCEDURE — 99999 PR PBB SHADOW E&M-EST. PATIENT-LVL V: CPT | Mod: PBBFAC,,, | Performed by: SURGERY

## 2025-07-01 PROCEDURE — 99215 OFFICE O/P EST HI 40 MIN: CPT | Mod: PBBFAC | Performed by: SURGERY

## 2025-07-01 RX ORDER — CLINDAMYCIN HYDROCHLORIDE 300 MG/1
300 CAPSULE ORAL 2 TIMES DAILY
Qty: 14 CAPSULE | Refills: 0 | Status: SHIPPED | OUTPATIENT
Start: 2025-07-01 | End: 2025-07-08

## 2025-07-01 NOTE — TELEPHONE ENCOUNTER
----- Message from Med Assistant Yany sent at 7/1/2025  2:59 PM CDT -----  Good afternoon, Dr. Sandhu placed a referral for this patient. Can you please reach out to the patient to get her scheduled.         Thank you...

## 2025-07-01 NOTE — PROGRESS NOTES
Pedro Luis Sandhu MD [facility]                         07/01/2025    HPI:  Yulia Peralta is a 80 y.o. female with   Patient Active Problem List   Diagnosis    Rheumatoid arthritis    Multiple thyroid nodules    Anemia of other chronic disease    Migraine headache    Essential hypertension    GERD (gastroesophageal reflux disease)    Lumbar spondylosis    DDD (degenerative disc disease), lumbar    Spinal stenosis, lumbar region, with neurogenic claudication    Acquired spondylolisthesis    Genital herpes in women    Vitamin B 12 deficiency    Acquired scoliosis    Immunosuppression    Anserine bursitis    Lumbosacral radiculopathy    Abnormal CT scan, chest    Thymoma    Cerebral microvascular disease    Tortuous aorta    Refractive error    Cerebral aneurysm without rupture    Impacted cerumen of both ears    Claudication of both lower extremities    Obesity (BMI 30-39.9)    PAD (peripheral artery disease)    Bilateral carotid artery stenosis    Mixed hyperlipidemia    Gait instability    Disorder of thyroid gland    Severe obesity (BMI 35.0-39.9) with comorbidity    Other chest pain    Pelvic floor dysfunction    Pulmonary hypertension    Gastroesophageal reflux disease    NSAID long-term use    Age-related osteoporosis without current pathological fracture    Gout, arthritis    Chronic allergic rhinitis    Cough    Post-nasal drip    being managed by PCP and specialists who is here today for evaluation of PVD.  Patient has no complaints of claudication.  Patient states location is LLE occurring for months.  Associated signs and symptoms include chronic back pain.  Quality is sharp and severity is 7/10.  Symptoms began weeks to months ago.  Alleviating factors include walking.  Worsening factors include resting.  no rest pain.  no tissue loss.  Patient is not diabetic.  Is not on Pletal.  no previous lower extremity interventions.    Patient has no  cerebrovascular complaints today.  She has no history of stroke or mini stroke.  No abdominal pain.  She has chronic back pain that is being managed by Dr. Curry and she is in the healthy back program.  She has complaints of left distal lower extremity sharp pain at rest.  It improves with ambulation.  She denies claudication, rest pain or wounds.    no MI  no Stroke  Tobacco use: denies  Daily Aspirin: yes  Anticoagulation: no    3/2022:  Patient feels better without significant back pain.  She does have severe left foot pain that is induced with standing with unsteady gait and it radiates upwards to her knee.  She completed the healthy back program although has not followed up with pain or nerve surgery per Dr. Brar recommendations.  She denies stroke or mini stroke.  She is compliant with all her medications.  She saw Podiatry for the foot pain and they recommended steroid pack and support shoes.    8/2022:  c/o LLE and L foot pain.  No claudication.      3/2023:  No new complaints or issues.  Minimal BLE edema.    7/2025: c/o numbness to bottom of feet, foot/ankle, edema for months    Past Medical History:   Diagnosis Date    Abnormal colonoscopy 07/24/2020    colon polyps nad repeat in 3 years.     Acid reflux     Anemia     Anxiety     Arthritis     Blood transfusion     Cataract     Depression     Dry eyes     H/O colonoscopy 07/22/2020    dr. nicholas. repeat in 3 years.     Heart murmur     Hypertension     Left lumbar radiculitis 05/19/2015    Mixed hyperlipidemia 11/05/2021    Multiple thyroid nodules 12/18/2012    Osteoporosis     Rheumatoid arthritis     Thoracic or lumbosacral neuritis or radiculitis, unspecified 10/30/2013     Past Surgical History:   Procedure Laterality Date    CATARACT EXTRACTION W/  INTRAOCULAR LENS IMPLANT Left 1/26/2021    Procedure: EXTRACTION, CATARACT, WITH IOL INSERTION;  Surgeon: Elvis Pete MD;  Location: [facility];  Service: Ophthalmology;  Laterality: Left;     CATARACT EXTRACTION W/  INTRAOCULAR LENS IMPLANT Right 2/23/2021    Procedure: EXTRACTION, CATARACT, WITH IOL INSERTION;  Surgeon: Elvis Pete MD;  Location: [facility];  Service: Ophthalmology;  Laterality: Right;    CEREBRAL ANGIOGRAM N/A 1/23/2020    Procedure: ANGIOGRAM-CEREBRAL;  Surgeon: Hira Diagnostic Provider;  [facility];  Service: Radiology;  Laterality: N/A;  /Fuentes    gallstones  [id number]    HYSTERECTOMY      JOINT REPLACEMENT  [id number]     bilateral knee    OOPHORECTOMY      VA REMOVAL OF OVARY/TUBE(S)      TONSILLECTOMY       Family History   Problem Relation Name Age of Onset    Heart failure Mother      Cataracts Mother      Hypertension Mother      Colon cancer Mother      Colon cancer Father      Hypertension Sister 1     Liver cancer Sister 1     Glaucoma Brother 1     Lung cancer Brother 1     Colon cancer Brother twin     No Known Problems Maternal Grandmother      No Known Problems Maternal Grandfather      No Known Problems Paternal Grandmother      No Known Problems Paternal Grandfather      No Known Problems Daughter 1     No Known Problems Son 1     Breast cancer Maternal Aunt      No Known Problems Maternal Uncle      No Known Problems Paternal Aunt      No Known Problems Paternal Uncle      No Known Problems Other      Lupus Neg Hx      Rheum arthritis Neg Hx      Psoriasis Neg Hx      Amblyopia Neg Hx      Blindness Neg Hx      Cancer Neg Hx      Diabetes Neg Hx      Macular degeneration Neg Hx      Retinal detachment Neg Hx      Strabismus Neg Hx      Stroke Neg Hx      Thyroid disease Neg Hx       Social History     Socioeconomic History    Marital status:      Spouse name: Ric     Number of children: 2    Highest education level: Some college, no degree   Occupational History    Occupation: retired    Tobacco Use    Smoking status: Former     Current packs/day: 0.25     Average packs/day: 0.3 packs/day for 1 year (0.3 ttl pk-yrs)     Types:  Cigarettes    Smokeless tobacco: Never   Substance and Sexual Activity    Alcohol use: Yes     Alcohol/week: 2.0 standard drinks of alcohol     Types: 1 Glasses of wine, 1 Cans of beer per week     Comment: very seldom    Drug use: No    Sexual activity: Not Currently     Partners: Male   Social History Narrative    Adult Screenings updated and reviewed  6/12/14    Mammogram( for females) ordered for DIS    Pap ( for females) Dr Zepeda  Due for f/u   For  2014    Colonoscopy  Done once    Flu shot yearly done  2013    Td 2005    Pneumovax  Updated  12/3/13    Zostavax done 2012    Eye exam recommended yearly done 2013    Bone density  12/9/13    Thyroid ultrasound  11/6/2012 Normal sized thyroid containing several subcentimeter nodules, similar to the 5/12/11 exam.  No concerning nodules identified..          Social Drivers of Health     Financial Resource Strain: Low Risk  (3/27/2023)    Overall Financial Resource Strain (CARDIA)     Difficulty of Paying Living Expenses: Not hard at all   Food Insecurity: No Food Insecurity (3/27/2023)    Hunger Vital Sign     Worried About Running Out of Food in the Last Year: Never true     Ran Out of Food in the Last Year: Never true   Transportation Needs: No Transportation Needs (3/27/2023)    PRAPARE - Transportation     Lack of Transportation (Medical): No     Lack of Transportation (Non-Medical): No   Physical Activity: Inactive (3/27/2023)    Exercise Vital Sign     Days of Exercise per Week: 0 days     Minutes of Exercise per Session: 0 min   Stress: No Stress Concern Present (3/27/2023)    [employer] - Occupational Stress Questionnaire     Feeling of Stress : Only a little   Housing Stability: Unknown (3/27/2023)    Housing Stability Vital Sign     Unable to Pay for Housing in the Last Year: No     Unstable Housing in the Last Year: No       Current Outpatient Medications:     aspirin (ECOTRIN) 81 MG EC tablet, Take 81 mg by mouth once  daily., Disp: , Rfl:     atorvastatin (LIPITOR) 40 MG tablet, TAKE ONE TABLET BY MOUTH EVERY DAY, Disp: 90 tablet, Rfl: 3    azelastine (ASTELIN) 137 mcg (0.1 %) nasal spray, 2 sprays as needed., Disp: , Rfl:     clobetasoL (TEMOVATE) 0.05 % external solution, Apply topically 2 (two) times daily., Disp: 100 mL, Rfl: 4    DULoxetine (CYMBALTA) 20 MG capsule, Take 1 capsule (20 mg total) by mouth once daily., Disp: 90 capsule, Rfl: 1    famotidine (PEPCID) 40 MG tablet, Take 0.5 tablets (20 mg total) by mouth 2 (two) times daily as needed for Heartburn., Disp: 15 tablet, Rfl: 2    fluticasone propionate (FLONASE) 50 mcg/actuation nasal spray, 1 spray (50 mcg total) by Each Nostril route once daily., Disp: 16 g, Rfl: 5    folic acid (FOLVITE) 1 MG tablet, Take 3 tablets (3,000 mcg total) by mouth once daily., Disp: 270 tablet, Rfl: 3    furosemide (LASIX) 20 MG tablet, Take 3 tablets (60 mg total) by mouth once daily., Disp: 90 tablet, Rfl: 2    hydrocortisone 2.5 % cream, as needed. , Disp: , Rfl: 0    irbesartan (AVAPRO) 300 MG tablet, Take 1 tablet (300 mg total) by mouth every evening., Disp: 90 tablet, Rfl: 3    KGCL ketamine 10%- gabapentin 6%- cyclobenzaprine 2%- LIDOcaine 4% in transdermal cream, as needed., Disp: , Rfl:     LIDOcaine (LIDODERM) 5 %, Place 1 patch onto the skin once daily. Remove & Discard patch within 12 hours or as directed by MD, Disp: 30 patch, Rfl: 2    loratadine (CLARITIN) 10 mg tablet, Take 1 tablet (10 mg total) by mouth once daily., Disp: 30 tablet, Rfl: 0    methotrexate 2.5 MG Tab, TAKE TEN TABLETS BY MOUTH ONCE A WEEK ON MONDAYS ( TAKE 5 IN THE MORNING AND 5 IN THE EVENING ), Disp: 120 tablet, Rfl: 0    methylPREDNISolone (MEDROL DOSEPACK) 4 mg tablet, use as directed, Disp: 21 each, Rfl: 0    miSOPROStoL (CYTOTEC) 100 MCG Tab, Take 1 tablet (100 mcg total) by mouth 2 (two) times daily., Disp: 120 tablet, Rfl: 5    nabumetone (RELAFEN) 500 MG tablet, Take 1 tablet (500 mg total) by  mouth 2 (two) times daily., Disp: 180 tablet, Rfl: 0    NORVASC 5 mg tablet, TAKE ONE TABLET BY MOUTH EVERY DAY, Disp: 90 tablet, Rfl: 3    nystatin (MYCOSTATIN) powder, Apply topically 2 (two) times daily., Disp: 30 g, Rfl: 1    nystatin-triamcinolone (MYCOLOG II) cream, Apply to affected area 2 times daily, Disp: 30 g, Rfl: 2    pantoprazole (PROTONIX) 40 MG tablet, Take 1 tablet (40 mg total) by mouth once daily., Disp: 90 tablet, Rfl: 3    pregabalin (LYRICA) 100 MG capsule, Take 1 capsule (100 mg total) by mouth 3 (three) times daily., Disp: 90 capsule, Rfl: 5    triamcinolone acetonide 0.1% (KENALOG) 0.1 % cream, Apply topically 3 (three) times daily., Disp: 80 g, Rfl: 1    valACYclovir (VALTREX) 500 MG tablet, TAKE ONE TABLET BY MOUTH EVERY DAY FOR SUPPRESSION, INCREASE TO TWICE A DAY FOR OUTBREAK, Disp: 180 tablet, Rfl: 0  No current facility-administered medications for this visit.    Facility-Administered Medications Ordered in Other Visits:     cyclopentolate 1% ophthalmic solution 1 drop, 1 drop, Left Eye, On Call Procedure, Elvis Pete MD, 1 drop at 01/26/21 0914    ofloxacin 0.3 % ophthalmic solution 1 drop, 1 drop, Left Eye, On Call Procedure, Elvis Pete MD, 1 drop at 01/26/21 0914    sodium chloride 0.9% flush 10 mL, 10 mL, Intravenous, PRN, Elvis Pete MD    REVIEW OF SYSTEMS:  General: No fevers or chills; ENT: No sore throat; Allergy and Immunology: no persistent infections; Hematological and Lymphatic: No history of bleeding or easy bruising; Endocrine: negative; Respiratory: no cough, shortness of breath, or wheezing; Cardiovascular: no chest pain or dyspnea on exertion; no claudication, no rest pain; Gastrointestinal: no abdominal pain/back, change in bowel habits, or bloody stools; Genito-Urinary: no dysuria, trouble voiding, or hematuria; Musculoskeletal: negative, no wound; Neurological: no TIA or stroke symptoms; Psychiatric: no nervousness, anxiety or  "depression.    PHYSICAL EXAM:      Pulse: 73         General appearance:  Alert, well-appearing, and in no distress.  Oriented to person, place, and time                    Neurological: Normal speech, no focal findings noted; RLE with sensation to light touch, LLE with sensation to light touch.            Musculoskeletal: Digits/nail without cyanosis/clubbing.  Strength 5/5 BLE.                    Neck: Supple                  Chest:  No use of accessory muscles               Cardiac: Normal rate and regular rhythm            Abdomen: ND           Extremities:2+ R femoral pulse, 2+ L femoral pulse     2+ R popliteal pulse, 2+ L popliteal pulse     2+ R PT pulse, 2+ L PT pulse     2+ R DP pulse, 1+ L DP pulse     1+ RLE edema, 1+ LLE edema    Skin: RLE no tissue loss; LLE no tissue loss    LAB RESULTS:  No results found for: "CBC"  Lab Results   Component Value Date    LABPROT 10.2 01/23/2020    INR 1.0 01/23/2020     Lab Results   Component Value Date     04/14/2025    K 4.2 04/14/2025     04/14/2025    CO2 24 04/14/2025    GLU 98 04/14/2025    BUN 9 04/14/2025    CREATININE 0.6 04/14/2025    CALCIUM 9.5 04/14/2025    ANIONGAP 11 04/14/2025    EGFRNONAA >60.0 05/06/2022     Lab Results   Component Value Date    WBC 6.37 04/14/2025    RBC 3.48 (L) 04/14/2025    HGB 10.9 (L) 04/14/2025    HCT 34.3 (L) 04/14/2025    MCV 99 (H) 04/14/2025    MCH 31.3 (H) 04/14/2025    MCHC 31.8 (L) 04/14/2025    RDW 14.2 04/14/2025     04/14/2025    MPV 14.1 (H) 04/14/2025    GRAN 3.6 12/11/2024    GRAN 59.0 12/11/2024    LYMPH 22.0 04/14/2025    LYMPH 1.40 04/14/2025    MONO 13.5 04/14/2025    MONO 0.86 04/14/2025    EOS 4.4 04/14/2025    EOS 0.28 04/14/2025    BASO 0.02 12/11/2024    EOSINOPHIL 3.8 12/11/2024    BASOPHIL 0.2 04/14/2025    BASOPHIL 0.01 04/14/2025    DIFFMETHOD Automated 12/11/2024     .  Lab Results   Component Value Date    HGBA1C 5.3 11/16/2020       IMAGING:  All pertinent imaging has been " reviewed and interpreted independently.    ONESIMO 8/13/21:  Normal rest and exercise ONESIMO bilaterally.  Normal PVR waveforms bilaterally.    8/2022  Right lower extremity pressures and waveforms indicate no hemodynamically significant arterial occlusive disease.  Left lower extremity pressures and waveforms indicate no hemodynamically significant arterial occlusive disease.    12/2022  Right carotid ultrasound shows 0-19% internal carotid artery stenosis.  Antegrade vertebral artery flow.  Left carotid ultrasound shows 20-39% internal carotid artery stenosis.  Antegrade vertebral artery flow.    4/2024    There is 20-39% right Internal Carotid Stenosis.    There is 20-39% left Internal Carotid Stenosis.    IMP/PLAN:  80 y.o. female with   Patient Active Problem List   Diagnosis    Rheumatoid arthritis    Multiple thyroid nodules    Anemia of other chronic disease    Migraine headache    Essential hypertension    GERD (gastroesophageal reflux disease)    Lumbar spondylosis    DDD (degenerative disc disease), lumbar    Spinal stenosis, lumbar region, with neurogenic claudication    Acquired spondylolisthesis    Genital herpes in women    Vitamin B 12 deficiency    Acquired scoliosis    Immunosuppression    Anserine bursitis    Lumbosacral radiculopathy    Abnormal CT scan, chest    Thymoma    Cerebral microvascular disease    Tortuous aorta    Refractive error    Cerebral aneurysm without rupture    Impacted cerumen of both ears    Claudication of both lower extremities    Obesity (BMI 30-39.9)    PAD (peripheral artery disease)    Bilateral carotid artery stenosis    Mixed hyperlipidemia    Gait instability    Disorder of thyroid gland    Severe obesity (BMI 35.0-39.9) with comorbidity    Other chest pain    Pelvic floor dysfunction    Pulmonary hypertension    Gastroesophageal reflux disease    NSAID long-term use    Age-related osteoporosis without current pathological fracture    Gout, arthritis    Chronic allergic  rhinitis    Cough    Post-nasal drip    being managed by PCP and specialists who is here today for evaluation of PVD.    - imaging reviewed without hemodynamically significant stenosis bilateral lower extremity or evidence of significant stenosis on CT angiogram - no vascular surgical intervention indicated at this time  -Cont back pain mgmt.  LLE pain and L foot pain - referred to Pain   -bilateral lower extremity pitting edema, asymptomatic-recommend compression with stockings, elevation, dietary changes associated with water and sodium intake discussed at length with patient  -continue daily ASA  -Exercise  -Heart healthy lifestyle  -RTC 6 weeks with venous insuff US and ONESIMO    I spent 11 minutes evaluating this patient and greater than 50% of the time was spent counseling, coordinator care and discussing the plan of care.  All questions were answered and patient stated understanding with agreement with the above treatment plan.    Pedro Luis Sandhu MD [facility]  Vascular and Endovascular Surgery

## 2025-07-01 NOTE — TELEPHONE ENCOUNTER
"left a voicemail, asking patient to call [facility] to schedule a New Patient appointment with Dr. Greco.    "  ----- Message from Med Assistant Yany sent at 7/1/2025  2:59 PM CDT -----  Good afternoon, Dr. Sandhu placed a referral for this patient. Can you please reach out to the patient to get her scheduled.            Thank you...      "  "

## 2025-07-07 DIAGNOSIS — M05.79 RHEUMATOID ARTHRITIS INVOLVING MULTIPLE SITES WITH POSITIVE RHEUMATOID FACTOR: ICD-10-CM

## 2025-07-07 DIAGNOSIS — I10 ESSENTIAL HYPERTENSION: ICD-10-CM

## 2025-07-07 NOTE — TELEPHONE ENCOUNTER
No care due was identified.  [facility] Embedded Care Due Messages. Reference number: [id number].   7/07/2025 4:50:09 PM CDT

## 2025-07-08 DIAGNOSIS — M10.9 GOUT, ARTHRITIS: ICD-10-CM

## 2025-07-08 DIAGNOSIS — Z79.1 NSAID LONG-TERM USE: ICD-10-CM

## 2025-07-08 DIAGNOSIS — D84.9 IMMUNOSUPPRESSION: ICD-10-CM

## 2025-07-08 DIAGNOSIS — M51.369 DDD (DEGENERATIVE DISC DISEASE), LUMBAR: ICD-10-CM

## 2025-07-08 DIAGNOSIS — M05.79 RHEUMATOID ARTHRITIS INVOLVING MULTIPLE SITES WITH POSITIVE RHEUMATOID FACTOR: ICD-10-CM

## 2025-07-08 DIAGNOSIS — K21.9 GASTROESOPHAGEAL REFLUX DISEASE: ICD-10-CM

## 2025-07-08 RX ORDER — NABUMETONE 500 MG/1
500 TABLET, FILM COATED ORAL 2 TIMES DAILY
Qty: 180 TABLET | Refills: 0 | Status: SHIPPED | OUTPATIENT
Start: 2025-07-08

## 2025-07-08 RX ORDER — METHOTREXATE 2.5 MG/1
TABLET ORAL
Qty: 120 TABLET | Refills: 0 | Status: SHIPPED | OUTPATIENT
Start: 2025-07-08

## 2025-07-08 NOTE — TELEPHONE ENCOUNTER
Refill Routing Note   Medication(s) are not appropriate for processing by [facility] for the following reason(s):        Required vitals abnormal    [facility] action(s):  Defer               Appointments  past 12m or future 3m with PCP    Date Provider   Last Visit   5/13/2025 Ileana Martin MD   Next Visit   Visit date not found Ileana Martin MD   ED visits in past 90 days: 0        Note composed:12:16 PM 07/08/2025

## 2025-07-09 ENCOUNTER — LAB VISIT (OUTPATIENT)
Dept: LAB | Facility: HOSPITAL | Age: 80
End: 2025-07-09
Attending: INTERNAL MEDICINE
Payer: MEDICARE

## 2025-07-09 DIAGNOSIS — Z79.899 IMMUNOSUPPRESSION DUE TO DRUG THERAPY: ICD-10-CM

## 2025-07-09 DIAGNOSIS — M05.79 RHEUMATOID ARTHRITIS INVOLVING MULTIPLE SITES WITH POSITIVE RHEUMATOID FACTOR: ICD-10-CM

## 2025-07-09 DIAGNOSIS — D84.821 IMMUNOSUPPRESSION DUE TO DRUG THERAPY: ICD-10-CM

## 2025-07-09 LAB
ABSOLUTE EOSINOPHIL (OHS): 0.18 K/UL
ABSOLUTE MONOCYTE (OHS): 1.21 K/UL (ref 0.3–1)
ABSOLUTE NEUTROPHIL COUNT (OHS): 3.23 K/UL (ref 1.8–7.7)
ALBUMIN SERPL BCP-MCNC: 3.7 G/DL (ref 3.5–5.2)
ALP SERPL-CCNC: 109 UNIT/L (ref 40–150)
ALT SERPL W/O P-5'-P-CCNC: 22 UNIT/L (ref 10–44)
ANION GAP (OHS): 6 MMOL/L (ref 8–16)
AST SERPL-CCNC: 23 UNIT/L (ref 11–45)
BASOPHILS # BLD AUTO: 0.02 K/UL
BASOPHILS NFR BLD AUTO: 0.3 %
BILIRUB SERPL-MCNC: 0.4 MG/DL (ref 0.1–1)
BUN SERPL-MCNC: 9 MG/DL (ref 8–23)
CALCIUM SERPL-MCNC: 9.3 MG/DL (ref 8.7–10.5)
CHLORIDE SERPL-SCNC: 107 MMOL/L (ref 95–110)
CO2 SERPL-SCNC: 27 MMOL/L (ref 23–29)
CREAT SERPL-MCNC: 0.6 MG/DL (ref 0.5–1.4)
CRP SERPL-MCNC: 1.6 MG/L
ERYTHROCYTE [DISTWIDTH] IN BLOOD BY AUTOMATED COUNT: 13.4 % (ref 11.5–14.5)
ERYTHROCYTE [SEDIMENTATION RATE] IN BLOOD BY PHOTOMETRIC METHOD: 18 MM/HR
GFR SERPLBLD CREATININE-BSD FMLA CKD-EPI: >60 ML/MIN/1.73/M2
GLUCOSE SERPL-MCNC: 93 MG/DL (ref 70–110)
HCT VFR BLD AUTO: 33.7 % (ref 37–48.5)
HGB BLD-MCNC: 10.8 GM/DL (ref 12–16)
IMM GRANULOCYTES # BLD AUTO: 0.05 K/UL (ref 0–0.04)
IMM GRANULOCYTES NFR BLD AUTO: 0.8 % (ref 0–0.5)
LYMPHOCYTES # BLD AUTO: 1.39 K/UL (ref 1–4.8)
MCH RBC QN AUTO: 31.9 PG (ref 27–31)
MCHC RBC AUTO-ENTMCNC: 32 G/DL (ref 32–36)
MCV RBC AUTO: 99 FL (ref 82–98)
NUCLEATED RBC (/100WBC) (OHS): 0 /100 WBC
PLATELET # BLD AUTO: 154 K/UL (ref 150–450)
PMV BLD AUTO: 13.8 FL (ref 9.2–12.9)
POTASSIUM SERPL-SCNC: 4.7 MMOL/L (ref 3.5–5.1)
PROT SERPL-MCNC: 7.1 GM/DL (ref 6–8.4)
RBC # BLD AUTO: 3.39 M/UL (ref 4–5.4)
RELATIVE EOSINOPHIL (OHS): 3 %
RELATIVE LYMPHOCYTE (OHS): 22.9 % (ref 18–48)
RELATIVE MONOCYTE (OHS): 19.9 % (ref 4–15)
RELATIVE NEUTROPHIL (OHS): 53.1 % (ref 38–73)
SODIUM SERPL-SCNC: 140 MMOL/L (ref 136–145)
WBC # BLD AUTO: 6.08 K/UL (ref 3.9–12.7)

## 2025-07-09 PROCEDURE — 82310 ASSAY OF CALCIUM: CPT

## 2025-07-09 PROCEDURE — 36415 COLL VENOUS BLD VENIPUNCTURE: CPT | Mod: PO

## 2025-07-09 PROCEDURE — 85025 COMPLETE CBC W/AUTO DIFF WBC: CPT

## 2025-07-09 PROCEDURE — 86140 C-REACTIVE PROTEIN: CPT

## 2025-07-09 PROCEDURE — 85652 RBC SED RATE AUTOMATED: CPT

## 2025-07-09 RX ORDER — FUROSEMIDE 20 MG/1
60 TABLET ORAL
Qty: 90 TABLET | Refills: 3 | Status: SHIPPED | OUTPATIENT
Start: 2025-07-09

## 2025-07-10 ENCOUNTER — TELEPHONE (OUTPATIENT)
Dept: RHEUMATOLOGY | Facility: CLINIC | Age: 80
End: 2025-07-10
Payer: MEDICARE

## 2025-07-10 DIAGNOSIS — N89.8 VAGINAL ODOR: Primary | ICD-10-CM

## 2025-07-10 RX ORDER — CLINDAMYCIN HYDROCHLORIDE 300 MG/1
300 CAPSULE ORAL 2 TIMES DAILY
Qty: 14 CAPSULE | Refills: 0 | Status: SHIPPED | OUTPATIENT
Start: 2025-07-10 | End: 2025-07-17

## 2025-07-10 NOTE — TELEPHONE ENCOUNTER
Labs are stable.  Staff schedule a follow-up for patient with myself or the rheumatology nurse practitioner.

## 2025-07-22 ENCOUNTER — TELEPHONE (OUTPATIENT)
Dept: RHEUMATOLOGY | Facility: CLINIC | Age: 80
End: 2025-07-22
Payer: MEDICARE

## 2025-07-22 NOTE — TELEPHONE ENCOUNTER
I CALLED PATIENT AND GOT HER AN APPOINTMENT SCHEDULED WITH [facility] ON A HELD SLOT FOR 10/02 @ 10. PATIENT CONFIRMED APPT.

## 2025-08-13 ENCOUNTER — HOSPITAL ENCOUNTER (OUTPATIENT)
Dept: RADIOLOGY | Facility: OTHER | Age: 80
Discharge: HOME | End: 2025-08-13
Payer: MEDICARE

## 2025-08-13 DIAGNOSIS — M81.0 AGE-RELATED OSTEOPOROSIS WITHOUT CURRENT PATHOLOGICAL FRACTURE: ICD-10-CM

## 2025-08-13 PROCEDURE — 77080 DXA BONE DENSITY AXIAL: CPT | Mod: TC

## 2025-08-13 PROCEDURE — 77080 DXA BONE DENSITY AXIAL: CPT | Mod: 26,,, | Performed by: RADIOLOGY

## 2025-08-26 ENCOUNTER — OFFICE VISIT (OUTPATIENT)
Dept: PAIN MEDICINE | Facility: CLINIC | Age: 80
End: 2025-08-26
Payer: MEDICARE

## 2025-08-26 VITALS
DIASTOLIC BLOOD PRESSURE: 65 MMHG | HEART RATE: 70 BPM | OXYGEN SATURATION: 99 % | RESPIRATION RATE: 18 BRPM | SYSTOLIC BLOOD PRESSURE: 137 MMHG

## 2025-08-26 DIAGNOSIS — M51.362 DEGENERATION OF INTERVERTEBRAL DISC OF LUMBAR REGION WITH DISCOGENIC BACK PAIN AND LOWER EXTREMITY PAIN: ICD-10-CM

## 2025-08-26 DIAGNOSIS — M54.16 LUMBAR RADICULOPATHY, CHRONIC: ICD-10-CM

## 2025-08-26 DIAGNOSIS — M47.816 LUMBAR SPONDYLOSIS: ICD-10-CM

## 2025-08-26 DIAGNOSIS — M48.062 SPINAL STENOSIS, LUMBAR REGION, WITH NEUROGENIC CLAUDICATION: Primary | ICD-10-CM

## 2025-08-26 PROCEDURE — 99215 OFFICE O/P EST HI 40 MIN: CPT | Mod: PBBFAC,PN | Performed by: PAIN MEDICINE

## 2025-08-26 PROCEDURE — 99999 PR PBB SHADOW E&M-EST. PATIENT-LVL V: CPT | Mod: PBBFAC,,, | Performed by: PAIN MEDICINE

## 2025-08-26 PROCEDURE — 99204 OFFICE O/P NEW MOD 45 MIN: CPT | Mod: S$PBB,,, | Performed by: PAIN MEDICINE

## 2025-08-30 ENCOUNTER — HOSPITAL ENCOUNTER (OUTPATIENT)
Dept: RADIOLOGY | Facility: HOSPITAL | Age: 80
Discharge: HOME | End: 2025-08-30
Attending: PAIN MEDICINE
Payer: MEDICARE

## 2025-08-30 DIAGNOSIS — M54.16 LUMBAR RADICULOPATHY, CHRONIC: ICD-10-CM

## 2025-08-30 DIAGNOSIS — M47.816 LUMBAR SPONDYLOSIS: ICD-10-CM

## 2025-08-30 DIAGNOSIS — M48.062 SPINAL STENOSIS, LUMBAR REGION, WITH NEUROGENIC CLAUDICATION: ICD-10-CM

## 2025-08-30 DIAGNOSIS — M51.362 DEGENERATION OF INTERVERTEBRAL DISC OF LUMBAR REGION WITH DISCOGENIC BACK PAIN AND LOWER EXTREMITY PAIN: ICD-10-CM

## 2025-08-30 PROCEDURE — 72148 MRI LUMBAR SPINE W/O DYE: CPT | Mod: TC

## 2025-08-30 PROCEDURE — 72148 MRI LUMBAR SPINE W/O DYE: CPT | Mod: 26,,, | Performed by: RADIOLOGY

## 2025-09-03 ENCOUNTER — OFFICE VISIT (OUTPATIENT)
Dept: PAIN MEDICINE | Facility: CLINIC | Age: 80
End: 2025-09-03
Payer: MEDICARE

## 2025-09-03 VITALS
HEART RATE: 59 BPM | RESPIRATION RATE: 18 BRPM | DIASTOLIC BLOOD PRESSURE: 70 MMHG | SYSTOLIC BLOOD PRESSURE: 163 MMHG | OXYGEN SATURATION: 100 %

## 2025-09-03 DIAGNOSIS — M47.816 LUMBAR SPONDYLOSIS: Primary | ICD-10-CM

## 2025-09-03 DIAGNOSIS — M54.51 VERTEBROGENIC LOW BACK PAIN: ICD-10-CM

## 2025-09-03 DIAGNOSIS — M48.062 SPINAL STENOSIS, LUMBAR REGION, WITH NEUROGENIC CLAUDICATION: ICD-10-CM

## 2025-09-03 DIAGNOSIS — M54.16 LUMBAR RADICULOPATHY: ICD-10-CM

## 2025-09-03 DIAGNOSIS — M51.362 DEGENERATION OF INTERVERTEBRAL DISC OF LUMBAR REGION WITH DISCOGENIC BACK PAIN AND LOWER EXTREMITY PAIN: ICD-10-CM

## 2025-09-03 PROCEDURE — 99999 PR PBB SHADOW E&M-EST. PATIENT-LVL III: CPT | Mod: PBBFAC,,, | Performed by: PAIN MEDICINE

## 2025-09-03 PROCEDURE — 99214 OFFICE O/P EST MOD 30 MIN: CPT | Mod: S$PBB,,, | Performed by: PAIN MEDICINE

## 2025-09-03 PROCEDURE — 99213 OFFICE O/P EST LOW 20 MIN: CPT | Mod: PBBFAC,PN | Performed by: PAIN MEDICINE

## (undated) DEVICE — Device

## (undated) DEVICE — SYR SLIP TIP 1CC

## (undated) DEVICE — BLADE SURG BVL ANG COAX 2.4MM

## (undated) DEVICE — SOL IRR STRL WATER 500ML

## (undated) DEVICE — SOL BETADINE 5%

## (undated) DEVICE — SHEILD & GARTERS FOX METAL EYE

## (undated) DEVICE — CASSETTE INFINITI

## (undated) DEVICE — GLOVE BIOGEL SKINSENSE PI 7.5